# Patient Record
Sex: MALE | Race: WHITE | NOT HISPANIC OR LATINO | Employment: OTHER | URBAN - METROPOLITAN AREA
[De-identification: names, ages, dates, MRNs, and addresses within clinical notes are randomized per-mention and may not be internally consistent; named-entity substitution may affect disease eponyms.]

---

## 2019-06-20 ENCOUNTER — TRANSFERRED RECORDS (OUTPATIENT)
Dept: HEALTH INFORMATION MANAGEMENT | Facility: CLINIC | Age: 64
End: 2019-06-20

## 2020-02-24 ENCOUNTER — OFFICE VISIT (OUTPATIENT)
Dept: NEPHROLOGY | Facility: CLINIC | Age: 65
End: 2020-02-24
Payer: MEDICARE

## 2020-02-24 VITALS
HEART RATE: 91 BPM | BODY MASS INDEX: 31.33 KG/M2 | WEIGHT: 231 LBS | SYSTOLIC BLOOD PRESSURE: 134 MMHG | OXYGEN SATURATION: 98 % | DIASTOLIC BLOOD PRESSURE: 83 MMHG

## 2020-02-24 DIAGNOSIS — D64.9 ANEMIA, UNSPECIFIED TYPE: Primary | ICD-10-CM

## 2020-02-24 DIAGNOSIS — N18.30 CKD (CHRONIC KIDNEY DISEASE) STAGE 3, GFR 30-59 ML/MIN (H): ICD-10-CM

## 2020-02-24 DIAGNOSIS — Z86.79 HX OF AORTIC ANEURYSM: ICD-10-CM

## 2020-02-24 LAB
ALBUMIN SERPL-MCNC: 3.3 G/DL (ref 3.4–5)
ALBUMIN UR-MCNC: 100 MG/DL
ANION GAP SERPL CALCULATED.3IONS-SCNC: 5 MMOL/L (ref 3–14)
APPEARANCE UR: CLEAR
BILIRUB UR QL STRIP: NEGATIVE
BUN SERPL-MCNC: 31 MG/DL (ref 7–30)
CALCIUM SERPL-MCNC: 8.8 MG/DL (ref 8.5–10.1)
CHLORIDE SERPL-SCNC: 110 MMOL/L (ref 94–109)
CO2 SERPL-SCNC: 29 MMOL/L (ref 20–32)
COLOR UR AUTO: YELLOW
CREAT SERPL-MCNC: 1.45 MG/DL (ref 0.66–1.25)
CREAT UR-MCNC: 195 MG/DL
FERRITIN SERPL-MCNC: 124 NG/ML (ref 26–388)
GFR SERPL CREATININE-BSD FRML MDRD: 50 ML/MIN/{1.73_M2}
GLUCOSE SERPL-MCNC: 101 MG/DL (ref 70–99)
GLUCOSE UR STRIP-MCNC: NEGATIVE MG/DL
HGB BLD-MCNC: 11.1 G/DL (ref 13.3–17.7)
HGB UR QL STRIP: NEGATIVE
IRON SATN MFR SERPL: 13 % (ref 15–46)
IRON SERPL-MCNC: 36 UG/DL (ref 35–180)
KETONES UR STRIP-MCNC: NEGATIVE MG/DL
LEUKOCYTE ESTERASE UR QL STRIP: NEGATIVE
MUCOUS THREADS #/AREA URNS LPF: PRESENT /LPF
NITRATE UR QL: NEGATIVE
PH UR STRIP: 5.5 PH (ref 5–7)
PHOSPHATE SERPL-MCNC: 3.2 MG/DL (ref 2.5–4.5)
POTASSIUM SERPL-SCNC: 4.4 MMOL/L (ref 3.4–5.3)
PROT UR-MCNC: 1.34 G/L
PROT/CREAT 24H UR: 0.69 G/G CR (ref 0–0.2)
PTH-INTACT SERPL-MCNC: 100 PG/ML (ref 18–80)
RBC #/AREA URNS AUTO: ABNORMAL /HPF
SODIUM SERPL-SCNC: 144 MMOL/L (ref 133–144)
SOURCE: ABNORMAL
SP GR UR STRIP: 1.02 (ref 1–1.03)
TIBC SERPL-MCNC: 276 UG/DL (ref 240–430)
UROBILINOGEN UR STRIP-MCNC: 2 MG/DL (ref 0–2)
WBC #/AREA URNS AUTO: ABNORMAL /HPF

## 2020-02-24 PROCEDURE — 81001 URINALYSIS AUTO W/SCOPE: CPT | Performed by: INTERNAL MEDICINE

## 2020-02-24 PROCEDURE — 99204 OFFICE O/P NEW MOD 45 MIN: CPT | Performed by: INTERNAL MEDICINE

## 2020-02-24 PROCEDURE — 83550 IRON BINDING TEST: CPT | Performed by: INTERNAL MEDICINE

## 2020-02-24 PROCEDURE — 82728 ASSAY OF FERRITIN: CPT | Performed by: INTERNAL MEDICINE

## 2020-02-24 PROCEDURE — 83970 ASSAY OF PARATHORMONE: CPT | Performed by: INTERNAL MEDICINE

## 2020-02-24 PROCEDURE — 85018 HEMOGLOBIN: CPT | Performed by: INTERNAL MEDICINE

## 2020-02-24 PROCEDURE — 80069 RENAL FUNCTION PANEL: CPT | Performed by: INTERNAL MEDICINE

## 2020-02-24 PROCEDURE — 36415 COLL VENOUS BLD VENIPUNCTURE: CPT | Performed by: INTERNAL MEDICINE

## 2020-02-24 PROCEDURE — 83540 ASSAY OF IRON: CPT | Performed by: INTERNAL MEDICINE

## 2020-02-24 PROCEDURE — 84156 ASSAY OF PROTEIN URINE: CPT | Performed by: INTERNAL MEDICINE

## 2020-02-24 RX ORDER — PANTOPRAZOLE SODIUM 40 MG/1
1 FOR SUSPENSION ORAL DAILY
COMMUNITY
End: 2020-10-12

## 2020-02-24 RX ORDER — CLOPIDOGREL BISULFATE 75 MG/1
75 TABLET ORAL DAILY
COMMUNITY
End: 2020-10-12

## 2020-02-24 ASSESSMENT — PAIN SCALES - GENERAL: PAINLEVEL: NO PAIN (0)

## 2020-02-24 NOTE — PROGRESS NOTES
"2/24/20  CC: CKD    HPI: Min Andino is a 64 year old male who presents for evaluation of CKD. I last saw Mr. Andino in 2014. To review from my previous note: Mr. Andino's past medical hx was unremarkable leading up to Dec 2013 when he noted chest tightness. He was seen at [facility] at that time and noted to have aortic dissection. He was initially monitored but later underwent aortic repair/aortic valve replacement/CABG in early April 2014. His post op course was complicated by the need for ECMO as well as pressor support. His wife reports today that he was told that he has \"normal\" creatinine levels when undergoing physicals in the past. His creatinine was 2.2 post surgery in April 2014 but improved to 1.3 at the time of discharge in April. 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 At that time his creatinine was 2.2 post op but improved to 1.3. Since that time, creatinine readings have included 1.48 on 7/17/14, 1.39 no 7/27/14, and 1.35 on 8/13/14. 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      02/24/20: today he presents to reestablish care in our clinic. His creatinine was stable at ~ 1.1 on last check in 2014 but is now at a new baseline of ~ 1.6 since august. Mr. Andino underwent thoracoabdominal aortic repair august 2019. The placement of this stent compromised blood flow to the left kidney moiety which was noted on imaging later. His creatinine post this procedure was ~ 1.6. There have been a few episodes when the creatinine has increased, most recently a few weeks ago (to 1.9). With his most recent creatinine rise, lasix was held. He has not noted much change in his swelling or breathing with holding the lasix for the past few weeks. Today I noted SOB during our conversation. His oxygen saturation was fine but his breathing seemed labored. He and his wife report that this is his breathing " "baseline over the past 5 years and is not worse than typical. They are following weights at home and he has been dropping weight - no increase since stopping the lasix. He does have difficulty with empyting his bladder - last imaging in the fall showed no hydronephrosis. Flomax did not help him in the past. He has a HHN coming once a week to the house. He is not lightheaded. He has plans to see cardiology at [place] in March, is looking to establish care in urology, is also looking to establish care with internal medicine potentially in our clinic for continuity in one location.      No Known Allergies    atorvastatin (LIPITOR) 40 MG tablet, Take 40 mg by mouth daily  clopidogrel (PLAVIX) 75 MG tablet, Take 75 mg by mouth daily  Ferrous Gluconate (IRON 27 PO), Take by mouth 2 times daily  metoprolol (LOPRESSOR) 25 MG tablet, Take 12.5 mg by mouth 2 times daily   multivitamin, therapeutic with minerals (MULTI-VITAMIN) TABS, Take 1 tablet by mouth daily  pantoprazole sodium (PROTONIX) 40 MG packet, Take 1 packet by mouth daily  warfarin (COUMADIN) 1 MG tablet, Take by mouth daily    No current facility-administered medications on file prior to visit.       Past Medical History:   Diagnosis Date     Aortic dissection (H)      Aortic root aneurysm (H)      C. difficile colitis     April 2014 following surgery     Connective tissue disease (H)     \"probable\" per [facility] ER Notes     DVT (deep venous thrombosis) (H)     April 2014 following surgery     Horseshoe kidney      Hypertension      Valvular cardiomyopathy (H)        Past Surgical History:   Procedure Laterality Date     AORTIC VALVE REPLACEMENT  4/7/14     ARTHROSCOPY KNEE RT/LT  2005    left, repair meniscus     C CABG, ARTERIAL, SINGLE  4/7/14     C REPAIR CRUCIATE LIGAMENT,KNEE  1999    left     REPAIR AORTIC ROOT  4/7/14    surgery followed by ECMO, vasopressor therapy       Social History     Tobacco Use     Smoking status: Never Smoker     Smokeless tobacco: " Never Used   Substance Use Topics     Alcohol use: Yes     Comment: reports very little etoh use.      Drug use: No       Family History   Problem Relation Age of Onset     Family History Negative Father      C.A.D. No family hx of      Diabetes No family hx of      Hypertension No family hx of      Aneurysm Unknown         family hx       ROS: A 4 system review of systems was negative other than noted here or above.     Exam:  /83 (BP Location: Right arm, Patient Position: Sitting, Cuff Size: Adult Large)   Pulse 91   Wt 104.8 kg (231 lb)   SpO2 98%   BMI 31.33 kg/m      GENERAL APPEARANCE: alert and no distress  HEENT: nc/at  RESP: lungs clear to auscultation   CV: regular rhythm, normal rate, no rub  Extremities: no clubbing, cyanosis, +1 edema in the LLE, +_2 edema in the LLE  SKIN: no rash  NEURO: mentation intact and speech normal  PSYCH: affect normal/bright    Results  Office Visit on 02/24/2020   Component Date Value Ref Range Status     Sodium 02/24/2020 144  133 - 144 mmol/L Final     Potassium 02/24/2020 4.4  3.4 - 5.3 mmol/L Final     Chloride 02/24/2020 110* 94 - 109 mmol/L Final     Carbon Dioxide 02/24/2020 29  20 - 32 mmol/L Final     Anion Gap 02/24/2020 5  3 - 14 mmol/L Final     Glucose 02/24/2020 101* 70 - 99 mg/dL Final    Non Fasting     Urea Nitrogen 02/24/2020 31* 7 - 30 mg/dL Final     Creatinine 02/24/2020 1.45* 0.66 - 1.25 mg/dL Final     GFR Estimate 02/24/2020 50* >60 mL/min/[1.73_m2] Final    Comment: Non  GFR Calc  Starting 12/18/2018, serum creatinine based estimated GFR (eGFR) will be   calculated using the Chronic Kidney Disease Epidemiology Collaboration   (CKD-EPI) equation.       GFR Estimate If Black 02/24/2020 58* >60 mL/min/[1.73_m2] Final    Comment:  GFR Calc  Starting 12/18/2018, serum creatinine based estimated GFR (eGFR) will be   calculated using the Chronic Kidney Disease Epidemiology Collaboration   (CKD-EPI) equation.        Calcium 02/24/2020 8.8  8.5 - 10.1 mg/dL Final     Phosphorus 02/24/2020 3.2  2.5 - 4.5 mg/dL Final     Albumin 02/24/2020 3.3* 3.4 - 5.0 g/dL Final     Hemoglobin 02/24/2020 11.1* 13.3 - 17.7 g/dL Final     Color Urine 02/24/2020 Yellow   Final     Appearance Urine 02/24/2020 Clear   Final     Glucose Urine 02/24/2020 Negative  NEG^Negative mg/dL Final     Bilirubin Urine 02/24/2020 Negative  NEG^Negative Final     Ketones Urine 02/24/2020 Negative  NEG^Negative mg/dL Final     Specific Gravity Urine 02/24/2020 1.021  1.003 - 1.035 Final     Blood Urine 02/24/2020 Negative  NEG^Negative Final     pH Urine 02/24/2020 5.5  5.0 - 7.0 pH Final     Protein Albumin Urine 02/24/2020 100* NEG^Negative mg/dL Final     Urobilinogen mg/dL 02/24/2020 2.0  0.0 - 2.0 mg/dL Final     Nitrite Urine 02/24/2020 Negative  NEG^Negative Final     Leukocyte Esterase Urine 02/24/2020 Negative  NEG^Negative Final     Source 02/24/2020 Midstream Urine   Final    :  Assessment/Plan:  1. CKD Stage 3: has CKD in the setting of previous ULICES as well as vascular compromise to the left kidney moiety from procedure in August 2019. Addt ULICES recently which was likely hemodynamic in the setting of poor oral intake and being on lasix. Will get urine studies as well today.     2. Hypertension: at goal of <130/80    3. Anemia:hemoglobin 11.1. Will get iron studies.     4. Aneurysms/AVR: following with [facility] - defer workup for underlying etiology of his vascular complications to [facility].     5. Urinary retention: recommend seeing urology for addt evaluation.     Patient Instructions   Dr. Espinosa in urology if you are interested in seeing urology here at our facility  Internal medicine is available here: Dr. Blair    Lab and urine tests today.         Madelin Vallecillo, DO

## 2020-02-24 NOTE — PATIENT INSTRUCTIONS
Dr. Espinosa in urology if you are interested in seeing urology here at our facility  Internal medicine is available here: Dr. Blair    Lab and urine tests today.

## 2020-02-24 NOTE — NURSING NOTE
Min Andino's goals for this visit include:   Chief Complaint   Patient presents with     Consult     LOV 2014  has been seen at [facility]       He requests these members of his care team be copied on today's visit information: no    PCP: Don Mc    Referring Provider:  No referring provider defined for this encounter.    /83 (BP Location: Right arm, Patient Position: Sitting, Cuff Size: Adult Large)   Pulse 91   Wt 104.8 kg (231 lb)   SpO2 98%   BMI 31.33 kg/m      Do you need any medication refills at today's visit? No    Lizet Cary LPN

## 2020-02-25 DIAGNOSIS — Z86.79 HX OF AORTIC ANEURYSM: Primary | ICD-10-CM

## 2020-03-10 PROBLEM — Z95.828 HISTORY OF ENDOVASCULAR STENT GRAFT FOR ABDOMINAL AORTIC ANEURYSM (AAA): Status: ACTIVE | Noted: 2019-08-12

## 2020-03-10 PROBLEM — R06.02 SOB (SHORTNESS OF BREATH): Status: ACTIVE | Noted: 2020-02-10

## 2020-03-10 PROBLEM — R60.0 FLUID RETENTION IN LEGS: Status: ACTIVE | Noted: 2020-02-10

## 2020-03-10 PROBLEM — K21.9 GASTROESOPHAGEAL REFLUX DISEASE: Status: ACTIVE | Noted: 2019-08-01

## 2020-03-10 PROBLEM — I72.4 PSEUDOANEURYSM OF FEMORAL ARTERY (H): Status: ACTIVE | Noted: 2019-12-22

## 2020-03-10 PROBLEM — I11.9 HYPERTENSIVE HEART DISEASE WITHOUT HEART FAILURE: Status: ACTIVE | Noted: 2019-08-01

## 2020-03-10 PROBLEM — I50.9 CONGESTIVE HEART FAILURE (H): Status: ACTIVE | Noted: 2020-01-27

## 2020-03-10 PROBLEM — Z95.1 PRESENCE OF AORTOCORONARY BYPASS GRAFT: Status: ACTIVE | Noted: 2019-12-22

## 2020-03-10 PROBLEM — R79.89 HIGH SERUM CREATININE: Status: ACTIVE | Noted: 2019-08-16

## 2020-03-10 PROBLEM — E78.5 HYPERLIPIDEMIA: Status: ACTIVE | Noted: 2019-08-01

## 2020-03-10 NOTE — PROGRESS NOTES
Subjective     Min Andino is a 64 year old male who presents to clinic today for the following health issues:    HPI   New Patient/Transfer of Care; Establish Care      Pt states that he is here to Establish care on today and to talk to the provider about previously health concerns which some of them are being already care for. Pt states that does not have further concerns on today visit as well.     64-year-old gentleman comes in accompanied by his wife to establish care.  He has had a lot of vascular problem for which he goes to [facility].  More recently he has been seen by Dr. Silva and nephrology at our clinic for chronic kidney disease.    The patient's history is significant for having had aortic root and aortic valve replacement by mechanical valve as well as CABG x1 graft on 4/7/2014.  In June 2014 he had type B dissecting thoracoabdominal aneurysm repair by graft.  Postsurgically he had acute kidney failure.  Patient has known horseshoe kidney.  Also has history of heart failure related to valvular disease, anxiety disorder and felt to have connective tissue disorder.    More recently he underwent bilateral iliac artery aneurysm repair and bilateral iliac branch devices as well as superior mesenteric artery stenosis on 12/18/2019.  On 12/22/2019 he was hospitalized for right femoral pseudoaneurysm repair.  It sounds like it was discovered that the stent to the left renal artery is blocked.    He complains of weakness and shortness of breath has had the symptoms since surgery though gradually getting better.      Patient Active Problem List   Diagnosis     CARDIOVASCULAR SCREENING; LDL GOAL LESS THAN 160     Hypertension, goal below 140/90     Anemia     CKD (chronic kidney disease) stage 3, GFR 30-59 ml/min (H)     Hx of aortic aneurysm     Aneurysm of iliac artery (H)     Anticoagulation monitoring, INR range 2-3     Aortic dissection (H)     Aortic regurgitation     Callus     Congestive heart  failure (H)     Coronary atherosclerosis     Delirium, acute     Fluid retention in legs     Gastroesophageal reflux disease     High serum creatinine     Hyperlipidemia     Hypertensive heart disease without heart failure     Mechanical heart valve present     Presence of aortocoronary bypass graft     Presence of prosthetic heart valve     Pseudoaneurysm of femoral artery (H)     History of endovascular stent graft for abdominal aortic aneurysm (AAA)     S/P AVR (aortic valve replacement)     SOB (shortness of breath)     Tailors bunion     Thoracoabdominal aortic aneurysm (TAAA) (H)     Vitamin D deficiency     Past Surgical History:   Procedure Laterality Date     AORTIC VALVE REPLACEMENT  4/7/14     ARTHROSCOPY KNEE RT/LT  2005    left, repair meniscus     C CABG, ARTERIAL, SINGLE  4/7/14     C REPAIR CRUCIATE LIGAMENT,KNEE  1999    left     REPAIR AORTIC ROOT  4/7/14    surgery followed by ECMO, vasopressor therapy       Social History     Tobacco Use     Smoking status: Never Smoker     Smokeless tobacco: Never Used   Substance Use Topics     Alcohol use: Yes     Comment: reports very little etoh use.      Family History   Problem Relation Age of Onset     Family History Negative Father      C.A.D. No family hx of      Diabetes No family hx of      Hypertension No family hx of      Aneurysm Unknown         family hx         Current Outpatient Medications   Medication Sig Dispense Refill     atorvastatin (LIPITOR) 40 MG tablet Take 40 mg by mouth daily       clopidogrel (PLAVIX) 75 MG tablet Take 75 mg by mouth daily       Ferrous Gluconate (IRON 27 PO) Take by mouth 2 times daily       furosemide (LASIX) 20 MG tablet Take 20 mg by mouth daily       metoprolol (LOPRESSOR) 25 MG tablet Take 12.5 mg by mouth 2 times daily        multivitamin, therapeutic with minerals (MULTI-VITAMIN) TABS Take 1 tablet by mouth daily       pantoprazole sodium (PROTONIX) 40 MG packet Take 1 packet by mouth daily       warfarin  (COUMADIN) 1 MG tablet Take by mouth daily       No Known Allergies  BP Readings from Last 3 Encounters:   03/11/20 124/79   02/24/20 134/83   10/13/14 101/55    Wt Readings from Last 3 Encounters:   03/11/20 105.1 kg (231 lb 12.8 oz)   02/24/20 104.8 kg (231 lb)   10/13/14 100.2 kg (221 lb)                    Reviewed and updated as needed this visit by Provider         Review of Systems   ROS COMP: Constitutional, HEENT, cardiovascular, pulmonary, GI, , musculoskeletal, neuro, skin, endocrine and psych systems are negative, except as otherwise noted.      Objective    There were no vitals taken for this visit.  There is no height or weight on file to calculate BMI.  Physical Exam   GENERAL: healthy, alert and no distress  NECK: no adenopathy, no asymmetry, masses, or scars and thyroid normal to palpation  RESP: lungs clear to auscultation - no rales, rhonchi or wheezes  CV: regular rates and rhythm, normal S1 S2, no S3 or S4, heart valve click and 2+ bilateral lower extremity pitting edema to knee    ABDOMEN: soft, nontender, no hepatosplenomegaly, no masses and bowel sounds normal  MS: no gross musculoskeletal defects noted, no edema    Diagnostic Test Results:  Labs reviewed in Epic        Assessment & Plan     1.  Chronic kidney disease stage III probably related to postsurgical acute renal failure as well as left renal artery stent obstruction.  His creatinine recently was 1.45 but since then he has been started Lasix 20 mg a day.  Creatinine today is 1.65 and GFR 43.  Seems stable.  2.  Anemia postoperatively gradually getting better.  3.  Essential hypertension with blood pressure under control.  4.  Status post mechanical aortic valve replacement 2014.  Has a bileaflet valve.  Anticoagulated Coumadin.  5.  Pure hypercholesterolemia been treated with atorvastatin 40 mg a day.  Cholesterol 146, triglyceride 96, HDL 34 and LDL 93 measured on 10/10/2019.  6.  Biventricular heart failure with stress echo  "showing EF around 45 to 50% with mild Robles reduced right ventricular systolic function.  7.  Atherosclerotic coronary artery disease with status post single vessel bypass x1 and repair of the aortic root aneurysm along with replacement of the aortic valve June 2014.  8.  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  9.  Status post bilateral iliac artery aneurysm repair as well as superior mesenteric artery stent and stenting of the hypogastric artery in December 2019.  12.  Bilateral leg edema.  13.  GERD with significant symptoms.  14.  Known horseshoe kidney.  15.  Suspected connective tissue disorder.  16.  Obesity.    I will get back to the patient with results of BMP and INR today.      Total time spent 60 minutes with greater than 50% of time spent in care coordination, face-to-face consultation and review of records.    BMI:   Estimated body mass index is 32.09 kg/m  as calculated from the following:    Height as of this encounter: 1.81 m (5' 11.26\").    Weight as of this encounter: 105.1 kg (231 lb 12.8 oz).       No follow-ups on file.    John Blair MD  [facility]    "

## 2020-03-11 ENCOUNTER — OFFICE VISIT (OUTPATIENT)
Dept: PEDIATRICS | Facility: CLINIC | Age: 65
End: 2020-03-11
Payer: MEDICARE

## 2020-03-11 VITALS
TEMPERATURE: 96.4 F | DIASTOLIC BLOOD PRESSURE: 79 MMHG | SYSTOLIC BLOOD PRESSURE: 124 MMHG | HEIGHT: 71 IN | HEART RATE: 90 BPM | OXYGEN SATURATION: 96 % | RESPIRATION RATE: 20 BRPM | WEIGHT: 231.8 LBS | BODY MASS INDEX: 32.45 KG/M2

## 2020-03-11 DIAGNOSIS — I10 HYPERTENSION, GOAL BELOW 140/90: ICD-10-CM

## 2020-03-11 DIAGNOSIS — Z79.01 ANTICOAGULATION MONITORING, INR RANGE 2-3: ICD-10-CM

## 2020-03-11 DIAGNOSIS — I50.82 BIVENTRICULAR CONGESTIVE HEART FAILURE (H): ICD-10-CM

## 2020-03-11 DIAGNOSIS — I25.10 ATHEROSCLEROSIS OF CORONARY ARTERY OF NATIVE HEART WITHOUT ANGINA PECTORIS, UNSPECIFIED VESSEL OR LESION TYPE: ICD-10-CM

## 2020-03-11 DIAGNOSIS — Z95.1 PRESENCE OF AORTOCORONARY BYPASS GRAFT: ICD-10-CM

## 2020-03-11 DIAGNOSIS — E78.00 PURE HYPERCHOLESTEROLEMIA: ICD-10-CM

## 2020-03-11 DIAGNOSIS — Z95.2 S/P AVR (AORTIC VALVE REPLACEMENT): ICD-10-CM

## 2020-03-11 DIAGNOSIS — K21.9 GASTROESOPHAGEAL REFLUX DISEASE WITHOUT ESOPHAGITIS: ICD-10-CM

## 2020-03-11 DIAGNOSIS — D64.9 ANEMIA, UNSPECIFIED TYPE: ICD-10-CM

## 2020-03-11 DIAGNOSIS — N18.30 CKD (CHRONIC KIDNEY DISEASE) STAGE 3, GFR 30-59 ML/MIN (H): Primary | ICD-10-CM

## 2020-03-11 LAB
ANION GAP SERPL CALCULATED.3IONS-SCNC: 3 MMOL/L (ref 3–14)
BUN SERPL-MCNC: 33 MG/DL (ref 7–30)
CALCIUM SERPL-MCNC: 8.8 MG/DL (ref 8.5–10.1)
CHLORIDE SERPL-SCNC: 107 MMOL/L (ref 94–109)
CO2 SERPL-SCNC: 32 MMOL/L (ref 20–32)
CREAT SERPL-MCNC: 1.65 MG/DL (ref 0.66–1.25)
GFR SERPL CREATININE-BSD FRML MDRD: 43 ML/MIN/{1.73_M2}
GLUCOSE SERPL-MCNC: 97 MG/DL (ref 70–99)
INR PPP: 2.41 (ref 0.86–1.14)
POTASSIUM SERPL-SCNC: 4.4 MMOL/L (ref 3.4–5.3)
SODIUM SERPL-SCNC: 142 MMOL/L (ref 133–144)

## 2020-03-11 PROCEDURE — 85610 PROTHROMBIN TIME: CPT | Performed by: INTERNAL MEDICINE

## 2020-03-11 PROCEDURE — 80048 BASIC METABOLIC PNL TOTAL CA: CPT | Performed by: INTERNAL MEDICINE

## 2020-03-11 PROCEDURE — 36415 COLL VENOUS BLD VENIPUNCTURE: CPT | Performed by: INTERNAL MEDICINE

## 2020-03-11 PROCEDURE — 99205 OFFICE O/P NEW HI 60 MIN: CPT | Performed by: INTERNAL MEDICINE

## 2020-03-11 RX ORDER — FUROSEMIDE 20 MG
20 TABLET ORAL DAILY
COMMUNITY
End: 2020-03-16

## 2020-03-11 ASSESSMENT — MIFFLIN-ST. JEOR: SCORE: 1862.69

## 2020-03-11 ASSESSMENT — PAIN SCALES - GENERAL: PAINLEVEL: NO PAIN (0)

## 2020-03-15 ENCOUNTER — MYC MEDICAL ADVICE (OUTPATIENT)
Dept: PEDIATRICS | Facility: CLINIC | Age: 65
End: 2020-03-15

## 2020-03-15 DIAGNOSIS — R60.0 BILATERAL LEG EDEMA: Primary | ICD-10-CM

## 2020-03-16 DIAGNOSIS — N18.30 CKD (CHRONIC KIDNEY DISEASE) STAGE 3, GFR 30-59 ML/MIN (H): Primary | ICD-10-CM

## 2020-03-16 RX ORDER — FUROSEMIDE 20 MG
20 TABLET ORAL DAILY
Qty: 90 TABLET | Refills: 1 | Status: SHIPPED | OUTPATIENT
Start: 2020-03-16 | End: 2020-05-31

## 2020-03-26 DIAGNOSIS — Z79.01 LONG TERM (CURRENT) USE OF ANTICOAGULANTS: Primary | ICD-10-CM

## 2020-04-02 DIAGNOSIS — Z86.79 HX OF AORTIC ANEURYSM: ICD-10-CM

## 2020-04-02 DIAGNOSIS — N18.30 CKD (CHRONIC KIDNEY DISEASE) STAGE 3, GFR 30-59 ML/MIN (H): ICD-10-CM

## 2020-04-02 LAB
ANION GAP SERPL CALCULATED.3IONS-SCNC: 6 MMOL/L (ref 3–14)
BUN SERPL-MCNC: 39 MG/DL (ref 7–30)
CALCIUM SERPL-MCNC: 8.7 MG/DL (ref 8.5–10.1)
CHLORIDE SERPL-SCNC: 110 MMOL/L (ref 94–109)
CO2 SERPL-SCNC: 27 MMOL/L (ref 20–32)
CREAT SERPL-MCNC: 1.73 MG/DL (ref 0.66–1.25)
GFR SERPL CREATININE-BSD FRML MDRD: 40 ML/MIN/{1.73_M2}
GLUCOSE SERPL-MCNC: 107 MG/DL (ref 70–99)
INR BLD: 2.1 (ref 0.86–1.14)
POTASSIUM SERPL-SCNC: 4.1 MMOL/L (ref 3.4–5.3)
SODIUM SERPL-SCNC: 143 MMOL/L (ref 133–144)

## 2020-04-02 PROCEDURE — 85610 PROTHROMBIN TIME: CPT | Mod: QW | Performed by: INTERNAL MEDICINE

## 2020-04-02 PROCEDURE — 80048 BASIC METABOLIC PNL TOTAL CA: CPT | Performed by: INTERNAL MEDICINE

## 2020-04-02 PROCEDURE — 85610 PROTHROMBIN TIME: CPT | Performed by: INTERNAL MEDICINE

## 2020-04-02 PROCEDURE — 36415 COLL VENOUS BLD VENIPUNCTURE: CPT | Performed by: INTERNAL MEDICINE

## 2020-04-08 ENCOUNTER — MYC MEDICAL ADVICE (OUTPATIENT)
Dept: NEPHROLOGY | Facility: CLINIC | Age: 65
End: 2020-04-08

## 2020-04-08 DIAGNOSIS — N18.30 CKD (CHRONIC KIDNEY DISEASE) STAGE 3, GFR 30-59 ML/MIN (H): Primary | ICD-10-CM

## 2020-04-10 NOTE — TELEPHONE ENCOUNTER
Orders faxed.     Sukumar Ernst RN   [facility] Care Coordinator  [facility]

## 2020-05-05 ENCOUNTER — VIRTUAL VISIT (OUTPATIENT)
Dept: PEDIATRICS | Facility: CLINIC | Age: 65
End: 2020-05-05
Payer: MEDICARE

## 2020-05-05 DIAGNOSIS — I25.10 ATHEROSCLEROSIS OF NATIVE CORONARY ARTERY OF NATIVE HEART WITHOUT ANGINA PECTORIS: ICD-10-CM

## 2020-05-05 DIAGNOSIS — N18.30 CKD (CHRONIC KIDNEY DISEASE) STAGE 3, GFR 30-59 ML/MIN (H): ICD-10-CM

## 2020-05-05 DIAGNOSIS — R60.0 BILATERAL LEG EDEMA: Primary | ICD-10-CM

## 2020-05-05 DIAGNOSIS — I50.22 CHRONIC SYSTOLIC CONGESTIVE HEART FAILURE (H): ICD-10-CM

## 2020-05-05 DIAGNOSIS — I10 HYPERTENSION, GOAL BELOW 140/90: ICD-10-CM

## 2020-05-05 DIAGNOSIS — Z95.2 MECHANICAL HEART VALVE PRESENT: ICD-10-CM

## 2020-05-05 PROCEDURE — 99214 OFFICE O/P EST MOD 30 MIN: CPT | Mod: 95 | Performed by: INTERNAL MEDICINE

## 2020-05-05 NOTE — PROGRESS NOTES
"Min Andino is a 64 year old male who is being evaluated via a billable video visit.      The patient has been notified of following:     \"This video visit will be conducted via a call between you and your physician/provider. We have found that certain health care needs can be provided without the need for an in-person physical exam.  This service lets us provide the care you need with a video conversation.  If a prescription is necessary we can send it directly to your pharmacy.  If lab work is needed we can place an order for that and you can then stop by our lab to have the test done at a later time.    Video visits are billed at different rates depending on your insurance coverage.  Please reach out to your insurance provider with any questions.    If during the course of the call the physician/provider feels a video visit is not appropriate, you will not be charged for this service.\"    Patient has given verbal consent for Video visit? Yes    How would you like to obtain your AVS? MyChart    Patient would like the video invitation sent by: Send to e-mail at: [email or web address]    Will anyone else be joining your video visit? Yes: wife. How would they like to receive their invitation? Send to e-mail at: [email or web address]        Subjective     Min Andino is a 64 year old male who presents to clinic today for the following health issues:    HPI  Concern - swelling in bilateral feet  Onset: worst in the last month    Description:   Patient notes has swelling in bilateral feet since his surgery in December 2019. Notes has been worst in the last month with some redness, tightness and tenderness. Had surgery on the right leg. Patient has been monitoring his weight since the surgery and cutting out salt in his diet. Noted has gained about 10 lbs in the last month. Has some appointments scheduled with [facility] but would like to discuss his leg swelling. Has been using his compression stockings, " sometimes it will help and other times it will not. He used to have a home care nurse come out to his home to check in on him and she told him to monitor his swelling. Patient wanted to mnow if his lasix should be increased. Denies any fevers, chills or other symptoms.    Intensity: moderate    Progression of Symptoms:  worsening    Accompanying Signs & Symptoms:  Redness, tightness    Previous history of similar problem:   yes    Precipitating factors:   Worsened by:feels it when he takes off his shoes    Alleviating factors:  Improved by: none    Therapies Tried and outcome: compression     Patient has chronic SOB which is unchanged. His is eating better. He has noticed increase leg edema and 10 Lbs weight gain over the past month or more. He was started on Furosemide 20 mg last month, but that has not helped. He has complex medical issues as outline in problem list. Takes medication as prescribed. Has appointment next week at [facility].       Video Start Time: 11:02 am        Patient Active Problem List   Diagnosis     CARDIOVASCULAR SCREENING; LDL GOAL LESS THAN 160     Hypertension, goal below 140/90     Anemia     CKD (chronic kidney disease) stage 3, GFR 30-59 ml/min (H)     Hx of aortic aneurysm     Aneurysm of iliac artery (H)     Anticoagulation monitoring, INR range 2-3     Aortic dissection (H)     Aortic regurgitation     Callus     Congestive heart failure (H)     Coronary atherosclerosis     Delirium, acute     Bilateral leg edema     Gastroesophageal reflux disease     High serum creatinine     Hyperlipidemia     Hypertensive heart disease without heart failure     Mechanical heart valve present     Presence of aortocoronary bypass graft     Presence of prosthetic heart valve     Pseudoaneurysm of femoral artery (H)     History of endovascular stent graft for abdominal aortic aneurysm (AAA)     S/P AVR (aortic valve replacement)     SOB (shortness of breath)     Randys barrington      Thoracoabdominal aortic aneurysm (TAAA) (H)     Vitamin D deficiency     Past Surgical History:   Procedure Laterality Date     AORTIC VALVE REPLACEMENT  4/7/14     ARTHROSCOPY KNEE RT/LT  2005    left, repair meniscus     C CABG, ARTERIAL, SINGLE  4/7/14     C REPAIR CRUCIATE LIGAMENT,KNEE  1999    left     REPAIR AORTIC ROOT  4/7/14    surgery followed by ECMO, vasopressor therapy       Social History     Tobacco Use     Smoking status: Never Smoker     Smokeless tobacco: Never Used   Substance Use Topics     Alcohol use: Yes     Comment: reports very little etoh use.      Family History   Problem Relation Age of Onset     Family History Negative Father      Aneurysm Other         family hx     C.A.D. No family hx of      Diabetes No family hx of      Hypertension No family hx of          Current Outpatient Medications   Medication Sig Dispense Refill     atorvastatin (LIPITOR) 40 MG tablet Take 40 mg by mouth daily       clopidogrel (PLAVIX) 75 MG tablet Take 75 mg by mouth daily       Ferrous Gluconate (IRON 27 PO) Take by mouth 2 times daily       furosemide (LASIX) 20 MG tablet Take 1 tablet (20 mg) by mouth daily 90 tablet 1     metoprolol (LOPRESSOR) 25 MG tablet Take 12.5 mg by mouth 2 times daily        multivitamin, therapeutic with minerals (MULTI-VITAMIN) TABS Take 1 tablet by mouth daily       pantoprazole sodium (PROTONIX) 40 MG packet Take 1 packet by mouth daily       warfarin (COUMADIN) 1 MG tablet Take by mouth daily       No Known Allergies  BP Readings from Last 3 Encounters:   03/11/20 124/79   02/24/20 134/83   10/13/14 101/55    Wt Readings from Last 3 Encounters:   03/11/20 105.1 kg (231 lb 12.8 oz)   02/24/20 104.8 kg (231 lb)   10/13/14 100.2 kg (221 lb)                    Reviewed and updated as needed this visit by Provider         Review of Systems   ROS COMP: Constitutional, HEENT, cardiovascular, pulmonary, GI, , musculoskeletal, neuro, skin, endocrine and psych systems are  "negative, except as otherwise noted.      Objective    There were no vitals taken for this visit.  Estimated body mass index is 32.09 kg/m  as calculated from the following:    Height as of 3/11/20: 1.81 m (5' 11.26\").    Weight as of 3/11/20: 105.1 kg (231 lb 12.8 oz).  Physical Exam     GENERAL: healthy, alert and no distress  EYES: Eyes grossly normal to inspection, conjunctivae and sclerae normal  RESP: no audible wheeze, cough, or visible cyanosis.  No visible retractions or increased work of breathing.  Able to speak fully in complete sentences.  MS: 4+ edema to thigh bilateral.   NEURO: Cranial nerves grossly intact, mentation intact and speech normal  PSYCH: mentation appears normal, affect normal/bright, judgement and insight intact, normal speech and appearance well-groomed      Diagnostic Test Results:  Labs reviewed in Epic  Cr: 1.73 and GRF 40 on 04/02/2020. Electrolytes normal. Previous Cr. 1.65 and GRF 43 on 03/11/2020. Prior to that on 02/24/2020 Cr. 1.45 and GRF 50        Assessment & Plan     1. Bilateral leg edema with weight gain of 10 lbs. Patient has increase volume overload most likely related to CHF and worsening of CKD.  Advice to increase Furosemide to 40 mg once a day from 20 mg daily he currently takes. He has follow-up at [facility] next week. Renal function can be rechecked there.  2. CKD stage 3. Renal function has worsen in patient with acute or chronic kidney failure following vascular surgery and left renal artery stent obstruction.  3. Essential hypertension - controled  4. S/P Mechanical aortic valve replacement with bileaflet valve with repair of ascending aorta in June 2014. Anticoagulated with recent INR 2.1  5. Coronary Artery disease with S/P CABG X 1 graft June 2014.  6. Multiple vascular interventions as mentioned before.     BMI:   Estimated body mass index is 32.09 kg/m  as calculated from the following:    Height as of 3/11/20: 1.81 m (5' 11.26\").    Weight as of 3/11/20: " 105.1 kg (231 lb 12.8 oz).               No follow-ups on file.    John Blair MD  [facility]              Video-Visit Details    Type of service:  Video Visit    Video End Time:11:20 AM    Originating Location (pt. Location): Home    Distant Location (provider location):  [facility]     Platform used for Video Visit: [employer]    No follow-ups on file.       John Blair MD

## 2020-05-22 ENCOUNTER — MYC MEDICAL ADVICE (OUTPATIENT)
Dept: PEDIATRICS | Facility: CLINIC | Age: 65
End: 2020-05-22

## 2020-05-22 DIAGNOSIS — N18.30 CKD (CHRONIC KIDNEY DISEASE) STAGE 3, GFR 30-59 ML/MIN (H): Primary | ICD-10-CM

## 2020-05-22 DIAGNOSIS — I72.3 ANEURYSM OF ILIAC ARTERY (H): Primary | ICD-10-CM

## 2020-05-22 DIAGNOSIS — I71.50: ICD-10-CM

## 2020-05-22 DIAGNOSIS — D64.9 ANEMIA, UNSPECIFIED TYPE: ICD-10-CM

## 2020-05-22 DIAGNOSIS — Z86.79 HX OF AORTIC ANEURYSM: ICD-10-CM

## 2020-05-22 DIAGNOSIS — Z95.828 HISTORY OF ENDOVASCULAR STENT GRAFT FOR ABDOMINAL AORTIC ANEURYSM (AAA): ICD-10-CM

## 2020-05-22 DIAGNOSIS — N25.81 SECONDARY RENAL HYPERPARATHYROIDISM (H): ICD-10-CM

## 2020-05-26 DIAGNOSIS — D64.9 ANEMIA, UNSPECIFIED TYPE: ICD-10-CM

## 2020-05-26 DIAGNOSIS — N25.81 SECONDARY RENAL HYPERPARATHYROIDISM (H): ICD-10-CM

## 2020-05-26 DIAGNOSIS — N18.30 CKD (CHRONIC KIDNEY DISEASE) STAGE 3, GFR 30-59 ML/MIN (H): ICD-10-CM

## 2020-05-26 DIAGNOSIS — R60.0 BILATERAL LEG EDEMA: ICD-10-CM

## 2020-05-26 LAB
ALBUMIN SERPL-MCNC: 3.3 G/DL (ref 3.4–5)
ANION GAP SERPL CALCULATED.3IONS-SCNC: 5 MMOL/L (ref 3–14)
BUN SERPL-MCNC: 31 MG/DL (ref 7–30)
CALCIUM SERPL-MCNC: 8.1 MG/DL (ref 8.5–10.1)
CHLORIDE SERPL-SCNC: 110 MMOL/L (ref 94–109)
CO2 SERPL-SCNC: 28 MMOL/L (ref 20–32)
CREAT SERPL-MCNC: 1.75 MG/DL (ref 0.66–1.25)
ERYTHROCYTE [DISTWIDTH] IN BLOOD BY AUTOMATED COUNT: 17.9 % (ref 10–15)
FERRITIN SERPL-MCNC: 91 NG/ML (ref 26–388)
GFR SERPL CREATININE-BSD FRML MDRD: 40 ML/MIN/{1.73_M2}
GLUCOSE SERPL-MCNC: 99 MG/DL (ref 70–99)
HCT VFR BLD AUTO: 35.1 % (ref 40–53)
HGB BLD-MCNC: 11 G/DL (ref 13.3–17.7)
IRON SATN MFR SERPL: 14 % (ref 15–46)
IRON SERPL-MCNC: 44 UG/DL (ref 35–180)
MCH RBC QN AUTO: 29.5 PG (ref 26.5–33)
MCHC RBC AUTO-ENTMCNC: 31.3 G/DL (ref 31.5–36.5)
MCV RBC AUTO: 94 FL (ref 78–100)
PHOSPHATE SERPL-MCNC: 3.6 MG/DL (ref 2.5–4.5)
PLATELET # BLD AUTO: 155 10E9/L (ref 150–450)
POTASSIUM SERPL-SCNC: 4.4 MMOL/L (ref 3.4–5.3)
PTH-INTACT SERPL-MCNC: 118 PG/ML (ref 18–80)
RBC # BLD AUTO: 3.73 10E12/L (ref 4.4–5.9)
SODIUM SERPL-SCNC: 143 MMOL/L (ref 133–144)
TIBC SERPL-MCNC: 305 UG/DL (ref 240–430)
WBC # BLD AUTO: 6.2 10E9/L (ref 4–11)

## 2020-05-26 PROCEDURE — 83540 ASSAY OF IRON: CPT | Performed by: INTERNAL MEDICINE

## 2020-05-26 PROCEDURE — 36415 COLL VENOUS BLD VENIPUNCTURE: CPT | Performed by: INTERNAL MEDICINE

## 2020-05-26 PROCEDURE — 83550 IRON BINDING TEST: CPT | Performed by: INTERNAL MEDICINE

## 2020-05-26 PROCEDURE — 85027 COMPLETE CBC AUTOMATED: CPT | Performed by: INTERNAL MEDICINE

## 2020-05-26 PROCEDURE — 82306 VITAMIN D 25 HYDROXY: CPT | Performed by: INTERNAL MEDICINE

## 2020-05-26 PROCEDURE — 83970 ASSAY OF PARATHORMONE: CPT | Performed by: INTERNAL MEDICINE

## 2020-05-26 PROCEDURE — 82728 ASSAY OF FERRITIN: CPT | Performed by: INTERNAL MEDICINE

## 2020-05-26 PROCEDURE — 80069 RENAL FUNCTION PANEL: CPT | Performed by: INTERNAL MEDICINE

## 2020-05-27 ENCOUNTER — MYC MEDICAL ADVICE (OUTPATIENT)
Dept: PEDIATRICS | Facility: CLINIC | Age: 65
End: 2020-05-27

## 2020-05-27 ENCOUNTER — TELEPHONE (OUTPATIENT)
Dept: VASCULAR SURGERY | Facility: CLINIC | Age: 65
End: 2020-05-27
Payer: COMMERCIAL

## 2020-05-27 DIAGNOSIS — R60.0 BILATERAL LEG EDEMA: ICD-10-CM

## 2020-05-27 NOTE — TELEPHONE ENCOUNTER
[facility] Call Center    Phone Message    May a detailed message be left on voicemail: yes     Reason for Call: Other: Pt is internally referred by Dr John Blair in  system to Dr Gricelda Contreras for Aneurysm of iliac artery, and hx of endovascular stent graft of AAA. Pt has records at [facility] in [place] as well. Please call pt at home number to discuss, thanks!     Action Taken: Message routed to:  [facility] ([facility] Vascular Surgery    Travel Screening: Not Applicable

## 2020-05-27 NOTE — TELEPHONE ENCOUNTER
Wife called and is wondering when this referral will placed as nothing is in the chart.    Also wants to let know that it should be for ENDO Vascular.    Please contact wife when placed [phone] and can leave detailed message.

## 2020-05-28 LAB
DEPRECATED CALCIDIOL+CALCIFEROL SERPL-MC: <38 UG/L (ref 20–75)
VITAMIN D2 SERPL-MCNC: <5 UG/L
VITAMIN D3 SERPL-MCNC: 33 UG/L

## 2020-05-31 RX ORDER — FUROSEMIDE 20 MG
40 TABLET ORAL DAILY
Qty: 180 TABLET | Refills: 1 | Status: SHIPPED | OUTPATIENT
Start: 2020-05-31 | End: 2020-10-12

## 2020-06-01 ENCOUNTER — MYC MEDICAL ADVICE (OUTPATIENT)
Dept: NEPHROLOGY | Facility: CLINIC | Age: 65
End: 2020-06-01

## 2020-06-01 NOTE — TELEPHONE ENCOUNTER
Dr Vallecillo,  Please review the pt's lab results drawn on 5/26 and advise.     Mica Velasquez, RNCC  Neurology

## 2020-06-02 ENCOUNTER — DOCUMENTATION ONLY (OUTPATIENT)
Dept: NEPHROLOGY | Facility: CLINIC | Age: 65
End: 2020-06-02

## 2020-06-02 DIAGNOSIS — N18.30 CKD (CHRONIC KIDNEY DISEASE) STAGE 3, GFR 30-59 ML/MIN (H): Primary | ICD-10-CM

## 2020-06-02 NOTE — PROGRESS NOTES
Action 6.2.2020 MJ   Action Taken Received message from Nahomy. Requested all images performed on 5.13. and 5.14 from [place]. Sent request

## 2020-06-04 ENCOUNTER — DOCUMENTATION ONLY (OUTPATIENT)
Dept: CARE COORDINATION | Facility: CLINIC | Age: 65
End: 2020-06-04

## 2020-06-05 NOTE — TELEPHONE ENCOUNTER
New lab results drawn 6/4 pulled in from [facility] for Dr Vallecillo to review and advise.  Mica Velasquez, RNCC  Neurology

## 2020-06-08 ENCOUNTER — VIRTUAL VISIT (OUTPATIENT)
Dept: NEPHROLOGY | Facility: CLINIC | Age: 65
End: 2020-06-08
Payer: MEDICARE

## 2020-06-08 DIAGNOSIS — N18.30 CKD (CHRONIC KIDNEY DISEASE) STAGE 3, GFR 30-59 ML/MIN (H): Primary | ICD-10-CM

## 2020-06-08 DIAGNOSIS — D64.9 ANEMIA, UNSPECIFIED TYPE: ICD-10-CM

## 2020-06-08 DIAGNOSIS — N25.81 SECONDARY RENAL HYPERPARATHYROIDISM (H): ICD-10-CM

## 2020-06-08 DIAGNOSIS — I10 ESSENTIAL HYPERTENSION: ICD-10-CM

## 2020-06-08 PROCEDURE — 99214 OFFICE O/P EST MOD 30 MIN: CPT | Mod: GT | Performed by: INTERNAL MEDICINE

## 2020-06-08 NOTE — TELEPHONE ENCOUNTER
Patient has an office visit 6/8. Questions will be addressed at time of visit with Dr. Vallecillo.    Gricelda Salazar RN

## 2020-06-08 NOTE — PROGRESS NOTES
"06/08/20   CC: CKD    HPI: Min Andino is a 64 year old male who presents for evaluation of CKD. I last saw Mr. Andino in 2014. To review from my previous note: Mr. Andino's past medical hx was unremarkable leading up to Dec 2013 when he noted chest tightness. He was seen at [facility] at that time and noted to have aortic dissection. He was initially monitored but later underwent aortic repair/aortic valve replacement/CABG in early April 2014. His post op course was complicated by the need for ECMO as well as pressor support. His wife reports today that he was told that he has \"normal\" creatinine levels when undergoing physicals in the past. His creatinine was 2.2 post surgery in April 2014 but improved to 1.3 at the time of discharge in April. 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 At that time his creatinine was 2.2 post op but improved to 1.3. Since that time, creatinine readings have included 1.48 on 7/17/14, 1.39 no 7/27/14, and 1.35 on 8/13/14. 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      02/24/20: today he presents to reestablish care in our clinic. His creatinine was stable at ~ 1.1 on last check in 2014 but is now at a new baseline of ~ 1.6 since august. Mr. Andino underwent thoracoabdominal aortic repair august 2019. The placement of this stent compromised blood flow to the left kidney moiety which was noted on imaging later. His creatinine post this procedure was ~ 1.6. There have been a few episodes when the creatinine has increased, most recently a few weeks ago (to 1.9). With his most recent creatinine rise, lasix was held. He has not noted much change in his swelling or breathing with holding the lasix for the past few weeks. Today I noted SOB during our conversation. His oxygen saturation was fine but his breathing seemed labored. He and his wife report that this is his breathing " baseline over the past 5 years and is not worse than typical. They are following weights at home and he has been dropping weight - no increase since stopping the lasix. He does have difficulty with empyting his bladder - last imaging in the fall showed no hydronephrosis. Flomax did not help him in the past. He has a HHN coming once a week to the house. He is not lightheaded. He has plans to see cardiology at [place] in March, is looking to establish care in urology, is also looking to establish care with internal medicine potentially in our clinic for continuity in one location.       06/08/20:  Virtual visit. Lasix was increased last month. He reports that the swelling is there but better. He denies any change in eating. No diarrhea. Weight was 240 lbs down to 215 lbs. He had been losing 1 lb a day. He was in ED last week and  Dx with hernia. He held lasix since Friday - was on lasix 40 mg daily held over the weekend - weight was 215 lbs on Thursday and 217 lbs this AM. He denies lightheadedness/dizziness. No orthostatic sxs. He quit wearing compression stockings a week ago. He feels he has slightly more endurance; can't lift a lot, needing to take breaks. He feels his urinary retention is not as problematic - has not seen urology. He has been taking iron BID.        No Known Allergies    atorvastatin (LIPITOR) 40 MG tablet, Take 40 mg by mouth daily  Ferrous Gluconate (IRON 27 PO), Take by mouth 2 times daily  furosemide (LASIX) 20 MG tablet, Take 2 tablets (40 mg) by mouth daily  metoprolol (LOPRESSOR) 25 MG tablet, Take 12.5 mg by mouth 2 times daily   multivitamin, therapeutic with minerals (MULTI-VITAMIN) TABS, Take 1 tablet by mouth daily  warfarin (COUMADIN) 1 MG tablet, Take by mouth daily  clopidogrel (PLAVIX) 75 MG tablet, Take 75 mg by mouth daily  pantoprazole sodium (PROTONIX) 40 MG packet, Take 1 packet by mouth daily    No current facility-administered medications on file prior to visit.       Past  "Medical History:   Diagnosis Date     Aortic dissection (H)      Aortic root aneurysm (H)      C. difficile colitis     April 2014 following surgery     Connective tissue disease (H)     \"probable\" per [facility] Notes     DVT (deep venous thrombosis) (H)     April 2014 following surgery     Horseshoe kidney      Hypertension      Valvular cardiomyopathy (H)        Past Surgical History:   Procedure Laterality Date     AORTIC VALVE REPLACEMENT  4/7/14     ARTHROSCOPY KNEE RT/LT  2005    left, repair meniscus     C CABG, ARTERIAL, SINGLE  4/7/14     C REPAIR CRUCIATE LIGAMENT,KNEE  1999    left     REPAIR AORTIC ROOT  4/7/14    surgery followed by ECMO, vasopressor therapy       Social History     Tobacco Use     Smoking status: Never Smoker     Smokeless tobacco: Never Used   Substance Use Topics     Alcohol use: Yes     Comment: reports very little etoh use.      Drug use: No       Family History   Problem Relation Age of Onset     Family History Negative Father      Aneurysm Other         family hx     C.A.D. No family hx of      Diabetes No family hx of      Hypertension No family hx of        ROS: A 4 system review of systems was negative other than noted here or above.     Exam:  There were no vitals taken for this visit.    GENERAL: Healthy, alert and no distress  EYES: Eyes grossly normal to inspection.  No discharge or erythema, or obvious scleral/conjunctival abnormalities.  RESP: No audible wheeze, cough, or visible cyanosis.  No visible retractions or increased work of breathing.    SKIN: Visible skin clear. No significant rash, abnormal pigmentation or lesions.  NEURO: Cranial nerves grossly intact.  Mentation and speech appropriate for age.  PSYCH: Mentation appears normal, affect normal/bright, judgement and insight intact, normal speech and appearance well-groomed.   Results  No visits with results within 1 Day(s) from this visit.   Latest known visit with results is:   Orders Only on 05/26/2020 "   Component Date Value Ref Range Status     Ferritin 05/26/2020 91  26 - 388 ng/mL Final     Iron 05/26/2020 44  35 - 180 ug/dL Final     Iron Binding Cap 05/26/2020 305  240 - 430 ug/dL Final     Iron Saturation Index 05/26/2020 14* 15 - 46 % Final     25 OH Vit D2 05/26/2020 <5  ug/L Final     25 OH Vit D3 05/26/2020 33  ug/L Final     25 OH Vit D total 05/26/2020 <38  20 - 75 ug/L Final    Comment: Season, race, dietary intake, and treatment affect the concentration of   25-hydroxy-Vitamin D. Values may decrease during winter months and increase   during summer months. Values 20-29 ug/L may indicate Vitamin D insufficiency   and values <20 ug/L may indicate Vitamin D deficiency.  This test was developed and its performance characteristics determined by the   [facility].   It has not been cleared or approved by the FDA. The laboratory is regulated   under CLIA as qualified to perform high-complexity testing. This test is used   for clinical purposes. It should not be regarded as investigational or for   research.       Parathyroid Hormone Intact 05/26/2020 118* 18 - 80 pg/mL Final     Sodium 05/26/2020 143  133 - 144 mmol/L Final     Potassium 05/26/2020 4.4  3.4 - 5.3 mmol/L Final     Chloride 05/26/2020 110* 94 - 109 mmol/L Final     Carbon Dioxide 05/26/2020 28  20 - 32 mmol/L Final     Anion Gap 05/26/2020 5  3 - 14 mmol/L Final     Glucose 05/26/2020 99  70 - 99 mg/dL Final     Urea Nitrogen 05/26/2020 31* 7 - 30 mg/dL Final     Creatinine 05/26/2020 1.75* 0.66 - 1.25 mg/dL Final     GFR Estimate 05/26/2020 40* >60 mL/min/[1.73_m2] Final    Comment: Non  GFR Calc  Starting 12/18/2018, serum creatinine based estimated GFR (eGFR) will be   calculated using the Chronic Kidney Disease Epidemiology Collaboration   (CKD-EPI) equation.       GFR Estimate If Black 05/26/2020 46* >60 mL/min/[1.73_m2] Final    Comment:   GFR Calc  Starting 12/18/2018, serum creatinine based estimated GFR (eGFR) will be   calculated using the Chronic Kidney Disease Epidemiology Collaboration   (CKD-EPI) equation.       Calcium 05/26/2020 8.1* 8.5 - 10.1 mg/dL Final     Phosphorus 05/26/2020 3.6  2.5 - 4.5 mg/dL Final     Albumin 05/26/2020 3.3* 3.4 - 5.0 g/dL Final     WBC 05/26/2020 6.2  4.0 - 11.0 10e9/L Final     RBC Count 05/26/2020 3.73* 4.4 - 5.9 10e12/L Final     Hemoglobin 05/26/2020 11.0* 13.3 - 17.7 g/dL Final     Hematocrit 05/26/2020 35.1* 40.0 - 53.0 % Final     MCV 05/26/2020 94  78 - 100 fl Final     MCH 05/26/2020 29.5  26.5 - 33.0 pg Final     MCHC 05/26/2020 31.3* 31.5 - 36.5 g/dL Final    Results confirmed by repeat test     RDW 05/26/2020 17.9* 10.0 - 15.0 % Final     Platelet Count 05/26/2020 155  150 - 450 10e9/L Final     :  Assessment/Plan:  1. CKD Stage 3: has CKD in the setting of previous ULICES as well as vascular compromise to the left kidney moiety from procedure in August 2019. Addt ULICES recently which was likely hemodynamic in the setting of poor oral intake and being on lasix.    Decreasing lasix to 20 mg daily    2. Hypertension: asked to monitor home readings.     3. Anemia:hemoglobin 11.4 in May.   Iron level is low at 14% in May. Will increase iron to TID.     4. Aneurysms/AVR: following with [facility]     5. Urinary retention: recommend seeing urology for addt evaluation.     Patient Instructions   1. Restart lasix but just at 20 mg daily  2. Repeat labs on Wednesday - (scheduled 6/10 @ [facility])  3. Attempt three times a day iron supplement - decrease if constipation.   4. Goal blood pressure less than 130/80 - let me know if you are above this goal at home.   5. One month follow-up with A[facility] - virtual visit. 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      The patient has been notified of following:  "    \"This video visit will be conducted via a call between you and your physician/provider. We have found that certain health care needs can be provided without the need for an in-person physical exam.  This service lets us provide the care you need with a video conversation.  If a prescription is necessary we can send it directly to your pharmacy.  If lab work is needed we can place an order for that and you can then stop by our lab to have the test done at a later time.    Video visits are billed at different rates depending on your insurance coverage.  Please reach out to your insurance provider with any questions.    If during the course of the call the physician/provider feels a video visit is not appropriate, you will not be charged for this service.\"    Patient has given verbal consent for Video visit? YES    How would you like to obtain your AVS? [email or web address]     Patient would like the video invitation sent by: [employer] [email or web address]    Will anyone else be joining your video visit? Yes Wife Jalyn        Video-Visit Details    Type of service:  Video Visit    Video Start Time: 805 AM  Video End Time: 841 AM    Originating Location (pt. Location): Home    Distant Location (provider location):  [place]     Platform used for Video Visit: Иван Vallecillo MD        " oral

## 2020-06-08 NOTE — PATIENT INSTRUCTIONS
1. Restart lasix but just at 20 mg daily  2. Repeat labs on Wednesday - (scheduled 6/10 @ [facility])  3. Attempt three times a day iron supplement - decrease if constipation.   4. Goal blood pressure less than 130/80 - let me know if you are above this goal at home.   5. One month follow-up with [facility] - virtual visit. (scheduled 7/13 @ 2:30)

## 2020-06-09 NOTE — TELEPHONE ENCOUNTER
DIAGNOSIS: Aneurysm of iliac artery, and hx of endovascular stent graft of AAA per Nah[facility]  email invite for video visit   DATE RECEIVED: 6.10.20   NOTES STATUS DETAILS   OFFICE NOTE from referring provider Internal 5.22.20, 5.5.20, 3.11.20  Dr. Kristin Blair  [facility]   OFFICE NOTE from other specialist CE 3.31.20  Dr. Petar Gonzales  [facility]    2.10.20, 1.13.20  Katelynn Hassan, APRN CNP  [facility]    2.4.20  Doreen Thurston, JANIA [facility]    1.9.20, 1.6.20  Dr. Don Nichols    1.7.20  Viki Low, JANAI [facility]    12.22.19  Dr. Nae Lee  [facility]    OPERATIVE REPORT CE 12.22.19  Dr. Ranjan Morfin  [facility]    12.18.20  Dr. Jone Barrientos  [facility]   MEDICATION LIST Internal/CE    PERTINENT LABS CE/Internal    CTA (CT ANGIOGRAPHY) N/A    CT Pacs 6.4.20, 5.12.20  CT Chest/Abd/Pelvis   MRI N/A    ULTRASOUND Pacs 5.13.20  US Kidneys with Renal Artery

## 2020-06-10 ENCOUNTER — PRE VISIT (OUTPATIENT)
Dept: VASCULAR SURGERY | Facility: CLINIC | Age: 65
End: 2020-06-10

## 2020-06-10 ENCOUNTER — TELEPHONE (OUTPATIENT)
Dept: VASCULAR SURGERY | Facility: CLINIC | Age: 65
End: 2020-06-10

## 2020-06-10 DIAGNOSIS — N18.30 CKD (CHRONIC KIDNEY DISEASE) STAGE 3, GFR 30-59 ML/MIN (H): ICD-10-CM

## 2020-06-10 DIAGNOSIS — Z79.01 LONG TERM (CURRENT) USE OF ANTICOAGULANTS: ICD-10-CM

## 2020-06-10 LAB
ALBUMIN SERPL-MCNC: 3.6 G/DL (ref 3.4–5)
ANION GAP SERPL CALCULATED.3IONS-SCNC: 7 MMOL/L (ref 3–14)
BUN SERPL-MCNC: 41 MG/DL (ref 7–30)
CALCIUM SERPL-MCNC: 8.8 MG/DL (ref 8.5–10.1)
CHLORIDE SERPL-SCNC: 108 MMOL/L (ref 94–109)
CO2 SERPL-SCNC: 28 MMOL/L (ref 20–32)
CREAT SERPL-MCNC: 1.97 MG/DL (ref 0.66–1.25)
GFR SERPL CREATININE-BSD FRML MDRD: 35 ML/MIN/{1.73_M2}
GLUCOSE SERPL-MCNC: 95 MG/DL (ref 70–99)
INR PPP: 1.88 (ref 0.86–1.14)
PHOSPHATE SERPL-MCNC: 3.9 MG/DL (ref 2.5–4.5)
POTASSIUM SERPL-SCNC: 4.6 MMOL/L (ref 3.4–5.3)
SODIUM SERPL-SCNC: 143 MMOL/L (ref 133–144)

## 2020-06-10 PROCEDURE — 36415 COLL VENOUS BLD VENIPUNCTURE: CPT | Performed by: INTERNAL MEDICINE

## 2020-06-10 PROCEDURE — 85610 PROTHROMBIN TIME: CPT | Performed by: INTERNAL MEDICINE

## 2020-06-10 PROCEDURE — 80069 RENAL FUNCTION PANEL: CPT | Performed by: INTERNAL MEDICINE

## 2020-06-10 NOTE — TELEPHONE ENCOUNTER
Spoke with patient and his wife letting them know we are going to have to cancel today's appointment as [facility] has not pushed over his imaging yet, despite being requested to do son on 6/1. Pt/wife were very understanding and will be rescheduled as soon as we have the imaging in PACs.

## 2020-06-17 ENCOUNTER — VIRTUAL VISIT (OUTPATIENT)
Dept: GASTROENTEROLOGY | Facility: CLINIC | Age: 65
End: 2020-06-17
Payer: MEDICARE

## 2020-06-17 ENCOUNTER — MYC MEDICAL ADVICE (OUTPATIENT)
Dept: GASTROENTEROLOGY | Facility: CLINIC | Age: 65
End: 2020-06-17

## 2020-06-17 DIAGNOSIS — N18.30 CKD (CHRONIC KIDNEY DISEASE) STAGE 3, GFR 30-59 ML/MIN (H): Primary | ICD-10-CM

## 2020-06-17 DIAGNOSIS — K21.9 GASTROESOPHAGEAL REFLUX DISEASE, ESOPHAGITIS PRESENCE NOT SPECIFIED: Primary | ICD-10-CM

## 2020-06-17 PROCEDURE — 99204 OFFICE O/P NEW MOD 45 MIN: CPT | Mod: GT | Performed by: INTERNAL MEDICINE

## 2020-06-17 ASSESSMENT — PAIN SCALES - GENERAL: PAINLEVEL: NO PAIN (0)

## 2020-06-17 NOTE — PATIENT INSTRUCTIONS
Start taking nexium 40mg twice a day - take on an empty stomach and eat 30 to 60 minutes later.    Avoid eating within 3 hours of going to sleep.  Sleep with the head of your bed elevated if possible.      Patient Education     Tips to Control Acid Reflux    To control acid reflux, you ll need to make some basic diet and lifestyle changes. The simple steps outlined below may be all you ll need to ease discomfort.  Watch what you eat    Avoid fatty foods and spicy foods.    Eat fewer acidic foods, such as citrus and tomato-based foods. These can increase symptoms.    Limit drinking alcohol, caffeine, and fizzy beverages. All increase acid reflux.    Try limiting chocolate, peppermint, and spearmint. These can worsen acid reflux in some people.  Watch when you eat    Avoid lying down for 3 hours after eating.    Do not snack before going to bed.  Raise your head  Raising your head and upper body by 4 to 6 inches helps limit reflux when you re lying down. Put blocks under the head of your bed frame to raise it.  Other changes    Lose weight, if you need to    Don t exercise near bedtime    Avoid tight-fitting clothes    Limit ibuprofen    Stop smoking   Date Last Reviewed: 7/1/2016 2000-2019 [employer]. [address] [place] [address], [place] [address]. All rights reserved. This information is not intended as a substitute for professional medical care. Always follow your healthcare professional's instructions.

## 2020-06-17 NOTE — PROGRESS NOTES
"Min Anidno is a 64 year old male who is being evaluated via a billable video visit.      The patient has been notified of following:     \"This video visit will be conducted via a call between you and your physician/provider. We have found that certain health care needs can be provided without the need for an in-person physical exam.  This service lets us provide the care you need with a video conversation.  If a prescription is necessary we can send it directly to your pharmacy.  If lab work is needed we can place an order for that and you can then stop by our lab to have the test done at a later time.    Video visits are billed at different rates depending on your insurance coverage.  Please reach out to your insurance provider with any questions.    If during the course of the call the physician/provider feels a video visit is not appropriate, you will not be charged for this service.\"    Patient has given verbal consent for Video visit? Yes    Will anyone else be joining your video visit? Yes: Wife . How would they like to receive their invitation? Send to e-mail at: [email or web address]          Accompanying Signs & Symptoms:  Does it feel like food gets stuck: no   Nausea: no   Vomiting (bloody?): no   Abdominal Pain: no   Chronic cough for 5 years now.       Appointment today to discuss chronic cough thought to be due to LPR.  Previously had good response to nexium - this was switched to pantoprazole which did not work.  Recently switched back to nexium and has noticed some improvement but not complete resolution.  Coughs mainly throughout the day which is very disruptive to him - has been going on for years.  Has previously followed with ENT and was told his symptoms were likely due to reflux. No dysphagia or weight loss.  No changes in bowel habits.  Occasionally will cough so much he feels nauseated but no vomiting. Patient follows closely with vascular surgery at [facility] - had thoracoabdominal " "aortic aneurysm repair last year with complicated post-op course.  Remains on plavix and warfarin.  Patient also had recent ER visit a few weeks ago for inguinal hernia - planning for follow-up with surgery regarding what to do for this.     Due for colonoscopy he thinks - last was ~ 6 years ago and normal.  Had polyp on his initial c-scope at age 49 but has had 2 or 3 colonoscopies since which he reports were normal.    Past Medical History:   Diagnosis Date     Aortic dissection (H)      Aortic root aneurysm (H)      C. difficile colitis     April 2014 following surgery     Connective tissue disease (H)     \"probable\" per [facility] Notes     DVT (deep venous thrombosis) (H)     April 2014 following surgery     Horseshoe kidney      Hypertension      Valvular cardiomyopathy (H)        Past Surgical History:   Procedure Laterality Date     AORTIC VALVE REPLACEMENT  4/7/14     ARTHROSCOPY KNEE RT/LT  2005    left, repair meniscus     C CABG, ARTERIAL, SINGLE  4/7/14     C REPAIR CRUCIATE LIGAMENT,KNEE  1999    left     REPAIR AORTIC ROOT  4/7/14    surgery followed by ECMO, vasopressor therapy       Family History   Problem Relation Age of Onset     Family History Negative Father      Aneurysm Other         family hx     C.A.D. No family hx of      Diabetes No family hx of      Hypertension No family hx of        Social History     Tobacco Use     Smoking status: Never Smoker     Smokeless tobacco: Never Used   Substance Use Topics     Alcohol use: Yes     Comment: reports very little etoh use.        O:  Gen: well nourished male in NAD  HEENT: NCAT  Neck: normal ROM  Resp: nonlabored breathing, + cough  Neuro: no focal deficits  Psych: appropriate mood and affect        Assessment and Plan:    # possible LPR - will increase nexium to 40mg BID and monitor - discussed that it may take time for his symptoms to improve on his regimen.  Did discuss endoscopy with patient and his wife, thinks he may have had an EGD for " this around 2013 but is unsure, will try to attain records.  Will hold off on endoscopic evaluation at this time given patient's comorbidities, may consider EGD +/- bravo placement pending clinical response and review of previous records.    # reported history of colon polyps - will obtain previous reports    # hernia - following with surgery    RTC 2 months    Video-Visit Details    Type of service:  Video Visit    Video Start Time: 1:04PM  Video End Time: 1:28 PM    Originating Location (pt. Location): Home    Distant Location (provider location):  [facility]     Platform used for Video Visit: Иван Chavira DO

## 2020-06-18 ENCOUNTER — TELEPHONE (OUTPATIENT)
Dept: GASTROENTEROLOGY | Facility: CLINIC | Age: 65
End: 2020-06-18

## 2020-06-18 ENCOUNTER — TELEPHONE (OUTPATIENT)
Dept: VASCULAR SURGERY | Facility: CLINIC | Age: 65
End: 2020-06-18

## 2020-06-18 DIAGNOSIS — K21.9 GASTROESOPHAGEAL REFLUX DISEASE, ESOPHAGITIS PRESENCE NOT SPECIFIED: ICD-10-CM

## 2020-06-18 RX ORDER — ESOMEPRAZOLE MAGNESIUM 40 MG/1
40 CAPSULE, DELAYED RELEASE ORAL 2 TIMES DAILY
Qty: 180 CAPSULE | Refills: 1 | Status: SHIPPED | OUTPATIENT
Start: 2020-06-18 | End: 2020-09-14

## 2020-06-18 ASSESSMENT — PAIN SCALES - GENERAL: PAINLEVEL: NO PAIN (0)

## 2020-06-18 NOTE — TELEPHONE ENCOUNTER
[facility] Call Center    Phone Message    May a detailed message be left on voicemail: yes     Reason for Call: Other: pt wife calling about esomeprazole (NEXIUM) 20 MG DR capsule [[id number]] (Order [id number]) , went to pick it up at pharmacy and it was wrong dosage, please advise with pt and wife     Action Taken: Message routed to:  Adult Clinics: Gastroenterology (GI) p [id number]    Travel Screening: Not Applicable

## 2020-06-18 NOTE — TELEPHONE ENCOUNTER
Orders in place are correct. Nexium 20mg take 2 tablets by mouth (which equals 40mg) twice daily.     Spoke to pt. Pt vocalizes understanding and does not have any questions at this time.    Nidia Mcelroy RN  Gastroenterology Care Coordinator  [place], [place]

## 2020-06-18 NOTE — NURSING NOTE
Vascular Rooming Note     Min Andino's goals for this visit include:   Chief Complaint   Patient presents with     Consult     Min, is  participating in a video visit today for a consult regarding Aneurysm of iliac artery, feeling pretty well,  and to establish care, as reported by patient.     Atiya Boykin LPN

## 2020-06-18 NOTE — PROGRESS NOTES
"Min Andino is a 64 year old male who is being evaluated via a billable video visit.      The patient has been notified of following:     \"This video visit will be conducted via a call between you and your physician/provider. We have found that certain health care needs can be provided without the need for an in-person physical exam.  This service lets us provide the care you need with a video conversation.  If a prescription is necessary we can send it directly to your pharmacy.  If lab work is needed we can place an order for that and you can then stop by our lab to have the test done at a later time.    Video visits are billed at different rates depending on your insurance coverage.  Please reach out to your insurance provider with any questions.    If during the course of the call the physician/provider feels a video visit is not appropriate, you will not be charged for this service.\"    Patient has given verbal consent for Video visit? Yes    Will anyone else be joining your video visit? No    email : [email or web address]    Video-Visit Details    Type of service:  Video Visit    Video Start Time: 3:30 PM  Video End Time: 3:53 PM    Originating Location (pt. Location): Home    Distant Location (provider location):  [facility]     Platform used for Video Visit: [facility]       Vascular Surgery Consultation Note     Patient:  Min Andino   Date of birth 1955, Medical record number [id number]  Date of Visit:  06/25/2020  Consult Requester:No att. providers found            Assessment and Recommendations:   ASSESSMENT / RECOMMENDATION: 65 YO calvin with highly complex series of repairs following Type I aortic dissection in 2013.   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 He has a residual type 3 endoleak due to separation of the IBD graft in the left hypogastric " artery. The patient is well known to the [facility] vascular service and is considering repair of the type 3 endoleak there. Considering their knowledge of his multiple repairs and the high degree of expertise, I have urged i to have the procedure performed at [facility]. I will be glad to follow him after the procedure with routine serial imaging studies.      Many thanks for involving me in the care of this very pleasant patient. Should any questions or concerns arise, please don't hesitate to contact me.    Warm Regards,    SAMMY Gregg MD  Professor, Vascular and Endovascular Surgery  [facility]  [email or web address]           History of Present Illness:   This is the first vascular surgery clinic visit for this 65 YO male with HTN, CHF, horseshoe kidney, CKD3, and prosthetic aortic valve who presents for a second opinion regarding additional surgery. The patient's vascular issue began in 2014 with an extensive aortic dissection. He underwent open repair of the ascending aorta and arch via a median sternotomy in April 2014, followed by open replacement of the descending thoracic and visceral aortic segments via thoracoabdominal incision. Due to a large intercostal patch aneurysm, Dr. Jone Barrientos of [facility] performed an extensive FEVAR in August 2019, followed by EVAR with bilateral IBD in December 2019. The patient required emergency repair of a right femora artery pseudoaneurysm bur was eventually discharged in good condition. On follow up imaging, the left IBD branch device in the hypogastric artery as become dislodged from the proximal endograft. Dr. Barrientos has recommended an extension graft to cover the separation via a left femoral approach As Dr. Barrientos is leaving [facility] for another position, the patient and his wife are here to determine whether they wish to move their vascular care to the [facility].     On further questioning the patient feels that he has not yet recovered from the previous  "operations to undergo another procedure. He has decreased stamina, bilateral ankle edema, and finds hmiself short of breath after walking about 500 feet to his mailbox. He is currently scheduled for an echocardiogram [place]  to evaluate for CHF and also scheduled for lymphedema clinic.           Review of Systems:   Review Of Systems  Skin: negative  Eyes: negative  Ears/Nose/Throat: negative  Respiratory: Dyspnea on exertion- see HPI  Cardiovascular: no angina or previous MI. Notes improved orthopnea but persistent ankle edema partly controlled with Lasix  Gastrointestinal: recent incarcerated umbilical hernia, now asymptomatic. Evaluated and followed by Dr. Rivero  Genitourinary: Horseshoe kidney, left renal artery not revascularized with FEVAR, now with CKD3  Musculoskeletal: bilateral knee pain  Neurologic: negative  Psychiatric: negative  Hematologic/Lymphatic/Immunologic: negative  Endocrine: negative             Past Medical History:     Past Medical History:   Diagnosis Date     Aortic dissection (H)      Aortic root aneurysm (H)      C. difficile colitis     April 2014 following surgery     Connective tissue disease (H)     \"probable\" per [facility] Notes     DVT (deep venous thrombosis) (H)     April 2014 following surgery     Horseshoe kidney      Hypertension      Valvular cardiomyopathy (H)             Past Surgical History:     Past Surgical History:   Procedure Laterality Date     AORTIC VALVE REPLACEMENT  4/7/14     ARTHROSCOPY KNEE RT/LT  2005    left, repair meniscus     C CABG, ARTERIAL, SINGLE  4/7/14     C REPAIR CRUCIATE LIGAMENT,KNEE  1999    left     REPAIR AORTIC ROOT  4/7/14    surgery followed by ECMO, vasopressor therapy            Family History:     Family History   Problem Relation Age of Onset     Family History Negative Father      Aneurysm Other         family hx     C.A.D. No family hx of      Diabetes No family hx of      Hypertension No family hx of             Social History: "     Social History     Tobacco Use     Smoking status: Never Smoker     Smokeless tobacco: Never Used   Substance Use Topics     Alcohol use: Yes     Comment: reports very little etoh use.      History   Sexual Activity     Sexual activity: Never            Current Medications (antimicrobials listed in bold):     Current Outpatient Medications   Medication     aspirin (ASA) 81 MG chewable tablet     atorvastatin (LIPITOR) 40 MG tablet     esomeprazole (NEXIUM) 40 MG DR capsule     Ferrous Gluconate (IRON 27 PO)     furosemide (LASIX) 20 MG tablet     metoprolol (LOPRESSOR) 25 MG tablet     multivitamin, therapeutic with minerals (MULTI-VITAMIN) TABS     warfarin (COUMADIN) 1 MG tablet     clopidogrel (PLAVIX) 75 MG tablet     esomeprazole (NEXIUM) 20 MG DR capsule     pantoprazole sodium (PROTONIX) 40 MG packet     No current facility-administered medications for this visit.           Allergies:   No Known Allergies         Physical Inspection:     GENERAL:  well-developed, thin WM, in no acute distress. Appears fatigued.  HEENT:  Head is normocephalic, atraumatic   EYES:  Eyes are clear with anicteric sclerae.  ENT:  Oropharynx is moist without exudates or ulcers. Tongue is midline  NECK:  Midline trachea without masses appreciated on visual inspection.  LUNGS:  Unlabored breathing, not breathless.  SKIN:  No acute rashes noted..    NEUROLOGIC:  Grossly nonfocal. Smile is symmetric. Active upper extremities.         Laboratory Data:     Hematology Studies    Recent Labs   Lab Test 05/26/20  1459 02/24/20  1515 04/13/15  1029 10/13/14  1008 09/09/14  1215   WBC 6.2  --   --   --   --    HGB 11.0* 11.1* 13.0* 11.6* 11.5*   MCV 94  --   --   --   --      --   --   --   --        Metabolic Studies     Recent Labs   Lab Test 06/25/20  1101 06/10/20  1337 05/26/20  1459 04/02/20  1114 03/11/20  1227    143 143 143 142   POTASSIUM 4.3 4.6 4.4 4.1 4.4   CHLORIDE 111* 108 110* 110* 107   CO2 28 28 28 27 32    BUN 38* 41* 31* 39* 33*   CR 1.64* 1.97* 1.75* 1.73* 1.65*   GFRESTIMATED 43* 35* 40* 40* 43*       Imaging Studies  I have reviewed the CTA studies transmitted from [facility]. Please see formal report for final determination    Assess:  1. Extensive vascular reconstruction with near complete aortic replacement using a combination of open and endovascular repairs.  2. Type 3 endoleak at left hypogastric artery die to IBD graft separation  3. 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 CKD 3  5. General deconditioning, moderate frailty    Recommendation:  1. Follow up at [facility] for repair of the type 3 endoleak  2. RTC prn desire to be followed in our system    Total time spent 24 minutes face to face with patient with more than 50% time spent in counseling and coordination of care.    Shea Gregg MD

## 2020-06-18 NOTE — TELEPHONE ENCOUNTER
Patients mychart message from 6/17 addressed. See encounter from 6/18. Closing encounter.     Sayra Lockhart LPN

## 2020-06-18 NOTE — TELEPHONE ENCOUNTER
Spoke with patient wife regarding the appointment today and due to  issues  did not have access to patient chart, therefore needing to change appointment to 6/25 at 3:40 video visit.

## 2020-06-22 ENCOUNTER — VIRTUAL VISIT (OUTPATIENT)
Dept: SURGERY | Facility: CLINIC | Age: 65
End: 2020-06-22
Payer: MEDICARE

## 2020-06-22 DIAGNOSIS — K42.9 UMBILICAL HERNIA WITHOUT OBSTRUCTION AND WITHOUT GANGRENE: Primary | ICD-10-CM

## 2020-06-22 PROCEDURE — 99202 OFFICE O/P NEW SF 15 MIN: CPT | Mod: 95 | Performed by: SURGERY

## 2020-06-22 RX ORDER — ASPIRIN 81 MG/1
81 TABLET, CHEWABLE ORAL DAILY
COMMUNITY

## 2020-06-22 NOTE — LETTER
"    6/22/2020         RE: Min Andino  [address] [facility] [id number]        Dear Colleague,    Thank you for referring your patient, Min Andino, to the [facility]. Please see a copy of my visit note below.    Min Andino is a 64 year old male who is being evaluated via a billable video visit.      The patient has been notified of following:     \"This video visit will be conducted via a call between you and your physician/provider. We have found that certain health care needs can be provided without the need for an in-person physical exam.  This service lets us provide the care you need with a video conversation.  If a prescription is necessary we can send it directly to your pharmacy.  If lab work is needed we can place an order for that and you can then stop by our lab to have the test done at a later time.    Video visits are billed at different rates depending on your insurance coverage.  Please reach out to your insurance provider with any questions.    If during the course of the call the physician/provider feels a video visit is not appropriate, you will not be charged for this service.\"    Patient has given verbal consent for Video visit? Yes    Patient via [employer] in My Chart  Video visit? Yes: [email or web address]. How would they like to receive their invitation? Send to e-mail at: [email or web address]             Monique Kaur LPN    Video-Visit Details    Type of service:  Video Visit    Video Start Time:4 pm  Video End Time: 445    Originating Location (pt. Location): Home    Distant Location (provider location):  [facility]     Platform used for Video Visit: Иван  Chief Complaint: Umbilical hernia    History of Present Illness:   64 year old man with a complex vascular history which includes several open and endovascular repairs for arch and thoracoabdominal aortic aneurysm. He has ongoing issues with a left iliac " "endoleak and lymphedema.   He noted a new abdominal mass with sudden onset of pain 3 weeks ago when performing some yard work. He was seen in the ED, and noted to have a tender umbilical hernia which was reduced. He was sent for a CT scan and no other significant pathology was noted.  Since that episode, he denies any episodes of pain, or of any specific mass in the area.   Patient was seen with his wife, who is very knowledgeable about his complex history.         Past Medical History:   Diagnosis Date     Aortic dissection (H)      Aortic root aneurysm (H)      C. difficile colitis     April 2014 following surgery     Connective tissue disease (H)     \"probable\" per [facility] Notes     DVT (deep venous thrombosis) (H)     April 2014 following surgery     Horseshoe kidney      Hypertension      Valvular cardiomyopathy (H)      Past Surgical History:   Procedure Laterality Date     AORTIC VALVE REPLACEMENT  4/7/14     ARTHROSCOPY KNEE RT/LT  2005    left, repair meniscus     C CABG, ARTERIAL, SINGLE  4/7/14     C REPAIR CRUCIATE LIGAMENT,KNEE  1999    left     REPAIR AORTIC ROOT  4/7/14    surgery followed by ECMO, vasopressor therapy     Current Outpatient Medications   Medication     aspirin (ASA) 81 MG chewable tablet     atorvastatin (LIPITOR) 40 MG tablet     esomeprazole (NEXIUM) 40 MG DR capsule     Ferrous Gluconate (IRON 27 PO)     furosemide (LASIX) 20 MG tablet     metoprolol (LOPRESSOR) 25 MG tablet     multivitamin, therapeutic with minerals (MULTI-VITAMIN) TABS     warfarin (COUMADIN) 1 MG tablet     clopidogrel (PLAVIX) 75 MG tablet     esomeprazole (NEXIUM) 20 MG DR capsule     pantoprazole sodium (PROTONIX) 40 MG packet     No current facility-administered medications for this visit.       No Known Allergies  Social History     Socioeconomic History     Marital status:      Spouse name: Not on file     Number of children: 5     Years of education: Not on file     Highest education level: " Not on file   Occupational History     Employer: [employer]     Comment: 35 years     Employer: OTHER     Comment: landscaping business   Social Needs     Financial resource strain: Not on file     Food insecurity     Worry: Not on file     Inability: Not on file     Transportation needs     Medical: Not on file     Non-medical: Not on file   Tobacco Use     Smoking status: Never Smoker     Smokeless tobacco: Never Used   Substance and Sexual Activity     Alcohol use: Yes     Comment: reports very little etoh use.      Drug use: No     Sexual activity: Never   Lifestyle     Physical activity     Days per week: Not on file     Minutes per session: Not on file     Stress: Not on file   Relationships     Social connections     Talks on phone: Not on file     Gets together: Not on file     Attends [employer]ism service: Not on file     Active member of club or organization: Not on file     Attends meetings of clubs or organizations: Not on file     Relationship status: Not on file     Intimate partner violence     Fear of current or ex partner: Not on file     Emotionally abused: Not on file     Physically abused: Not on file     Forced sexual activity: Not on file   Other Topics Concern     Not on file   Social History Narrative     Not on file     Family History   Problem Relation Age of Onset     Family History Negative Father      Aneurysm Other         family hx     C.A.D. No family hx of      Diabetes No family hx of      Hypertension No family hx of            Review of Systems:  No chest pain, dyspnea, weight loss, fevers or night sweats.   All other systems questioned and negative.     Exam:  Vital signs for exam: There were no vitals taken for this visit.  Gen - sitting comfortably, NAD, able to ambulate without difficulty  Heent- grossly normocephalic, no jaundice or icterus noted, extraocular  movements intact  Resp- breathing comfortably, verbalized clearly w/o sob or cough  CV - extremities  with no visible edema, fingertips well perfused  Abd- patient's wife able to palpate abd in all 4 quadrants, appears soft, non  distended, no signs of tenderness  Left thoracoabdominal incision noted- video definition was a bit poor, and difficult to discern if the area of the hernia (at the umbilicus) is at the lower end of his incision or not  Skin - no visible rashes  Neuro grossly intact  Psych appropriate without obvious decreased mood or psychosis    Laboratory Studies:    Latest CBC:  Lab Results   Component Value Date    WBC 6.2 05/26/2020     Lab Results   Component Value Date    RBC 3.73 05/26/2020     Lab Results   Component Value Date    HGB 11.0 05/26/2020     Lab Results   Component Value Date    HCT 35.1 05/26/2020     Lab Results   Component Value Date    MCV 94 05/26/2020     Lab Results   Component Value Date    MCH 29.5 05/26/2020     Lab Results   Component Value Date    MCHC 31.3 05/26/2020     Lab Results   Component Value Date    RDW 17.9 05/26/2020     Lab Results   Component Value Date     05/26/2020       Latest Basic Metabolic Panel:  Lab Results   Component Value Date     06/10/2020      Lab Results   Component Value Date    POTASSIUM 4.6 06/10/2020     Lab Results   Component Value Date    CHLORIDE 108 06/10/2020     Lab Results   Component Value Date    FAYE 8.8 06/10/2020     Lab Results   Component Value Date    CO2 28 06/10/2020     Lab Results   Component Value Date    BUN 41 06/10/2020     Lab Results   Component Value Date    CR 1.97 06/10/2020     Lab Results   Component Value Date    GLC 95 06/10/2020       Radiology:   CT images from 6/4/20 reviewed- small fat containing umbilical hernia       IMPRESSION AND PLAN:  Fat containing umbilical hernia in a man with a complex vascular history. They have not decides whether they will be continuing their care at [place] or transferring to the [place] system. I do not think that there is an urgency for hernia repair at this  time. The small risks of incarceration explained to him and his wife, and we discussed binder use if he were to develop non acute symptoms. He will follow-up with me in person over the next few months.     Anabel Rivero MD        Again, thank you for allowing me to participate in the care of your patient.        Sincerely,        Anabel Rivero MD

## 2020-06-22 NOTE — PROGRESS NOTES
"Min Andino is a 64 year old male who is being evaluated via a billable video visit.      The patient has been notified of following:     \"This video visit will be conducted via a call between you and your physician/provider. We have found that certain health care needs can be provided without the need for an in-person physical exam.  This service lets us provide the care you need with a video conversation.  If a prescription is necessary we can send it directly to your pharmacy.  If lab work is needed we can place an order for that and you can then stop by our lab to have the test done at a later time.    Video visits are billed at different rates depending on your insurance coverage.  Please reach out to your insurance provider with any questions.    If during the course of the call the physician/provider feels a video visit is not appropriate, you will not be charged for this service.\"    Patient has given verbal consent for Video visit? Yes    Patient via Иван in My Chart  Video visit? Yes: [email or web address]. How would they like to receive their invitation? Send to e-mail at: [email or web address]             Monique Kaur LPN    Video-Visit Details    Type of service:  Video Visit    Video Start Time:4 pm  Video End Time: 445    Originating Location (pt. Location): Home    Distant Location (provider location):  [facility]     Platform used for Video Visit: Иван  Chief Complaint: Umbilical hernia    History of Present Illness:   64 year old man with a complex vascular history which includes several open and endovascular repairs for arch and thoracoabdominal aortic aneurysm. He has ongoing issues with a left iliac endoleak and lymphedema.   He noted a new abdominal mass with sudden onset of pain 3 weeks ago when performing some yard work. He was seen in the ED, and noted to have a tender umbilical hernia which was reduced. He was sent for a CT scan and no other " "significant pathology was noted.  Since that episode, he denies any episodes of pain, or of any specific mass in the area.   Patient was seen with his wife, who is very knowledgeable about his complex history.         Past Medical History:   Diagnosis Date     Aortic dissection (H)      Aortic root aneurysm (H)      C. difficile colitis     April 2014 following surgery     Connective tissue disease (H)     \"probable\" per [facility] Notes     DVT (deep venous thrombosis) (H)     April 2014 following surgery     Horseshoe kidney      Hypertension      Valvular cardiomyopathy (H)      Past Surgical History:   Procedure Laterality Date     AORTIC VALVE REPLACEMENT  4/7/14     ARTHROSCOPY KNEE RT/LT  2005    left, repair meniscus     C CABG, ARTERIAL, SINGLE  4/7/14     C REPAIR CRUCIATE LIGAMENT,KNEE  1999    left     REPAIR AORTIC ROOT  4/7/14    surgery followed by ECMO, vasopressor therapy     Current Outpatient Medications   Medication     aspirin (ASA) 81 MG chewable tablet     atorvastatin (LIPITOR) 40 MG tablet     esomeprazole (NEXIUM) 40 MG DR capsule     Ferrous Gluconate (IRON 27 PO)     furosemide (LASIX) 20 MG tablet     metoprolol (LOPRESSOR) 25 MG tablet     multivitamin, therapeutic with minerals (MULTI-VITAMIN) TABS     warfarin (COUMADIN) 1 MG tablet     clopidogrel (PLAVIX) 75 MG tablet     esomeprazole (NEXIUM) 20 MG DR capsule     pantoprazole sodium (PROTONIX) 40 MG packet     No current facility-administered medications for this visit.       No Known Allergies  Social History     Socioeconomic History     Marital status:      Spouse name: Not on file     Number of children: 5     Years of education: Not on file     Highest education level: Not on file   Occupational History     Employer: [employer]     Comment: 35 years     Employer: OTHER     Comment: landscaping business   Social Needs     Financial resource strain: Not on file     Food insecurity     Worry: Not on file "     Inability: Not on file     Transportation needs     Medical: Not on file     Non-medical: Not on file   Tobacco Use     Smoking status: Never Smoker     Smokeless tobacco: Never Used   Substance and Sexual Activity     Alcohol use: Yes     Comment: reports very little etoh use.      Drug use: No     Sexual activity: Never   Lifestyle     Physical activity     Days per week: Not on file     Minutes per session: Not on file     Stress: Not on file   Relationships     Social connections     Talks on phone: Not on file     Gets together: Not on file     Attends Quaker service: Not on file     Active member of club or organization: Not on file     Attends meetings of clubs or organizations: Not on file     Relationship status: Not on file     Intimate partner violence     Fear of current or ex partner: Not on file     Emotionally abused: Not on file     Physically abused: Not on file     Forced sexual activity: Not on file   Other Topics Concern     Not on file   Social History Narrative     Not on file     Family History   Problem Relation Age of Onset     Family History Negative Father      Aneurysm Other         family hx     C.A.D. No family hx of      Diabetes No family hx of      Hypertension No family hx of            Review of Systems:  No chest pain, dyspnea, weight loss, fevers or night sweats.   All other systems questioned and negative.     Exam:  Vital signs for exam: There were no vitals taken for this visit.  Gen - sitting comfortably, NAD, able to ambulate without difficulty  Heent- grossly normocephalic, no jaundice or icterus noted, extraocular  movements intact  Resp- breathing comfortably, verbalized clearly w/o sob or cough  CV - extremities with no visible edema, fingertips well perfused  Abd- patient's wife able to palpate abd in all 4 quadrants, appears soft, non  distended, no signs of tenderness  Left thoracoabdominal incision noted- video definition was a bit poor, and difficult to  discern if the area of the hernia (at the umbilicus) is at the lower end of his incision or not  Skin - no visible rashes  Neuro grossly intact  Psych appropriate without obvious decreased mood or psychosis    Laboratory Studies:    Latest CBC:  Lab Results   Component Value Date    WBC 6.2 05/26/2020     Lab Results   Component Value Date    RBC 3.73 05/26/2020     Lab Results   Component Value Date    HGB 11.0 05/26/2020     Lab Results   Component Value Date    HCT 35.1 05/26/2020     Lab Results   Component Value Date    MCV 94 05/26/2020     Lab Results   Component Value Date    MCH 29.5 05/26/2020     Lab Results   Component Value Date    MCHC 31.3 05/26/2020     Lab Results   Component Value Date    RDW 17.9 05/26/2020     Lab Results   Component Value Date     05/26/2020       Latest Basic Metabolic Panel:  Lab Results   Component Value Date     06/10/2020      Lab Results   Component Value Date    POTASSIUM 4.6 06/10/2020     Lab Results   Component Value Date    CHLORIDE 108 06/10/2020     Lab Results   Component Value Date    FAYE 8.8 06/10/2020     Lab Results   Component Value Date    CO2 28 06/10/2020     Lab Results   Component Value Date    BUN 41 06/10/2020     Lab Results   Component Value Date    CR 1.97 06/10/2020     Lab Results   Component Value Date    GLC 95 06/10/2020       Radiology:   CT images from 6/4/20 reviewed- small fat containing umbilical hernia       IMPRESSION AND PLAN:  Fat containing umbilical hernia in a man with a complex vascular history. They have not decides whether they will be continuing their care at [place] or transferring to the [place] system. I do not think that there is an urgency for hernia repair at this time. The small risks of incarceration explained to him and his wife, and we discussed binder use if he were to develop non acute symptoms. He will follow-up with me in person over the next few months.     Anabel Rivero MD

## 2020-06-25 ENCOUNTER — VIRTUAL VISIT (OUTPATIENT)
Dept: VASCULAR SURGERY | Facility: CLINIC | Age: 65
End: 2020-06-25
Payer: MEDICARE

## 2020-06-25 DIAGNOSIS — I50.22 CHRONIC SYSTOLIC CONGESTIVE HEART FAILURE (H): ICD-10-CM

## 2020-06-25 DIAGNOSIS — R06.02 SOB (SHORTNESS OF BREATH): ICD-10-CM

## 2020-06-25 DIAGNOSIS — N18.30 CKD (CHRONIC KIDNEY DISEASE) STAGE 3, GFR 30-59 ML/MIN (H): ICD-10-CM

## 2020-06-25 DIAGNOSIS — R60.0 BILATERAL LEG EDEMA: ICD-10-CM

## 2020-06-25 DIAGNOSIS — I71.03 DISSECTION OF THORACOABDOMINAL AORTA (H): ICD-10-CM

## 2020-06-25 DIAGNOSIS — I72.3 ANEURYSM OF ILIAC ARTERY (H): ICD-10-CM

## 2020-06-25 DIAGNOSIS — I10 HYPERTENSION, GOAL BELOW 140/90: ICD-10-CM

## 2020-06-25 DIAGNOSIS — Z95.2 S/P AVR (AORTIC VALVE REPLACEMENT): ICD-10-CM

## 2020-06-25 DIAGNOSIS — Z95.1 PRESENCE OF AORTOCORONARY BYPASS GRAFT: ICD-10-CM

## 2020-06-25 LAB
ALBUMIN SERPL-MCNC: 3.4 G/DL (ref 3.4–5)
ANION GAP SERPL CALCULATED.3IONS-SCNC: 4 MMOL/L (ref 3–14)
BUN SERPL-MCNC: 38 MG/DL (ref 7–30)
CALCIUM SERPL-MCNC: 8.5 MG/DL (ref 8.5–10.1)
CHLORIDE SERPL-SCNC: 111 MMOL/L (ref 94–109)
CO2 SERPL-SCNC: 28 MMOL/L (ref 20–32)
CREAT SERPL-MCNC: 1.64 MG/DL (ref 0.66–1.25)
GFR SERPL CREATININE-BSD FRML MDRD: 43 ML/MIN/{1.73_M2}
GLUCOSE SERPL-MCNC: 110 MG/DL (ref 70–99)
PHOSPHATE SERPL-MCNC: 3.6 MG/DL (ref 2.5–4.5)
POTASSIUM SERPL-SCNC: 4.3 MMOL/L (ref 3.4–5.3)
SODIUM SERPL-SCNC: 143 MMOL/L (ref 133–144)

## 2020-06-25 PROCEDURE — 36415 COLL VENOUS BLD VENIPUNCTURE: CPT | Performed by: INTERNAL MEDICINE

## 2020-06-25 PROCEDURE — 80069 RENAL FUNCTION PANEL: CPT | Performed by: INTERNAL MEDICINE

## 2020-06-25 NOTE — LETTER
6/25/2020       RE: Min Andino  [address] [facility] [id number]     Dear Colleague,    Thank you for referring your patient, Min Andino, to the [facility]IC at [facility]. Please see a copy of my visit note below.    Min Andino is a 64 year old male who is being evaluated via a billable video visit.        email : [email or web address]    Video-Visit Details    Type of service:  Video Visit    Video Start Time: 3:30 PM  Video End Time: 3:53 PM    Originating Location (pt. Location): Home    Distant Location (provider location):  [facility] VASCULAR [facility]     Platform used for Video Visit: Netadmin       Vascular Surgery Consultation Note     Patient:  Min Andino   Date of birth 1955, Medical record number [id number]  Date of Visit:  06/25/2020  Consult Requester:No att. providers found            Assessment and Recommendations:   ASSESSMENT / RECOMMENDATION: 65 YO calvin with highly complex series of repairs following Type I aortic dissection in 2013.   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 He has a residual type 3 endoleak due to separation of the IBD graft in the left hypogastric artery. The patient is well known to the [facility] vascular service and is considering repair of the type 3 endoleak there. Considering their knowledge of his multiple repairs and the high degree of expertise, I have urged i to have the procedure performed at [place]. I will be glad to follow him after the procedure with routine serial imaging studies.      Many thanks for involving me in the care of this very pleasant patient. Should any questions or concerns arise, please don't hesitate to contact me.    Warm Regards,    SAMMY Gregg MD  Professor, Vascular and Endovascular Surgery  [employer]   [place]  [email or web address]           History of Present Illness:   This is the first vascular surgery clinic visit for this 63 YO male with HTN, CHF, horseshoe kidney, CKD3, and prosthetic aortic valve who presents for a second opinion regarding additional surgery. The patient's vascular issue began in 2014 with an extensive aortic dissection. He underwent open repair of the ascending aorta and arch via a median sternotomy in April 2014, followed by open replacement of the descending thoracic and visceral aortic segments via thoracoabdominal incision. Due to a large intercostal patch aneurysm, Dr. Jone Barrientos of [place] performed an extensive FEVAR in August 2019, followed by EVAR with bilateral IBD in December 2019. The patient required emergency repair of a right femora artery pseudoaneurysm bur was eventually discharged in good condition. On follow up imaging, the left IBD branch device in the hypogastric artery as become dislodged from the proximal endograft. Dr. Barrientos has recommended an extension graft to cover the separation via a left femoral approach As Dr. Barrientos is leaving [place] for another position, the patient and his wife are here to determine whether they wish to move their vascular care to the [facility].     On further questioning the patient feels that he has not yet recovered from the previous operations to undergo another procedure. He has decreased stamina, bilateral ankle edema, and finds hmiself short of breath after walking about 500 feet to his mailbox. He is currently scheduled for an echocardiogram [place]  to evaluate for CHF and also scheduled for lymphedema clinic.           Review of Systems:   Review Of Systems  Skin: negative  Eyes: negative  Ears/Nose/Throat: negative  Respiratory: Dyspnea on exertion- see HPI  Cardiovascular: no angina or previous MI. Notes improved orthopnea but persistent ankle edema partly controlled with Lasix  Gastrointestinal: recent incarcerated umbilical hernia,  "now asymptomatic. Evaluated and followed by Dr. Rivero  Genitourinary: Horseshoe kidney, left renal artery not revascularized with FEVAR, now with CKD3  Musculoskeletal: bilateral knee pain  Neurologic: negative  Psychiatric: negative  Hematologic/Lymphatic/Immunologic: negative  Endocrine: negative             Past Medical History:     Past Medical History:   Diagnosis Date     Aortic dissection (H)      Aortic root aneurysm (H)      C. difficile colitis     April 2014 following surgery     Connective tissue disease (H)     \"probable\" per [facility] Notes     DVT (deep venous thrombosis) (H)     April 2014 following surgery     Horseshoe kidney      Hypertension      Valvular cardiomyopathy (H)             Past Surgical History:     Past Surgical History:   Procedure Laterality Date     AORTIC VALVE REPLACEMENT  4/7/14     ARTHROSCOPY KNEE RT/LT  2005    left, repair meniscus     C CABG, ARTERIAL, SINGLE  4/7/14     C REPAIR CRUCIATE LIGAMENT,KNEE  1999    left     REPAIR AORTIC ROOT  4/7/14    surgery followed by ECMO, vasopressor therapy            Family History:     Family History   Problem Relation Age of Onset     Family History Negative Father      Aneurysm Other         family hx     C.A.D. No family hx of      Diabetes No family hx of      Hypertension No family hx of             Social History:     Social History     Tobacco Use     Smoking status: Never Smoker     Smokeless tobacco: Never Used   Substance Use Topics     Alcohol use: Yes     Comment: reports very little etoh use.      History   Sexual Activity     Sexual activity: Never            Current Medications (antimicrobials listed in bold):     Current Outpatient Medications   Medication     aspirin (ASA) 81 MG chewable tablet     atorvastatin (LIPITOR) 40 MG tablet     esomeprazole (NEXIUM) 40 MG DR capsule     Ferrous Gluconate (IRON 27 PO)     furosemide (LASIX) 20 MG tablet     metoprolol (LOPRESSOR) 25 MG tablet     multivitamin, " therapeutic with minerals (MULTI-VITAMIN) TABS     warfarin (COUMADIN) 1 MG tablet     clopidogrel (PLAVIX) 75 MG tablet     esomeprazole (NEXIUM) 20 MG DR capsule     pantoprazole sodium (PROTONIX) 40 MG packet     No current facility-administered medications for this visit.           Allergies:   No Known Allergies         Physical Inspection:     GENERAL:  well-developed, thin WM, in no acute distress. Appears fatigued.  HEENT:  Head is normocephalic, atraumatic   EYES:  Eyes are clear with anicteric sclerae.  ENT:  Oropharynx is moist without exudates or ulcers. Tongue is midline  NECK:  Midline trachea without masses appreciated on visual inspection.  LUNGS:  Unlabored breathing, not breathless.  SKIN:  No acute rashes noted..    NEUROLOGIC:  Grossly nonfocal. Smile is symmetric. Active upper extremities.         Laboratory Data:     Hematology Studies    Recent Labs   Lab Test 05/26/20  1459 02/24/20  1515 04/13/15  1029 10/13/14  1008 09/09/14  1215   WBC 6.2  --   --   --   --    HGB 11.0* 11.1* 13.0* 11.6* 11.5*   MCV 94  --   --   --   --      --   --   --   --        Metabolic Studies     Recent Labs   Lab Test 06/25/20  1101 06/10/20  1337 05/26/20  1459 04/02/20  1114 03/11/20  1227    143 143 143 142   POTASSIUM 4.3 4.6 4.4 4.1 4.4   CHLORIDE 111* 108 110* 110* 107   CO2 28 28 28 27 32   BUN 38* 41* 31* 39* 33*   CR 1.64* 1.97* 1.75* 1.73* 1.65*   GFRESTIMATED 43* 35* 40* 40* 43*       Imaging Studies  I have reviewed the CTA studies transmitted from [place]. Please see formal report for final determination    Assess:  1. Extensive vascular reconstruction with near complete aortic replacement using a combination of open and endovascular repairs.  2. Type 3 endoleak at left hypogastric artery die to IBD graft separation  3. 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 CKD 3  5. General deconditioning, moderate frailty    Recommendation:  1. Follow up at [place] for repair of the type 3  endoleak  2. RTC prn desire to be followed in our system    Total time spent 24 minutes face to face with patient with more than 50% time spent in counseling and coordination of care.    Shea Gregg MD

## 2020-07-13 ENCOUNTER — VIRTUAL VISIT (OUTPATIENT)
Dept: NEPHROLOGY | Facility: CLINIC | Age: 65
End: 2020-07-13
Payer: MEDICARE

## 2020-07-13 VITALS — BODY MASS INDEX: 28.38 KG/M2 | WEIGHT: 205 LBS

## 2020-07-13 DIAGNOSIS — N25.81 SECONDARY RENAL HYPERPARATHYROIDISM (H): ICD-10-CM

## 2020-07-13 DIAGNOSIS — N18.30 CKD (CHRONIC KIDNEY DISEASE) STAGE 3, GFR 30-59 ML/MIN (H): Primary | ICD-10-CM

## 2020-07-13 DIAGNOSIS — D64.9 ANEMIA, UNSPECIFIED TYPE: ICD-10-CM

## 2020-07-13 DIAGNOSIS — I10 ESSENTIAL HYPERTENSION: ICD-10-CM

## 2020-07-13 PROCEDURE — 99214 OFFICE O/P EST MOD 30 MIN: CPT | Mod: 95 | Performed by: INTERNAL MEDICINE

## 2020-07-13 ASSESSMENT — PAIN SCALES - GENERAL: PAINLEVEL: NO PAIN (0)

## 2020-07-13 NOTE — PROGRESS NOTES
"7/13/20  CC: CKD    HPI: Min Andino is a 64 year old male who presents for evaluation of CKD. I last saw Mr. Andino in 2014. To review from my previous note: Mr. Andino's past medical hx was unremarkable leading up to Dec 2013 when he noted chest tightness. He was seen at [facility] at that time and noted to have aortic dissection. He was initially monitored but later underwent aortic repair/aortic valve replacement/CABG in early April 2014. His post op course was complicated by the need for ECMO as well as pressor support. His wife reports today that he was told that he has \"normal\" creatinine levels when undergoing physicals in the past. His creatinine was 2.2 post surgery in April 2014 but improved to 1.3 at the time of discharge in April. 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 At that time his creatinine was 2.2 post op but improved to 1.3. Since that time, creatinine readings have included 1.48 on 7/17/14, 1.39 no 7/27/14, and 1.35 on 8/13/14. 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      02/24/20: today he presents to reestablish care in our clinic. His creatinine was stable at ~ 1.1 on last check in 2014 but is now at a new baseline of ~ 1.6 since august. Mr. Andino underwent thoracoabdominal aortic repair august 2019. The placement of this stent compromised blood flow to the left kidney moiety which was noted on imaging later. His creatinine post this procedure was ~ 1.6. There have been a few episodes when the creatinine has increased, most recently a few weeks ago (to 1.9). With his most recent creatinine rise, lasix was held. He has not noted much change in his swelling or breathing with holding the lasix for the past few weeks. Today I noted SOB during our conversation. His oxygen saturation was fine but his breathing seemed labored. He and his wife report that this is his breathing " baseline over the past 5 years and is not worse than typical. They are following weights at home and he has been dropping weight - no increase since stopping the lasix. He does have difficulty with empyting his bladder - last imaging in the fall showed no hydronephrosis. Flomax did not help him in the past. He has a HHN coming once a week to the house. He is not lightheaded. He has plans to see cardiology at [place] in March, is looking to establish care in urology, is also looking to establish care with internal medicine potentially in our clinic for continuity in one location.       06/08/20:  Virtual visit. Lasix was increased last month. He reports that the swelling is there but better. He denies any change in eating. No diarrhea. Weight was 240 lbs down to 215 lbs. He had been losing 1 lb a day. He was in ED last week and  Dx with hernia. He held lasix since Friday - was on lasix 40 mg daily held over the weekend - weight was 215 lbs on Thursday and 217 lbs this AM. He denies lightheadedness/dizziness. No orthostatic sxs. He quit wearing compression stockings a week ago. He feels he has slightly more endurance; can't lift a lot, needing to take breaks. He feels his urinary retention is not as problematic - has not seen urology. He has been taking iron BID.     7/13/20: video visit. He has continued to see weight loss over time - was 215 lbs in June but now 205 lbs. He has seen a change in his clothing size by 2 over the past few months. He is hungry often - eating well. No diarrhea. He has some constipation at times. He feels that maybe his breathing is improved. He is wearing compression stockings. He is using lasix 20 mg daily.        No Known Allergies    aspirin (ASA) 81 MG chewable tablet, Take 81 mg by mouth daily  esomeprazole (NEXIUM) 40 MG DR capsule, Take 1 capsule (40 mg) by mouth 2 times daily Take 30-60 minutes before eating.  Ferrous Sulfate (IRON PO), Take 45 mg by mouth 2 times daily Slow  "release  furosemide (LASIX) 20 MG tablet, Take 2 tablets (40 mg) by mouth daily  metoprolol (LOPRESSOR) 25 MG tablet, Take 12.5 mg by mouth 2 times daily   multivitamin, therapeutic with minerals (MULTI-VITAMIN) TABS, Take 1 tablet by mouth daily  warfarin (COUMADIN) 1 MG tablet, Take by mouth daily  clopidogrel (PLAVIX) 75 MG tablet, Take 75 mg by mouth daily  esomeprazole (NEXIUM) 20 MG DR capsule, Take 20 mg by mouth  Ferrous Gluconate (IRON 27 PO), Take by mouth 2 times daily  pantoprazole sodium (PROTONIX) 40 MG packet, Take 1 packet by mouth daily    No current facility-administered medications on file prior to visit.       Past Medical History:   Diagnosis Date     Aortic dissection (H)      Aortic root aneurysm (H)      C. difficile colitis     April 2014 following surgery     Connective tissue disease (H)     \"probable\" per [facility] Notes     DVT (deep venous thrombosis) (H)     April 2014 following surgery     Horseshoe kidney      Hypertension      Valvular cardiomyopathy (H)        Past Surgical History:   Procedure Laterality Date     AORTIC VALVE REPLACEMENT  4/7/14     ARTHROSCOPY KNEE RT/LT  2005    left, repair meniscus     C CABG, ARTERIAL, SINGLE  4/7/14     C REPAIR CRUCIATE LIGAMENT,KNEE  1999    left     REPAIR AORTIC ROOT  4/7/14    surgery followed by ECMO, vasopressor therapy       Social History     Tobacco Use     Smoking status: Never Smoker     Smokeless tobacco: Never Used   Substance Use Topics     Alcohol use: Yes     Comment: reports very little etoh use.      Drug use: No       Family History   Problem Relation Age of Onset     Family History Negative Father      Aneurysm Other         family hx     C.A.D. No family hx of      Diabetes No family hx of      Hypertension No family hx of        ROS: A 4 system review of systems was negative other than noted here or above.     Exam:  Wt 93 kg (205 lb)   BMI 28.38 kg/m      GENERAL: Healthy, alert and no distress  EYES: Eyes grossly " normal to inspection.  No discharge or erythema, or obvious scleral/conjunctival abnormalities.  RESP: No audible wheeze, cough, or visible cyanosis.  No visible retractions or increased work of breathing.    SKIN: Visible skin clear. No significant rash, abnormal pigmentation or lesions.  NEURO: Cranial nerves grossly intact.  Mentation and speech appropriate for age.  PSYCH: Mentation appears normal, affect normal/bright, judgement and insight intact, normal speech and appearance well-groomed.   Result  No visits with results within 1 Day(s) from this visit.   Latest known visit with results is:   Orders Only on 06/25/2020   Component Date Value Ref Range Status     Sodium 06/25/2020 143  133 - 144 mmol/L Final     Potassium 06/25/2020 4.3  3.4 - 5.3 mmol/L Final     Chloride 06/25/2020 111* 94 - 109 mmol/L Final     Carbon Dioxide 06/25/2020 28  20 - 32 mmol/L Final     Anion Gap 06/25/2020 4  3 - 14 mmol/L Final     Glucose 06/25/2020 110* 70 - 99 mg/dL Final     Urea Nitrogen 06/25/2020 38* 7 - 30 mg/dL Final     Creatinine 06/25/2020 1.64* 0.66 - 1.25 mg/dL Final     GFR Estimate 06/25/2020 43* >60 mL/min/[1.73_m2] Final    Comment: Non  GFR Calc  Starting 12/18/2018, serum creatinine based estimated GFR (eGFR) will be   calculated using the Chronic Kidney Disease Epidemiology Collaboration   (CKD-EPI) equation.       GFR Estimate If Black 06/25/2020 50* >60 mL/min/[1.73_m2] Final    Comment:  GFR Calc  Starting 12/18/2018, serum creatinine based estimated GFR (eGFR) will be   calculated using the Chronic Kidney Disease Epidemiology Collaboration   (CKD-EPI) equation.       Calcium 06/25/2020 8.5  8.5 - 10.1 mg/dL Final     Phosphorus 06/25/2020 3.6  2.5 - 4.5 mg/dL Final     Albumin 06/25/2020 3.4  3.4 - 5.0 g/dL Final        Assessment/Plan:  1. CKD Stage 3: has CKD in the setting of previous ULICES as well as vascular compromise to the left kidney moiety from procedure in August  "2019. Addt ULICES recently which was likely hemodynamic in the setting of poor oral intake and being on lasix. Creatinine is realtively stable. Will continue to monitor closely.     2. Hypertension: 130/67 - no changes today.     3. Anemia: hemoglobin 11 in May - iron saturation low in May - on iron BID.     4. Aneurysms/AVR: following with [facility]       Patient Instructions   1. Labs in the next week or two  2. Labs in August  3. Labs in Sept  4. Labs and follow-up appt in October.        DO Min Carter Hill Andino is a 64 year old male who is being evaluated via a billable video visit.      The patient has been notified of following:     \"This video visit will be conducted via a call between you and your physician/provider. We have found that certain health care needs can be provided without the need for an in-person physical exam.  This service lets us provide the care you need with a video conversation.  If a prescription is necessary we can send it directly to your pharmacy.  If lab work is needed we can place an order for that and you can then stop by our lab to have the test done at a later time.    Video visits are billed at different rates depending on your insurance coverage.  Please reach out to your insurance provider with any questions.    If during the course of the call the physician/provider feels a video visit is not appropriate, you will not be charged for this service.\"    Patient has given verbal consent for Video visit? Yes  How would you like to obtain your AVS? Trampoline  Patient would like the video invitation sent by: via [employer]  Will anyone else be joining your video visit? Wife \"Jalyn\"    Lizet Cary LPN    Video-Visit Details    Type of service:  Video Visit    Video time was not documented. Will bill on EM codes.     Madelin Vallecillo, DO       "

## 2020-07-13 NOTE — PATIENT INSTRUCTIONS
1. Labs in the next week or two  2. Labs in August  3. Labs in Sept  4. Labs and follow-up appt in October.

## 2020-07-16 ENCOUNTER — TELEPHONE (OUTPATIENT)
Dept: GASTROENTEROLOGY | Facility: CLINIC | Age: 65
End: 2020-07-16

## 2020-07-16 NOTE — TELEPHONE ENCOUNTER
7/16 Provided phone number [phone] to schedule  in about 2 months (around 8/17/2020.    Evangelina Sanchez   Procedure    Ortho/Sports Med/Ent/Eye   [facility]   [phone]

## 2020-07-22 DIAGNOSIS — N18.30 CKD (CHRONIC KIDNEY DISEASE) STAGE 3, GFR 30-59 ML/MIN (H): ICD-10-CM

## 2020-07-22 LAB
ALBUMIN SERPL-MCNC: 3.7 G/DL (ref 3.4–5)
ANION GAP SERPL CALCULATED.3IONS-SCNC: 3 MMOL/L (ref 3–14)
BUN SERPL-MCNC: 31 MG/DL (ref 7–30)
CALCIUM SERPL-MCNC: 9.1 MG/DL (ref 8.5–10.1)
CHLORIDE SERPL-SCNC: 106 MMOL/L (ref 94–109)
CO2 SERPL-SCNC: 31 MMOL/L (ref 20–32)
CREAT SERPL-MCNC: 1.61 MG/DL (ref 0.66–1.25)
GFR SERPL CREATININE-BSD FRML MDRD: 44 ML/MIN/{1.73_M2}
GLUCOSE SERPL-MCNC: 101 MG/DL (ref 70–99)
PHOSPHATE SERPL-MCNC: 3.8 MG/DL (ref 2.5–4.5)
POTASSIUM SERPL-SCNC: 5.1 MMOL/L (ref 3.4–5.3)
SODIUM SERPL-SCNC: 140 MMOL/L (ref 133–144)

## 2020-07-22 PROCEDURE — 36415 COLL VENOUS BLD VENIPUNCTURE: CPT | Performed by: INTERNAL MEDICINE

## 2020-07-22 PROCEDURE — 80069 RENAL FUNCTION PANEL: CPT | Performed by: INTERNAL MEDICINE

## 2020-07-30 NOTE — TELEPHONE ENCOUNTER
7/30 2nd attempt.  Provided phone number [phone] to schedule  in about 2 months (around 8/17/2020.    Evangelina Sanchez   Procedure    Ortho/Sports Med/Ent/Eye   [facility]   [phone]

## 2020-08-05 DIAGNOSIS — N25.81 SECONDARY RENAL HYPERPARATHYROIDISM (H): ICD-10-CM

## 2020-08-05 DIAGNOSIS — N18.30 CKD (CHRONIC KIDNEY DISEASE) STAGE 3, GFR 30-59 ML/MIN (H): Primary | ICD-10-CM

## 2020-08-05 DIAGNOSIS — D64.9 ANEMIA, UNSPECIFIED TYPE: ICD-10-CM

## 2020-08-18 DIAGNOSIS — N18.30 CKD (CHRONIC KIDNEY DISEASE) STAGE 3, GFR 30-59 ML/MIN (H): ICD-10-CM

## 2020-08-18 DIAGNOSIS — D64.9 ANEMIA, UNSPECIFIED TYPE: ICD-10-CM

## 2020-08-18 DIAGNOSIS — N25.81 SECONDARY RENAL HYPERPARATHYROIDISM (H): ICD-10-CM

## 2020-08-18 LAB
ALBUMIN SERPL-MCNC: 3.4 G/DL (ref 3.4–5)
ANION GAP SERPL CALCULATED.3IONS-SCNC: 5 MMOL/L (ref 3–14)
BUN SERPL-MCNC: 28 MG/DL (ref 7–30)
CALCIUM SERPL-MCNC: 9.1 MG/DL (ref 8.5–10.1)
CHLORIDE SERPL-SCNC: 111 MMOL/L (ref 94–109)
CO2 SERPL-SCNC: 25 MMOL/L (ref 20–32)
CREAT SERPL-MCNC: 1.39 MG/DL (ref 0.66–1.25)
FERRITIN SERPL-MCNC: 196 NG/ML (ref 26–388)
GFR SERPL CREATININE-BSD FRML MDRD: 53 ML/MIN/{1.73_M2}
GLUCOSE SERPL-MCNC: 83 MG/DL (ref 70–99)
HGB BLD-MCNC: 12 G/DL (ref 13.3–17.7)
IRON SATN MFR SERPL: 32 % (ref 15–46)
IRON SERPL-MCNC: 73 UG/DL (ref 35–180)
PHOSPHATE SERPL-MCNC: 4.3 MG/DL (ref 2.5–4.5)
POTASSIUM SERPL-SCNC: 4.6 MMOL/L (ref 3.4–5.3)
PTH-INTACT SERPL-MCNC: 43 PG/ML (ref 18–80)
SODIUM SERPL-SCNC: 141 MMOL/L (ref 133–144)
TIBC SERPL-MCNC: 227 UG/DL (ref 240–430)

## 2020-08-18 PROCEDURE — 83970 ASSAY OF PARATHORMONE: CPT | Performed by: INTERNAL MEDICINE

## 2020-08-18 PROCEDURE — 85018 HEMOGLOBIN: CPT | Performed by: INTERNAL MEDICINE

## 2020-08-18 PROCEDURE — 83540 ASSAY OF IRON: CPT | Performed by: INTERNAL MEDICINE

## 2020-08-18 PROCEDURE — 82306 VITAMIN D 25 HYDROXY: CPT | Performed by: INTERNAL MEDICINE

## 2020-08-18 PROCEDURE — 36415 COLL VENOUS BLD VENIPUNCTURE: CPT | Performed by: INTERNAL MEDICINE

## 2020-08-18 PROCEDURE — 82728 ASSAY OF FERRITIN: CPT | Performed by: INTERNAL MEDICINE

## 2020-08-18 PROCEDURE — 83550 IRON BINDING TEST: CPT | Performed by: INTERNAL MEDICINE

## 2020-08-18 PROCEDURE — 80069 RENAL FUNCTION PANEL: CPT | Performed by: INTERNAL MEDICINE

## 2020-08-20 LAB
DEPRECATED CALCIDIOL+CALCIFEROL SERPL-MC: <43 UG/L (ref 20–75)
VITAMIN D2 SERPL-MCNC: <5 UG/L
VITAMIN D3 SERPL-MCNC: 38 UG/L

## 2020-08-26 ENCOUNTER — VIRTUAL VISIT (OUTPATIENT)
Dept: GASTROENTEROLOGY | Facility: CLINIC | Age: 65
End: 2020-08-26
Payer: MEDICARE

## 2020-08-26 DIAGNOSIS — K21.9 GASTROESOPHAGEAL REFLUX DISEASE WITHOUT ESOPHAGITIS: Primary | ICD-10-CM

## 2020-08-26 PROCEDURE — 99214 OFFICE O/P EST MOD 30 MIN: CPT | Mod: 95 | Performed by: INTERNAL MEDICINE

## 2020-08-26 NOTE — PROGRESS NOTES
"       HPI:    Min presents today for a video visit to discuss chronic cough thought to be secondary to LPR.  Has noticed significant improvement over the last few weeks in cough.  Remains on nexium BID which seems to be helping.  No dysphagia.  No abdominal pain.  Appetite is improving and seems to be eating more lately than usual. No changes in bowel habits or blood in the stool    Has lost about 40lbs over the last few months which he thinks is mostly fluid (previously had a lot of LE edema and abdominal distention which has resolved).  Planning to undergo surgery at [place] in November for repair of endo leak from L internal iliac stent.  Patient is worried as his cough always seems to worsen when he undergoes surgical procedures.       Past Medical History:   Diagnosis Date     Aortic dissection (H)      Aortic root aneurysm (H)      C. difficile colitis     April 2014 following surgery     Connective tissue disease (H)     \"probable\" per [facility] Notes     DVT (deep venous thrombosis) (H)     April 2014 following surgery     Horseshoe kidney      Hypertension      Valvular cardiomyopathy (H)        Past Surgical History:   Procedure Laterality Date     AORTIC VALVE REPLACEMENT  4/7/14     ARTHROSCOPY KNEE RT/LT  2005    left, repair meniscus     C CABG, ARTERIAL, SINGLE  4/7/14     C REPAIR CRUCIATE LIGAMENT,KNEE  1999    left     REPAIR AORTIC ROOT  4/7/14    surgery followed by ECMO, vasopressor therapy       Family History   Problem Relation Age of Onset     Family History Negative Father      Aneurysm Other         family hx     C.A.D. No family hx of      Diabetes No family hx of      Hypertension No family hx of        Social History     Tobacco Use     Smoking status: Never Smoker     Smokeless tobacco: Never Used   Substance Use Topics     Alcohol use: Yes     Comment: reports very little etoh use.         O:    Gen: no acute distress  HEENT: NCAT  Neck: normal ROM  Resp: nonlabored breathing  Neuro: " no gross deficits  Psych: appropriate mood and affect    Assessment and Plan:    # chronic cough thought to be secondary to LPR - now improved on nexium - will continue for now at current dose - after patient undergoes his surgery, may try to wean to lowest dose that controls his symptoms.    # CHF    # weight loss - no localizing GI symptoms.  Appetite is good and reports he is eating more than he has in awhile.  Likely related to less fluid retention. CT chest/abdomen and pelvis in June reviewed - demonstrated soft tissue edema, pleural effusions and minimal perihepatic/perisplenic ascites an known aneurysms but otherwise unrevealing.      # history of colon polyps - last c-scope in 2014 without polyps - will hold off on repeat colonoscopy at this time given comorbidities.  May reconsider if persistent weight loss, changes in bowel habits, etc    RTC 5-6 months    Padmaja Chavira, DO     Video-Visit Details     Type of service:  Video Visit      Originating Location (pt. Location): Home     Distant Location (provider location):  [facility]      Mode of Communication:  Video Conference via [employer]

## 2020-08-26 NOTE — PROGRESS NOTES
"Min Andino is a 64 year old male who is being evaluated via a billable video visit.      The patient has been notified of following:     \"This video visit will be conducted via a call between you and your physician/provider. We have found that certain health care needs can be provided without the need for an in-person physical exam.  This service lets us provide the care you need with a video conversation.  If a prescription is necessary we can send it directly to your pharmacy.  If lab work is needed we can place an order for that and you can then stop by our lab to have the test done at a later time.    If during the course of the call the physician/provider feels a video visit is not appropriate, you will not be charged for this service.\"     Patient has given verbal consent for Video visit? Yes    Patient would like the video invitation sent by: Send to e-mail at: [email or web address]    Video Start Time:     Min Andino complains of    Chief Complaint   Patient presents with     New Patient     - Reflux       I have reviewed and updated the patient's Past Medical History, Social History, Family History and Medication List.    ALLERGIES  Patient has no known allergies.    Additional provider notes:      Assessment/Plan:        Video-Visit Details    Type of service:  Video Visit    Video End Time (time video stopped):     Originating Location (pt. Location):     Distant Location (provider location):  [facility]     Mode of Communication:  Video Conference via Nancy Cabello MA    "

## 2020-08-26 NOTE — PATIENT INSTRUCTIONS
Continue your nexium at your current dose for now, we may try to decrease it at your next appointment depending on your symptoms.    Good luck with your surgery.    Please let us know if your cough returns or is worsening.

## 2020-09-14 ENCOUNTER — MYC REFILL (OUTPATIENT)
Dept: GASTROENTEROLOGY | Facility: CLINIC | Age: 65
End: 2020-09-14

## 2020-09-14 DIAGNOSIS — K21.9 GASTROESOPHAGEAL REFLUX DISEASE, ESOPHAGITIS PRESENCE NOT SPECIFIED: ICD-10-CM

## 2020-09-14 RX ORDER — ESOMEPRAZOLE MAGNESIUM 40 MG/1
40 CAPSULE, DELAYED RELEASE ORAL 2 TIMES DAILY
Qty: 180 CAPSULE | Refills: 1 | Status: SHIPPED | OUTPATIENT
Start: 2020-09-14 | End: 2021-05-27

## 2020-09-15 ENCOUNTER — ALLIED HEALTH/NURSE VISIT (OUTPATIENT)
Dept: FAMILY MEDICINE | Facility: OTHER | Age: 65
End: 2020-09-15
Payer: MEDICARE

## 2020-09-15 ENCOUNTER — TELEPHONE (OUTPATIENT)
Dept: GASTROENTEROLOGY | Facility: CLINIC | Age: 65
End: 2020-09-15

## 2020-09-15 ENCOUNTER — NURSE TRIAGE (OUTPATIENT)
Dept: NURSING | Facility: CLINIC | Age: 65
End: 2020-09-15

## 2020-09-15 DIAGNOSIS — Z23 NEED FOR PROPHYLACTIC VACCINATION AND INOCULATION AGAINST INFLUENZA: Primary | ICD-10-CM

## 2020-09-15 DIAGNOSIS — N18.30 CKD (CHRONIC KIDNEY DISEASE) STAGE 3, GFR 30-59 ML/MIN (H): ICD-10-CM

## 2020-09-15 LAB
ALBUMIN SERPL-MCNC: 3.5 G/DL (ref 3.4–5)
ANION GAP SERPL CALCULATED.3IONS-SCNC: 5 MMOL/L (ref 3–14)
BUN SERPL-MCNC: 30 MG/DL (ref 7–30)
CALCIUM SERPL-MCNC: 9.1 MG/DL (ref 8.5–10.1)
CHLORIDE SERPL-SCNC: 109 MMOL/L (ref 94–109)
CO2 SERPL-SCNC: 26 MMOL/L (ref 20–32)
CREAT SERPL-MCNC: 1.47 MG/DL (ref 0.66–1.25)
GFR SERPL CREATININE-BSD FRML MDRD: 49 ML/MIN/{1.73_M2}
GLUCOSE SERPL-MCNC: 98 MG/DL (ref 70–99)
HGB BLD-MCNC: 11.8 G/DL (ref 13.3–17.7)
PHOSPHATE SERPL-MCNC: 3.7 MG/DL (ref 2.5–4.5)
POTASSIUM SERPL-SCNC: 4.7 MMOL/L (ref 3.4–5.3)
SODIUM SERPL-SCNC: 140 MMOL/L (ref 133–144)

## 2020-09-15 PROCEDURE — 99207 ZZC NO CHARGE NURSE ONLY: CPT

## 2020-09-15 PROCEDURE — 90662 IIV NO PRSV INCREASED AG IM: CPT

## 2020-09-15 PROCEDURE — 85018 HEMOGLOBIN: CPT | Performed by: INTERNAL MEDICINE

## 2020-09-15 PROCEDURE — 80069 RENAL FUNCTION PANEL: CPT | Performed by: INTERNAL MEDICINE

## 2020-09-15 PROCEDURE — G0008 ADMIN INFLUENZA VIRUS VAC: HCPCS

## 2020-09-15 PROCEDURE — 36415 COLL VENOUS BLD VENIPUNCTURE: CPT | Performed by: INTERNAL MEDICINE

## 2020-09-15 NOTE — TELEPHONE ENCOUNTER
PA Initiation    Medication: esomeprazole (NEXIUM) 40 MG DR capsule   Insurance Company: [employer] - Phone [phone] Fax [phone]  Pharmacy Filling the Rx: [id number] - [place], [place] - [address] [facility] [address]  Filling Pharmacy Phone: [phone]  Filling Pharmacy Fax: [phone]  Start Date: 9/15/2020

## 2020-09-15 NOTE — TELEPHONE ENCOUNTER
Wife calling re 's prescription. States was told by pharmacy that the prescription needs a Provider over ride.  [facility] sent a MyChart request yesterday.    Per [facility] Refill request is currently open 9/14/20.  Advised to call clinic to follow up as indicated by pharmacy.  Number given to caller for [facility].    Protocol-  Information Only  Care advice reviewed.   Disposition-  Per Visit Selection Guide  Advised as above. Will route this request to Provider.  Caller states understanding of the recommended disposition.   Advised to call back if further questions or concerns.     CHRISTEN Ron RN  [facility]         Reason for Disposition    [1] Caller requesting NON-URGENT health information AND [2] PCP's office is the best resource    Protocols used: INFORMATION ONLY CALL-A-

## 2020-09-15 NOTE — TELEPHONE ENCOUNTER
Prior Authorization Retail Medication Request    Medication/Dose: Nexium  ICD code (if different than what is on RX):    Previously Tried and Failed:    Rationale:      Insurance Name:    Insurance ID:        Pharmacy Information (if different than what is on RX)  Name:    Phone:

## 2020-09-15 NOTE — TELEPHONE ENCOUNTER
Please see other telephone encounter.    Nidia Mcelroy RN  Gastroenterology Care Coordinator  [facility] [place]

## 2020-09-15 NOTE — TELEPHONE ENCOUNTER
Pt is calling regarding medication has not been refilled. Pt stating pharmacy needs provider to send over an override for medication. Pt will be out of medication in 2 days. Please advise

## 2020-09-15 NOTE — TELEPHONE ENCOUNTER
Pt's states that a PA is needed. A stat PA request has completed.    Nidia Mcelroy RN  Gastroenterology Care Coordinator  [place], [place]

## 2020-09-15 NOTE — TELEPHONE ENCOUNTER
Prior Authorization Approval    Authorization Effective Date: 8/16/2020  Authorization Expiration Date: 9/15/2021  Medication: esomeprazole (NEXIUM) 40 MG DR capsule--APPROVED  Approved Dose/Quantity:   Reference #:     Insurance Company: [employer] - Phone [phone] Fax [phone]  Expected CoPay:       CoPay Card Available:      Foundation Assistance Needed:    Which Pharmacy is filling the prescription (Not needed for infusion/clinic administered): be[id number] [place], [place] - [address] AT [facility] [address]  Pharmacy Notified: Yes  Patient Notified: Yes **Instructed pharmacy to notify patient when script is ready to /ship.**

## 2020-09-16 NOTE — TELEPHONE ENCOUNTER
[facility] for pt informing him that this has been approved.    Nidia Mcelroy RN  Gastroenterology Care Coordinator  [place], [place]

## 2020-10-07 ENCOUNTER — MEDICAL CORRESPONDENCE (OUTPATIENT)
Dept: HEALTH INFORMATION MANAGEMENT | Facility: CLINIC | Age: 65
End: 2020-10-07

## 2020-10-12 ENCOUNTER — OFFICE VISIT (OUTPATIENT)
Dept: PEDIATRICS | Facility: CLINIC | Age: 65
End: 2020-10-12
Payer: MEDICARE

## 2020-10-12 VITALS
SYSTOLIC BLOOD PRESSURE: 115 MMHG | BODY MASS INDEX: 28.49 KG/M2 | DIASTOLIC BLOOD PRESSURE: 61 MMHG | TEMPERATURE: 98 F | WEIGHT: 203.5 LBS | OXYGEN SATURATION: 97 % | HEIGHT: 71 IN | HEART RATE: 66 BPM

## 2020-10-12 DIAGNOSIS — Z00.00 ENCOUNTER FOR MEDICARE ANNUAL WELLNESS EXAM: Primary | ICD-10-CM

## 2020-10-12 DIAGNOSIS — Z23 NEED FOR 23-POLYVALENT PNEUMOCOCCAL POLYSACCHARIDE VACCINE: ICD-10-CM

## 2020-10-12 DIAGNOSIS — I25.10 ATHEROSCLEROSIS OF NATIVE CORONARY ARTERY OF NATIVE HEART WITHOUT ANGINA PECTORIS: ICD-10-CM

## 2020-10-12 DIAGNOSIS — N18.30 ANEMIA DUE TO STAGE 3 CHRONIC KIDNEY DISEASE, UNSPECIFIED WHETHER STAGE 3A OR 3B CKD (H): ICD-10-CM

## 2020-10-12 DIAGNOSIS — I10 HYPERTENSION, GOAL BELOW 140/90: ICD-10-CM

## 2020-10-12 DIAGNOSIS — R39.15 URINARY URGENCY: ICD-10-CM

## 2020-10-12 DIAGNOSIS — Z95.2 MECHANICAL HEART VALVE PRESENT: ICD-10-CM

## 2020-10-12 DIAGNOSIS — D63.1 ANEMIA DUE TO STAGE 3 CHRONIC KIDNEY DISEASE, UNSPECIFIED WHETHER STAGE 3A OR 3B CKD (H): ICD-10-CM

## 2020-10-12 DIAGNOSIS — E78.00 PURE HYPERCHOLESTEROLEMIA: ICD-10-CM

## 2020-10-12 DIAGNOSIS — N18.30 STAGE 3 CHRONIC KIDNEY DISEASE, UNSPECIFIED WHETHER STAGE 3A OR 3B CKD (H): ICD-10-CM

## 2020-10-12 DIAGNOSIS — Z79.01 ANTICOAGULATION MONITORING, INR RANGE 2-3: ICD-10-CM

## 2020-10-12 DIAGNOSIS — K21.9 GASTROESOPHAGEAL REFLUX DISEASE WITHOUT ESOPHAGITIS: ICD-10-CM

## 2020-10-12 DIAGNOSIS — I50.22 CHRONIC SYSTOLIC CONGESTIVE HEART FAILURE (H): ICD-10-CM

## 2020-10-12 PROBLEM — R06.02 SOB (SHORTNESS OF BREATH): Status: RESOLVED | Noted: 2020-02-10 | Resolved: 2020-10-12

## 2020-10-12 PROCEDURE — 99214 OFFICE O/P EST MOD 30 MIN: CPT | Mod: 25 | Performed by: INTERNAL MEDICINE

## 2020-10-12 PROCEDURE — G0009 ADMIN PNEUMOCOCCAL VACCINE: HCPCS | Performed by: INTERNAL MEDICINE

## 2020-10-12 PROCEDURE — G0402 INITIAL PREVENTIVE EXAM: HCPCS | Performed by: INTERNAL MEDICINE

## 2020-10-12 PROCEDURE — 90732 PPSV23 VACC 2 YRS+ SUBQ/IM: CPT | Performed by: INTERNAL MEDICINE

## 2020-10-12 RX ORDER — METOPROLOL SUCCINATE 25 MG/1
25 TABLET, EXTENDED RELEASE ORAL DAILY
COMMUNITY
Start: 2020-10-12 | End: 2021-05-27

## 2020-10-12 RX ORDER — LOSARTAN POTASSIUM 25 MG/1
TABLET ORAL
COMMUNITY
Start: 2020-08-03 | End: 2021-05-27

## 2020-10-12 ASSESSMENT — MIFFLIN-ST. JEOR: SCORE: 1734.16

## 2020-10-12 NOTE — PATIENT INSTRUCTIONS
Patient Education   Personalized Prevention Plan  You are due for the preventive services outlined below.  Your care team is available to assist you in scheduling these services.  If you have already completed any of these items, please share that information with your care team to update in your medical record.  Health Maintenance Due   Topic Date Due     Heart Failure Action Plan  1955     Cholesterol Lab  1955     Thyroid Function Lab  1955     Hepatitis C Screening  1955     Discuss Advance Care Planning  1955     HIV Screening  12/23/1969     Liver Monitoring Lab  09/16/2016     Annual Wellness Visit  12/23/2019     Pneumococcal Vaccine (2 of 2 - PPSV23) 10/10/2020

## 2020-10-12 NOTE — PROGRESS NOTES
SUBJECTIVE:   Min Andino is a 64 year old male who presents for Preventive Visit.    64-year-old gentleman comes for a Medicare  annual wellness exam.  He has had both Prevnar 13 and Pneumovax 23 Valent before he turned 64.  His last Pneumovax 23 Valent vaccine was on 10/23/2014 so he is wondering if he should have a booster.  He otherwise has been doing well.  He does not have issues with fluid retention.  In November he is going back to [place] for some more vascular intervention.    He has multiple health issues including hypertension, chronic kidney disease stage III, anemia of chronic kidney disease, hyperlipidemia, mechanical aortic valve, coronary artery disease with previous single-vessel bypass surgery, surgical repair of the ascending aortic aneurysm, repair of fat dissected thoracoabdominal aneurysm as well as repair of bilateral iliac artery aneurysm, superior mesenteric artery stent and stenting the hypogastric artery and the sounds like renal artery at one time as well.  He has had multiple interventions done at [facility].    He is denying chest pain, shortness of breath, dizziness or lightheadedness.  No change in bowel habits.  Takes his medication as prescribed.  Does complain of urinary urgency and frequency at night.  Offered to refer him to urologist but he decided to wait.    Patient has been advised of split billing requirements and indicates understanding: Yes  Are you in the first 12 months of your Medicare Part B coverage?  Yes,  Visual Acuity:  Right Eye: 20/20   Left Eye: 20/25  Both Eyes: 20/20 with glasses    Physical Health:    In general, how would you rate your overall physical health? good    Outside of work, how many days during the week do you exercise? 2-3 days/week    Outside of work, approximately how many minutes a day do you exercise?15-30 minutes    If you drink alcohol do you typically have >3 drinks per day or >7 drinks per week? No    Do you usually eat at least 4  "servings of fruit and vegetables a day, include whole grains & fiber and avoid regularly eating high fat or \"junk\" foods? Yes    Do you have any problems taking medications regularly?  No    Do you have any side effects from medications? none    Needs assistance for the following daily activities: no assistance needed    Which of the following safety concerns are present in your home?  none identified     Hearing impairment: Yes, Pt wears hearaids    In the past 6 months, have you been bothered by leaking of urine? no    Mental Health:    In general, how would you rate your overall mental or emotional health? good  PHQ-2 Score: 0    Do you feel safe in your environment? Yes    Have you ever done Advance Care Planning? (For example, a Health Directive, POLST, or a discussion with a medical provider or your loved ones about your wishes): Yes, advance care planning is on file.    Additional concerns to address?  YES    Fall risk:  Fallen 2 or more times in the past year?: No  Any fall with injury in the past year?: No    Cognitive Screenin) Repeat 3 items (Leader, Season, Table)    2) Clock draw: NORMAL  3) 3 item recall: Recalls 2 objects   Results: NORMAL clock, 1-2 items recalled: COGNITIVE IMPAIRMENT LESS LIKELY    Mini-CogTM Copyright S Baldomero. Licensed by the author for use in [facility]; reprinted with permission (sohay@.[facility]). All rights reserved.      Do you have sleep apnea, excessive snoring or daytime drowsiness?: no            Reviewed and updated as needed this visit by clinical staff  Tobacco  Allergies  Meds              Reviewed and updated as needed this visit by Provider                Social History     Tobacco Use     Smoking status: Never Smoker     Smokeless tobacco: Never Used   Substance Use Topics     Alcohol use: Yes     Comment: reports very little etoh use.                            Current providers sharing in care for this patient include:   Patient Care " Team:  John Blair MD as PCP - General (Internal Medicine)  John Blair MD as Assigned PCP    The following health maintenance items are reviewed in Epic and correct as of today:  Health Maintenance   Topic Date Due     HF ACTION PLAN  1955     LIPID  1955     TSH W/FREE T4 REFLEX  1955     HEPATITIS C SCREENING  1955     ADVANCE CARE PLANNING  1955     HIV SCREENING  12/23/1969     ALT  09/16/2016     MEDICARE ANNUAL WELLNESS VISIT  12/23/2019     Pneumococcal Vaccine: 65+ Years (2 of 2 - PPSV23) 10/10/2020     BMP  03/15/2021     FALL RISK ASSESSMENT  05/05/2021     CBC  05/26/2021     COLORECTAL CANCER SCREENING  12/11/2024     DTAP/TDAP/TD IMMUNIZATION (3 - Td) 03/04/2029     PHQ-2  Completed     INFLUENZA VACCINE  Completed     ZOSTER IMMUNIZATION  Completed     AORTIC ANEURYSM SCREENING (SYSTEM ASSIGNED)  Completed     Pneumococcal Vaccine: Pediatrics (0 to 5 Years) and At-Risk Patients (6 to 64 Years)  Aged Out     IPV IMMUNIZATION  Aged Out     MENINGITIS IMMUNIZATION  Aged Out     HEPATITIS B IMMUNIZATION  Aged Out     Labs reviewed in EPIC  BP Readings from Last 3 Encounters:   10/12/20 115/61   03/11/20 124/79   02/24/20 134/83    Wt Readings from Last 3 Encounters:   10/12/20 92.3 kg (203 lb 8 oz)   07/13/20 93 kg (205 lb)   03/11/20 105.1 kg (231 lb 12.8 oz)                  Patient Active Problem List   Diagnosis     CARDIOVASCULAR SCREENING; LDL GOAL LESS THAN 160     Hypertension, goal below 140/90     Anemia     CKD (chronic kidney disease) stage 3, GFR 30-59 ml/min     Hx of aortic aneurysm     Aneurysm of iliac artery (H)     Anticoagulation monitoring, INR range 2-3     Aortic dissection (H)     Aortic regurgitation     Callus     Congestive heart failure (H)     Coronary atherosclerosis     Delirium, acute     Bilateral leg edema     Gastroesophageal reflux disease without esophagitis     High serum creatinine     Hyperlipidemia     Hypertensive heart  disease without heart failure     Mechanical heart valve present     Presence of aortocoronary bypass graft     Presence of prosthetic heart valve     Pseudoaneurysm of femoral artery (H)     History of endovascular stent graft for abdominal aortic aneurysm (AAA)     S/P AVR (aortic valve replacement)     Tailors bunion     Thoracoabdominal aortic aneurysm (TAAA) (H)     Vitamin D deficiency     Past Surgical History:   Procedure Laterality Date     AORTIC VALVE REPLACEMENT  4/7/14     ARTHROSCOPY KNEE RT/LT  2005    left, repair meniscus     C CABG, ARTERIAL, SINGLE  4/7/14     C REPAIR CRUCIATE LIGAMENT,KNEE  1999    left     REPAIR AORTIC ROOT  4/7/14    surgery followed by ECMO, vasopressor therapy       Social History     Tobacco Use     Smoking status: Never Smoker     Smokeless tobacco: Never Used   Substance Use Topics     Alcohol use: Yes     Comment: reports very little etoh use.      Family History   Problem Relation Age of Onset     Family History Negative Father      Aneurysm Other         family hx     C.A.D. No family hx of      Diabetes No family hx of      Hypertension No family hx of          Current Outpatient Medications   Medication Sig Dispense Refill     aspirin (ASA) 81 MG chewable tablet Take 81 mg by mouth daily       atorvastatin (LIPITOR) 40 MG tablet Take 1 tablet (40 mg) by mouth daily 90 tablet 1     esomeprazole (NEXIUM) 40 MG DR capsule Take 1 capsule (40 mg) by mouth 2 times daily Take 30-60 minutes before eating. 180 capsule 1     Ferrous Sulfate (IRON PO) Take 45 mg by mouth 2 times daily Slow release       losartan (COZAAR) 25 MG tablet TAKE 1 TABLET(25 MG) BY MOUTH DAILY       metoprolol succinate ER (TOPROL-XL) 25 MG 24 hr tablet Take 1 tablet (25 mg) by mouth daily       multivitamin, therapeutic with minerals (MULTI-VITAMIN) TABS Take 1 tablet by mouth daily       warfarin (COUMADIN) 1 MG tablet Take 1 mg by mouth daily Pt takes 4mg-five days a week and 5mg-two days a week.    "    No Known Allergies  Pneumonia Vaccine:Adults age 65+ who received Pneumovax (PPSV23) at 65 years or older: Should be given PCV13 > 1 year after their most recent PPSV23    ROS:  Constitutional, HEENT, cardiovascular, pulmonary, GI, , musculoskeletal, neuro, skin, endocrine and psych systems are negative, except as otherwise noted.    OBJECTIVE:   /61 (BP Location: Right arm, Patient Position: Sitting, Cuff Size: Adult Large)   Pulse 66   Temp 98  F (36.7  C) (Temporal)   Ht 1.81 m (5' 11.25\")   Wt 92.3 kg (203 lb 8 oz)   SpO2 97%   BMI 28.18 kg/m   Estimated body mass index is 28.18 kg/m  as calculated from the following:    Height as of this encounter: 1.81 m (5' 11.25\").    Weight as of this encounter: 92.3 kg (203 lb 8 oz).  EXAM:   GENERAL: healthy, alert and no distress  EYES: Eyes grossly normal to inspection, PERRL and conjunctivae and sclerae normal  HENT: ear canals and TM's normal, nose and mouth without ulcers or lesions  NECK: no adenopathy, no asymmetry, masses, or scars and thyroid normal to palpation  RESP: lungs clear to auscultation - no rales, rhonchi or wheezes  CV: regular rate and rhythm, normal S1 S2, no S3 or S4, no murmur, click or rub, no peripheral edema and peripheral pulses strong  ABDOMEN: soft, nontender, no hepatosplenomegaly, no masses and bowel sounds normal   (male): normal male genitalia without lesions or urethral discharge, no hernia  RECTAL: normal sphincter tone, no rectal masses, prostate normal size, smooth, nontender without nodules or masses  MS: no gross musculoskeletal defects noted, no edema  SKIN: no suspicious lesions or rashes  NEURO: Normal strength and tone, mentation intact and speech normal  PSYCH: mentation appears normal, affect normal/bright  LYMPH: no cervical, supraclavicular, axillary, or inguinal adenopathy    Diagnostic Test Results:  Labs reviewed in Epic    ASSESSMENT / PLAN:     1.  Encounter for Medicare annual wellness exam.  Exam " completed.  2.  Booster Pneumovax 23 Valent vaccine provided today.  Was more than 5 years since his last one before age 64.  3.  Stage III chronic kidney disease.  Been followed by Dr. Vallecillo.  4.  Mild anemia of chronic kidney disease stable.  5.  Essential hypertension with blood pressure under control.  6.  Pure hypercholesterolemia been treated with atorvastatin 40 mg a day.  Patient to have fasting lipids and I will get back to the results.  7.  Urinary urgency and frequency particularly at night.  He wanted to wait regarding referral to urology.  8.  Coronary artery disease.  Currently stable.  Status post single-vessel bypass x1 graft SVG to RCA June 2014.  9.  Status post aortic valve and root conduit replacement for ascending aortic aneurysm with 27 mm carbo medics Top-Hat plus to 32 mm Dacron graft June 2014.  10.  Biventricular heart failure with biventricular dilatation and left ventricular EF around 35% to 40%.  Currently well compensated.  11.  Moderate to severe tricuspid valve regurgitation and mild to moderate mitral valve regurgitation.  12.  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    13.  Multiple other vascular repairs including bilateral iliac artery aneurysm repair, superior mesenteric artery stent, hypogastric artery stent, renal artery stent.  14.  Suspected connective tissue disorder.  15 GERD    Patient gets regular lab due to frequent follow-ups with vascular department at [facility] also with nephrology department at [facility].  The only thing she really needs is a lipid profile.      Patient has been advised of split billing requirements and indicates understanding: Yes    COUNSELING:  Reviewed preventive health counseling, as reflected in patient instructions       Regular exercise       Healthy diet/nutrition       Vision screening    Estimated body mass index is 28.18 kg/m  as calculated from the following:    Height as of this  "encounter: 1.81 m (5' 11.25\").    Weight as of this encounter: 92.3 kg (203 lb 8 oz).        He reports that he has never smoked. He has never used smokeless tobacco.    Appropriate preventive services were discussed with this patient, including applicable screening as appropriate for cardiovascular disease, diabetes, osteopenia/osteoporosis, and glaucoma.  As appropriate for age/gender, discussed screening for colorectal cancer, prostate cancer, breast cancer, and cervical cancer. Checklist reviewing preventive services available has been given to the patient.    Reviewed patients plan of care and provided an AVS. The Basic Care Plan (routine screening as documented in Health Maintenance) for Min meets the Care Plan requirement. This Care Plan has been established and reviewed with the Patient.    Counseling Resources:  ATP IV Guidelines  Pooled Cohorts Equation Calculator  Breast Cancer Risk Calculator  BRCA-Related Cancer Risk Assessment: FHS-7 Tool  FRAX Risk Assessment  ICSI Preventive Guidelines  Dietary Guidelines for Americans, 2010  USDA's MyPlate  ASA Prophylaxis  Lung CA Screening    John Blair MD  [facility]  "

## 2020-10-16 DIAGNOSIS — E78.00 PURE HYPERCHOLESTEROLEMIA: ICD-10-CM

## 2020-10-16 DIAGNOSIS — N18.30 CKD (CHRONIC KIDNEY DISEASE) STAGE 3, GFR 30-59 ML/MIN (H): ICD-10-CM

## 2020-10-16 LAB
ALBUMIN SERPL-MCNC: 3.4 G/DL (ref 3.4–5)
ANION GAP SERPL CALCULATED.3IONS-SCNC: 3 MMOL/L (ref 3–14)
BUN SERPL-MCNC: 36 MG/DL (ref 7–30)
CALCIUM SERPL-MCNC: 9.3 MG/DL (ref 8.5–10.1)
CHLORIDE SERPL-SCNC: 110 MMOL/L (ref 94–109)
CHOLEST SERPL-MCNC: 125 MG/DL
CO2 SERPL-SCNC: 29 MMOL/L (ref 20–32)
CREAT SERPL-MCNC: 1.46 MG/DL (ref 0.66–1.25)
GFR SERPL CREATININE-BSD FRML MDRD: 49 ML/MIN/{1.73_M2}
GLUCOSE SERPL-MCNC: 99 MG/DL (ref 70–99)
HDLC SERPL-MCNC: 46 MG/DL
HGB BLD-MCNC: 12.5 G/DL (ref 13.3–17.7)
LDLC SERPL CALC-MCNC: 59 MG/DL
NONHDLC SERPL-MCNC: 79 MG/DL
PHOSPHATE SERPL-MCNC: 3.8 MG/DL (ref 2.5–4.5)
POTASSIUM SERPL-SCNC: 5.3 MMOL/L (ref 3.4–5.3)
SODIUM SERPL-SCNC: 142 MMOL/L (ref 133–144)
TRIGL SERPL-MCNC: 98 MG/DL

## 2020-10-16 PROCEDURE — 80061 LIPID PANEL: CPT | Performed by: INTERNAL MEDICINE

## 2020-10-16 PROCEDURE — 36415 COLL VENOUS BLD VENIPUNCTURE: CPT | Performed by: INTERNAL MEDICINE

## 2020-10-16 PROCEDURE — 80069 RENAL FUNCTION PANEL: CPT | Performed by: INTERNAL MEDICINE

## 2020-10-16 PROCEDURE — 85018 HEMOGLOBIN: CPT | Performed by: INTERNAL MEDICINE

## 2020-10-20 ENCOUNTER — VIRTUAL VISIT (OUTPATIENT)
Dept: NEPHROLOGY | Facility: CLINIC | Age: 65
End: 2020-10-20
Payer: MEDICARE

## 2020-10-20 DIAGNOSIS — I10 ESSENTIAL HYPERTENSION: ICD-10-CM

## 2020-10-20 DIAGNOSIS — D64.9 ANEMIA, UNSPECIFIED TYPE: Primary | ICD-10-CM

## 2020-10-20 DIAGNOSIS — N18.31 STAGE 3A CHRONIC KIDNEY DISEASE (H): ICD-10-CM

## 2020-10-20 PROCEDURE — 99213 OFFICE O/P EST LOW 20 MIN: CPT | Mod: 95 | Performed by: INTERNAL MEDICINE

## 2020-10-20 RX ORDER — NITROGLYCERIN 0.4 MG/1
0.4 TABLET SUBLINGUAL
COMMUNITY
Start: 2020-10-06 | End: 2022-06-15

## 2020-10-20 NOTE — PROGRESS NOTES
"10/20/20   CC: CKD    HPI: Min Andino is a 64 year old male who presents for evaluation of CKD. I last saw Mr. Andino in 2014. To review from my previous note: Mr. Andino's past medical hx was unremarkable leading up to Dec 2013 when he noted chest tightness. He was seen at [facility] at that time and noted to have aortic dissection. He was initially monitored but later underwent aortic repair/aortic valve replacement/CABG in early April 2014. His post op course was complicated by the need for ECMO as well as pressor support. His wife reports today that he was told that he has \"normal\" creatinine levels when undergoing physicals in the past. His creatinine was 2.2 post surgery in April 2014 but improved to 1.3 at the time of discharge in April. 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 At that time his creatinine was 2.2 post op but improved to 1.3. Since that time, creatinine readings have included 1.48 on 7/17/14, 1.39 no 7/27/14, and 1.35 on 8/13/14. 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      02/24/20: today he presents to reestablish care in our clinic. His creatinine was stable at ~ 1.1 on last check in 2014 but is now at a new baseline of ~ 1.6 since august. Mr. Andino underwent thoracoabdominal aortic repair august 2019. The placement of this stent compromised blood flow to the left kidney moiety which was noted on imaging later. His creatinine post this procedure was ~ 1.6. There have been a few episodes when the creatinine has increased, most recently a few weeks ago (to 1.9). With his most recent creatinine rise, lasix was held. He has not noted much change in his swelling or breathing with holding the lasix for the past few weeks. Today I noted SOB during our conversation. His oxygen saturation was fine but his breathing seemed labored. He and his wife report that this is his breathing " baseline over the past 5 years and is not worse than typical. They are following weights at home and he has been dropping weight - no increase since stopping the lasix. He does have difficulty with empyting his bladder - last imaging in the fall showed no hydronephrosis. Flomax did not help him in the past. He has a HHN coming once a week to the house. He is not lightheaded. He has plans to see cardiology at [place] in March, is looking to establish care in urology, is also looking to establish care with internal medicine potentially in our clinic for continuity in one location.       06/08/20:  Virtual visit. Lasix was increased last month. He reports that the swelling is there but better. He denies any change in eating. No diarrhea. Weight was 240 lbs down to 215 lbs. He had been losing 1 lb a day. He was in ED last week and  Dx with hernia. He held lasix since Friday - was on lasix 40 mg daily held over the weekend - weight was 215 lbs on Thursday and 217 lbs this AM. He denies lightheadedness/dizziness. No orthostatic sxs. He quit wearing compression stockings a week ago. He feels he has slightly more endurance; can't lift a lot, needing to take breaks. He feels his urinary retention is not as problematic - has not seen urology. He has been taking iron BID.     7/13/20: video visit. He has continued to see weight loss over time - was 215 lbs in June but now 205 lbs. He has seen a change in his clothing size by 2 over the past few months. He is hungry often - eating well. No diarrhea. He has some constipation at times. He feels that maybe his breathing is improved. He is wearing compression stockings. He is using lasix 20 mg daily.     10/20/20: video visit - started on [facility] and did exam that way - then converted to telephone given echo noted. Feeling the best he has felt since before last winter. NO new issues. He has endovascular stent repair planned in the coming week. Activity improving. No swelling. Weight  "most recently 203 lbs. Over the past month - 201-203 lbs. BP has been well controlled; 115/61 at a recent physician appt. He tends to have higher readings at home; 140s at home, sometimes 130s.        No Known Allergies         aspirin (ASA) 81 MG chewable tablet, Take 81 mg by mouth daily       atorvastatin (LIPITOR) 40 MG tablet, Take 1 tablet (40 mg) by mouth daily       esomeprazole (NEXIUM) 40 MG DR capsule, Take 1 capsule (40 mg) by mouth 2 times daily Take 30-60 minutes before eating.       Ferrous Sulfate (IRON PO), Take 45 mg by mouth 2 times daily Slow release       losartan (COZAAR) 25 MG tablet, TAKE 1 TABLET(25 MG) BY MOUTH DAILY       metoprolol succinate ER (TOPROL-XL) 25 MG 24 hr tablet, Take 1 tablet (25 mg) by mouth daily       multivitamin, therapeutic with minerals (MULTI-VITAMIN) TABS, Take 1 tablet by mouth daily       nitroGLYcerin (NITROSTAT) 0.4 MG sublingual tablet, Place 0.4 mg under the tongue       warfarin (COUMADIN) 1 MG tablet, Take 1 mg by mouth daily Pt takes 4mg-five days a week and 5mg-two days a week.    No current facility-administered medications on file prior to visit.       Past Medical History:   Diagnosis Date     Aortic dissection (H)      Aortic root aneurysm (H)      C. difficile colitis     April 2014 following surgery     Connective tissue disease (H)     \"probable\" per [facility] Notes     DVT (deep venous thrombosis) (H)     April 2014 following surgery     Horseshoe kidney      Hypertension      Urinary urgency 10/12/2020     Valvular cardiomyopathy (H)        Past Surgical History:   Procedure Laterality Date     AORTIC VALVE REPLACEMENT  4/7/14     ARTHROSCOPY KNEE RT/LT  2005    left, repair meniscus     C CABG, ARTERIAL, SINGLE  4/7/14     C REPAIR CRUCIATE LIGAMENT,KNEE  1999    left     REPAIR AORTIC ROOT  4/7/14    surgery followed by ECMO, vasopressor therapy       Social History     Tobacco Use     Smoking status: Never Smoker     Smokeless tobacco: Never " Used   Substance Use Topics     Alcohol use: Yes     Comment: reports very little etoh use.      Drug use: No       Family History   Problem Relation Age of Onset     Family History Negative Father      Aneurysm Other         family hx     C.A.D. No family hx of      Diabetes No family hx of      Hypertension No family hx of        ROS: A 4 system review of systems was negative other than noted here or above.     Exam:  There were no vitals taken for this visit.    GENERAL: Healthy, alert and no distress  EYES: Eyes grossly normal to inspection.  No discharge or erythema, or obvious scleral/conjunctival abnormalities.  RESP: No audible wheeze, cough, or visible cyanosis.  No visible retractions or increased work of breathing.    SKIN: Visible skin clear. No significant rash, abnormal pigmentation or lesions.  NEURO: Cranial nerves grossly intact.  Mentation and speech appropriate for age.  PSYCH: Mentation appears normal, affect normal/bright, judgement and insight intact, normal speech and appearance well-groomed.     Resul  No visits with results within 1 Day(s) from this visit.   Latest known visit with results is:   Orders Only on 10/16/2020   Component Date Value Ref Range Status     Hemoglobin 10/16/2020 12.5* 13.3 - 17.7 g/dL Final     Sodium 10/16/2020 142  133 - 144 mmol/L Final     Potassium 10/16/2020 5.3  3.4 - 5.3 mmol/L Final     Chloride 10/16/2020 110* 94 - 109 mmol/L Final     Carbon Dioxide 10/16/2020 29  20 - 32 mmol/L Final     Anion Gap 10/16/2020 3  3 - 14 mmol/L Final     Glucose 10/16/2020 99  70 - 99 mg/dL Final     Urea Nitrogen 10/16/2020 36* 7 - 30 mg/dL Final     Creatinine 10/16/2020 1.46* 0.66 - 1.25 mg/dL Final     GFR Estimate 10/16/2020 49* >60 mL/min/[1.73_m2] Final    Comment: Non  GFR Calc  Starting 12/18/2018, serum creatinine based estimated GFR (eGFR) will be   calculated using the Chronic Kidney Disease Epidemiology Collaboration   (CKD-EPI) equation.        "GFR Estimate If Black 10/16/2020 57* >60 mL/min/[1.73_m2] Final    Comment:  GFR Calc  Starting 12/18/2018, serum creatinine based estimated GFR (eGFR) will be   calculated using the Chronic Kidney Disease Epidemiology Collaboration   (CKD-EPI) equation.       Calcium 10/16/2020 9.3  8.5 - 10.1 mg/dL Final     Phosphorus 10/16/2020 3.8  2.5 - 4.5 mg/dL Final     Albumin 10/16/2020 3.4  3.4 - 5.0 g/dL Final     Cholesterol 10/16/2020 125  <200 mg/dL Final     Triglycerides 10/16/2020 98  <150 mg/dL Final     HDL Cholesterol 10/16/2020 46  >39 mg/dL Final     LDL Cholesterol Calculated 10/16/2020 59  <100 mg/dL Final    Desirable:       <100 mg/dl     Non HDL Cholesterol 10/16/2020 79  <130 mg/dL Final     Assessment/Plan:  1. CKD Stage 3: has CKD in the setting of previous ULICES as well as vascular compromise to the left kidney moiety from procedure in August 2019. Addt ULICES recently which was likely hemodynamic in the setting of poor oral intake and being on lasix. Creatinine is relatively stable. Will continue to monitor closely.     2. Hypertension: at goal at recent appt - no changes today.     3. Anemia: hemoglobin 12.5 - iron saturation 30% in aguust - will decrease oral iron to just once a day.     4. Aneurysms/AVR: following with [facility] - has vascular procedure planned for the coming weeks.       Patient Instructions   1. Decrease iron to once a day  2. Plan labs ~ 1-2 weeks after you are discharged from the hospital.  3. Tentatively plan 3 month follow-up        DO Min Carter Thu is a 64 year old male who is being evaluated via a billable video visit.      The patient has been notified of following:     \"This video visit will be conducted via a call between you and your physician/provider. We have found that certain health care needs can be provided without the need for an in-person physical exam.  This service lets us provide the care you need with a " "video conversation.  If a prescription is necessary we can send it directly to your pharmacy.  If lab work is needed we can place an order for that and you can then stop by our lab to have the test done at a later time.    Video visits are billed at different rates depending on your insurance coverage.  Please reach out to your insurance provider with any questions.    If during the course of the call the physician/provider feels a video visit is not appropriate, you will not be charged for this service.\"    Patient has given verbal consent for Video visit? yes  How would you like to obtain your AVS? mychart  If you are dropped from the video visit, the video invite should be resent to: text invite  Will anyone else be joining your video visit?no      Video-Visit Details    Type of service:  Video Visit    Video Start Time: 1104 AM  Video End Time: 1109 AM  Telephone call for 11 min after video due to echo on video    Originating Location (pt. Location): Home    Distant Location (provider location):  [facility]     Platform used for Video Visit: Иван Vallecillo MD      "

## 2020-10-20 NOTE — PATIENT INSTRUCTIONS
1. Decrease iron to once a day  2. Plan labs ~ 1-2 weeks after you are discharged from the hospital.  3. Tentatively plan 3 month follow-up

## 2020-11-20 ENCOUNTER — TELEPHONE (OUTPATIENT)
Dept: PEDIATRICS | Facility: CLINIC | Age: 65
End: 2020-11-20

## 2020-11-20 DIAGNOSIS — Z95.2 MECHANICAL HEART VALVE PRESENT: Primary | ICD-10-CM

## 2020-11-20 NOTE — TELEPHONE ENCOUNTER
Min calls and states that he has transferred his care from [facility] to [facility] with you. He is currently on Lovenox post procedure and they are managing his INR at this time but they asked him to get a referral for our Anticoagulation clinic so we can keep it in the same system.     Pt also has a home monitor through [employer] that he would like to keep. Please review and send us a initial Anticoagulation referral so we can begin to manage him. Thanks! Tony Leon, RN, BSN

## 2020-11-20 NOTE — TELEPHONE ENCOUNTER
Reason for Call: Request for an order or referral:    Order or referral being requested: coumadin clinic     Date needed: as soon as possible    Has the patient been seen by the PCP for this problem? YES    Additional comments: is wondering if you have received a referral from Dr Blair from [facility]    Phone number Patient can be reached at:  [phone]    Best Time:  any    Can we leave a detailed message on this number?  YES    Call taken on 11/20/2020 at 1:01 PM by Heather Olmstead

## 2020-11-23 ENCOUNTER — MYC MEDICAL ADVICE (OUTPATIENT)
Dept: PEDIATRICS | Facility: CLINIC | Age: 65
End: 2020-11-23

## 2020-11-23 ENCOUNTER — DOCUMENTATION ONLY (OUTPATIENT)
Dept: PEDIATRICS | Facility: CLINIC | Age: 65
End: 2020-11-23

## 2020-11-23 NOTE — TELEPHONE ENCOUNTER
Routing to provider to please advise on orders in mychart request    I am trying to begin INR management through [facility] in stead of [facility].  The  INR nurse stated that I needed a referral from you to begin that.  Also, I have a home INR monitor and she said they may need approval for that as well.  Can you place that referral with them, please.    Kerri Hinds RN  [facility]

## 2020-11-23 NOTE — TELEPHONE ENCOUNTER
[facility] [place].    Lois Khan RN

## 2020-11-23 NOTE — TELEPHONE ENCOUNTER
Sent home meter application form to [employer] manager who handles all home meter requests. INR referral has been placed.    Shantal Hoover RN

## 2020-11-23 NOTE — TELEPHONE ENCOUNTER
I believe I have already put in a referral for a low INR clinic last week.  Please check.  Please find out what kind of approval process is needed for home INR from me.

## 2020-11-23 NOTE — PROGRESS NOTES
Anticoagulation Management    Discussed INR home monitoring program with Min Andino reviewing:      [employer] requirements: >= 3 months of anticoagulation therapy, indication for chronic anticoagulation and order from provider    Required testing frequency (q1-2 weeks)    Home meters, testing supplies, meter training, and reporting of INR results done through an outside company. Patient would be contacted by home monitoring company to review insurance coverage with home monitoring company prior to enrolling.    [facility] would continue to receive and manage INR results.    Home monitoring application may take several weeks and must continue to follow up with recommended INR monitoring in clinic until receives monitor and training completed.     Home monitoring terms reviewed with patient      Patient agrees to frequency of testing as directed by referring provider ( weekly or biweekly) Yes    Testing to be performed during business hours of [facility] Yes    Patient agrees they have the skill ( or a designated caregiver) necessary to perform the self test Yes    Patient agrees to report all INR results to INR home monitoring company Yes    Patient agrees to have additional INR test in clinic if a home result is critical Yes    Patient agrees to schedule an INR test at a [facility] clinic yearly for technique observation and quality check of INR results with their home meter Yes    Patient agrees to use a [facility] approved service provider and device for home monitoring Yes    [facility]    Referring provider: Johnnie    Referring providers Clinic Fax number   Min Andino is interested home INR monitoring and requests order be submitted.        Patient has a home meter but would like to transfer from [facility] to [place], patient already has INR clinic referral placed.

## 2020-11-24 ENCOUNTER — TELEPHONE (OUTPATIENT)
Dept: NEPHROLOGY | Facility: CLINIC | Age: 65
End: 2020-11-24

## 2020-11-24 LAB — INR PPP: 1.6 (ref 0.9–1.1)

## 2020-11-24 NOTE — TELEPHONE ENCOUNTER
1st attempt.    Pt stated his wife makes most of his appts and she would be calling back to schedule the 3 month video visit with Dr. Vallecillo for around 1/20/21 with lab appt (done in [place]) prior.    Jacinta Raymundo  Medical Specialty Procedure   [place]  [phone]

## 2020-11-25 ENCOUNTER — TELEPHONE (OUTPATIENT)
Dept: PEDIATRICS | Facility: CLINIC | Age: 65
End: 2020-11-25
Payer: MEDICARE

## 2020-11-25 DIAGNOSIS — Z95.2 MECHANICAL HEART VALVE PRESENT: ICD-10-CM

## 2020-11-25 LAB
INR PPP: 1.2 (ref 0.9–1.1)
INR PPP: 1.2 (ref 0.9–1.1)

## 2020-11-25 PROCEDURE — 99207 PR NO CHARGE NURSE ONLY: CPT | Performed by: INTERNAL MEDICINE

## 2020-11-25 NOTE — TELEPHONE ENCOUNTER
ANTICOAGULATION FOLLOW-UP CLINIC VISIT    Patient Name:  Min Andino  Date:  11/25/2020  Contact Type:  Telephone- Jalyn, pt's wife    SUBJECTIVE:  Patient Findings     Positives:  Hospital admission, Other complaints    Comments:  Patient uses a home monitor ordered by [facility]. Request has already been made to reorder through [employer] Wife given the [facility] [place] line to call in results until we start receiving them from [facility].     Patient had surgery- left internal iliac artery stenting for endoleak on 11/11/2020.  Warfarin was restarted in the hospital on 11/15/20. The hospital did forget to give him warfarin on 11/16/20.  Restarted warfarin over 1 week ago.  Bridging with lovenox.   Patient was instructed to take a loaded dose of 8 mg but declined. He is more concerned about bleeding and would prefer to bridge with lovenox for a longer period time than take a loaded dose. He has agreed to take 5 mg for a few days.  Maintenance dose prior to surgery was 4 mg daily.    Recommended patient take 5 mg today/tomorrow (most he is willing to take) and recheck INR on Friday. Call result to [place]. Keep bridging with lovenox. Wife states he has enough to last until then. Currently using 90 mg bid.        Clinical Outcomes     Negatives:  Major bleeding event, Thromboembolic event, Anticoagulation-related hospital admission, Anticoagulation-related ED visit, Anticoagulation-related fatality    Comments:  Patient uses a home monitor ordered by [facility]. Request has already been made to reorder through [employer] Wife given the [facility] [place] line to call in results until we start receiving them from [facility].     Patient had surgery- left internal iliac artery stenting for endoleak on 11/11/2020.  Warfarin was restarted in the hospital on 11/15/20. The hospital did forget to give him warfarin on 11/16/20.  Restarted warfarin over 1 week ago.  Bridging with lovenox.   Patient was instructed to take a loaded dose of 8  mg but declined. He is more concerned about bleeding and would prefer to bridge with lovenox for a longer period time than take a loaded dose. He has agreed to take 5 mg for a few days.  Maintenance dose prior to surgery was 4 mg daily.    Recommended patient take 5 mg today/tomorrow (most he is willing to take) and recheck INR on Friday. Call result to [facility]. Keep bridging with lovenox. Wife states he has enough to last until then. Currently using 90 mg bid.           OBJECTIVE    Recent labs: (last 7 days)     20   INR 1.6*       ASSESSMENT / PLAN      Anticoagulation Summary  As of 2020    INR goal:  2.5-3.5   TTR:  --   INR used for dosin.2 (11/15/2020)   Warfarin maintenance plan:  4 mg (4 mg x 1) every day   Full warfarin instructions:  : 5 mg; : 5 mg; Otherwise 4 mg every day   Weekly warfarin total:  28 mg   Plan last modified:  Temi Taylor RN (2020)   Next INR check:  2020   Target end date:  2050    Indications    Mechanical heart valve present [Z95.2]             Anticoagulation Episode Summary     INR check location:      Preferred lab:      Send INR reminders to:  [facility]    Comments:        Anticoagulation Care Providers     Provider Role Specialty Phone number    John Blair MD Referring Internal Medicine [phone]            See the Encounter Report to view Anticoagulation Flowsheet and Dosing Calendar (Go to Encounters tab in chart review, and find the Anticoagulation Therapy Visit)        Temi Taylor RN

## 2020-11-25 NOTE — TELEPHONE ENCOUNTER
Reason for Call:  INR    Who is calling?  Patient's spouse    Phone number:  [phone]      INR Value:  1.6    Are there any other concerns:  Yes: , patient's wife is concerned because no one has called them with dosing adjustments and it has been 2 days    Can we leave a detailed message on this number? YES    Phone number patient can be reached at: Cell number on file:    Telephone Information:   Mobile [phone]         Call taken on 11/25/2020 at 11:12 AM by Jeana Escobedo

## 2020-11-27 ENCOUNTER — ANTICOAGULATION THERAPY VISIT (OUTPATIENT)
Dept: PEDIATRICS | Facility: CLINIC | Age: 65
End: 2020-11-27

## 2020-11-27 ENCOUNTER — TELEPHONE (OUTPATIENT)
Dept: PEDIATRICS | Facility: CLINIC | Age: 65
End: 2020-11-27

## 2020-11-27 DIAGNOSIS — Z95.2 MECHANICAL HEART VALVE PRESENT: ICD-10-CM

## 2020-11-27 DIAGNOSIS — Z95.2 H/O MECHANICAL AORTIC VALVE REPLACEMENT: Primary | ICD-10-CM

## 2020-11-27 DIAGNOSIS — Z79.01 LONG TERM CURRENT USE OF ANTICOAGULANT THERAPY: ICD-10-CM

## 2020-11-27 LAB — INR PPP: 1.6 (ref 0.9–1.1)

## 2020-11-27 NOTE — PROGRESS NOTES
ANTICOAGULATION MANAGEMENT     Patient Name:  Min Andino  Date:  11/27/2020    ASSESSMENT /SUBJECTIVE:    Today's INR result of 1.6 is subtherapeutic. Goal INR of 2.5-3.5. See separate telephone encounter. Message sent to Dr. Blair to verify goal.      Warfarin dose taken: Warfarin taken as instructed    Diet: No new diet changes affecting INR    Medication changes/ interactions: No new medications/supplements affecting INR    Previous INR: Subtherapeutic     S/S of bleeding or thromboembolism: No    New injury or illness: No    Upcoming surgery, procedure or cardioversion: No    Additional findings: Will continue to bridge with lovenox. New rx sent.      PLAN:    Telephone call with  Zainab regarding INR result and instructed:     Warfarin Dosing Instructions: 8 mg today then change your warfarin dose to 5 mg MWF and 4 mg ROW    Instructed patient to follow up no later than: in 3 days  Patient to recheck with home meter    Education provided: None required, Importance of taking warfarin as instructed and discussed by a loaded dose was needed. Patient previously declined a loaded dose because he was concerned about risk of bleeding. Huddled with Marion ThomasD. There is a bigger risk for bleeding while using 2 different blocking agents (lovenox and warfarin). Because he is seeing Dr. Land for his kidney and we know kidney's that do not function well can take a long time to come back into range. This would further increase his risk for bleeding. It would also be recommended he get his Anti-Xa checked if lovenox continued for a long period. It would be beneficial to take a loaded dose today to get his INR back into range and then transition back towards PTA. Zainab verbalized understanding and will given medication as recommended and continue to bridge.      Zainab verbalizes understanding and agrees to warfarin dosing plan.    Instructed to call the Anticoagulation Clinic for any changes,  questions or concerns. (#[phone])        Temi Taylor RN      OBJECTIVE:  Recent labs: (last 7 days)     20   INR 1.6* 1.6*         No question data found.  Anticoagulation Summary  As of 2020    INR goal:  2.5-3.5   TTR:  --   INR used for dosin.6 (2020)   Warfarin maintenance plan:  5 mg (1 mg x 1 and 4 mg x 1) every Mon, Wed, Fri; 4 mg (4 mg x 1) all other days   Full warfarin instructions:  : 8 mg; Otherwise 5 mg every Mon, Wed, Fri; 4 mg all other days   Weekly warfarin total:  31 mg   Plan last modified:  Temi Taylor RN (2020)   Next INR check:  2020   Priority:  High   Target end date:  2050    Indications    Mechanical heart valve present [Z95.2]             Anticoagulation Episode Summary     INR check location:      Preferred lab:      Send INR reminders to:  [facility]    Comments:        Anticoagulation Care Providers     Provider Role Specialty Phone number    John Blair MD Referring Internal Medicine [phone]

## 2020-11-27 NOTE — PROGRESS NOTES
Patient's wife, Jalyn, left a message stating Min's INR was 1.6 today.     Temi Taylor RN on 11/27/2020 at 12:08 PM    Per micromedex:   Estimated creatinine clearance based on serum creatinine:   56.9 mL/min

## 2020-11-27 NOTE — TELEPHONE ENCOUNTER
New referral order has the patient's INR goal range listed as 2.5-3.5.    Patient on warfarin for a mechanical AVR (Carbomedic tophat bileaflet 32mm) placed in 2014. Patient experienced a soleal dvt at the time of surgery.    Patient's previous goal was 2-3 while being managed at [facility].    Discussed goal with chidi Vang, PharmD. Based on current history she is recommending a goal of 2-3.    Routed to Dr. Blair to decide if he would like the goal to remain 2.5-3.5 as originally ordered on 11/20/20 or if he would like to change it to 2-3.  Order pended if he would like to change goal range.    Temi Taylor RN on 11/27/2020 at 1:07 PM

## 2020-11-30 ENCOUNTER — ANTICOAGULATION THERAPY VISIT (OUTPATIENT)
Dept: PEDIATRICS | Facility: CLINIC | Age: 65
End: 2020-11-30

## 2020-11-30 DIAGNOSIS — Z95.2 MECHANICAL HEART VALVE PRESENT: ICD-10-CM

## 2020-11-30 DIAGNOSIS — Z79.01 LONG TERM CURRENT USE OF ANTICOAGULANT THERAPY: ICD-10-CM

## 2020-11-30 DIAGNOSIS — Z95.2 H/O MECHANICAL AORTIC VALVE REPLACEMENT: ICD-10-CM

## 2020-11-30 LAB — INR PPP: 2 (ref 0.9–1.1)

## 2020-11-30 NOTE — PROGRESS NOTES
ANTICOAGULATION MANAGEMENT     Patient Name:  Min Andino  Date:  2020    ASSESSMENT /SUBJECTIVE:    Today's INR result of 2.0 is therapeutic. Goal INR of 2.0-3.0      Warfarin dose taken: Warfarin taken as instructed    Diet: No new diet changes affecting INR    Medication changes/ interactions: No new medications/supplements affecting INR    Previous INR: Subtherapeutic     S/S of bleeding or thromboembolism: No    New injury or illness: No    Upcoming surgery, procedure or cardioversion: No    Additional findings: first therapeutic since released from the hospital on 20      PLAN:    Telephone call with  pts wife Caity regarding INR result and instructed:     Warfarin Dosing Instructions: Continue your current warfarin dose 5 mg MWF; 4 mg ROW    Instructed patient to follow up no later than: 4 days  Patient to recheck with home meter    Education provided: None required      Jalyn verbalizes understanding and agrees to warfarin dosing plan.    Instructed to call the Anticoagulation Clinic for any changes, questions or concerns. (#[phone])        Eva Stanton RN      OBJECTIVE:  Recent labs: (last 7 days)     20   INR 1.6* 1.6* 2.0*         No question data found.  Anticoagulation Summary  As of 2020    INR goal:  2.0-3.0   TTR:  --   Prior goal:  2.5-3.5   INR used for dosin.0 (2020)   Warfarin maintenance plan:  5 mg (1 mg x 1 and 4 mg x 1) every Mon, Wed, Fri; 4 mg (4 mg x 1) all other days   Full warfarin instructions:  5 mg every Mon, Wed, Fri; 4 mg all other days   Weekly warfarin total:  31 mg   Plan last modified:  Temi Taylor, RN (2020)   Next INR check:  12/3/2020   Priority:  High   Target end date:  2050    Indications    Mechanical heart valve present [Z95.2]  H/O mechanical aortic valve replacement [Z95.2]  Long term current use of anticoagulant therapy [Z79.01]             Anticoagulation Episode Summary      INR check location:      Preferred lab:      Send INR reminders to:  RIVER MCLAUGHLIN    Comments:        Anticoagulation Care Providers     Provider Role Specialty Phone number    John Blair MD Referring Internal Medicine [phone]

## 2020-12-03 LAB — INR PPP: 2.1 (ref 0.9–1.1)

## 2020-12-04 ENCOUNTER — ANTICOAGULATION THERAPY VISIT (OUTPATIENT)
Dept: PEDIATRICS | Facility: CLINIC | Age: 65
End: 2020-12-04
Payer: MEDICARE

## 2020-12-04 ENCOUNTER — OFFICE VISIT (OUTPATIENT)
Dept: PEDIATRICS | Facility: CLINIC | Age: 65
End: 2020-12-04
Payer: MEDICARE

## 2020-12-04 VITALS
WEIGHT: 199.3 LBS | DIASTOLIC BLOOD PRESSURE: 58 MMHG | SYSTOLIC BLOOD PRESSURE: 105 MMHG | BODY MASS INDEX: 27.6 KG/M2 | OXYGEN SATURATION: 99 % | TEMPERATURE: 99.2 F | HEART RATE: 84 BPM

## 2020-12-04 DIAGNOSIS — Z95.2 H/O MECHANICAL AORTIC VALVE REPLACEMENT: ICD-10-CM

## 2020-12-04 DIAGNOSIS — Z79.01 LONG TERM CURRENT USE OF ANTICOAGULANT THERAPY: ICD-10-CM

## 2020-12-04 DIAGNOSIS — Z95.828 HISTORY OF ENDOVASCULAR STENT GRAFT FOR ABDOMINAL AORTIC ANEURYSM (AAA): ICD-10-CM

## 2020-12-04 DIAGNOSIS — Z95.2 MECHANICAL HEART VALVE PRESENT: ICD-10-CM

## 2020-12-04 DIAGNOSIS — Z95.828 S/P INSERTION OF ILIAC ARTERY STENT: ICD-10-CM

## 2020-12-04 DIAGNOSIS — Z95.2 S/P AVR (AORTIC VALVE REPLACEMENT): ICD-10-CM

## 2020-12-04 DIAGNOSIS — I10 HYPERTENSION, GOAL BELOW 140/90: ICD-10-CM

## 2020-12-04 DIAGNOSIS — I50.20 SYSTOLIC CONGESTIVE HEART FAILURE, UNSPECIFIED HF CHRONICITY (H): ICD-10-CM

## 2020-12-04 DIAGNOSIS — Z48.02 VISIT FOR SUTURE REMOVAL: Primary | ICD-10-CM

## 2020-12-04 PROCEDURE — 99214 OFFICE O/P EST MOD 30 MIN: CPT | Performed by: INTERNAL MEDICINE

## 2020-12-04 PROCEDURE — 99207 PR NO CHARGE NURSE ONLY: CPT | Performed by: INTERNAL MEDICINE

## 2020-12-04 NOTE — PROGRESS NOTES
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 No   Problems taking medications regularly:  None  Medication changes since discharge: lovenox- pt is no longer on it  Problems adhering to non-medication therapy:  None    Summary of hospitalization:    Diagnostic Tests/Treatments reviewed.  Follow up needed: none  Other Healthcare Providers Involved in Patient s Care:         None  Update since discharge: improved. Post Discharge Medication Reconciliation: discharge medications reconciled, continue medications without change.  Plan of care communicated with patient          64-year-old with normal generalized vascular disease as well as heart disease had recently undergone surgery at [facility].  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  He has had bilateral iliac artery aneurysm repair as well as stenting of other multiple vessels as previously described in past medical and surgical history.    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  The patient comes in today to have me look at the incision and remove the sutures are present in the left lower quadrant and groin area.  Is been doing fine.  Denies claudication.  No chest pain or shortness of breath.  Full week.      Review of Systems   Constitutional, HEENT, cardiovascular, pulmonary, GI, , musculoskeletal, neuro, skin, endocrine and psych systems are negative, except as otherwise noted.      Objective    There were no vitals taken for this  visit.  There is no height or weight on file to calculate BMI.  Physical Exam   GENERAL: healthy, alert and no distress  NECK: no adenopathy, no asymmetry, masses, or scars and thyroid normal to palpation  RESP: lungs clear to auscultation - no rales, rhonchi or wheezes  CV: regular rates and rhythm, normal S1 S2, no S3 or S4, peripheral pulses strong, no peripheral edema. Prostatic valve click  ABDOMEN: soft, nontender, no hepatosplenomegaly, no masses and bowel sounds normal  MS: no gross musculoskeletal defects noted, no edema  SKIN: incision left groin area, well healed.             Assessment & Plan     1.  Visit for suture removal.  Incision looks good and so I removed all the sutures.  Wound looks fine after removal of sutures.  2.  Left internal iliac artery stenting for endoleak on 11/11/2020.  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  3.  Essential hypertension.  Blood pressure according to patient is higher in the morning.  Advised to start taking losartan in the morning.  Blood pressure in clinic today was good.  4.  Systolic heart failure with recent EF of 36% with evidence of biventricular heart failure during recent evaluation at [facility] in November.  Clinically well compensated today.  5.  Multiple other vascular problems including mechanical aortic valve, multiple vascular surgery in the past and   Coronary disease which appears to be stable.    John Blair MD  [facility]

## 2020-12-04 NOTE — PROGRESS NOTES
ANTICOAGULATION FOLLOW-UP CLINIC VISIT    Patient Name:  Min Andino  Date:  2020  Contact Type:  Telephone    SUBJECTIVE:  Patient Findings     Positives:  Extra doses    Comments:  No changes in medications, activity, or diet noted. No concerns with clotting, bleeding, or increased bruising noted.  Pt did self-dose last night and took 5 mg. Will have pt continue maintenance dose and recheck INR on Monday as wife requested.   Pt is using a home meter now.  Patient verbalizes understanding and agrees to plan. No further questions or concerns.        Clinical Outcomes     Negatives:  Major bleeding event, Thromboembolic event, Anticoagulation-related hospital admission, Anticoagulation-related ED visit, Anticoagulation-related fatality    Comments:  No changes in medications, activity, or diet noted. No concerns with clotting, bleeding, or increased bruising noted.  Pt did self-dose last night and took 5 mg. Will have pt continue maintenance dose and recheck INR on Monday as wife requested.   Pt is using a home meter now.  Patient verbalizes understanding and agrees to plan. No further questions or concerns.           OBJECTIVE    Recent labs: (last 7 days)     20   INR 2.1*       ASSESSMENT / PLAN  INR assessment THER    Recheck INR In: 3 DAYS    INR Location Home INR      Anticoagulation Summary  As of 2020    INR goal:  2.0-3.0   TTR:  100.0 % (3 d)   INR used for dosin.1 (12/3/2020)   Warfarin maintenance plan:  5 mg (1 mg x 1 and 4 mg x 1) every Mon, Wed, Fri; 4 mg (4 mg x 1) all other days   Full warfarin instructions:  5 mg every Mon, Wed, Fri; 4 mg all other days   Weekly warfarin total:  31 mg   Plan last modified:  Temi Taylor RN (2020)   Next INR check:  2020   Priority:  High   Target end date:  2050    Indications    Mechanical heart valve present [Z95.2]  H/O mechanical aortic valve replacement [Z95.2]  Long term current use of anticoagulant therapy  [Z79.01]             Anticoagulation Episode Summary     INR check location:      Preferred lab:      Send INR reminders to:  RIVER MCLAUGHLIN    Comments:        Anticoagulation Care Providers     Provider Role Specialty Phone number    John Blair MD Referring Internal Medicine [phone]            See the Encounter Report to view Anticoagulation Flowsheet and Dosing Calendar (Go to Encounters tab in chart review, and find the Anticoagulation Therapy Visit)        Ambika Licea RN

## 2020-12-07 ENCOUNTER — DOCUMENTATION ONLY (OUTPATIENT)
Dept: PEDIATRICS | Facility: CLINIC | Age: 65
End: 2020-12-07

## 2020-12-07 ENCOUNTER — ANTICOAGULATION THERAPY VISIT (OUTPATIENT)
Dept: PEDIATRICS | Facility: CLINIC | Age: 65
End: 2020-12-07

## 2020-12-07 DIAGNOSIS — Z95.2 H/O MECHANICAL AORTIC VALVE REPLACEMENT: ICD-10-CM

## 2020-12-07 DIAGNOSIS — Z79.01 LONG TERM CURRENT USE OF ANTICOAGULANT THERAPY: ICD-10-CM

## 2020-12-07 DIAGNOSIS — Z95.2 MECHANICAL HEART VALVE PRESENT: ICD-10-CM

## 2020-12-07 LAB — INR PPP: 2.1 (ref 0.9–1.1)

## 2020-12-07 NOTE — PROGRESS NOTES
Anticoagulation Management    Discussed INR home monitoring program with Min Andino reviewing:       [employer] requirements: >= 3 months of anticoagulation therapy, indication for chronic anticoagulation and order from provider    Required testing frequency (q1-2 weeks)    Home meters, testing supplies, meter training, and reporting of INR results done through an outside company. Patient would be contacted by home monitoring company to review insurance coverage with home monitoring company prior to enrolling.    [facility] would continue to receive and manage INR results.    Home monitoring application may take several weeks and must continue to follow up with recommended INR monitoring in clinic until receives monitor and training completed.     Home monitoring terms reviewed with patient      Patient agrees to frequency of testing as directed by referring provider ( weekly or biweekly) Yes    Testing to be performed during business hours of [facility] Yes    Patient agrees they have the skill ( or a designated caregiver) necessary to perform the self test Yes    Patient agrees to report all INR results to INR home monitoring company Yes    Patient agrees to have additional INR test in clinic if a home result is critical Yes    Patient agrees to schedule an INR test at a [facility] clinic yearly for technique observation and quality check of INR results with their home meter Yes    Patient agrees to use a [facility] approved service provider and device for home monitoring Yes    Merged with [facility]    Referring provider: Dr. John Blair    Referring providers Clinic Fax number     Min Andino is interested home INR monitoring and requests order be submitted.

## 2020-12-07 NOTE — PROGRESS NOTES
ANTICOAGULATION MANAGEMENT     Patient Name:  Min Andino  Date:  2020    ASSESSMENT /SUBJECTIVE:    Today's INR result of 2.1 is therapeutic. Goal INR of 2.0-3.0      Warfarin dose taken: Warfarin taken as instructed    Diet: No new diet changes affecting INR    Medication changes/ interactions: No new medications/supplements affecting INR    Previous INR: Therapeutic     S/S of bleeding or thromboembolism: No    New injury or illness: No    Upcoming surgery, procedure or cardioversion: No    Additional findings: None      PLAN:    Telephone call with Jalyn regarding INR result and instructed:     Warfarin Dosing Instructions: Continue your current warfarin dose 4 mg Sun, Tue, Thu; 5 mg all other days (this is what pt took last week and is doing well in range)    Instructed patient to follow up no later than: 1 week  Patient to recheck with home meter    Education provided: Target INR goal and significance of current INR result      Jalyn verbalizes understanding and agrees to warfarin dosing plan.    Instructed to call the Anticoagulation Clinic for any changes, questions or concerns. (#[phone])        Nigel Rubio RN      OBJECTIVE:  Recent labs: (last 7 days)     20   INR 2.1* 2.1*         No question data found.  Anticoagulation Summary  As of 2020    INR goal:  2.0-3.0   TTR:  100.0 % (1 wk)   INR used for dosin.1 (2020)   Warfarin maintenance plan:  4 mg (4 mg x 1) every Sun, Tue, Thu; 5 mg (1 mg x 1 and 4 mg x 1) all other days   Full warfarin instructions:  4 mg every Sun, Tue, Thu; 5 mg all other days   Weekly warfarin total:  32 mg   Plan last modified:  Nigel Rubio, RN (2020)   Next INR check:  2020   Priority:  High   Target end date:  2050    Indications    Mechanical heart valve present [Z95.2]  H/O mechanical aortic valve replacement [Z95.2]  Long term current use of anticoagulant therapy [Z79.01]             Anticoagulation  Episode Summary     INR check location:      Preferred lab:      Send INR reminders to:  RIVER MCLAUGHLIN    Comments:        Anticoagulation Care Providers     Provider Role Specialty Phone number    John Blair MD Referring Internal Medicine [phone]

## 2020-12-14 ENCOUNTER — ANTICOAGULATION THERAPY VISIT (OUTPATIENT)
Dept: PEDIATRICS | Facility: CLINIC | Age: 65
End: 2020-12-14

## 2020-12-14 DIAGNOSIS — Z95.2 H/O MECHANICAL AORTIC VALVE REPLACEMENT: ICD-10-CM

## 2020-12-14 DIAGNOSIS — Z79.01 LONG TERM CURRENT USE OF ANTICOAGULANT THERAPY: ICD-10-CM

## 2020-12-14 DIAGNOSIS — Z95.2 MECHANICAL HEART VALVE PRESENT: ICD-10-CM

## 2020-12-14 LAB — INR PPP: 2.7 (ref 0.9–1.1)

## 2020-12-14 NOTE — PROGRESS NOTES
ANTICOAGULATION MANAGEMENT     Patient Name:  Min Andino  Date:  2020    ASSESSMENT /SUBJECTIVE:    Today's INR result of 2.7 is therapeutic. Goal INR of 2.0-3.0      Warfarin dose taken: Warfarin taken as instructed    Diet: No new diet changes affecting INR    Medication changes/ interactions: No new medications/supplements affecting INR    Previous INR: Therapeutic     S/S of bleeding or thromboembolism: No    New injury or illness: No    Upcoming surgery, procedure or cardioversion: No    Additional findings: Patient feels more comfortable when his INR is Lower in the range.  He would like to dial down weekly dosing of coumadin just a bit. Writer agrees with wife and patient.       PLAN:    Telephone call with  Tony regarding INR result and instructed:     Warfarin Dosing Instructions: Change your warfarin dose to 5 mg tues, 4 mg all other days    Instructed patient to follow up no later than: 1 week  Patient to recheck with home meter    Education provided: Monitoring for bleeding signs and symptoms and Monitoring for clotting signs and symptoms      Min Gunderson verbalizes understanding and agrees to warfarin dosing plan.    Instructed to call the Anticoagulation Clinic for any changes, questions or concerns. (#[phone])        Vonda Izaguirre, JASPER      OBJECTIVE:  Recent labs: (last 7 days)     20   INR 2.7*         No question data found.  Anticoagulation Summary  As of 2020    INR goal:  2.0-3.0   TTR:  100.0 % (2 wk)   INR used for dosin.7 (2020)   Warfarin maintenance plan:  4 mg (4 mg x 1) every Sun, Tue, Thu; 5 mg (1 mg x 1 and 4 mg x 1) all other days   Full warfarin instructions:  4 mg every Sun, Tue, Thu; 5 mg all other days   Weekly warfarin total:  32 mg   Plan last modified:  Nigel Rubio, RN (2020)   Next INR check:  2020   Priority:  High   Target end date:  2050    Indications    Mechanical heart valve present [Z95.2]  H/O  mechanical aortic valve replacement [Z95.2]  Long term current use of anticoagulant therapy [Z79.01]             Anticoagulation Episode Summary     INR check location:      Preferred lab:      Send INR reminders to:  RIVER MCLAUGHLIN    Comments:        Anticoagulation Care Providers     Provider Role Specialty Phone number    John Blair MD Referring Internal Medicine [phone]

## 2020-12-21 ENCOUNTER — ANTICOAGULATION THERAPY VISIT (OUTPATIENT)
Dept: PEDIATRICS | Facility: CLINIC | Age: 65
End: 2020-12-21

## 2020-12-21 DIAGNOSIS — Z95.2 MECHANICAL HEART VALVE PRESENT: ICD-10-CM

## 2020-12-21 DIAGNOSIS — Z95.2 H/O MECHANICAL AORTIC VALVE REPLACEMENT: ICD-10-CM

## 2020-12-21 DIAGNOSIS — Z79.01 LONG TERM CURRENT USE OF ANTICOAGULANT THERAPY: ICD-10-CM

## 2020-12-21 LAB — INR PPP: 2.4 (ref 0.9–1.1)

## 2020-12-21 NOTE — PROGRESS NOTES
ANTICOAGULATION MANAGEMENT     Patient Name:  Min Andino  Date:  2020    ASSESSMENT /SUBJECTIVE:    Today's INR result of 2.4 is therapeutic. Goal INR of 2.0-3.0      Warfarin dose taken: Warfarin taken as instructed    Diet: No new diet changes affecting INR    Medication changes/ interactions: No new medications/supplements affecting INR    Previous INR: Therapeutic     S/S of bleeding or thromboembolism: No    New injury or illness: No    Upcoming surgery, procedure or cardioversion: No    Additional findings: None      PLAN:    Telephone call with Min regarding INR result and instructed:     Warfarin Dosing Instructions: Continue your current warfarin dose 5mg Tue, 4mg AOD    Instructed patient to follow up no later than: 1 week  Patient to recheck with home meter    Education provided: Contact the anticoagulation clinic with any changes, questions or concerns at #[phone]       min verbalizes understanding and agrees to warfarin dosing plan.    Instructed to call the Anticoagulation Clinic for any changes, questions or concerns. (#[phone])        Samantha Ty RN      OBJECTIVE:  Recent labs: (last 7 days)     20   INR 2.4*         No question data found.  Anticoagulation Summary  As of 2020    INR goal:  2.0-3.0   TTR:  100.0 % (3 wk)   INR used for dosin.4 (2020)   Warfarin maintenance plan:  5 mg (1 mg x 1 and 4 mg x 1) every Tue; 4 mg (4 mg x 1) all other days   Full warfarin instructions:  5 mg every Tue; 4 mg all other days   Weekly warfarin total:  29 mg   No change documented:  Samantha Ty RN   Plan last modified:  Vonda Izaguirre RN (2020)   Next INR check:  2020   Priority:  High   Target end date:  2050    Indications    Mechanical heart valve present [Z95.2]  H/O mechanical aortic valve replacement [Z95.2]  Long term current use of anticoagulant therapy [Z79.01]             Anticoagulation Episode Summary     INR check location:   Already printed new order for [facility] yesterday, given to Amrik Baxtero 146.     Preferred lab:      Send INR reminders to:  RIVER MCLAUGHLIN    Comments:        Anticoagulation Care Providers     Provider Role Specialty Phone number    John Blair MD Referring Internal Medicine [phone]         Samantha Estrada RN, BSN, PHN  1

## 2020-12-24 DIAGNOSIS — Z95.2 S/P AVR: Primary | ICD-10-CM

## 2020-12-24 RX ORDER — WARFARIN SODIUM 4 MG/1
TABLET ORAL
Qty: 93 TABLET | Refills: 0 | Status: SHIPPED | OUTPATIENT
Start: 2020-12-24 | End: 2021-05-26

## 2020-12-28 ENCOUNTER — ANTICOAGULATION THERAPY VISIT (OUTPATIENT)
Dept: PEDIATRICS | Facility: CLINIC | Age: 65
End: 2020-12-28

## 2020-12-28 ENCOUNTER — TRANSFERRED RECORDS (OUTPATIENT)
Dept: HEALTH INFORMATION MANAGEMENT | Facility: CLINIC | Age: 65
End: 2020-12-28

## 2020-12-28 DIAGNOSIS — Z95.2 H/O MECHANICAL AORTIC VALVE REPLACEMENT: ICD-10-CM

## 2020-12-28 DIAGNOSIS — Z79.01 LONG TERM CURRENT USE OF ANTICOAGULANT THERAPY: ICD-10-CM

## 2020-12-28 DIAGNOSIS — Z95.2 MECHANICAL HEART VALVE PRESENT: ICD-10-CM

## 2020-12-28 LAB — INR PPP: 2.3 (ref 0.9–1.1)

## 2020-12-28 NOTE — PROGRESS NOTES
ANTICOAGULATION MANAGEMENT     Patient Name:  Min Andino  Date:  2020    ASSESSMENT /SUBJECTIVE:    Today's INR result of 2.3 is therapeutic. Goal INR of 2.0-3.0      Warfarin dose taken: Warfarin taken as instructed    Diet: No new diet changes affecting INR    Medication changes/ interactions: No new medications/supplements affecting INR    Previous INR: Therapeutic     S/S of bleeding or thromboembolism: No    New injury or illness: No    Upcoming surgery, procedure or cardioversion: No    Additional findings: Patient gives permission to manage by exception      PLAN:    Spoke to Min regarding INR result and instructed:     Warfarin Dosing Instructions: Continue your current warfarin dose 5 mg Tue; 4 mg all other days    Instructed patient to follow up no later than: 1 week  Patient to recheck with home meter    Education provided: Monitoring for bleeding signs and symptoms, Monitoring for clotting signs and symptoms and Contact the anticoagulation clinic with any changes, questions or concerns at #[phone]       Min verbalizes understanding and agrees to warfarin dosing plan.    Instructed to call the Anticoagulation Clinic for any changes, questions or concerns. (#[phone])        Vonda Izaguirre RN      OBJECTIVE:  Recent labs: (last 7 days)     20   INR 2.3*         No question data found.  Anticoagulation Summary  As of 2020    INR goal:  2.0-3.0   TTR:  100.0 % (4 wk)   INR used for dosin.3 (2020)   Warfarin maintenance plan:  5 mg (1 mg x 1 and 4 mg x 1) every Tue; 4 mg (4 mg x 1) all other days   Full warfarin instructions:  5 mg every Tue; 4 mg all other days   Weekly warfarin total:  29 mg   No change documented:  Vonda Izaguirre RN   Plan last modified:  Vonda Izaguirre RN (2020)   Next INR check:  2021   Priority:  High   Target end date:  2050    Indications    Mechanical heart valve present [Z95.2]  H/O mechanical aortic valve  replacement [Z95.2]  Long term current use of anticoagulant therapy [Z79.01]             Anticoagulation Episode Summary     INR check location:      Preferred lab:      Send INR reminders to:  R[facility]GHLIN    Comments:        Anticoagulation Care Providers     Provider Role Specialty Phone number    John Blair MD Referring Internal Medicine [phone]

## 2021-01-04 ENCOUNTER — ANTICOAGULATION THERAPY VISIT (OUTPATIENT)
Dept: PEDIATRICS | Facility: CLINIC | Age: 66
End: 2021-01-04

## 2021-01-04 ENCOUNTER — TRANSFERRED RECORDS (OUTPATIENT)
Dept: HEALTH INFORMATION MANAGEMENT | Facility: CLINIC | Age: 66
End: 2021-01-04

## 2021-01-04 DIAGNOSIS — Z95.2 MECHANICAL HEART VALVE PRESENT: ICD-10-CM

## 2021-01-04 DIAGNOSIS — Z95.2 H/O MECHANICAL AORTIC VALVE REPLACEMENT: ICD-10-CM

## 2021-01-04 DIAGNOSIS — Z79.01 LONG TERM CURRENT USE OF ANTICOAGULANT THERAPY: ICD-10-CM

## 2021-01-04 LAB — INR PPP: 1.9 (ref 0.9–1.1)

## 2021-01-04 NOTE — PROGRESS NOTES
ANTICOAGULATION MANAGEMENT     Patient Name:  Min Andino  Date:  2021    ASSESSMENT /SUBJECTIVE:    Today's INR result of 1.9 is subtherapeutic. Goal INR of 2.0-3.0      Warfarin dose taken: Warfarin taken as instructed    Diet: No new diet changes affecting INR    Medication changes/ interactions: No new medications/supplements affecting INR    Previous INR: Therapeutic     S/S of bleeding or thromboembolism: No    New injury or illness: No    Upcoming surgery, procedure or cardioversion: No    Additional findings: None      PLAN:    Telephone call with Min regarding INR result and instructed:     Warfarin Dosing Instructions: Wife expressed concern about patient INR decreasing gradually over the last several weeks. Would like an increase in warfarin, plan to increase patient maintenance plan to 5mg every Mon & Thu; 4mg all other days of the week. 3.4% increase.    Instructed patient to follow up no later than: 1 week  Patient to recheck with home meter    Education provided: Target INR goal and significance of current INR result, Importance of therapeutic range and Importance of taking warfarin as instructed      Min Sevilla Jalyn verbalizes understanding and agrees to warfarin dosing plan.    Instructed to call the Anticoagulation Clinic for any changes, questions or concerns. (#[phone])        Dave Thomas RN      OBJECTIVE:  Recent labs: (last 7 days)     21   INR 1.9*         No question data found.  Anticoagulation Summary  As of 2021    INR goal:  2.0-3.0   TTR:  95.0 % (1.2 mo)   INR used for dosin.9 (2021)   Warfarin maintenance plan:  5 mg (1 mg x 1 and 4 mg x 1) every Tue; 4 mg (4 mg x 1) all other days   Full warfarin instructions:  5 mg every Tue; 4 mg all other days   Weekly warfarin total:  29 mg   Plan last modified:  Vonda Izaguirre RN (2020)   Next INR check:  2021   Priority:  High   Target end date:  2050    Indications    Mechanical heart valve  present [Z95.2]  H/O mechanical aortic valve replacement [Z95.2]  Long term current use of anticoagulant therapy [Z79.01]             Anticoagulation Episode Summary     INR check location:      Preferred lab:  [facility]TER[facility]    Send INR reminders to:  R[facility]GHL[facility]    Comments:  Okay to Manage By Exception      Anticoagulation Care Providers     Provider Role Specialty Phone number    John Blair MD Referring Internal Medicine [phone]

## 2021-01-11 ENCOUNTER — ANTICOAGULATION THERAPY VISIT (OUTPATIENT)
Dept: PEDIATRICS | Facility: CLINIC | Age: 66
End: 2021-01-11

## 2021-01-11 ENCOUNTER — TRANSFERRED RECORDS (OUTPATIENT)
Dept: HEALTH INFORMATION MANAGEMENT | Facility: CLINIC | Age: 66
End: 2021-01-11

## 2021-01-11 DIAGNOSIS — Z95.2 H/O MECHANICAL AORTIC VALVE REPLACEMENT: ICD-10-CM

## 2021-01-11 DIAGNOSIS — Z95.2 MECHANICAL HEART VALVE PRESENT: ICD-10-CM

## 2021-01-11 DIAGNOSIS — Z79.01 LONG TERM CURRENT USE OF ANTICOAGULANT THERAPY: ICD-10-CM

## 2021-01-11 LAB — INR PPP: 2.3 (ref 0.9–1.1)

## 2021-01-11 NOTE — PROGRESS NOTES
Incoming fax from [employer] home monitoring company    Date of INR 1/11    INR result 2.3

## 2021-01-11 NOTE — PROGRESS NOTES
ANTICOAGULATION MANAGEMENT     Patient Name:  Min Andino  Date:  2021    ASSESSMENT /SUBJECTIVE:    Today's INR result of 2.3 is therapeutic. Goal INR of 2.0-3.0      Warfarin dose taken: Warfarin taken as instructed    Diet: No new diet changes affecting INR    Medication changes/ interactions: No new medications/supplements affecting INR    Previous INR: Subtherapeutic     S/S of bleeding or thromboembolism: No    New injury or illness: No    Upcoming surgery, procedure or cardioversion: No    Additional findings: None      PLAN:    Telephone call with Min regarding INR result and instructed:     Warfarin Dosing Instructions: Continue your current warfarin dose 5mg Mon/Thu, 4mg AOD    Instructed patient to follow up no later than: 1 week  Patient to recheck with home meter    Education provided: Contact [facility] Anticoagulation: [phone]  with any changes, questions or concerns at       Min verbalizes understanding and agrees to warfarin dosing plan.    Instructed to call the Anticoagulation Clinic for any changes, questions or concerns. (#[phone])        Samantha Ty RN      OBJECTIVE:  Recent labs: (last 7 days)     21   INR 2.3*         No question data found.  Anticoagulation Summary  As of 2021    INR goal:  2.0-3.0   TTR:  91.7 % (1.4 mo)   INR used for dosin.3 (2021)   Warfarin maintenance plan:  5 mg (1 mg x 1 and 4 mg x 1) every Mon, Thu; 4 mg (4 mg x 1) all other days   Full warfarin instructions:  5 mg every Mon, Thu; 4 mg all other days   Weekly warfarin total:  30 mg   No change documented:  Samantha Ty RN   Plan last modified:  Dave Thomas RN (2021)   Next INR check:  2021   Priority:  High   Target end date:  2050    Indications    Mechanical heart valve present [Z95.2]  H/O mechanical aortic valve replacement [Z95.2]  Long term current use of anticoagulant therapy [Z79.01]             Anticoagulation Episode Summary      INR check location:      Preferred lab:  [facility]    Send INR reminders to:  [facility]GHL[facility]    Comments:  Okay to Manage By Exception      Anticoagulation Care Providers     Provider Role Specialty Phone number    John Blair MD Referring Internal Medicine [phone]         Samantha Estrada, RN, BSN, PHN

## 2021-01-18 ENCOUNTER — TRANSFERRED RECORDS (OUTPATIENT)
Dept: HEALTH INFORMATION MANAGEMENT | Facility: CLINIC | Age: 66
End: 2021-01-18

## 2021-01-18 ENCOUNTER — ANTICOAGULATION THERAPY VISIT (OUTPATIENT)
Dept: PEDIATRICS | Facility: CLINIC | Age: 66
End: 2021-01-18

## 2021-01-18 DIAGNOSIS — Z95.2 H/O MECHANICAL AORTIC VALVE REPLACEMENT: ICD-10-CM

## 2021-01-18 DIAGNOSIS — Z95.2 MECHANICAL HEART VALVE PRESENT: ICD-10-CM

## 2021-01-18 DIAGNOSIS — Z79.01 LONG TERM CURRENT USE OF ANTICOAGULANT THERAPY: ICD-10-CM

## 2021-01-18 LAB — INR PPP: 1.9 (ref 0.9–1.1)

## 2021-01-18 NOTE — PROGRESS NOTES
ANTICOAGULATION MANAGEMENT     Patient Name:  Min Andino  Date:  2021    ASSESSMENT /SUBJECTIVE:    Today's INR result of 1.9 is subtherapeutic. Goal INR of 2.0-3.0      Warfarin dose taken: Warfarin taken as instructed    Diet: No new diet changes affecting INR    Medication changes/ interactions: No new medications/supplements affecting INR    Previous INR: Therapeutic     S/S of bleeding or thromboembolism: No    New injury or illness: No    Upcoming surgery, procedure or cardioversion: No    Additional findings: None      PLAN:    Telephone call with Min regarding INR result and instructed:     Warfarin Dosing Instructions: Change your warfarin dose to 5 mg MWF and 4 mg ROW  . (3.3 % change)    Instructed patient to follow up no later than: 1 week  Patient to recheck with home meter    Education provided: None required      Min verbalizes understanding and agrees to warfarin dosing plan.    Instructed to call the Anticoagulation Clinic for any changes, questions or concerns. (#[phone])        Temi Taylor RN      OBJECTIVE:  Recent labs: (last 7 days)     21   INR 1.9*         No question data found.  Anticoagulation Summary  As of 2021    INR goal:  2.0-3.0   TTR:  89.3 % (1.6 mo)   INR used for dosin.9 (2021)   Warfarin maintenance plan:  5 mg (1 mg x 1 and 4 mg x 1) every Mon, Wed, Fri; 4 mg (4 mg x 1) all other days   Full warfarin instructions:  5 mg every Mon, Wed, Fri; 4 mg all other days   Weekly warfarin total:  31 mg   Plan last modified:  Temi Taylor RN (2021)   Next INR check:  2021   Priority:  High   Target end date:  2050    Indications    Mechanical heart valve present [Z95.2]  H/O mechanical aortic valve replacement [Z95.2]  Long term current use of anticoagulant therapy [Z79.01]             Anticoagulation Episode Summary     INR check location:      Preferred lab:  EXTERNAL LAB    Send INR reminders to:  RIVER MCLAUGHLIN     Comments:  Okay to Manage By Exception      Anticoagulation Care Providers     Provider Role Specialty Phone number    John Blair MD Referring Internal Medicine [phone]

## 2021-01-21 DIAGNOSIS — N18.31 STAGE 3A CHRONIC KIDNEY DISEASE (H): ICD-10-CM

## 2021-01-21 DIAGNOSIS — D64.9 ANEMIA, UNSPECIFIED TYPE: ICD-10-CM

## 2021-01-21 LAB
ALBUMIN SERPL-MCNC: 3.6 G/DL (ref 3.4–5)
ANION GAP SERPL CALCULATED.3IONS-SCNC: 1 MMOL/L (ref 3–14)
BUN SERPL-MCNC: 32 MG/DL (ref 7–30)
CALCIUM SERPL-MCNC: 9 MG/DL (ref 8.5–10.1)
CHLORIDE SERPL-SCNC: 108 MMOL/L (ref 94–109)
CO2 SERPL-SCNC: 32 MMOL/L (ref 20–32)
CREAT SERPL-MCNC: 1.3 MG/DL (ref 0.66–1.25)
CREAT UR-MCNC: 92 MG/DL
FERRITIN SERPL-MCNC: 121 NG/ML (ref 26–388)
GFR SERPL CREATININE-BSD FRML MDRD: 57 ML/MIN/{1.73_M2}
GLUCOSE SERPL-MCNC: 95 MG/DL (ref 70–99)
HGB BLD-MCNC: 12.2 G/DL (ref 13.3–17.7)
IRON SATN MFR SERPL: 19 % (ref 15–46)
IRON SERPL-MCNC: 50 UG/DL (ref 35–180)
PHOSPHATE SERPL-MCNC: 3.3 MG/DL (ref 2.5–4.5)
POTASSIUM SERPL-SCNC: 4.7 MMOL/L (ref 3.4–5.3)
PROT UR-MCNC: 0.16 G/L
PROT/CREAT 24H UR: 0.17 G/G CR (ref 0–0.2)
PTH-INTACT SERPL-MCNC: 43 PG/ML (ref 18–80)
SODIUM SERPL-SCNC: 141 MMOL/L (ref 133–144)
TIBC SERPL-MCNC: 262 UG/DL (ref 240–430)

## 2021-01-21 PROCEDURE — 83540 ASSAY OF IRON: CPT | Performed by: INTERNAL MEDICINE

## 2021-01-21 PROCEDURE — 80069 RENAL FUNCTION PANEL: CPT | Performed by: INTERNAL MEDICINE

## 2021-01-21 PROCEDURE — 85018 HEMOGLOBIN: CPT | Performed by: INTERNAL MEDICINE

## 2021-01-21 PROCEDURE — 36415 COLL VENOUS BLD VENIPUNCTURE: CPT | Performed by: INTERNAL MEDICINE

## 2021-01-21 PROCEDURE — 83970 ASSAY OF PARATHORMONE: CPT | Performed by: INTERNAL MEDICINE

## 2021-01-21 PROCEDURE — 83550 IRON BINDING TEST: CPT | Performed by: INTERNAL MEDICINE

## 2021-01-21 PROCEDURE — 84156 ASSAY OF PROTEIN URINE: CPT | Performed by: INTERNAL MEDICINE

## 2021-01-21 PROCEDURE — 82728 ASSAY OF FERRITIN: CPT | Performed by: INTERNAL MEDICINE

## 2021-01-25 ENCOUNTER — VIRTUAL VISIT (OUTPATIENT)
Dept: NEPHROLOGY | Facility: CLINIC | Age: 66
End: 2021-01-25
Payer: MEDICARE

## 2021-01-25 ENCOUNTER — TRANSFERRED RECORDS (OUTPATIENT)
Dept: HEALTH INFORMATION MANAGEMENT | Facility: CLINIC | Age: 66
End: 2021-01-25

## 2021-01-25 ENCOUNTER — VIRTUAL VISIT (OUTPATIENT)
Dept: GASTROENTEROLOGY | Facility: CLINIC | Age: 66
End: 2021-01-25
Payer: MEDICARE

## 2021-01-25 ENCOUNTER — ANTICOAGULATION THERAPY VISIT (OUTPATIENT)
Dept: FAMILY MEDICINE | Facility: CLINIC | Age: 66
End: 2021-01-25

## 2021-01-25 DIAGNOSIS — K21.9 GASTROESOPHAGEAL REFLUX DISEASE WITHOUT ESOPHAGITIS: Primary | ICD-10-CM

## 2021-01-25 DIAGNOSIS — Z79.01 LONG TERM CURRENT USE OF ANTICOAGULANT THERAPY: ICD-10-CM

## 2021-01-25 DIAGNOSIS — D64.9 ANEMIA, UNSPECIFIED TYPE: ICD-10-CM

## 2021-01-25 DIAGNOSIS — Z95.2 MECHANICAL HEART VALVE PRESENT: ICD-10-CM

## 2021-01-25 DIAGNOSIS — Z86.79 HX OF AORTIC ANEURYSM: ICD-10-CM

## 2021-01-25 DIAGNOSIS — N18.31 STAGE 3A CHRONIC KIDNEY DISEASE (H): Primary | ICD-10-CM

## 2021-01-25 DIAGNOSIS — I10 ESSENTIAL HYPERTENSION: ICD-10-CM

## 2021-01-25 DIAGNOSIS — Z95.2 H/O MECHANICAL AORTIC VALVE REPLACEMENT: ICD-10-CM

## 2021-01-25 LAB — INR PPP: 2 (ref 0.9–1.1)

## 2021-01-25 PROCEDURE — 99214 OFFICE O/P EST MOD 30 MIN: CPT | Mod: 95 | Performed by: INTERNAL MEDICINE

## 2021-01-25 PROCEDURE — 99442 PR PHYSICIAN TELEPHONE EVALUATION 11-20 MIN: CPT | Mod: 95 | Performed by: INTERNAL MEDICINE

## 2021-01-25 NOTE — PROGRESS NOTES
ANTICOAGULATION MANAGEMENT     Patient Name:  Min Andino  Date:  2021    ASSESSMENT /SUBJECTIVE:    Today's INR result of 2.0 is therapeutic. Goal INR of 2.0-3.0      Warfarin dose taken: Warfarin taken as instructed    Diet: No new diet changes affecting INR    Medication changes/ interactions: No new medications/supplements affecting INR    Previous INR: Subtherapeutic     S/S of bleeding or thromboembolism: No    New injury or illness: No    Upcoming surgery, procedure or cardioversion: No    Additional findings: None      PLAN:    Telephone call with Min regarding INR result and instructed:     Warfarin Dosing Instructions: Change your warfarin dose to 4 mg on sun/tues/thu and 5 mg all other days  . (3.2 % change) Patient requesting to increase dosing as been running on low end    Instructed patient to follow up no later than: 2 weeks  Patient to recheck with home meter    Education provided: None required      Min verbalizes understanding and agrees to warfarin dosing plan.    Instructed to call the Anticoagulation Clinic for any changes, questions or concerns. (#[phone])        Shantal Hoover RN      OBJECTIVE:  Recent labs: (last 7 days)     21   INR 2.0*         No question data found.  Anticoagulation Summary  As of 2021    INR goal:  2.0-3.0   TTR:  78.1 % (1.9 mo)   INR used for dosin.0 (2021)   Warfarin maintenance plan:  5 mg (1 mg x 1 and 4 mg x 1) every Mon, Wed, Fri; 4 mg (4 mg x 1) all other days   Full warfarin instructions:  5 mg every Mon, Wed, Fri; 4 mg all other days   Weekly warfarin total:  31 mg   Plan last modified:  Temi Taylor, RN (2021)   Next INR check:  2021   Priority:  Maintenance   Target end date:  2050    Indications    Mechanical heart valve present [Z95.2]  H/O mechanical aortic valve replacement [Z95.2]  Long term current use of anticoagulant therapy [Z79.01]             Anticoagulation Episode Summary     INR  check location:      Preferred lab:  EXTERNAL LAB    Send INR reminders to:  IR[facility] MCLAUGHLIN    Comments:  Okay to Manage By Exception      Anticoagulation Care Providers     Provider Role Specialty Phone number    John Blair MD Referring Internal Medicine [phone]       Incoming fax from [employer] monitoring company   Date of INR 1/25  INR result 2.0

## 2021-01-25 NOTE — PROGRESS NOTES
"01/25/21    CC: CKD    HPI: Min Andino is a 64 year old male who presents for evaluation of CKD. I last saw Mr. Andino in 2014. To review from my previous note: Mr. Andino's past medical hx was unremarkable leading up to Dec 2013 when he noted chest tightness. He was seen at [facility] at that time and noted to have aortic dissection. He was initially monitored but later underwent aortic repair/aortic valve replacement/CABG in early April 2014. His post op course was complicated by the need for ECMO as well as pressor support. His wife reports today that he was told that he has \"normal\" creatinine levels when undergoing physicals in the past. His creatinine was 2.2 post surgery in April 2014 but improved to 1.3 at the time of discharge in April. 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 At that time his creatinine was 2.2 post op but improved to 1.3. Since that time, creatinine readings have included 1.48 on 7/17/14, 1.39 no 7/27/14, and 1.35 on 8/13/14. 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                 02/24/20: today he presents to reestablish care in our clinic. His creatinine was stable at ~ 1.1 on last check in 2014 but is now at a new baseline of ~ 1.6 since august. Mr. Andino underwent thoracoabdominal aortic repair august 2019. The placement of this stent compromised blood flow to the left kidney moiety which was noted on imaging later. His creatinine post this procedure was ~ 1.6. There have been a few episodes when the creatinine has increased, most recently a few weeks ago (to 1.9). With his most recent creatinine rise, lasix was held. He has not noted much change in his swelling or breathing with holding the lasix for the past few weeks. Today I noted SOB during our conversation. His oxygen saturation was fine but his breathing seemed labored. He and his wife report that this is his " breathing baseline over the past 5 years and is not worse than typical. They are following weights at home and he has been dropping weight - no increase since stopping the lasix. He does have difficulty with empyting his bladder - last imaging in the fall showed no hydronephrosis. Flomax did not help him in the past. He has a HHN coming once a week to the house. He is not lightheaded. He has plans to see cardiology at [place] in March, is looking to establish care in urology, is also looking to establish care with internal medicine potentially in our clinic for continuity in one location.         06/08/20:  Virtual visit. Lasix was increased last month. He reports that the swelling is there but better. He denies any change in eating. No diarrhea. Weight was 240 lbs down to 215 lbs. He had been losing 1 lb a day. He was in ED last week and  Dx with hernia. He held lasix since Friday - was on lasix 40 mg daily held over the weekend - weight was 215 lbs on Thursday and 217 lbs this AM. He denies lightheadedness/dizziness. No orthostatic sxs. He quit wearing compression stockings a week ago. He feels he has slightly more endurance; can't lift a lot, needing to take breaks. He feels his urinary retention is not as problematic - has not seen urology. He has been taking iron BID.      7/13/20: video visit. He has continued to see weight loss over time - was 215 lbs in June but now 205 lbs. He has seen a change in his clothing size by 2 over the past few months. He is hungry often - eating well. No diarrhea. He has some constipation at times. He feels that maybe his breathing is improved. He is wearing compression stockings. He is using lasix 20 mg daily.      10/20/20: video visit - started on [facility] and did exam that way - then converted to telephone given echo noted. Feeling the best he has felt since before last winter. NO new issues. He has endovascular stent repair planned in the coming week. Activity improving. No  swelling. Weight most recently 203 lbs. Over the past month - 201-203 lbs. BP has been well controlled; 115/61 at a recent physician appt. He tends to have higher readings at home; 140s at home, sometimes 130s.     01/25/21:  In the setting of COVID-19 pandemic, this visit was changed to a telephone visit. Patient reports feeling good overall. No difficulty with fluid overload/swelling/shortness of breath. His weight is up slightly but he also has a bigger appetite lately and snacking more with being at home. BP was 105/58 at a recent clinic visit but they report pressures of 130-176 at home most recently - mostly above goal. They have been working with cardiology clinic at [place] in regards to medication adjustments  And plan to reach out to them with these updates. He was hospitalized 11/11/20-11/17/20 following left internal iliac artery stenting for endoleak. Returned to operating room 11/13 for [id number] of left common femoral aneurysm and previous graft left external iliac to superficial femoral and deep femoral artery interposition graft. Creatinine was stable at 1.5-1.7 while inpatient. He has been taking iron BID but with some constipation.        aspirin (ASA) 81 MG chewable tablet, Take 81 mg by mouth daily       atorvastatin (LIPITOR) 40 MG tablet, Take 1 tablet (40 mg) by mouth daily       esomeprazole (NEXIUM) 40 MG DR capsule, Take 1 capsule (40 mg) by mouth 2 times daily Take 30-60 minutes before eating.       Ferrous Sulfate (IRON PO), Take 45 mg by mouth 2 times daily Slow release       losartan (COZAAR) 25 MG tablet, TAKE 1 TABLET(25 MG) BY MOUTH DAILY       metoprolol succinate ER (TOPROL-XL) 25 MG 24 hr tablet, Take 1 tablet (25 mg) by mouth daily       multivitamin, therapeutic with minerals (MULTI-VITAMIN) TABS, Take 1 tablet by mouth daily       warfarin (COUMADIN) 1 MG tablet, Take 5 mg every Mon, Wed, Fri; 4 mg all other days or As directed by Anticoagulation clinic       warfarin  ANTICOAGULANT (COUMADIN) 4 MG tablet, 5 mg (1 mg x 1 and 4 mg x 1) every Tue; 4 mg (4 mg x 1) all other days or as directed by the INR clinic.       nitroGLYcerin (NITROSTAT) 0.4 MG sublingual tablet, Place 0.4 mg under the tongue    No current facility-administered medications on file prior to visit.       Exam:  There were no vitals taken for this visit.  Telephone visit.     Results:    No visits with results within 1 Day(s) from this visit.   Latest known visit with results is:   Orders Only on 01/21/2021   Component Date Value Ref Range Status     Ferritin 01/21/2021 121  26 - 388 ng/mL Final     Iron 01/21/2021 50  35 - 180 ug/dL Final     Iron Binding Cap 01/21/2021 262  240 - 430 ug/dL Final     Iron Saturation Index 01/21/2021 19  15 - 46 % Final     Sodium 01/21/2021 141  133 - 144 mmol/L Final     Potassium 01/21/2021 4.7  3.4 - 5.3 mmol/L Final     Chloride 01/21/2021 108  94 - 109 mmol/L Final     Carbon Dioxide 01/21/2021 32  20 - 32 mmol/L Final     Anion Gap 01/21/2021 1* 3 - 14 mmol/L Final     Glucose 01/21/2021 95  70 - 99 mg/dL Final     Urea Nitrogen 01/21/2021 32* 7 - 30 mg/dL Final     Creatinine 01/21/2021 1.30* 0.66 - 1.25 mg/dL Final     GFR Estimate 01/21/2021 57* >60 mL/min/[1.73_m2] Final    Comment: Non  GFR Calc  Starting 12/18/2018, serum creatinine based estimated GFR (eGFR) will be   calculated using the Chronic Kidney Disease Epidemiology Collaboration   (CKD-EPI) equation.       GFR Estimate If Black 01/21/2021 66  >60 mL/min/[1.73_m2] Final    Comment:  GFR Calc  Starting 12/18/2018, serum creatinine based estimated GFR (eGFR) will be   calculated using the Chronic Kidney Disease Epidemiology Collaboration   (CKD-EPI) equation.       Calcium 01/21/2021 9.0  8.5 - 10.1 mg/dL Final     Phosphorus 01/21/2021 3.3  2.5 - 4.5 mg/dL Final     Albumin 01/21/2021 3.6  3.4 - 5.0 g/dL Final     Protein Random Urine 01/21/2021 0.16  g/L Final     Protein Total  Urine g/gr Creatinine 01/21/2021 0.17  0 - 0.2 g/g Cr Final     Parathyroid Hormone Intact 01/21/2021 43  18 - 80 pg/mL Final     Hemoglobin 01/21/2021 12.2* 13.3 - 17.7 g/dL Final     Creatinine Urine 01/21/2021 92  mg/dL Final      Lab results were reviewed and interpreted.       Assessment/Plan:   1. CKD Stage 3: has CKD in the setting of previous ULICES as well as vascular compromise to the left kidney moiety from his vascular procedure in August 2019. Addt ULICES recently which was likely hemodynamic in the setting of poor oral intake and being on lasix. Creatinine is relatively stable. Will monitor routinely.      2. Hypertension: blood pressure goal from kidney perspective is <130/80. He is above this goal at this time. They report that this has been followed by [facility] most recently and would like to follow-up with them on next change for now. I defer mgmt to [facility] for now.      3. Anemia: hemoglobin 12.2. Iron saturation below goal at 19%, however, given his constipation he has with BID dosing of iron, I am ok with them decreasing the iron to just once a day.      4. Aneurysms/AVR: following with [facility]    Patient Instructions   1. Labs in 3 months  2. Follow-up in 6 months.   3. Ok to decrease iron to just once a day dosing.        25 minutes spent on the date of the encounter doing review of outside records, review of test results, interpretation of tests, patient visit and documentation   Telephone visit: 12 minutes  Madelin Vallecillo DO

## 2021-01-25 NOTE — PATIENT INSTRUCTIONS
1. Labs in 3 months  2. Follow-up in 6 months.   3. Ok to decrease iron to just once a day dosing.

## 2021-01-25 NOTE — PROGRESS NOTES
Min is a 65 year old who is being evaluated via a billable video visit.      How would you like to obtain your AVS? [facility]  Telephone visit: [phone]    Will anyone else be joining your phonr visit? Piper Kaur LPN

## 2021-01-25 NOTE — PATIENT INSTRUCTIONS
Continue your twice daily nexium. You can try adding pepcid (famotidine) up to twice a day as needed for cough/throat clearing.

## 2021-01-25 NOTE — PROGRESS NOTES
"     HPI:    Min presents today for a telephone visit to discuss chronic cough/LPR.  Symptoms have improved although has been clearing his throat more frequently the last month.    Gained about 10lbs since his surgery in November although still down about 30lbs since August.  Is eating well - is concerned that he maybe eating too much.  Has also been less active now that it is winter.     Some constipation which he attributes to iron pills - is using fiber and a stool softener.  Recently was able to go down on his iron supplement.     Past Medical History:   Diagnosis Date     Aortic dissection (H)      Aortic root aneurysm (H)      C. difficile colitis     April 2014 following surgery     Connective tissue disease (H)     \"probable\" per [facility] Notes     DVT (deep venous thrombosis) (H)     April 2014 following surgery     Horseshoe kidney      Hypertension      S/P insertion of iliac artery stent 11/11/2020    Left internal iliac artery stenting for endoleak     Urinary urgency 10/12/2020     Valvular cardiomyopathy (H)        Past Surgical History:   Procedure Laterality Date     AORTIC VALVE REPLACEMENT  4/7/14     ARTHROSCOPY KNEE RT/LT  2005    left, repair meniscus     C CABG, ARTERIAL, SINGLE  4/7/14     C REPAIR CRUCIATE LIGAMENT,KNEE  1999    left     REPAIR AORTIC ROOT  4/7/14    surgery followed by ECMO, vasopressor therapy       Family History   Problem Relation Age of Onset     Family History Negative Father      Aneurysm Other         family hx     C.A.D. No family hx of      Diabetes No family hx of      Hypertension No family hx of        Social History     Tobacco Use     Smoking status: Never Smoker     Smokeless tobacco: Never Used   Substance Use Topics     Alcohol use: Yes     Comment: reports very little etoh use.         Assessment and Plan:    # chronic cough - improved - thought to be secondary to LPR - continue BID nexium.  Has had some recurrence of throat clearing, can try adding " pepcid as needed.  Will continue BID PPI for now - may consider trying to wean in a few months pending symptoms    # weight loss - now starting to gain some back - multifactorial.  Recent CTs reassuring.  Will continue to monitor    # history of colon polyps - last c-scope 2014 was normal - will hold off on repeat given comorbidities but may consider at next appointment.    # LINDSEY - no overt GI bleeding - improving with iron and now having dose weaned down.  If persists, consider endoscopic evaluation.    # CHF, history of thoracic aortic aneurysm      RTC 6 months    Padmaja Chavira DO     Phone call duration: 10 minutes

## 2021-01-25 NOTE — PROGRESS NOTES
Min is a 65 year old who is being evaluated via a billable telephone visit.      What phone number would you like to be contacted at? [phone]   How would you like to obtain your AVS? MyChart  Phone call duration:  Minutes  Wyatt Cabello CMA

## 2021-01-27 NOTE — NURSING NOTE
Called and spoke with pt.  Offered to assist with scheduling Future appts recommended by Dr. Vallecillo:    Instructions:  1. Labs in 3 months  2. Follow-up in 6 months.   3. Ok to decrease iron to just once a day dosing.        Pt stated he will call at a later dated to schedule the appts himself.  Pt stated he is able to see Dr. Vallecillo's After Visit instructions in his MyChart.    Lizet Cary LPN

## 2021-02-01 ENCOUNTER — TRANSFERRED RECORDS (OUTPATIENT)
Dept: HEALTH INFORMATION MANAGEMENT | Facility: CLINIC | Age: 66
End: 2021-02-01

## 2021-02-01 ENCOUNTER — ANTICOAGULATION THERAPY VISIT (OUTPATIENT)
Dept: FAMILY MEDICINE | Facility: CLINIC | Age: 66
End: 2021-02-01

## 2021-02-01 DIAGNOSIS — Z95.2 H/O MECHANICAL AORTIC VALVE REPLACEMENT: ICD-10-CM

## 2021-02-01 DIAGNOSIS — Z95.2 MECHANICAL HEART VALVE PRESENT: ICD-10-CM

## 2021-02-01 DIAGNOSIS — Z79.01 LONG TERM CURRENT USE OF ANTICOAGULANT THERAPY: ICD-10-CM

## 2021-02-01 LAB — INR PPP: 2.3 (ref 0.9–1.1)

## 2021-02-01 NOTE — PROGRESS NOTES
ANTICOAGULATION  MANAGEMENT-Patient Home Monitoring Result    Assessment     Therapeutic INR result of 2.3 . Goal range 2.0-3.0. Received via fax from [employer] home INR monitoring company.        Previous INR was therapeutic    Min was last contacted by phone:     Plan     Per home monitoring agreement with patient, patient was NOT contacted regarding therapeutic result today.  Patient is to continue current dose and continue to check INR with home monitor per protocol.  ?       OBJECTIVE    INR   Date Value Ref Range Status   2021 2.3 (A) 0.90 - 1.10 Final       ASSESSMENT / PLAN  No question data found.  Anticoagulation Summary  As of 2021    INR goal:  2.0-3.0   TTR:  80.6 % (2.1 mo)   INR used for dosin.3 (2021)   Warfarin maintenance plan:  4 mg (4 mg x 1) every Sun, Tue, Thu; 5 mg (1 mg x 1 and 4 mg x 1) all other days   Full warfarin instructions:  4 mg every Sun, Tue, Thu; 5 mg all other days   Weekly warfarin total:  32 mg   Plan last modified:  Shantal Hoover RN (2021)   Next INR check:  2021   Priority:  Maintenance   Target end date:  2050    Indications    Mechanical heart valve present [Z95.2]  H/O mechanical aortic valve replacement [Z95.2]  Long term current use of anticoagulant therapy [Z79.01]             Anticoagulation Episode Summary     INR check location:      Preferred lab:  EXTERNAL LAB    Send INR reminders to:  [facility] MCLAUGHLIN    Comments:  Okay to Manage By Exception      Anticoagulation Care Providers     Provider Role Specialty Phone number    John Blair MD Referring Internal Medicine [phone]

## 2021-02-08 ENCOUNTER — ANTICOAGULATION THERAPY VISIT (OUTPATIENT)
Dept: FAMILY MEDICINE | Facility: CLINIC | Age: 66
End: 2021-02-08

## 2021-02-08 ENCOUNTER — TRANSFERRED RECORDS (OUTPATIENT)
Dept: HEALTH INFORMATION MANAGEMENT | Facility: CLINIC | Age: 66
End: 2021-02-08

## 2021-02-08 DIAGNOSIS — Z95.2 H/O MECHANICAL AORTIC VALVE REPLACEMENT: ICD-10-CM

## 2021-02-08 DIAGNOSIS — Z95.2 MECHANICAL HEART VALVE PRESENT: ICD-10-CM

## 2021-02-08 DIAGNOSIS — Z79.01 LONG TERM CURRENT USE OF ANTICOAGULANT THERAPY: ICD-10-CM

## 2021-02-08 LAB — INR PPP: 2.5 (ref 0.9–1.1)

## 2021-02-08 NOTE — PROGRESS NOTES
ANTICOAGULATION MANAGEMENT     Patient Name:  Min Andino  Date:  2021      ANTICOAGULATION  MANAGEMENT-Patient Home Monitoring Result    Assessment     Therapeutic INR result of 2.5 . Goal range 2.0-3.0. Received via fax from [employer] home INR monitoring company.        Previous INR was therapeutic    Min was last contacted by phone: 21    Plan     Per home monitoring agreement with patient, patient was NOT contacted regarding therapeutic result today.  Patient is to continue current dose and continue to check INR with home monitor per protocol.  ?       OBJECTIVE    INR   Date Value Ref Range Status   2021 2.5 (A) 0.90 - 1.10 Final       ASSESSMENT / PLAN  No question data found.  Anticoagulation Summary  As of 2021    INR goal:  2.0-3.0   TTR:  82.5 % (2.3 mo)   INR used for dosin.5 (2021)   Warfarin maintenance plan:  4 mg (4 mg x 1) every Sun, Tue, Thu; 5 mg (1 mg x 1 and 4 mg x 1) all other days   Full warfarin instructions:  4 mg every Sun, Tue, Thu; 5 mg all other days   Weekly warfarin total:  32 mg   No change documented:  Vonda Izaguirre RN   Plan last modified:  Shantal Hoover RN (2021)   Next INR check:  2/15/2021   Priority:  Maintenance   Target end date:  2050    Indications    Mechanical heart valve present [Z95.2]  H/O mechanical aortic valve replacement [Z95.2]  Long term current use of anticoagulant therapy [Z79.01]             Anticoagulation Episode Summary     INR check location:      Preferred lab:  EXTERNAL LAB    Send INR reminders to:  RIVER MCLAUGHLIN    Comments:  Okay to Manage By Exception      Anticoagulation Care Providers     Provider Role Specialty Phone number    John Blair MD Referring Internal Medicine [phone]

## 2021-02-15 ENCOUNTER — ANTICOAGULATION THERAPY VISIT (OUTPATIENT)
Dept: FAMILY MEDICINE | Facility: CLINIC | Age: 66
End: 2021-02-15

## 2021-02-15 ENCOUNTER — TRANSFERRED RECORDS (OUTPATIENT)
Dept: HEALTH INFORMATION MANAGEMENT | Facility: CLINIC | Age: 66
End: 2021-02-15

## 2021-02-15 DIAGNOSIS — Z95.2 MECHANICAL HEART VALVE PRESENT: ICD-10-CM

## 2021-02-15 DIAGNOSIS — Z95.2 H/O MECHANICAL AORTIC VALVE REPLACEMENT: ICD-10-CM

## 2021-02-15 DIAGNOSIS — Z79.01 LONG TERM CURRENT USE OF ANTICOAGULANT THERAPY: ICD-10-CM

## 2021-02-15 LAB — INR PPP: 2.1 (ref 0.9–1.1)

## 2021-02-15 NOTE — PROGRESS NOTES
ANTICOAGULATION  MANAGEMENT-Patient Home Monitoring Result    Assessment     Therapeutic INR result of 2.1 . Goal range 2.0-3.0. Received via fax from [employer] home INR monitoring company.        Previous INR was therapeutic    Min was last contacted by phone: 21    Plan     Per home monitoring agreement with patient, patient was NOT contacted regarding therapeutic result today.  Patient is to continue current dose and continue to check INR with home monitor per protocol.  ?       OBJECTIVE    INR   Date Value Ref Range Status   02/15/2021 2.1 (A) 0.90 - 1.10 Final       ASSESSMENT / PLAN  No question data found.  Anticoagulation Summary  As of 2/15/2021    INR goal:  2.0-3.0   TTR:  84.1 % (2.6 mo)   INR used for dosin.1 (2/15/2021)   Warfarin maintenance plan:  4 mg (4 mg x 1) every Sun, Tue, Thu; 5 mg (1 mg x 1 and 4 mg x 1) all other days   Full warfarin instructions:  4 mg every Sun, Tue, Thu; 5 mg all other days   Weekly warfarin total:  32 mg   No change documented:  Vonda Izaguirre RN   Plan last modified:  Shantal Hoover RN (2021)   Next INR check:  2021   Priority:  Maintenance   Target end date:  2050    Indications    Mechanical heart valve present [Z95.2]  H/O mechanical aortic valve replacement [Z95.2]  Long term current use of anticoagulant therapy [Z79.01]             Anticoagulation Episode Summary     INR check location:      Preferred lab:  EXTERNAL LAB    Send INR reminders to:  R[facility] MCLAUGHLIN    Comments:  Okay to Manage By Exception      Anticoagulation Care Providers     Provider Role Specialty Phone number    John Blair MD Referring Internal Medicine [phone]

## 2021-02-22 ENCOUNTER — ANTICOAGULATION THERAPY VISIT (OUTPATIENT)
Dept: FAMILY MEDICINE | Facility: CLINIC | Age: 66
End: 2021-02-22

## 2021-02-22 ENCOUNTER — TRANSFERRED RECORDS (OUTPATIENT)
Dept: HEALTH INFORMATION MANAGEMENT | Facility: CLINIC | Age: 66
End: 2021-02-22

## 2021-02-22 DIAGNOSIS — Z95.2 H/O MECHANICAL AORTIC VALVE REPLACEMENT: ICD-10-CM

## 2021-02-22 DIAGNOSIS — Z95.2 MECHANICAL HEART VALVE PRESENT: ICD-10-CM

## 2021-02-22 DIAGNOSIS — Z79.01 LONG TERM CURRENT USE OF ANTICOAGULANT THERAPY: ICD-10-CM

## 2021-02-22 LAB — INR PPP: 2.1 (ref 0.9–1.1)

## 2021-02-22 NOTE — PROGRESS NOTES
ANTICOAGULATION  MANAGEMENT-Patient Home Monitoring Result    Assessment     Therapeutic INR result of 2.1 . Goal range 2.0-3.0. Received via fax from [employer] home INR monitoring company.        Previous INR was therapeutic    Min was last contacted by phone: 21    Plan     Per home monitoring agreement with patient, patient was NOT contacted regarding therapeutic result today.  Patient is to continue current dose and continue to check INR with home monitor per protocol.  ?       OBJECTIVE    INR   Date Value Ref Range Status   2021 2.1 (A) 0.90 - 1.10 Final       ASSESSMENT / PLAN  No question data found.  Anticoagulation Summary  As of 2021    INR goal:  2.0-3.0   TTR:  85.4 % (2.8 mo)   INR used for dosin.1 (2021)   Warfarin maintenance plan:  4 mg (4 mg x 1) every Sun, Tue, Thu; 5 mg (1 mg x 1 and 4 mg x 1) all other days   Full warfarin instructions:  4 mg every Sun, Tue, Thu; 5 mg all other days   Weekly warfarin total:  32 mg   No change documented:  Nigel Rubio RN   Plan last modified:  Shantal Hoover RN (2021)   Next INR check:  3/1/2021   Priority:  Maintenance   Target end date:  2050    Indications    Mechanical heart valve present [Z95.2]  H/O mechanical aortic valve replacement [Z95.2]  Long term current use of anticoagulant therapy [Z79.01]             Anticoagulation Episode Summary     INR check location:      Preferred lab:  EXTERNAL LAB    Send INR reminders to:  RIVER MCLAUGHLIN    Comments:  Okay to Manage By Exception      Anticoagulation Care Providers     Provider Role Specialty Phone number    John Blair MD Referring Internal Medicine [phone]

## 2021-03-01 ENCOUNTER — TRANSFERRED RECORDS (OUTPATIENT)
Dept: HEALTH INFORMATION MANAGEMENT | Facility: CLINIC | Age: 66
End: 2021-03-01

## 2021-03-01 ENCOUNTER — ANTICOAGULATION THERAPY VISIT (OUTPATIENT)
Dept: FAMILY MEDICINE | Facility: CLINIC | Age: 66
End: 2021-03-01

## 2021-03-01 DIAGNOSIS — Z95.2 H/O MECHANICAL AORTIC VALVE REPLACEMENT: ICD-10-CM

## 2021-03-01 DIAGNOSIS — Z95.2 MECHANICAL HEART VALVE PRESENT: ICD-10-CM

## 2021-03-01 DIAGNOSIS — Z79.01 LONG TERM CURRENT USE OF ANTICOAGULANT THERAPY: ICD-10-CM

## 2021-03-01 LAB — INR PPP: 2.8 (ref 0.9–1.1)

## 2021-03-01 NOTE — PROGRESS NOTES
ANTICOAGULATION  MANAGEMENT-Patient Home Monitoring Result    Assessment     Therapeutic INR result of 2.8 . Goal range 2.0-3.0. Received via fax from [employer] home INR monitoring company.        Previous INR was therapeutic    Min was last contacted by phone: 21    Plan     Per home monitoring agreement with patient, patient was NOT contacted regarding therapeutic result today.  Patient is to continue current dose and continue to check INR with home monitor per protocol.  ?       OBJECTIVE    INR   Date Value Ref Range Status   2021 2.8 (A) 0.90 - 1.10 Final       ASSESSMENT / PLAN  No question data found.  Anticoagulation Summary  As of 3/1/2021    INR goal:  2.0-3.0   TTR:  86.5 % (3 mo)   INR used for dosin.8 (3/1/2021)   Warfarin maintenance plan:  4 mg (4 mg x 1) every Sun, Tue, Thu; 5 mg (1 mg x 1 and 4 mg x 1) all other days   Full warfarin instructions:  4 mg every Sun, Tue, Thu; 5 mg all other days   Weekly warfarin total:  32 mg   Plan last modified:  Shantal Hoover RN (2021)   Next INR check:     Priority:  Maintenance   Target end date:  2050    Indications    Mechanical heart valve present [Z95.2]  H/O mechanical aortic valve replacement [Z95.2]  Long term current use of anticoagulant therapy [Z79.01]             Anticoagulation Episode Summary     INR check location:      Preferred lab:  EXTERNAL LAB    Send INR reminders to:  RIVER MCLAUGHLIN    Comments:  Okay to Manage By Exception      Anticoagulation Care Providers     Provider Role Specialty Phone number    John Blair MD Referring Internal Medicine [phone]            Evelyn Yin RN   [facility]  [facility], [place]

## 2021-03-01 NOTE — PROGRESS NOTES
Incoming fax from [employer] home monitoring company    Date of INR 3/1    INR result 2.8

## 2021-03-08 ENCOUNTER — TRANSFERRED RECORDS (OUTPATIENT)
Dept: HEALTH INFORMATION MANAGEMENT | Facility: CLINIC | Age: 66
End: 2021-03-08

## 2021-03-08 ENCOUNTER — DOCUMENTATION ONLY (OUTPATIENT)
Dept: FAMILY MEDICINE | Facility: CLINIC | Age: 66
End: 2021-03-08

## 2021-03-08 DIAGNOSIS — Z95.2 MECHANICAL HEART VALVE PRESENT: ICD-10-CM

## 2021-03-08 DIAGNOSIS — Z95.2 H/O MECHANICAL AORTIC VALVE REPLACEMENT: ICD-10-CM

## 2021-03-08 DIAGNOSIS — Z79.01 LONG TERM CURRENT USE OF ANTICOAGULANT THERAPY: ICD-10-CM

## 2021-03-08 LAB — INR PPP: 2.9 (ref 0.9–1.1)

## 2021-03-08 NOTE — PROGRESS NOTES
ANTICOAGULATION  MANAGEMENT-Patient Home Monitoring Result    Assessment     Therapeutic INR result of 2.9 . Goal range 2.0-3.0. Received via fax from [employer] home INR monitoring company.        Previous INR was therapeutic    Min was last contacted by phone: 2021    Plan     Per home monitoring agreement with patient, patient was NOT contacted regarding therapeutic result today.  Patient is to continue current dose and continue to check INR with home monitor per protocol.  ?       OBJECTIVE    INR   Date Value Ref Range Status   2021 2.9 (A) 0.90 - 1.10 Final       ASSESSMENT / PLAN  No question data found.  Anticoagulation Summary  As of 3/8/2021    INR goal:  2.0-3.0   TTR:  87.5 % (3.3 mo)   INR used for dosin.9 (3/8/2021)   Warfarin maintenance plan:  4 mg (4 mg x 1) every Sun, Tue, Thu; 5 mg (1 mg x 1 and 4 mg x 1) all other days   Full warfarin instructions:  4 mg every Sun, Tue, Thu; 5 mg all other days   Weekly warfarin total:  32 mg   No change documented:  Nigel Rubio RN   Plan last modified:  Shantal Hoover RN (2021)   Next INR check:  3/15/2021   Priority:  Maintenance   Target end date:  2050    Indications    Mechanical heart valve present [Z95.2]  H/O mechanical aortic valve replacement [Z95.2]  Long term current use of anticoagulant therapy [Z79.01]             Anticoagulation Episode Summary     INR check location:      Preferred lab:  EXTERNAL LAB    Send INR reminders to:  RIVER MCLAUGHLIN    Comments:  Okay to Manage By Exception      Anticoagulation Care Providers     Provider Role Specialty Phone number    John Blair MD Referring Internal Medicine [phone]

## 2021-03-18 ENCOUNTER — TRANSFERRED RECORDS (OUTPATIENT)
Dept: HEALTH INFORMATION MANAGEMENT | Facility: CLINIC | Age: 66
End: 2021-03-18

## 2021-03-18 ENCOUNTER — ANTICOAGULATION THERAPY VISIT (OUTPATIENT)
Dept: FAMILY MEDICINE | Facility: CLINIC | Age: 66
End: 2021-03-18

## 2021-03-18 DIAGNOSIS — Z79.01 LONG TERM CURRENT USE OF ANTICOAGULANT THERAPY: ICD-10-CM

## 2021-03-18 DIAGNOSIS — Z95.2 H/O MECHANICAL AORTIC VALVE REPLACEMENT: ICD-10-CM

## 2021-03-18 DIAGNOSIS — Z95.2 MECHANICAL HEART VALVE PRESENT: ICD-10-CM

## 2021-03-18 LAB — INR PPP: 2.8 (ref 0.9–1.1)

## 2021-03-18 NOTE — PROGRESS NOTES
ANTICOAGULATION  MANAGEMENT-Patient Home Monitoring Result    Assessment     Therapeutic INR result of 2.8 . Goal range 2.0-3.0. Received via fax from [employer] home INR monitoring company.        Previous INR was therapeutic    Min was last contacted by phone: 21    Plan     Per home monitoring agreement with patient, patient was NOT contacted regarding therapeutic result today.  Patient is to continue current dose and continue to check INR with home monitor per protocol.  ?       OBJECTIVE    INR   Date Value Ref Range Status   2021 2.8 (A) 0.90 - 1.10 Final       ASSESSMENT / PLAN  No question data found.  Anticoagulation Summary  As of 3/18/2021    INR goal:  2.0-3.0   TTR:  88.7 % (3.6 mo)   INR used for dosin.8 (3/18/2021)   Warfarin maintenance plan:  4 mg (4 mg x 1) every Sun, Tue, Thu; 5 mg (1 mg x 1 and 4 mg x 1) all other days   Full warfarin instructions:  4 mg every Sun, Tue, Thu; 5 mg all other days   Weekly warfarin total:  32 mg   No change documented:  Vonda Izaguirre RN   Plan last modified:  Shantal Hoover RN (2021)   Next INR check:  3/25/2021   Priority:  Maintenance   Target end date:  2050    Indications    Mechanical heart valve present [Z95.2]  H/O mechanical aortic valve replacement [Z95.2]  Long term current use of anticoagulant therapy [Z79.01]             Anticoagulation Episode Summary     INR check location:      Preferred lab:  EXTERNAL LAB    Send INR reminders to:  RIVER MCLAUGHLIN    Comments:  Okay to Manage By Exception      Anticoagulation Care Providers     Provider Role Specialty Phone number    John Blair MD Referring Internal Medicine [phone]

## 2021-03-22 ENCOUNTER — TRANSFERRED RECORDS (OUTPATIENT)
Dept: HEALTH INFORMATION MANAGEMENT | Facility: CLINIC | Age: 66
End: 2021-03-22

## 2021-03-22 ENCOUNTER — DOCUMENTATION ONLY (OUTPATIENT)
Dept: FAMILY MEDICINE | Facility: CLINIC | Age: 66
End: 2021-03-22

## 2021-03-22 DIAGNOSIS — Z95.2 MECHANICAL HEART VALVE PRESENT: ICD-10-CM

## 2021-03-22 DIAGNOSIS — Z79.01 LONG TERM CURRENT USE OF ANTICOAGULANT THERAPY: ICD-10-CM

## 2021-03-22 DIAGNOSIS — Z95.2 H/O MECHANICAL AORTIC VALVE REPLACEMENT: ICD-10-CM

## 2021-03-22 LAB — INR PPP: 2.7 (ref 0.9–1.1)

## 2021-03-22 NOTE — PROGRESS NOTES
ANTICOAGULATION  MANAGEMENT-Patient Home Monitoring Result    Assessment     Therapeutic INR result of 2.7 . Goal range 2.0-3.0. Received via fax from [employer] home INR monitoring company.        Previous INR was therapeutic    Min was last contacted by phone: 2021    Plan     Per home monitoring agreement with patient, patient was NOT contacted regarding therapeutic result today.  Patient is to continue current dose and continue to check INR with home monitor per protocol.  ?       OBJECTIVE    INR   Date Value Ref Range Status   2021 2.7 (A) 0.90 - 1.10 Final       ASSESSMENT / PLAN  No question data found.  Anticoagulation Summary  As of 3/22/2021    INR goal:  2.0-3.0   TTR:  89.1 % (3.7 mo)   INR used for dosin.7 (3/22/2021)   Warfarin maintenance plan:  4 mg (4 mg x 1) every Sun, Tue, Thu; 5 mg (1 mg x 1 and 4 mg x 1) all other days   Full warfarin instructions:  4 mg every Sun, Tue, Thu; 5 mg all other days   Weekly warfarin total:  32 mg   No change documented:  Nigel Rubio RN   Plan last modified:  Shantal Hoover RN (2021)   Next INR check:  3/29/2021   Priority:  Maintenance   Target end date:  2050    Indications    Mechanical heart valve present [Z95.2]  H/O mechanical aortic valve replacement [Z95.2]  Long term current use of anticoagulant therapy [Z79.01]             Anticoagulation Episode Summary     INR check location:      Preferred lab:  EXTERNAL LAB    Send INR reminders to:  RIVER MCLAUGHLIN    Comments:  Okay to Manage By Exception      Anticoagulation Care Providers     Provider Role Specialty Phone number    John Blair MD Referring Internal Medicine [phone]

## 2021-03-25 ENCOUNTER — MYC MEDICAL ADVICE (OUTPATIENT)
Dept: FAMILY MEDICINE | Facility: CLINIC | Age: 66
End: 2021-03-25

## 2021-03-25 DIAGNOSIS — Z95.2 S/P AVR (AORTIC VALVE REPLACEMENT): Primary | ICD-10-CM

## 2021-03-25 DIAGNOSIS — Z95.2 S/P AVR (AORTIC VALVE REPLACEMENT): ICD-10-CM

## 2021-03-25 RX ORDER — WARFARIN SODIUM 1 MG/1
TABLET ORAL
Qty: 400 TABLET | Refills: 0 | Status: SHIPPED | OUTPATIENT
Start: 2021-03-25 | End: 2021-03-26

## 2021-03-25 RX ORDER — WARFARIN SODIUM 1 MG/1
TABLET ORAL
Qty: 384 TABLET | Refills: 1 | Status: SHIPPED | OUTPATIENT
Start: 2021-03-25 | End: 2021-03-25

## 2021-03-26 RX ORDER — WARFARIN SODIUM 1 MG/1
TABLET ORAL
Qty: 420 TABLET | Refills: 0 | Status: SHIPPED | OUTPATIENT
Start: 2021-03-26 | End: 2022-04-06

## 2021-03-26 NOTE — TELEPHONE ENCOUNTER
Resent to the pharmacy for a 90 day supply.    Rx approved per [facility] protocol.    Mica Browning RN    [facility]

## 2021-03-31 ENCOUNTER — DOCUMENTATION ONLY (OUTPATIENT)
Dept: FAMILY MEDICINE | Facility: CLINIC | Age: 66
End: 2021-03-31

## 2021-03-31 ENCOUNTER — TRANSFERRED RECORDS (OUTPATIENT)
Dept: HEALTH INFORMATION MANAGEMENT | Facility: CLINIC | Age: 66
End: 2021-03-31

## 2021-03-31 DIAGNOSIS — Z95.2 MECHANICAL HEART VALVE PRESENT: ICD-10-CM

## 2021-03-31 DIAGNOSIS — Z95.2 H/O MECHANICAL AORTIC VALVE REPLACEMENT: ICD-10-CM

## 2021-03-31 DIAGNOSIS — Z79.01 LONG TERM CURRENT USE OF ANTICOAGULANT THERAPY: ICD-10-CM

## 2021-03-31 LAB — INR PPP: 2.6 (ref 0.9–1.1)

## 2021-03-31 NOTE — PROGRESS NOTES
ANTICOAGULATION  MANAGEMENT-Patient Home Monitoring Result    Assessment     Therapeutic INR result of 2.6 . Goal range 2.0-3.0. Received via fax from [employer] home INR monitoring company.        Previous INR was therapeutic    Min was last contacted by phone: 1/25/21    Plan     Per home monitoring agreement with patient, patient was NOT contacted regarding therapeutic result today.  Patient is to continue current dose and continue to check INR with home monitor per protocol.  ?       OBJECTIVE    INR   Date Value Ref Range Status   03/22/2021 2.7 (A) 0.90 - 1.10 Final       ASSESSMENT / PLAN  No question data found.

## 2021-04-08 ENCOUNTER — ANTICOAGULATION THERAPY VISIT (OUTPATIENT)
Dept: FAMILY MEDICINE | Facility: CLINIC | Age: 66
End: 2021-04-08

## 2021-04-08 ENCOUNTER — TRANSFERRED RECORDS (OUTPATIENT)
Dept: HEALTH INFORMATION MANAGEMENT | Facility: CLINIC | Age: 66
End: 2021-04-08

## 2021-04-08 DIAGNOSIS — Z95.2 H/O MECHANICAL AORTIC VALVE REPLACEMENT: ICD-10-CM

## 2021-04-08 DIAGNOSIS — Z79.01 LONG TERM CURRENT USE OF ANTICOAGULANT THERAPY: ICD-10-CM

## 2021-04-08 DIAGNOSIS — Z95.2 MECHANICAL HEART VALVE PRESENT: ICD-10-CM

## 2021-04-08 LAB — INR PPP: 2.8 (ref 0.9–1.1)

## 2021-04-08 NOTE — PROGRESS NOTES
ANTICOAGULATION  MANAGEMENT-Patient Home Monitoring Result    Assessment     Therapeutic INR result of 2.8 . Goal range 2.0-3.0. Received via fax from [employer] home INR monitoring company.        Previous INR was therapeutic    Min was last contacted by phone: 21    Plan     Per home monitoring agreement with patient, patient was NOT contacted regarding therapeutic result today.  Patient is to continue current dose and continue to check INR with home monitor per protocol.  ?       OBJECTIVE    INR   Date Value Ref Range Status   2021 2.8 (A) 0.90 - 1.10 Final       ASSESSMENT / PLAN  No question data found.  Anticoagulation Summary  As of 2021    INR goal:  2.0-3.0   TTR:  90.5 % (4.3 mo)   INR used for dosin.8 (2021)   Warfarin maintenance plan:  4 mg (4 mg x 1) every Sun, Tue, Thu; 5 mg (1 mg x 1 and 4 mg x 1) all other days   Full warfarin instructions:  4 mg every Sun, Tue, Thu; 5 mg all other days   Weekly warfarin total:  32 mg   No change documented:  Shantal Hoover RN   Plan last modified:  Shantal Hoover RN (2021)   Next INR check:  2021   Priority:  Maintenance   Target end date:  2050    Indications    Mechanical heart valve present [Z95.2]  H/O mechanical aortic valve replacement [Z95.2]  Long term current use of anticoagulant therapy [Z79.01]             Anticoagulation Episode Summary     INR check location:      Preferred lab:  EXTERNAL LAB    Send INR reminders to:  RIVER MCLAUGHLIN    Comments:  Okay to Manage By Exception      Anticoagulation Care Providers     Provider Role Specialty Phone number    John Blair MD Referring Internal Medicine [phone]           Yes...

## 2021-04-12 NOTE — PROGRESS NOTES
"    Assessment & Plan       ICD-10-CM    1. Impacted cerumen of right ear  H61.21 REMOVE IMPACTED CERUMEN   2. Stage 3a chronic kidney disease  N18.31 Renal panel     Protein  random urine with Creat Ratio     Parathyroid Hormone Intact     Hemoglobin   3. Iron deficiency anemia, unspecified iron deficiency anemia type  D50.9       Cerumen impaction was removed with curette today easily without complications.  Discussed manners and mechanisms to keep cerumen managed.  Sees Dr. Blair for his chronic health issues.    5 minutes spent on the date of the encounter doing chart review, history and exam, documentation and further activities per the note       BMI:   Estimated body mass index is 30.19 kg/m  as calculated from the following:    Height as of this encounter: 1.816 m (5' 11.5\").    Weight as of this encounter: 99.6 kg (219 lb 8 oz).   Weight management plan: Discussed healthy diet and exercise guidelines        Return in about 2 weeks (around 4/28/2021) for Routine Visit.    Gricelda Enrique MD, MD  [facility]    Dylon Copeland is a 65 year old who presents for the following health issues     HPI     Right ear feels plugged for a few months     Has many question about cerumen what causes it.  Has tried some over-the-counter drops but did not immediately relieve it.  He works in [employer] environments and seems to find this frequent problem.    Review of Systems   Constitutional, HEENT, cardiovascular, pulmonary, GI, , musculoskeletal, neuro, skin, endocrine and psych systems are negative, except as otherwise noted.      Objective    /62   Pulse 74   Temp 97.7  F (36.5  C) (Temporal)   Resp 18   Ht 1.816 m (5' 11.5\")   Wt 99.6 kg (219 lb 8 oz)   SpO2 99%   BMI 30.19 kg/m    Body mass index is 30.19 kg/m .  Physical Exam   GENERAL: healthy, alert and no distress  EYES: Eyes grossly normal to inspection, PERRL and conjunctivae and sclerae normal  HENT: R ear canal blocked with " cerumen.  Easily removed with curette.  After ear canals and TM's normal, nose and mouth without ulcers or lesions  NECK: no adenopathy, no asymmetry, masses, or scars and thyroid normal to palpation  RESP: lungs clear to auscultation - no rales, rhonchi or wheezes  CV: regular rate and rhythm, normal S1 S2, no S3 or S4, no murmur, click or rub, no peripheral edema and peripheral pulses strong  MS: no gross musculoskeletal defects noted, no edema  SKIN: no suspicious lesions or rashes  NEURO: Normal strength and tone, mentation intact and speech normal  PSYCH: mentation appears normal, affect normal/bright

## 2021-04-14 ENCOUNTER — OFFICE VISIT (OUTPATIENT)
Dept: FAMILY MEDICINE | Facility: OTHER | Age: 66
End: 2021-04-14
Payer: MEDICARE

## 2021-04-14 VITALS
WEIGHT: 219.5 LBS | BODY MASS INDEX: 29.73 KG/M2 | HEART RATE: 74 BPM | RESPIRATION RATE: 18 BRPM | TEMPERATURE: 97.7 F | HEIGHT: 72 IN | OXYGEN SATURATION: 99 % | SYSTOLIC BLOOD PRESSURE: 120 MMHG | DIASTOLIC BLOOD PRESSURE: 62 MMHG

## 2021-04-14 DIAGNOSIS — D50.9 IRON DEFICIENCY ANEMIA, UNSPECIFIED IRON DEFICIENCY ANEMIA TYPE: ICD-10-CM

## 2021-04-14 DIAGNOSIS — H61.21 IMPACTED CERUMEN OF RIGHT EAR: Primary | ICD-10-CM

## 2021-04-14 DIAGNOSIS — N18.31 STAGE 3A CHRONIC KIDNEY DISEASE (H): ICD-10-CM

## 2021-04-14 LAB
ALBUMIN SERPL-MCNC: 3.5 G/DL (ref 3.4–5)
ANION GAP SERPL CALCULATED.3IONS-SCNC: 3 MMOL/L (ref 3–14)
BUN SERPL-MCNC: 27 MG/DL (ref 7–30)
CALCIUM SERPL-MCNC: 8.6 MG/DL (ref 8.5–10.1)
CHLORIDE SERPL-SCNC: 111 MMOL/L (ref 94–109)
CO2 SERPL-SCNC: 27 MMOL/L (ref 20–32)
CREAT SERPL-MCNC: 1.52 MG/DL (ref 0.66–1.25)
CREAT UR-MCNC: 97 MG/DL
FERRITIN SERPL-MCNC: 125 NG/ML (ref 26–388)
GFR SERPL CREATININE-BSD FRML MDRD: 47 ML/MIN/{1.73_M2}
GLUCOSE SERPL-MCNC: 104 MG/DL (ref 70–99)
HGB BLD-MCNC: 12.6 G/DL (ref 13.3–17.7)
IRON SATN MFR SERPL: 25 % (ref 15–46)
IRON SERPL-MCNC: 59 UG/DL (ref 35–180)
PHOSPHATE SERPL-MCNC: 3 MG/DL (ref 2.5–4.5)
POTASSIUM SERPL-SCNC: 5 MMOL/L (ref 3.4–5.3)
PROT UR-MCNC: 0.16 G/L
PROT/CREAT 24H UR: 0.16 G/G CR (ref 0–0.2)
PTH-INTACT SERPL-MCNC: 61 PG/ML (ref 18–80)
SODIUM SERPL-SCNC: 141 MMOL/L (ref 133–144)
TIBC SERPL-MCNC: 233 UG/DL (ref 240–430)

## 2021-04-14 PROCEDURE — 82728 ASSAY OF FERRITIN: CPT | Performed by: INTERNAL MEDICINE

## 2021-04-14 PROCEDURE — 83550 IRON BINDING TEST: CPT | Performed by: INTERNAL MEDICINE

## 2021-04-14 PROCEDURE — 83540 ASSAY OF IRON: CPT | Performed by: INTERNAL MEDICINE

## 2021-04-14 PROCEDURE — 83970 ASSAY OF PARATHORMONE: CPT | Performed by: INTERNAL MEDICINE

## 2021-04-14 PROCEDURE — 36415 COLL VENOUS BLD VENIPUNCTURE: CPT | Performed by: INTERNAL MEDICINE

## 2021-04-14 PROCEDURE — 80069 RENAL FUNCTION PANEL: CPT | Performed by: INTERNAL MEDICINE

## 2021-04-14 PROCEDURE — 84156 ASSAY OF PROTEIN URINE: CPT | Performed by: INTERNAL MEDICINE

## 2021-04-14 PROCEDURE — 69210 REMOVE IMPACTED EAR WAX UNI: CPT | Mod: RT | Performed by: FAMILY MEDICINE

## 2021-04-14 PROCEDURE — 85018 HEMOGLOBIN: CPT | Performed by: INTERNAL MEDICINE

## 2021-04-14 ASSESSMENT — PAIN SCALES - GENERAL: PAINLEVEL: NO PAIN (0)

## 2021-04-14 ASSESSMENT — MIFFLIN-ST. JEOR: SCORE: 1805.71

## 2021-04-21 ENCOUNTER — TRANSFERRED RECORDS (OUTPATIENT)
Dept: HEALTH INFORMATION MANAGEMENT | Facility: CLINIC | Age: 66
End: 2021-04-21

## 2021-04-21 ENCOUNTER — ANTICOAGULATION THERAPY VISIT (OUTPATIENT)
Dept: FAMILY MEDICINE | Facility: CLINIC | Age: 66
End: 2021-04-21

## 2021-04-21 DIAGNOSIS — Z95.2 MECHANICAL HEART VALVE PRESENT: ICD-10-CM

## 2021-04-21 DIAGNOSIS — Z95.2 H/O MECHANICAL AORTIC VALVE REPLACEMENT: ICD-10-CM

## 2021-04-21 DIAGNOSIS — Z79.01 LONG TERM CURRENT USE OF ANTICOAGULANT THERAPY: ICD-10-CM

## 2021-04-21 LAB — INR PPP: 2 (ref 0.9–1.1)

## 2021-04-28 ENCOUNTER — TRANSFERRED RECORDS (OUTPATIENT)
Dept: HEALTH INFORMATION MANAGEMENT | Facility: CLINIC | Age: 66
End: 2021-04-28

## 2021-04-28 ENCOUNTER — ANTICOAGULATION THERAPY VISIT (OUTPATIENT)
Dept: FAMILY MEDICINE | Facility: CLINIC | Age: 66
End: 2021-04-28

## 2021-04-28 DIAGNOSIS — Z79.01 LONG TERM CURRENT USE OF ANTICOAGULANT THERAPY: ICD-10-CM

## 2021-04-28 DIAGNOSIS — Z95.2 MECHANICAL HEART VALVE PRESENT: ICD-10-CM

## 2021-04-28 DIAGNOSIS — Z95.2 H/O MECHANICAL AORTIC VALVE REPLACEMENT: ICD-10-CM

## 2021-04-28 LAB — INR PPP: 2.1 (ref 0.9–1.1)

## 2021-04-28 NOTE — PROGRESS NOTES
ANTICOAGULATION  MANAGEMENT-Patient Home Monitoring Result    Assessment     Therapeutic INR result of 2.1 . Goal range 2.0-3.0. Received via fax from [employer] home INR monitoring company.        Previous INR was therapeutic    Min was last contacted by phone:     Plan     Per home monitoring agreement with patient, patient was NOT contacted regarding therapeutic result today.  Patient is to continue current dose and continue to check INR with home monitor per protocol.  ?       OBJECTIVE    INR   Date Value Ref Range Status   2021 2.1 (A) 0.90 - 1.10 Final       ASSESSMENT / PLAN  No question data found.  Anticoagulation Summary  As of 2021    INR goal:  2.0-3.0   TTR:  91.8 % (5 mo)   INR used for dosin.1 (2021)   Warfarin maintenance plan:  4 mg (4 mg x 1) every Sun, Tue, Thu; 5 mg (1 mg x 1 and 4 mg x 1) all other days   Full warfarin instructions:  4 mg every Sun, Tue, Thu; 5 mg all other days   Weekly warfarin total:  32 mg   No change documented:  Vonda Izaguirre RN   Plan last modified:  Shantal Hoover RN (2021)   Next INR check:  2021   Priority:  Maintenance   Target end date:  2050    Indications    Mechanical heart valve present [Z95.2]  H/O mechanical aortic valve replacement [Z95.2]  Long term current use of anticoagulant therapy [Z79.01]             Anticoagulation Episode Summary     INR check location:      Preferred lab:  EXTERNAL LAB    Send INR reminders to:  RIVER MCLAUGHLIN    Comments:  Okay to Manage By Exception      Anticoagulation Care Providers     Provider Role Specialty Phone number    John Blair MD Referring Internal Medicine [phone]

## 2021-05-07 ENCOUNTER — ANTICOAGULATION THERAPY VISIT (OUTPATIENT)
Dept: FAMILY MEDICINE | Facility: CLINIC | Age: 66
End: 2021-05-07

## 2021-05-07 ENCOUNTER — TRANSFERRED RECORDS (OUTPATIENT)
Dept: HEALTH INFORMATION MANAGEMENT | Facility: CLINIC | Age: 66
End: 2021-05-07

## 2021-05-07 DIAGNOSIS — Z95.2 MECHANICAL HEART VALVE PRESENT: ICD-10-CM

## 2021-05-07 DIAGNOSIS — Z79.01 LONG TERM CURRENT USE OF ANTICOAGULANT THERAPY: ICD-10-CM

## 2021-05-07 DIAGNOSIS — Z95.2 H/O MECHANICAL AORTIC VALVE REPLACEMENT: ICD-10-CM

## 2021-05-07 LAB — INR PPP: 2.6 (ref 0.9–1.1)

## 2021-05-07 NOTE — PROGRESS NOTES
ANTICOAGULATION  MANAGEMENT-Patient Home Monitoring Result    Assessment     Therapeutic INR result of 2.6 . Goal range 2.0-3.0. Received via fax from [employer] home INR monitoring company.        Previous INR was therapeutic    Min was last contacted by phone: 21    Plan     Per home monitoring agreement with patient, patient was NOT contacted regarding therapeutic result today.  Patient is to continue current dose and continue to check INR with home monitor per protocol.  ?       OBJECTIVE    INR   Date Value Ref Range Status   2021 2.6 (A) 0.90 - 1.10 Final       ASSESSMENT / PLAN  No question data found.  Anticoagulation Summary  As of 2021    INR goal:  2.0-3.0   TTR:  92.2 % (5.3 mo)   INR used for dosin.6 (2021)   Warfarin maintenance plan:  4 mg (4 mg x 1) every Sun, Tue, Thu; 5 mg (1 mg x 1 and 4 mg x 1) all other days   Full warfarin instructions:  4 mg every Sun, Tue, Thu; 5 mg all other days   Weekly warfarin total:  32 mg   Plan last modified:  Shantal Hoover RN (2021)   Next INR check:  2021   Priority:  Maintenance   Target end date:  2050    Indications    Mechanical heart valve present [Z95.2]  H/O mechanical aortic valve replacement [Z95.2]  Long term current use of anticoagulant therapy [Z79.01]             Anticoagulation Episode Summary     INR check location:      Preferred lab:  EXTERNAL LAB    Send INR reminders to:  RIVER MCLAUGHLIN    Comments:  Okay to Manage By Exception      Anticoagulation Care Providers     Provider Role Specialty Phone number    John Blair MD Referring Internal Medicine [phone]

## 2021-05-20 ENCOUNTER — TRANSFERRED RECORDS (OUTPATIENT)
Dept: HEALTH INFORMATION MANAGEMENT | Facility: CLINIC | Age: 66
End: 2021-05-20

## 2021-05-20 LAB — INR PPP: 2.1 (ref 0.9–1.1)

## 2021-05-24 ENCOUNTER — ANTICOAGULATION THERAPY VISIT (OUTPATIENT)
Dept: FAMILY MEDICINE | Facility: CLINIC | Age: 66
End: 2021-05-24

## 2021-05-24 DIAGNOSIS — Z95.2 H/O MECHANICAL AORTIC VALVE REPLACEMENT: ICD-10-CM

## 2021-05-24 DIAGNOSIS — Z95.2 MECHANICAL HEART VALVE PRESENT: ICD-10-CM

## 2021-05-24 DIAGNOSIS — Z79.01 LONG TERM CURRENT USE OF ANTICOAGULANT THERAPY: ICD-10-CM

## 2021-05-24 NOTE — PROGRESS NOTES
ANTICOAGULATION MANAGEMENT     Patient Name:  Min Andino  Date:  2021    ASSESSMENT /SUBJECTIVE:    Today's INR result of 2.1 is therapeutic. Goal INR of 2.0-3.0    PLAN:    Detailed message left for Min regarding INR result and instructed:     Warfarin Dosing Instructions: Continue your current warfarin dose 4mg every Sun, Tue, & Thu; 5mg all other days of the week.     Instructed patient to follow up no later than: 2 weeks  Patient to recheck with home meter    Education provided: Target INR goal and significance of current INR result, Importance of therapeutic range and Contact [facility]: [phone]  with any changes, questions or concerns.       Dave Thomas RN      OBJECTIVE:  Recent labs: (last 7 days)     21   INR 2.1*         No question data found.  Anticoagulation Summary  As of 2021    INR goal:  2.0-3.0   TTR:  92.8 % (5.7 mo)   INR used for dosin.1 (2021)   Warfarin maintenance plan:  4 mg (4 mg x 1) every Sun, Tue, Thu; 5 mg (1 mg x 1 and 4 mg x 1) all other days   Full warfarin instructions:  4 mg every Sun, Tue, Thu; 5 mg all other days   Weekly warfarin total:  32 mg   Plan last modified:  Shantal Hoover RN (2021)   Next INR check:     Priority:  Maintenance   Target end date:  2050    Indications    Mechanical heart valve present [Z95.2]  H/O mechanical aortic valve replacement [Z95.2]  Long term current use of anticoagulant therapy [Z79.01]             Anticoagulation Episode Summary     INR check location:      Preferred lab:  EXTERNAL LAB    Send INR reminders to:  RIVER MCLAUGHLIN    Comments:  Okay to Manage By Exception      Anticoagulation Care Providers     Provider Role Specialty Phone number    John Blair MD Referring Internal Medicine [phone]

## 2021-05-26 DIAGNOSIS — Z95.2 S/P AVR: ICD-10-CM

## 2021-05-26 RX ORDER — WARFARIN SODIUM 4 MG/1
TABLET ORAL
Qty: 45 TABLET | Refills: 1 | Status: SHIPPED | OUTPATIENT
Start: 2021-05-26 | End: 2021-07-20

## 2021-05-26 NOTE — TELEPHONE ENCOUNTER
Refill Request  Warfarin 4 mg  Current Dosing Instructions: 4 mg Sun/Tues/Thurs and 5 mg ROW  Tabs: 45  Refills: 1  Last INR: 5/24/21  Next INR due: 6/3/21  Last OV with responsible provider: 12/4/20

## 2021-05-26 NOTE — PROGRESS NOTES
Assessment & Plan     1.  Essential hypertension with blood pressure not at goal.  He probably has resistant hypertension.  Had a lengthy discussion on how blood pressure medication work and after that added amlodipine 5 mg a day.  Follow-up in 3 to 4 weeks with me.  Continue losartan 100 mg a day and metoprolol  mg a day.  2.  Chronic systolic heart failure clinically well compensated.  Resting EF of 36% based on stress echocardiogram of 11/10/2020 at [place].  3.  Impacted cerumen right ear.  Irrigated and even removed some with forcep.  Currently removed completely.  Advised use wax softener at home.  4.  Stage IIIa chronic kidney disease.  Recently evaluated by Dr. Vallecillo in nephrology.  5.  Generalized vascular disease as previously noted.    No follow-ups on file.    John Blair MD  [facility]    Dylon Copeland is a 65 year old who presents for the following health issues     HPI     Hyperlipidemia Follow-Up      Are you regularly taking any medication or supplement to lower your cholesterol?   Yes- Liptor    Are you having muscle aches or other side effects that you think could be caused by your cholesterol lowering medication?  No    Hypertension Follow-up      Do you check your blood pressure regularly outside of the clinic? Yes     Are you following a low salt diet? No    Are your blood pressures ever more than 140 on the top number (systolic) OR more   than 90 on the bottom number (diastolic), for example 140/90? Yes      How many servings of fruits and vegetables do you eat daily?  2-3    On average, how many sweetened beverages do you drink each day (Examples: soda, juice, sweet tea, etc.  Do NOT count diet or artificially sweetened beverages)?   2    How many days per week do you exercise enough to make your heart beat faster? 5    How many minutes a day do you exercise enough to make your heart beat faster? 20 - 29    How many days per week do you miss taking  your medication? 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  At home his blood pressure is 133-179/50 6-97.  He was started on losartan initially and the dose was then increased.  Later metoprolol was added and dose increased.  Blood pressure still remains elevated.  Patient now comes in accompanied by his wife wanting to discuss treatment options.  He has known chronic kidney disease and been followed by nephrology.      Review of Systems   Constitutional, HEENT, cardiovascular, pulmonary, GI, , musculoskeletal, neuro, skin, endocrine and psych systems are negative, except as otherwise noted.      Objective    BP (!) 152/58 (BP Location: Right arm, Patient Position: Sitting, Cuff Size: Adult Regular)   Pulse 52   Temp 98.1  F (36.7  C) (Oral)   Wt 100.4 kg (221 lb 6.4 oz)   BMI 30.45 kg/m    Body mass index is 30.45 kg/m .  Physical Exam   GENERAL: healthy, alert and no distress  HENT: normal cephalic/atraumatic, right ear: crumen, nose and mouth without ulcers or lesions, oropharynx clear and oral mucous membranes moist  NECK: no adenopathy, no asymmetry, masses, or scars and thyroid normal to palpation  RESP: lungs clear to auscultation - no rales, rhonchi or wheezes  CV: regular rate and rhythm, normal S1 S2, no S3 or S4, no murmur, click or rub, no peripheral edema and peripheral pulses strong  ABDOMEN: soft, nontender, no hepatosplenomegaly, no masses and bowel sounds normal  MS: no gross musculoskeletal defects noted, no edema    Lab performed on 4/14/2021 showed creatinine of 1.52 with electrolytes normal.  Potassium was 5.0.  GFR 47.  Stress echocardiogram on 11/10/2020 at [facility] showed EF of 36% which increased to 45% with exercise.  There was no evidence of ischemic changes.

## 2021-05-27 ENCOUNTER — OFFICE VISIT (OUTPATIENT)
Dept: FAMILY MEDICINE | Facility: CLINIC | Age: 66
End: 2021-05-27
Payer: MEDICARE

## 2021-05-27 VITALS
WEIGHT: 221.4 LBS | BODY MASS INDEX: 30.45 KG/M2 | TEMPERATURE: 98.1 F | DIASTOLIC BLOOD PRESSURE: 58 MMHG | HEART RATE: 52 BPM | SYSTOLIC BLOOD PRESSURE: 152 MMHG

## 2021-05-27 DIAGNOSIS — H61.21 IMPACTED CERUMEN OF RIGHT EAR: ICD-10-CM

## 2021-05-27 DIAGNOSIS — N18.31 STAGE 3A CHRONIC KIDNEY DISEASE (H): ICD-10-CM

## 2021-05-27 DIAGNOSIS — I50.22 CHRONIC SYSTOLIC CONGESTIVE HEART FAILURE (H): ICD-10-CM

## 2021-05-27 DIAGNOSIS — I10 HYPERTENSION, GOAL BELOW 140/90: Primary | ICD-10-CM

## 2021-05-27 PROCEDURE — 99214 OFFICE O/P EST MOD 30 MIN: CPT | Mod: 25 | Performed by: INTERNAL MEDICINE

## 2021-05-27 PROCEDURE — 69210 REMOVE IMPACTED EAR WAX UNI: CPT | Mod: RT | Performed by: INTERNAL MEDICINE

## 2021-05-27 RX ORDER — AMLODIPINE BESYLATE 2.5 MG/1
5 TABLET ORAL DAILY
Qty: 30 TABLET | Refills: 0 | Status: SHIPPED | OUTPATIENT
Start: 2021-05-27 | End: 2021-05-28

## 2021-05-27 RX ORDER — LOSARTAN POTASSIUM 100 MG/1
TABLET ORAL
COMMUNITY
Start: 2021-01-26 | End: 2021-12-21

## 2021-05-27 RX ORDER — METOPROLOL SUCCINATE 100 MG/1
TABLET, EXTENDED RELEASE ORAL
COMMUNITY
Start: 2021-05-07 | End: 2021-09-28

## 2021-05-27 ASSESSMENT — PAIN SCALES - GENERAL: PAINLEVEL: NO PAIN (0)

## 2021-05-28 ENCOUNTER — MYC MEDICAL ADVICE (OUTPATIENT)
Dept: FAMILY MEDICINE | Facility: CLINIC | Age: 66
End: 2021-05-28

## 2021-05-28 DIAGNOSIS — I10 HYPERTENSION, GOAL BELOW 140/90: ICD-10-CM

## 2021-05-28 RX ORDER — AMLODIPINE BESYLATE 5 MG/1
TABLET ORAL
Qty: 90 TABLET | OUTPATIENT
Start: 2021-05-28

## 2021-05-28 RX ORDER — AMLODIPINE BESYLATE 5 MG/1
5 TABLET ORAL DAILY
Qty: 30 TABLET | Refills: 0 | Status: SHIPPED | OUTPATIENT
Start: 2021-05-28 | End: 2021-06-22

## 2021-06-01 ENCOUNTER — ANTICOAGULATION THERAPY VISIT (OUTPATIENT)
Dept: FAMILY MEDICINE | Facility: CLINIC | Age: 66
End: 2021-06-01

## 2021-06-01 ENCOUNTER — TRANSFERRED RECORDS (OUTPATIENT)
Dept: HEALTH INFORMATION MANAGEMENT | Facility: CLINIC | Age: 66
End: 2021-06-01

## 2021-06-01 DIAGNOSIS — Z79.01 LONG TERM CURRENT USE OF ANTICOAGULANT THERAPY: ICD-10-CM

## 2021-06-01 DIAGNOSIS — Z95.2 H/O MECHANICAL AORTIC VALVE REPLACEMENT: ICD-10-CM

## 2021-06-01 LAB — INR PPP: 2.8 (ref 0.9–1.1)

## 2021-06-01 NOTE — PROGRESS NOTES
ANTICOAGULATION  MANAGEMENT-Patient Home Monitoring Result    Assessment     Therapeutic INR result of 2.8 . Goal range 2.0-3.0. Received via fax from [employer] home INR monitoring company.        Previous INR was therapeutic    Min was last contacted by phone: 21    Plan     Per home monitoring agreement with patient, patient was NOT contacted regarding therapeutic result today.  Patient is to continue current dose and continue to check INR with home monitor per protocol.  ?       OBJECTIVE    INR   Date Value Ref Range Status   2021 2.8 (A) 0.90 - 1.10 Final       ASSESSMENT / PLAN  No question data found.  Anticoagulation Summary  As of 2021    INR goal:  2.0-3.0   TTR:  93.3 % (6.1 mo)   INR used for dosin.8 (2021)   Warfarin maintenance plan:  4 mg (4 mg x 1) every Sun, Tue, Thu; 5 mg (1 mg x 1 and 4 mg x 1) all other days   Full warfarin instructions:  4 mg every Sun, Tue, Thu; 5 mg all other days   Weekly warfarin total:  32 mg   Plan last modified:  Shantal Hoover RN (2021)   Next INR check:     Priority:  Maintenance   Target end date:  2050    Indications    Mechanical heart valve present (Resolved) [Z95.2]  H/O mechanical aortic valve replacement [Z95.2]  Long term current use of anticoagulant therapy [Z79.01]             Anticoagulation Episode Summary     INR check location:      Preferred lab:  EXTERNAL LAB    Send INR reminders to:  R[facility] MCLAUGHLIN    Comments:  Okay to Manage By Exception      Anticoagulation Care Providers     Provider Role Specialty Phone number    John Blair MD Referring Internal Medicine [phone]

## 2021-06-02 ENCOUNTER — TELEPHONE (OUTPATIENT)
Dept: FAMILY MEDICINE | Facility: CLINIC | Age: 66
End: 2021-06-02

## 2021-06-02 DIAGNOSIS — E78.00 PURE HYPERCHOLESTEROLEMIA: ICD-10-CM

## 2021-06-02 DIAGNOSIS — I25.10 ATHEROSCLEROSIS OF CORONARY ARTERY OF NATIVE HEART WITHOUT ANGINA PECTORIS, UNSPECIFIED VESSEL OR LESION TYPE: ICD-10-CM

## 2021-06-02 NOTE — TELEPHONE ENCOUNTER
1:34 PM - Pt left a VM stating that he had some medication changes.    Please return call to pt.  Thanks.  Ambika Licea RN on 6/2/2021 at 1:58 PM

## 2021-06-02 NOTE — TELEPHONE ENCOUNTER
VM left for patient to call back.     Samantha Estrada, RN, BSN, PHN  Anticoagulation Nurse  [phone]

## 2021-06-03 RX ORDER — ATORVASTATIN CALCIUM 40 MG/1
TABLET, FILM COATED ORAL
Qty: 90 TABLET | Refills: 1 | Status: SHIPPED | OUTPATIENT
Start: 2021-06-03 | End: 2022-06-10

## 2021-06-03 NOTE — TELEPHONE ENCOUNTER
Patient returned call to [facility] to report he started on amlodipine and wanted to make sure there were no interactions with warfarin therapy.  Educated amlodipine should not interact with warfarin.  Patient verbalized understanding and will continue maintenance dosing and recheck INR with home meter on 6/15.    Shantal Hoover RN

## 2021-06-11 ENCOUNTER — ANTICOAGULATION THERAPY VISIT (OUTPATIENT)
Dept: FAMILY MEDICINE | Facility: CLINIC | Age: 66
End: 2021-06-11

## 2021-06-11 ENCOUNTER — TRANSFERRED RECORDS (OUTPATIENT)
Dept: HEALTH INFORMATION MANAGEMENT | Facility: CLINIC | Age: 66
End: 2021-06-11

## 2021-06-11 DIAGNOSIS — Z79.01 LONG TERM CURRENT USE OF ANTICOAGULANT THERAPY: ICD-10-CM

## 2021-06-11 DIAGNOSIS — Z95.2 H/O MECHANICAL AORTIC VALVE REPLACEMENT: ICD-10-CM

## 2021-06-11 LAB — INR PPP: 3 (ref 0.9–1.1)

## 2021-06-11 NOTE — PROGRESS NOTES
Incoming fax from [employer] home monitoring company    Date of INR 6/11    INR result 3.0      ANTICOAGULATION  MANAGEMENT-Patient Home Monitoring Result    Assessment     Therapeutic INR result of 3.0 . Goal range 2.0-3.0. Received via fax from [employer] home INR monitoring company.        Previous INR was therapeutic    Min was last contacted by phone: 5/24/21    Plan     Per home monitoring agreement with patient, patient was NOT contacted regarding therapeutic result today.  Patient is to continue current dose and continue to check INR with home monitor per protocol.  ?       OBJECTIVE    INR   Date Value Ref Range Status   06/11/2021 3.0 (A) 0.90 - 1.10 Final       ASSESSMENT / PLAN  No question data found.  Anticoagulation Summary  As of 6/11/2021    INR goal:  2.0-3.0   TTR:  93.7 % (6.4 mo)   INR used for dosing:  3.0 (6/11/2021)   Warfarin maintenance plan:  4 mg (4 mg x 1) every Sun, Tue, Thu; 5 mg (1 mg x 1 and 4 mg x 1) all other days   Full warfarin instructions:  4 mg every Sun, Tue, Thu; 5 mg all other days   Weekly warfarin total:  32 mg   No change documented:  Ambika Licea RN   Plan last modified:  Shantal Hoover RN (1/25/2021)   Next INR check:  6/18/2021   Priority:  Maintenance   Target end date:  11/20/2050    Indications    Mechanical heart valve present (Resolved) [Z95.2]  H/O mechanical aortic valve replacement [Z95.2]  Long term current use of anticoagulant therapy [Z79.01]             Anticoagulation Episode Summary     INR check location:      Preferred lab:  EXTERNAL LAB    Send INR reminders to:  R[facility] MCLAUGHLIN    Comments:  Okay to Manage By Exception      Anticoagulation Care Providers     Provider Role Specialty Phone number    John Blair MD Referring Internal Medicine [phone]

## 2021-06-17 ENCOUNTER — ANTICOAGULATION THERAPY VISIT (OUTPATIENT)
Dept: FAMILY MEDICINE | Facility: CLINIC | Age: 66
End: 2021-06-17

## 2021-06-17 ENCOUNTER — TRANSFERRED RECORDS (OUTPATIENT)
Dept: HEALTH INFORMATION MANAGEMENT | Facility: CLINIC | Age: 66
End: 2021-06-17

## 2021-06-17 DIAGNOSIS — Z95.2 H/O MECHANICAL AORTIC VALVE REPLACEMENT: ICD-10-CM

## 2021-06-17 DIAGNOSIS — Z79.01 LONG TERM CURRENT USE OF ANTICOAGULANT THERAPY: ICD-10-CM

## 2021-06-17 LAB — INR PPP: 3 (ref 0.9–1.1)

## 2021-06-17 NOTE — PROGRESS NOTES
ANTICOAGULATION  MANAGEMENT-Patient Home Monitoring Result    Assessment     Therapeutic INR result of 3.0 . Goal range 2.0-3.0. Received via fax from [employer] home INR monitoring company.        Previous INR was therapeutic    Min was last contacted by phone: 5/24/21    Plan     Per home monitoring agreement with patient, patient was NOT contacted regarding therapeutic result today.  Patient is to continue current dose and continue to check INR with home monitor per protocol.  ?       OBJECTIVE    INR   Date Value Ref Range Status   06/17/2021 3.0 (A) 0.90 - 1.10 Final       ASSESSMENT / PLAN  No question data found.  Anticoagulation Summary  As of 6/17/2021    INR goal:  2.0-3.0   TTR:  93.8 % (6.6 mo)   INR used for dosing:  3.0 (6/17/2021)   Warfarin maintenance plan:  4 mg (4 mg x 1) every Sun, Tue, Thu; 5 mg (1 mg x 1 and 4 mg x 1) all other days   Full warfarin instructions:  4 mg every Sun, Tue, Thu; 5 mg all other days   Weekly warfarin total:  32 mg   No change documented:  Vonda Izaguirre RN   Plan last modified:  Shantal Hoover RN (1/25/2021)   Next INR check:  6/24/2021   Priority:  Maintenance   Target end date:  11/20/2050    Indications    Mechanical heart valve present (Resolved) [Z95.2]  H/O mechanical aortic valve replacement [Z95.2]  Long term current use of anticoagulant therapy [Z79.01]             Anticoagulation Episode Summary     INR check location:      Preferred lab:  EXTERNAL LAB    Send INR reminders to:  R[facility] MCLAUGHLIN    Comments:  Okay to Manage By Exception      Anticoagulation Care Providers     Provider Role Specialty Phone number    John Blair MD Referring Internal Medicine [phone]

## 2021-06-22 ENCOUNTER — OFFICE VISIT (OUTPATIENT)
Dept: FAMILY MEDICINE | Facility: CLINIC | Age: 66
End: 2021-06-22
Payer: MEDICARE

## 2021-06-22 VITALS
SYSTOLIC BLOOD PRESSURE: 134 MMHG | HEART RATE: 52 BPM | BODY MASS INDEX: 30.53 KG/M2 | RESPIRATION RATE: 16 BRPM | DIASTOLIC BLOOD PRESSURE: 74 MMHG | OXYGEN SATURATION: 97 % | TEMPERATURE: 97.4 F | WEIGHT: 222 LBS

## 2021-06-22 DIAGNOSIS — N18.31 STAGE 3A CHRONIC KIDNEY DISEASE (H): ICD-10-CM

## 2021-06-22 DIAGNOSIS — I10 HYPERTENSION, GOAL BELOW 140/90: Primary | ICD-10-CM

## 2021-06-22 DIAGNOSIS — I50.22 CHRONIC SYSTOLIC CONGESTIVE HEART FAILURE (H): ICD-10-CM

## 2021-06-22 PROCEDURE — 99213 OFFICE O/P EST LOW 20 MIN: CPT | Performed by: INTERNAL MEDICINE

## 2021-06-22 RX ORDER — AMLODIPINE BESYLATE 5 MG/1
5 TABLET ORAL DAILY
Qty: 90 TABLET | Refills: 3 | Status: SHIPPED | OUTPATIENT
Start: 2021-06-22 | End: 2021-08-27

## 2021-06-22 NOTE — PROGRESS NOTES
Assessment & Plan     #1. Essential hypertension. Blood pressure readings in the morning at home are higher otherwise good. Today blood pressure right arm 120/60 and left arm 116/60 when I checked. Advised to return for follow-up in 2 months in the meantime continue with metoprolol  mg a day, losartan 100 mg a day and amlodipine 5 mg a day. And notices pulses in the 50s so in the future could consider cutting back on metoprolol and even adding a mild diuretic. Due to congestive heart failure, spironolactone might be a good choice though we will have to watch for hyperkalemia particular in view of chronic kidney disease.  #2. Chronic kidney disease stage III remaining unchanged.  #3. Chronic systolic heart failure clinically well compensated. EF at rest had been around 26% but with exercise he went up to 45% at [facility]. In the future could discuss SGLT2 inhibitor for both chronic kidney disease and heart failure.        John Blair MD  [facility] [place]    Dylon Copeland is a 65 year old who presents for the following health issues     History of Present Illness       Hypertension: He presents for follow up of hypertension.  He does check blood pressure  regularly outside of the clinic. Outside blood pressures have been over 140/90. He follows a low salt diet.     He eats 2-3 servings of fruits and vegetables daily.He consumes 2 sweetened beverage(s) daily.He exercises with enough effort to increase his heart rate 10 to 19 minutes per day.  He exercises with enough effort to increase his heart rate 4 days per week.   He is taking medications regularly.       Hypertension Follow-up      Do you check your blood pressure regularly outside of the clinic? Yes     Are you following a low salt diet? Yes    Are your blood pressures ever more than 140 on the top number (systolic) OR more   than 90 on the bottom number (diastolic), for example 140/90? Yes pt has chart of BP  readings      How many servings of fruits and vegetables do you eat daily?  2-3    On average, how many sweetened beverages do you drink each day (Examples: soda, juice, sweet tea, etc.  Do NOT count diet or artificially sweetened beverages)?   0    How many days per week do you exercise enough to make your heart beat faster? 6    How many minutes a day do you exercise enough to make your heart beat faster? 30 - 60    How many days per week do you miss taking your medication? 0     Patient comes in today for hypertension follow-up. He has been monitoring his blood pressure four times a day and he brought his readings with. They are pretty good except for the morning when he first gets up it tends to be high between 140 to even occasionally 160. Systolic. Diastolic is always good. Denies chest pain or shortness of breath. No dizziness or lightheadedness    Review of Systems   Constitutional, HEENT, cardiovascular, pulmonary, GI, , musculoskeletal, neuro, skin, endocrine and psych systems are negative, except as otherwise noted.      Objective    There were no vitals taken for this visit.  There is no height or weight on file to calculate BMI.  Physical Exam   GENERAL: healthy, alert and no distress  NECK: no adenopathy, no asymmetry, masses, or scars and thyroid normal to palpation  RESP: lungs clear to auscultation - no rales, rhonchi or wheezes  CV: regular rate and rhythm, normal S1 S2, no S3 or S4, no murmur, click or rub, no peripheral edema and peripheral pulses strong  ABDOMEN: soft, nontender, no hepatosplenomegaly, no masses and bowel sounds normal  MS: no gross musculoskeletal defects noted, no edema

## 2021-07-01 ENCOUNTER — DOCUMENTATION ONLY (OUTPATIENT)
Dept: FAMILY MEDICINE | Facility: CLINIC | Age: 66
End: 2021-07-01

## 2021-07-08 ENCOUNTER — TRANSFERRED RECORDS (OUTPATIENT)
Dept: HEALTH INFORMATION MANAGEMENT | Facility: CLINIC | Age: 66
End: 2021-07-08

## 2021-07-08 ENCOUNTER — ANTICOAGULATION THERAPY VISIT (OUTPATIENT)
Dept: FAMILY MEDICINE | Facility: CLINIC | Age: 66
End: 2021-07-08

## 2021-07-08 ENCOUNTER — DOCUMENTATION ONLY (OUTPATIENT)
Dept: FAMILY MEDICINE | Facility: CLINIC | Age: 66
End: 2021-07-08

## 2021-07-08 DIAGNOSIS — Z79.01 LONG TERM CURRENT USE OF ANTICOAGULANT THERAPY: ICD-10-CM

## 2021-07-08 DIAGNOSIS — Z95.2 H/O MECHANICAL AORTIC VALVE REPLACEMENT: ICD-10-CM

## 2021-07-08 LAB — INR PPP: 2.3 (ref 0.9–1.1)

## 2021-07-08 NOTE — PROGRESS NOTES
ANTICOAGULATION     Min Andino is overdue for INR check.      Left message  reminding patient to check INR with their home meter and call results to the home monitoring company as soon as possible.     Shantal Hoover RN

## 2021-07-08 NOTE — PROGRESS NOTES
ANTICOAGULATION  MANAGEMENT-Patient Home Monitoring Result    Assessment     Therapeutic INR result of 2.3 . Goal range 2.0-3.0. Received via fax from [employer] home INR monitoring company.        Previous INR was therapeutic    Min was last contacted by phone:     Plan     Per home monitoring agreement with patient, patient was NOT contacted regarding therapeutic result today.  Patient is to continue current dose and continue to check INR with home monitor per protocol.  ?       OBJECTIVE    INR   Date Value Ref Range Status   2021 2.3 (A) 0.90 - 1.10 Final       ASSESSMENT / PLAN  No question data found.  Anticoagulation Summary  As of 2021    INR goal:  2.0-3.0   TTR:  94.4 % (7.3 mo)   INR used for dosin.3 (2021)   Warfarin maintenance plan:  4 mg (4 mg x 1) every Sun, Tue, Thu; 5 mg (1 mg x 1 and 4 mg x 1) all other days   Full warfarin instructions:  4 mg every Sun, Tue, Thu; 5 mg all other days   Weekly warfarin total:  32 mg   No change documented:  Shantal Hoover RN   Plan last modified:  Shantal Hoover RN (2021)   Next INR check:  2021   Priority:  Maintenance   Target end date:  2050    Indications    Mechanical heart valve present (Resolved) [Z95.2]  H/O mechanical aortic valve replacement [Z95.2]  Long term current use of anticoagulant therapy [Z79.01]             Anticoagulation Episode Summary     INR check location:      Preferred lab:  EXTERNAL LAB    Send INR reminders to:  RIVER MCLAUGHLIN    Comments:  Okay to Manage By Exception      Anticoagulation Care Providers     Provider Role Specialty Phone number    John Blair MD Referring Internal Medicine [phone]

## 2021-07-20 ENCOUNTER — TRANSFERRED RECORDS (OUTPATIENT)
Dept: HEALTH INFORMATION MANAGEMENT | Facility: CLINIC | Age: 66
End: 2021-07-20

## 2021-07-20 ENCOUNTER — ANTICOAGULATION THERAPY VISIT (OUTPATIENT)
Dept: FAMILY MEDICINE | Facility: CLINIC | Age: 66
End: 2021-07-20

## 2021-07-20 ENCOUNTER — MYC MEDICAL ADVICE (OUTPATIENT)
Dept: FAMILY MEDICINE | Facility: CLINIC | Age: 66
End: 2021-07-20

## 2021-07-20 DIAGNOSIS — Z95.2 S/P AVR: ICD-10-CM

## 2021-07-20 DIAGNOSIS — Z79.01 LONG TERM CURRENT USE OF ANTICOAGULANT THERAPY: ICD-10-CM

## 2021-07-20 DIAGNOSIS — Z95.2 H/O MECHANICAL AORTIC VALVE REPLACEMENT: Primary | ICD-10-CM

## 2021-07-20 LAB — INR PPP: 2

## 2021-07-20 RX ORDER — WARFARIN SODIUM 4 MG/1
TABLET ORAL
Qty: 90 TABLET | Refills: 1 | Status: SHIPPED | OUTPATIENT
Start: 2021-07-20 | End: 2021-07-21

## 2021-07-20 NOTE — PROGRESS NOTES
ANTICOAGULATION  MANAGEMENT-Patient Home Monitoring Result    Assessment     Therapeutic INR result of 2.0 . Goal range 2.0-3.0. Received via fax from [employer] home INR monitoring company.        Previous INR was therapeutic    Min was last contacted by phone: 2021    Plan     Per home monitoring agreement with patient, patient was NOT contacted regarding therapeutic result today.  Patient is to continue current dose and continue to check INR with home monitor per protocol.  ?       OBJECTIVE    INR   Date Value Ref Range Status   2021 2.0  Final       ASSESSMENT / PLAN  No question data found.  Anticoagulation Summary  As of 2021    INR goal:  2.0-3.0   TTR:  94.7 % (7.7 mo)   INR used for dosin.0 (2021)   Warfarin maintenance plan:  4 mg (4 mg x 1) every Sun, Tue, Thu; 5 mg (1 mg x 1 and 4 mg x 1) all other days   Full warfarin instructions:  4 mg every Sun, Tue, Thu; 5 mg all other days   Weekly warfarin total:  32 mg   No change documented:  Nigel Rubio RN   Plan last modified:  Shantal Hoover RN (2021)   Next INR check:  8/3/2021   Priority:  Maintenance   Target end date:  2050    Indications    Mechanical heart valve present (Resolved) [Z95.2]  H/O mechanical aortic valve replacement [Z95.2]  Long term current use of anticoagulant therapy [Z79.01]             Anticoagulation Episode Summary     INR check location:      Preferred lab:  EXTERNAL LAB    Send INR reminders to:  RIVER MCLAUGHLIN    Comments:  Okay to Manage By Exception      Anticoagulation Care Providers     Provider Role Specialty Phone number    John Blair MD Referring Internal Medicine [phone]

## 2021-07-20 NOTE — TELEPHONE ENCOUNTER
Rx approved per [facility] protocol.    Mica Browning RN    [facility] Anticoagulation [facility]

## 2021-07-21 ENCOUNTER — TELEPHONE (OUTPATIENT)
Dept: FAMILY MEDICINE | Facility: CLINIC | Age: 66
End: 2021-07-21

## 2021-07-21 DIAGNOSIS — Z95.2 S/P AVR: ICD-10-CM

## 2021-07-21 RX ORDER — WARFARIN SODIUM 4 MG/1
TABLET ORAL
Qty: 90 TABLET | Refills: 3 | Status: SHIPPED | OUTPATIENT
Start: 2021-07-21 | End: 2022-03-02

## 2021-07-21 NOTE — TELEPHONE ENCOUNTER
Message from Jayme Castillo [facility] [phone]    warfarin ANTICOAGULANT (COUMADIN) 4 MG tablet    Patient is claiming that he is now taking 4mg daily instead of only a few times a week.  If that is correct please send a new RX reflecting this change.    Doreen Pineda

## 2021-07-30 ENCOUNTER — TRANSFERRED RECORDS (OUTPATIENT)
Dept: HEALTH INFORMATION MANAGEMENT | Facility: CLINIC | Age: 66
End: 2021-07-30

## 2021-07-30 ENCOUNTER — LAB (OUTPATIENT)
Dept: LAB | Facility: OTHER | Age: 66
End: 2021-07-30
Payer: MEDICARE

## 2021-07-30 DIAGNOSIS — N18.30 CKD (CHRONIC KIDNEY DISEASE) STAGE 3, GFR 30-59 ML/MIN (H): ICD-10-CM

## 2021-07-30 LAB
ALBUMIN SERPL-MCNC: 3.6 G/DL (ref 3.4–5)
ANION GAP SERPL CALCULATED.3IONS-SCNC: 2 MMOL/L (ref 3–14)
BUN SERPL-MCNC: 31 MG/DL (ref 7–30)
CALCIUM SERPL-MCNC: 8.6 MG/DL (ref 8.5–10.1)
CHLORIDE BLD-SCNC: 109 MMOL/L (ref 94–109)
CO2 SERPL-SCNC: 29 MMOL/L (ref 20–32)
CREAT SERPL-MCNC: 1.54 MG/DL (ref 0.66–1.25)
GFR SERPL CREATININE-BSD FRML MDRD: 46 ML/MIN/1.73M2
GLUCOSE BLD-MCNC: 98 MG/DL (ref 70–99)
INR (EXTERNAL): 2.1 (ref 0.9–1.1)
PHOSPHATE SERPL-MCNC: 3.3 MG/DL (ref 2.5–4.5)
POTASSIUM BLD-SCNC: 5.1 MMOL/L (ref 3.4–5.3)
SODIUM SERPL-SCNC: 140 MMOL/L (ref 133–144)

## 2021-07-30 PROCEDURE — 36415 COLL VENOUS BLD VENIPUNCTURE: CPT

## 2021-07-30 PROCEDURE — 80069 RENAL FUNCTION PANEL: CPT

## 2021-08-02 ENCOUNTER — DOCUMENTATION ONLY (OUTPATIENT)
Dept: FAMILY MEDICINE | Facility: CLINIC | Age: 66
End: 2021-08-02

## 2021-08-02 DIAGNOSIS — Z79.01 LONG TERM CURRENT USE OF ANTICOAGULANT THERAPY: ICD-10-CM

## 2021-08-02 DIAGNOSIS — Z95.2 H/O MECHANICAL AORTIC VALVE REPLACEMENT: Primary | ICD-10-CM

## 2021-08-02 NOTE — PROGRESS NOTES
ANTICOAGULATION  MANAGEMENT-Patient Home Monitoring Result    Assessment     Therapeutic INR result of 2.1 . Goal range 2.0-3.0. Received via fax from [employer] home INR monitoring company.        Previous INR was therapeutic    Min was last contacted by phone: 2021    Plan     Per home monitoring agreement with patient, patient was NOT contacted regarding therapeutic result today.  Patient is to continue current dose and continue to check INR with home monitor per protocol.  ?       OBJECTIVE    INR (External)   Date Value Ref Range Status   2021 2.1 (A) 0.9 - 1.1 Final       ASSESSMENT / PLAN  No question data found.  Anticoagulation Summary  As of 2021    INR goal:  2.0-3.0   TTR:  95.0 % (8.1 mo)   INR used for dosin.1 (2021)   Warfarin maintenance plan:  4 mg (4 mg x 1) every Sun, Tue, Thu; 5 mg (1 mg x 1 and 4 mg x 1) all other days   Full warfarin instructions:  4 mg every Sun, Tue, Thu; 5 mg all other days   Weekly warfarin total:  32 mg   Plan last modified:  Shantal Hoover RN (2021)   Next INR check:  2021   Priority:  Maintenance   Target end date:  2050    Indications    Mechanical heart valve present (Resolved) [Z95.2]  H/O mechanical aortic valve replacement [Z95.2]  Long term current use of anticoagulant therapy [Z79.01]             Anticoagulation Episode Summary     INR check location:      Preferred lab:  EXTERNAL LAB    Send INR reminders to:  R[facility] MCLAUGHLIN    Comments:  Okay to Manage By Exception      Anticoagulation Care Providers     Provider Role Specialty Phone number    John Blair MD Referring Internal Medicine [phone]

## 2021-08-16 ENCOUNTER — TRANSFERRED RECORDS (OUTPATIENT)
Dept: HEALTH INFORMATION MANAGEMENT | Facility: CLINIC | Age: 66
End: 2021-08-16

## 2021-08-16 ENCOUNTER — ANTICOAGULATION THERAPY VISIT (OUTPATIENT)
Dept: FAMILY MEDICINE | Facility: CLINIC | Age: 66
End: 2021-08-16

## 2021-08-16 DIAGNOSIS — Z79.01 LONG TERM CURRENT USE OF ANTICOAGULANT THERAPY: ICD-10-CM

## 2021-08-16 DIAGNOSIS — Z95.2 H/O MECHANICAL AORTIC VALVE REPLACEMENT: Primary | ICD-10-CM

## 2021-08-16 LAB — INR (EXTERNAL): 2.7 (ref 0.9–1.1)

## 2021-08-16 NOTE — CONFIDENTIAL NOTE
ANTICOAGULATION MANAGEMENT     Min Andino 65 year old male is on warfarin with therapeutic INR result. (Goal INR 2.0-3.0)    Recent labs: (last 7 days)     08/16/21  1211   INR 2.7*       ASSESSMENT     Source(s): Patient/Caregiver Call       Warfarin doses taken: Pt states he has been taking 3mg Sun; 4mg all other days for at least a month    Diet: No new diet changes identified    New illness, injury, or hospitalization: No    Medication/supplement changes: None noted    Signs or symptoms of bleeding or clotting: No    Previous INR: Therapeutic last 2(+) visits    Additional findings: None     PLAN     Recommended plan for no diet, medication or health factor changes affecting INR     Dosing Instructions: Continue your current warfarin dose with next INR in 2 weeks       Summary  As of 8/16/2021    Full warfarin instructions:  3 mg every Sun; 4 mg all other days   Next INR check:  8/30/2021             Telephone call with Min who verbalizes understanding and agrees to plan    Patient to recheck with home meter    Education provided: Target INR goal and significance of current INR result    Plan made per [facility] anticoagulation protocol    Nigel Rubio RN  Anticoagulation Clinic  8/16/2021    _______________________________________________________________________     Anticoagulation Episode Summary     Current INR goal:  2.0-3.0   TTR:  95.3 % (8.6 mo)   Target end date:  11/20/2050   Send INR reminders to:  R[facility] MCLAUGHLIN    Indications    Mechanical heart valve present (Resolved) [Z95.2]  H/O mechanical aortic valve replacement [Z95.2]  Long term current use of anticoagulant therapy [Z79.01]           Comments:  Okay to Manage By Exception         Anticoagulation Care Providers     Provider Role Specialty Phone number    John Blair MD Referring Internal Medicine [phone]

## 2021-08-25 ENCOUNTER — TRANSFERRED RECORDS (OUTPATIENT)
Dept: HEALTH INFORMATION MANAGEMENT | Facility: CLINIC | Age: 66
End: 2021-08-25

## 2021-08-25 ENCOUNTER — ANTICOAGULATION THERAPY VISIT (OUTPATIENT)
Dept: FAMILY MEDICINE | Facility: CLINIC | Age: 66
End: 2021-08-25

## 2021-08-25 DIAGNOSIS — Z79.01 LONG TERM CURRENT USE OF ANTICOAGULANT THERAPY: ICD-10-CM

## 2021-08-25 DIAGNOSIS — Z95.2 H/O MECHANICAL AORTIC VALVE REPLACEMENT: Primary | ICD-10-CM

## 2021-08-25 LAB — INR (EXTERNAL): 2.5 (ref 0.9–1.1)

## 2021-08-25 NOTE — PROGRESS NOTES
ANTICOAGULATION  MANAGEMENT-Patient Home Monitoring Result    Assessment     Therapeutic INR result of 2.5 . Goal range 2.0-3.0. Received via fax from [employer] home INR monitoring company.        Previous INR was therapeutic    Min was last contacted by phone: 21    Plan     Per home monitoring agreement with patient, patient was NOT contacted regarding therapeutic result today.  Patient is to continue current dose and continue to check INR with home monitor per protocol.  ?       OBJECTIVE    INR (External)   Date Value Ref Range Status   2021 2.5 (A) 0.9 - 1.1 Final       ASSESSMENT / PLAN  No question data found.  Anticoagulation Summary  As of 2021    INR goal:  2.0-3.0   TTR:  95.4 % (8.9 mo)   INR used for dosin.5 (2021)   Warfarin maintenance plan:  3 mg (1 mg x 3) every Sun; 4 mg (4 mg x 1) all other days   Full warfarin instructions:  3 mg every Sun; 4 mg all other days   Weekly warfarin total:  27 mg   No change documented:  Shantal Hoover RN   Plan last modified:  Nigel Rubio RN (2021)   Next INR check:     Priority:  Maintenance   Target end date:  2050    Indications    Mechanical heart valve present (Resolved) [Z95.2]  H/O mechanical aortic valve replacement [Z95.2]  Long term current use of anticoagulant therapy [Z79.01]             Anticoagulation Episode Summary     INR check location:      Preferred lab:  EXTERNAL LAB    Send INR reminders to:  R[facility] MCLAUGHLIN    Comments:  Okay to Manage By Exception      Anticoagulation Care Providers     Provider Role Specialty Phone number    John Blair MD Referring Internal Medicine [phone]

## 2021-08-26 NOTE — PROGRESS NOTES
Assessment & Plan     1.  Hypertension with blood pressure still not at goal particular in the morning.  Will increase amlodipine from 5mg to 10 mg a day.  Side effects to watch out for discussed particular leg edema.  Continue with losartan 100 mg a day and metoprolol  mg a day.  Follow-up in couple of months.  Patient does have a follow-up appointment with Dr. Vallecillo in a month.  2.  Stage IIIa chronic kidney disease with creatinine of 1.54 GFR 46 measured on 7/30/2021.  3.  Chronic systolic heart failure well compensated.  Previous EF around 26% and improved with exercise to 45% at [facility].  Currently on ARB and beta-blocker.  4.  S/p mechanical aortic valve replacement.  Patient anticoagulated with warfarin.  Followed by INR clinic.  5.  Hyperlipidemia well-controlled with atorvastatin 40 mg a day.  6.  Bilateral impacted cerumen.  Irrigated today.  7.  History of vascular disease including aortic aneurysm with status post abdominal aortic endograft.       Return in about 2 months (around 10/27/2021) for Routine Visit.    John Blair MD  [facility] is a 65 year old who presents for the following health issues     History of Present Illness       Hypertension: He presents for follow up of hypertension.  He does check blood pressure  regularly outside of the clinic. Outside blood pressures have been over 140/90. He follows a low salt diet.     He eats 0-1 servings of fruits and vegetables daily.He consumes 1 sweetened beverage(s) daily.He exercises with enough effort to increase his heart rate 20 to 29 minutes per day.  He exercises with enough effort to increase his heart rate 5 days per week.   He is taking medications regularly.       Hyperlipidemia Follow-Up      Are you regularly taking any medication or supplement to lower your cholesterol?   Yes- atorvastatin    Are you having muscle aches or other side effects that you think could be caused by  your cholesterol lowering medication?  No    Patient comes in for follow-up regarding hypertension and other health problems as mentioned in the problem list.  He brings his blood pressure doing some home.  His blood pressure is highest in the morning and it varies from 157 -175/80-90.  At about 10:00 AM it is 112-130 7/60-78 and in early afternoon it is 135-150/60 4-74.  And 8 in the evening it is 117-150/60-82.  He denies chest pain or shortness of breath.  Has no leg edema.  No lightheadedness or dizziness      Review of Systems   Constitutional, HEENT, cardiovascular, pulmonary, GI, , musculoskeletal, neuro, skin, endocrine and psych systems are negative, except as otherwise noted.      Objective    There were no vitals taken for this visit.  There is no height or weight on file to calculate BMI.  Physical Exam   GENERAL: healthy, alert and no distress  HEENT: cerumen impaction bilateral, right more than left  RESP: lungs clear to auscultation - no rales, rhonchi or wheezes  CV: regular rates and rhythm with grade 2 systolic murmur, click present, no peripheral edema  MS: no gross musculoskeletal defects noted, no edema

## 2021-08-27 ENCOUNTER — OFFICE VISIT (OUTPATIENT)
Dept: FAMILY MEDICINE | Facility: CLINIC | Age: 66
End: 2021-08-27
Payer: MEDICARE

## 2021-08-27 VITALS
BODY MASS INDEX: 30.94 KG/M2 | SYSTOLIC BLOOD PRESSURE: 136 MMHG | WEIGHT: 225 LBS | DIASTOLIC BLOOD PRESSURE: 66 MMHG | HEART RATE: 69 BPM | TEMPERATURE: 96.8 F | RESPIRATION RATE: 16 BRPM | OXYGEN SATURATION: 97 %

## 2021-08-27 DIAGNOSIS — H61.23 BILATERAL IMPACTED CERUMEN: ICD-10-CM

## 2021-08-27 DIAGNOSIS — Z95.2 H/O MECHANICAL AORTIC VALVE REPLACEMENT: ICD-10-CM

## 2021-08-27 DIAGNOSIS — N18.31 STAGE 3A CHRONIC KIDNEY DISEASE (H): ICD-10-CM

## 2021-08-27 DIAGNOSIS — I10 HYPERTENSION, GOAL BELOW 140/90: Primary | ICD-10-CM

## 2021-08-27 DIAGNOSIS — E78.00 PURE HYPERCHOLESTEROLEMIA: ICD-10-CM

## 2021-08-27 DIAGNOSIS — I50.22 CHRONIC SYSTOLIC CONGESTIVE HEART FAILURE (H): ICD-10-CM

## 2021-08-27 PROBLEM — N18.9 ANEMIA OF CHRONIC RENAL FAILURE, UNSPECIFIED CKD STAGE: Status: ACTIVE | Noted: 2021-08-27

## 2021-08-27 PROBLEM — R60.0 BILATERAL LEG EDEMA: Status: RESOLVED | Noted: 2020-02-10 | Resolved: 2021-08-27

## 2021-08-27 PROBLEM — R79.89 HIGH SERUM CREATININE: Status: RESOLVED | Noted: 2019-08-16 | Resolved: 2021-08-27

## 2021-08-27 PROBLEM — I11.9 HYPERTENSIVE HEART DISEASE WITHOUT HEART FAILURE: Status: RESOLVED | Noted: 2019-08-01 | Resolved: 2021-08-27

## 2021-08-27 PROBLEM — D63.1 ANEMIA OF CHRONIC RENAL FAILURE, UNSPECIFIED CKD STAGE: Status: ACTIVE | Noted: 2021-08-27

## 2021-08-27 PROCEDURE — 69210 REMOVE IMPACTED EAR WAX UNI: CPT | Performed by: INTERNAL MEDICINE

## 2021-08-27 PROCEDURE — 99214 OFFICE O/P EST MOD 30 MIN: CPT | Mod: 25 | Performed by: INTERNAL MEDICINE

## 2021-08-27 RX ORDER — AMLODIPINE BESYLATE 10 MG/1
10 TABLET ORAL DAILY
Qty: 90 TABLET | Refills: 3 | Status: SHIPPED | OUTPATIENT
Start: 2021-08-27 | End: 2021-12-02

## 2021-08-31 DIAGNOSIS — N18.31 STAGE 3A CHRONIC KIDNEY DISEASE (H): Primary | ICD-10-CM

## 2021-09-08 ENCOUNTER — ANTICOAGULATION THERAPY VISIT (OUTPATIENT)
Dept: FAMILY MEDICINE | Facility: CLINIC | Age: 66
End: 2021-09-08

## 2021-09-08 ENCOUNTER — OFFICE VISIT (OUTPATIENT)
Dept: FAMILY MEDICINE | Facility: OTHER | Age: 66
End: 2021-09-08
Payer: MEDICARE

## 2021-09-08 ENCOUNTER — TRANSFERRED RECORDS (OUTPATIENT)
Dept: HEALTH INFORMATION MANAGEMENT | Facility: CLINIC | Age: 66
End: 2021-09-08

## 2021-09-08 VITALS
BODY MASS INDEX: 31.19 KG/M2 | OXYGEN SATURATION: 97 % | DIASTOLIC BLOOD PRESSURE: 80 MMHG | WEIGHT: 226.8 LBS | TEMPERATURE: 98.6 F | SYSTOLIC BLOOD PRESSURE: 120 MMHG | HEART RATE: 62 BPM | RESPIRATION RATE: 16 BRPM

## 2021-09-08 DIAGNOSIS — Z95.2 H/O MECHANICAL AORTIC VALVE REPLACEMENT: Primary | ICD-10-CM

## 2021-09-08 DIAGNOSIS — H61.23 BILATERAL IMPACTED CERUMEN: Primary | ICD-10-CM

## 2021-09-08 DIAGNOSIS — Z79.01 LONG TERM CURRENT USE OF ANTICOAGULANT THERAPY: ICD-10-CM

## 2021-09-08 LAB — INR (EXTERNAL): 2.4 (ref 0.9–1.1)

## 2021-09-08 PROCEDURE — 99212 OFFICE O/P EST SF 10 MIN: CPT | Mod: 25 | Performed by: PHYSICIAN ASSISTANT

## 2021-09-08 PROCEDURE — 69210 REMOVE IMPACTED EAR WAX UNI: CPT | Performed by: PHYSICIAN ASSISTANT

## 2021-09-08 RX ORDER — DOCUSATE SODIUM 100 MG/1
CAPSULE, LIQUID FILLED ORAL DAILY
COMMUNITY
Start: 2021-01-03

## 2021-09-08 RX ORDER — MULTIVIT-MIN/IRON/FOLIC ACID/K 18-600-40
CAPSULE ORAL 2 TIMES DAILY
COMMUNITY
Start: 2020-12-01

## 2021-09-08 NOTE — PROGRESS NOTES
Assessment & Plan     Bilateral impacted cerumen  Wax was removed.   At this point no further wax is noted on exam and recommended he start more maintenance treatment with use of in home ear flush with warm water/hydroxygen peroxide in the shower once every 2-4 weeks with a bulb syringe. Ok to continue drops to help soften wax.     Return in about 2 months (around 11/8/2021) for Medication Re-check, Lab Work.     Options for treatment and follow-up care were reviewed with the patient and/or guardian. Patient and/or guardian engaged in the decision making process and verbalized understanding of the options discussed and agreed with the final plan.     MADINA [facility] MALATHI Copeland is a 65 year old who presents for the following health issues     HPI     Concern - Plugged ear  Onset: 1 year  Description: right ear  Intensity: moderate  Progression of Symptoms:  same  Accompanying Signs & Symptoms: none  Previous history of similar problem: none  Precipitating factors:        Worsened by: none  Alleviating factors:        Improved by: none  Therapies tried and outcome: drops in ears at night.    Has been dealing with wax in the ears since last winter. They work to get it out but not all of it comes out when they flush it.  He has no significant change in hearing but wife says he has hard time.  No pain. No drainage.      Review of Systems   Constitutional, HEENT, msk, skin systems are negative, except as otherwise noted.      Objective    /80 (BP Location: Left arm, Patient Position: Sitting, Cuff Size: Adult Regular)   Pulse 62   Temp 98.6  F (37  C) (Temporal)   Resp 16   Wt 102.9 kg (226 lb 12.8 oz)   SpO2 97%   BMI 31.19 kg/m    Body mass index is 31.19 kg/m .  Physical Exam   GENERAL: healthy, alert and no distress  HENT: normal cephalic/atraumatic, right ear: normal: no effusions, no erythema, normal landmarks and Cerumen occluding 1/2 the EAC removed  with curette by provider and left ear: normal: no effusions, no erythema, normal landmarks and mild cerumen noted on floor of the EAC removed with curette by provider.   PSYCH: mentation appears normal, affect normal/bright

## 2021-09-08 NOTE — PROGRESS NOTES
ANTICOAGULATION  MANAGEMENT-Patient Home Monitoring Result    Assessment     Therapeutic INR result of 2.4 . Goal range 2.0-3.0. Received via fax from [employer] home INR monitoring company.        Previous INR was therapeutic    Min was last contacted by phone: 21    Plan     Per home monitoring agreement with patient, patient was NOT contacted regarding therapeutic result today.  Patient is to continue current dose and continue to check INR with home monitor per protocol.  ?       OBJECTIVE    INR (External)   Date Value Ref Range Status   2021 2.4 (A) 0.9 - 1.1 Final       ASSESSMENT / PLAN  No question data found.  Anticoagulation Summary  As of 2021    INR goal:  2.0-3.0   TTR:  95.7 % (9.4 mo)   INR used for dosin.4 (2021)   Warfarin maintenance plan:  3 mg (1 mg x 3) every Sun; 4 mg (4 mg x 1) all other days   Full warfarin instructions:  3 mg every Sun; 4 mg all other days   Weekly warfarin total:  27 mg   No change documented:  Shantal Hoover RN   Plan last modified:  Nigel Rubio RN (2021)   Next INR check:     Priority:  Maintenance   Target end date:  2050    Indications    Mechanical heart valve present (Resolved) [Z95.2]  H/O mechanical aortic valve replacement [Z95.2]  Long term current use of anticoagulant therapy [Z79.01]             Anticoagulation Episode Summary     INR check location:      Preferred lab:  EXTERNAL LAB    Send INR reminders to:  RIVER MCLAUGHLIN    Comments:  Okay to Manage By Exception      Anticoagulation Care Providers     Provider Role Specialty Phone number    John Blair MD Referring Internal Medicine [phone]

## 2021-09-11 ENCOUNTER — HEALTH MAINTENANCE LETTER (OUTPATIENT)
Age: 66
End: 2021-09-11

## 2021-09-18 ENCOUNTER — TRANSFERRED RECORDS (OUTPATIENT)
Dept: HEALTH INFORMATION MANAGEMENT | Facility: CLINIC | Age: 66
End: 2021-09-18

## 2021-09-18 LAB — INR (EXTERNAL): 2.6 (ref 2–3)

## 2021-09-20 ENCOUNTER — DOCUMENTATION ONLY (OUTPATIENT)
Dept: FAMILY MEDICINE | Facility: CLINIC | Age: 66
End: 2021-09-20

## 2021-09-20 DIAGNOSIS — Z95.2 H/O MECHANICAL AORTIC VALVE REPLACEMENT: Primary | ICD-10-CM

## 2021-09-20 DIAGNOSIS — Z79.01 LONG TERM CURRENT USE OF ANTICOAGULANT THERAPY: ICD-10-CM

## 2021-09-20 NOTE — PROGRESS NOTES
ANTICOAGULATION  MANAGEMENT-Patient Home Monitoring Result    Assessment     Therapeutic INR result of 2.6 . Goal range 2.0-3.0. Received via fax from [employer] home INR monitoring company.        Previous INR was therapeutic    Min was last contacted by phone: 2021    Plan     Per home monitoring agreement with patient, patient was NOT contacted regarding therapeutic result today.  Patient is to continue current dose and continue to check INR with home monitor per protocol.  ?       OBJECTIVE    INR (External)   Date Value Ref Range Status   2021 2.6 (A) 0.9 - 1.1 Final       ASSESSMENT / PLAN  No question data found.  Anticoagulation Summary  As of 2021    INR goal:  2.0-3.0   TTR:  95.8 % (9.7 mo)   INR used for dosin.6 (2021)   Warfarin maintenance plan:  3 mg (1 mg x 3) every Sun; 4 mg (4 mg x 1) all other days   Full warfarin instructions:  3 mg every Sun; 4 mg all other days   Weekly warfarin total:  27 mg   No change documented:  Nigel Rubio RN   Plan last modified:  Ngiel Rubio RN (2021)   Next INR check:  10/1/2021   Priority:  Maintenance   Target end date:  2050    Indications    Mechanical heart valve present (Resolved) [Z95.2]  H/O mechanical aortic valve replacement [Z95.2]  Long term current use of anticoagulant therapy [Z79.01]             Anticoagulation Episode Summary     INR check location:      Preferred lab:  EXTERNAL LAB    Send INR reminders to:  R[facility] MCLAUGHLIN    Comments:  Okay to Manage By Exception      Anticoagulation Care Providers     Provider Role Specialty Phone number    John Blair MD Referring Internal Medicine [phone]

## 2021-09-24 ENCOUNTER — LAB (OUTPATIENT)
Dept: LAB | Facility: OTHER | Age: 66
End: 2021-09-24

## 2021-09-24 ENCOUNTER — IMMUNIZATION (OUTPATIENT)
Dept: FAMILY MEDICINE | Facility: OTHER | Age: 66
End: 2021-09-24
Payer: MEDICARE

## 2021-09-24 DIAGNOSIS — N18.31 STAGE 3A CHRONIC KIDNEY DISEASE (H): ICD-10-CM

## 2021-09-24 LAB
ALBUMIN SERPL-MCNC: 3.7 G/DL (ref 3.4–5)
ANION GAP SERPL CALCULATED.3IONS-SCNC: 5 MMOL/L (ref 3–14)
BUN SERPL-MCNC: 32 MG/DL (ref 7–30)
CALCIUM SERPL-MCNC: 8.4 MG/DL (ref 8.5–10.1)
CHLORIDE BLD-SCNC: 110 MMOL/L (ref 94–109)
CO2 SERPL-SCNC: 28 MMOL/L (ref 20–32)
CREAT SERPL-MCNC: 1.68 MG/DL (ref 0.66–1.25)
CREAT UR-MCNC: 127 MG/DL
GFR SERPL CREATININE-BSD FRML MDRD: 42 ML/MIN/1.73M2
GLUCOSE BLD-MCNC: 81 MG/DL (ref 70–99)
HGB BLD-MCNC: 13.1 G/DL (ref 13.3–17.7)
PHOSPHATE SERPL-MCNC: 2.8 MG/DL (ref 2.5–4.5)
POTASSIUM BLD-SCNC: 4.9 MMOL/L (ref 3.4–5.3)
PROT UR-MCNC: 0.18 G/L
PROT/CREAT 24H UR: 0.14 G/G CR (ref 0–0.2)
SODIUM SERPL-SCNC: 143 MMOL/L (ref 133–144)

## 2021-09-24 PROCEDURE — 36415 COLL VENOUS BLD VENIPUNCTURE: CPT

## 2021-09-24 PROCEDURE — 85018 HEMOGLOBIN: CPT

## 2021-09-24 PROCEDURE — 80069 RENAL FUNCTION PANEL: CPT

## 2021-09-24 PROCEDURE — 83970 ASSAY OF PARATHORMONE: CPT

## 2021-09-24 PROCEDURE — G0008 ADMIN INFLUENZA VIRUS VAC: HCPCS

## 2021-09-24 PROCEDURE — 84156 ASSAY OF PROTEIN URINE: CPT

## 2021-09-24 PROCEDURE — 90662 IIV NO PRSV INCREASED AG IM: CPT

## 2021-09-25 LAB — PTH-INTACT SERPL-MCNC: 66 PG/ML (ref 18–80)

## 2021-09-28 ENCOUNTER — VIRTUAL VISIT (OUTPATIENT)
Dept: NEPHROLOGY | Facility: CLINIC | Age: 66
End: 2021-09-28
Payer: MEDICARE

## 2021-09-28 DIAGNOSIS — N18.31 STAGE 3A CHRONIC KIDNEY DISEASE (H): ICD-10-CM

## 2021-09-28 DIAGNOSIS — N25.81 SECONDARY RENAL HYPERPARATHYROIDISM (H): ICD-10-CM

## 2021-09-28 DIAGNOSIS — D64.9 ANEMIA, UNSPECIFIED TYPE: ICD-10-CM

## 2021-09-28 DIAGNOSIS — I10 ESSENTIAL HYPERTENSION: Primary | ICD-10-CM

## 2021-09-28 PROCEDURE — 99215 OFFICE O/P EST HI 40 MIN: CPT | Mod: 95 | Performed by: INTERNAL MEDICINE

## 2021-09-28 RX ORDER — CARVEDILOL 12.5 MG/1
12.5 TABLET ORAL 2 TIMES DAILY WITH MEALS
Qty: 180 TABLET | Refills: 3 | Status: SHIPPED | OUTPATIENT
Start: 2021-09-28 | End: 2022-09-14

## 2021-09-28 NOTE — PROGRESS NOTES
"9/28/21    CC: CKD    HPI: Min Andino is a 64 year old male who presents for evaluation of CKD. I last saw Mr. Andino in 2014. To review from my previous note: Mr. Andino's past medical hx was unremarkable leading up to Dec 2013 when he noted chest tightness. He was seen at [facility] at that time and noted to have aortic dissection. He was initially monitored but later underwent aortic repair/aortic valve replacement/CABG in early April 2014. His post op course was complicated by the need for ECMO as well as pressor support. His wife reports today that he was told that he has \"normal\" creatinine levels when undergoing physicals in the past. His creatinine was 2.2 post surgery in April 2014 but improved to 1.3 at the time of discharge in April. 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 At that time his creatinine was 2.2 post op but improved to 1.3. Since that time, creatinine readings have included 1.48 on 7/17/14, 1.39 no 7/27/14, and 1.35 on 8/13/14. 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                 02/24/20: today he presents to reestablish care in our clinic. His creatinine was stable at ~ 1.1 on last check in 2014 but is now at a new baseline of ~ 1.6 since august. Mr. Andino underwent thoracoabdominal aortic repair august 2019. The placement of this stent compromised blood flow to the left kidney moiety which was noted on imaging later. His creatinine post this procedure was ~ 1.6. There have been a few episodes when the creatinine has increased, most recently a few weeks ago (to 1.9). With his most recent creatinine rise, lasix was held. He has not noted much change in his swelling or breathing with holding the lasix for the past few weeks. Today I noted SOB during our conversation. His oxygen saturation was fine but his breathing seemed labored. He and his wife report that this is his " breathing baseline over the past 5 years and is not worse than typical. They are following weights at home and he has been dropping weight - no increase since stopping the lasix. He does have difficulty with empyting his bladder - last imaging in the fall showed no hydronephrosis. Flomax did not help him in the past. He has a HHN coming once a week to the house. He is not lightheaded. He has plans to see cardiology at [place] in March, is looking to establish care in urology, is also looking to establish care with internal medicine potentially in our clinic for continuity in one location.         06/08/20:  Virtual visit. Lasix was increased last month. He reports that the swelling is there but better. He denies any change in eating. No diarrhea. Weight was 240 lbs down to 215 lbs. He had been losing 1 lb a day. He was in ED last week and  Dx with hernia. He held lasix since Friday - was on lasix 40 mg daily held over the weekend - weight was 215 lbs on Thursday and 217 lbs this AM. He denies lightheadedness/dizziness. No orthostatic sxs. He quit wearing compression stockings a week ago. He feels he has slightly more endurance; can't lift a lot, needing to take breaks. He feels his urinary retention is not as problematic - has not seen urology. He has been taking iron BID.      7/13/20: video visit. He has continued to see weight loss over time - was 215 lbs in June but now 205 lbs. He has seen a change in his clothing size by 2 over the past few months. He is hungry often - eating well. No diarrhea. He has some constipation at times. He feels that maybe his breathing is improved. He is wearing compression stockings. He is using lasix 20 mg daily.      10/20/20: video visit - started on [facility] and did exam that way - then converted to telephone given echo noted. Feeling the best he has felt since before last winter. NO new issues. He has endovascular stent repair planned in the coming week. Activity improving. No  swelling. Weight most recently 203 lbs. Over the past month - 201-203 lbs. BP has been well controlled; 115/61 at a recent physician appt. He tends to have higher readings at home; 140s at home, sometimes 130s.     01/25/21:  In the setting of COVID-19 pandemic, this visit was changed to a telephone visit. Patient reports feeling good overall. No difficulty with fluid overload/swelling/shortness of breath. His weight is up slightly but he also has a bigger appetite lately and snacking more with being at home. BP was 105/58 at a recent clinic visit but they report pressures of 130-176 at home most recently - mostly above goal. They have been working with cardiology clinic at [place] in regards to medication adjustments  And plan to reach out to them with these updates. He was hospitalized 11/11/20-11/17/20 following left internal iliac artery stenting for endoleak. Returned to operating room 11/13 for [id number] of left common femoral aneurysm and previous graft left external iliac to superficial femoral and deep femoral artery interposition graft. Creatinine was stable at 1.5-1.7 while inpatient. He has been taking iron BID but with some constipation.     9/28/21: video visit. No vascular interventions or hospitalizations since last visit. Vascular stent/leak stable on recent CT. Checking BP 4x/day with average SBP in AM 160s-180s, mid day 110s-120s, PM 140s. Amlodipine (takes in AM) increased to 10 mg daily approximately one month ago by PCP. Losartan (take at night) 100 mg daily increased march 2021. Metoprolol succinate increased to 100 mg daily (not sure when this was increased, take in AM). BP sligthly improved but not much. Denies any dizziness, CP, abdominal pain, SOB (some BROWN with moderate exertion), LE swelling or abdominal distention. Doesn't check weights regularly, last weight 220.    amLODIPine (NORVASC) 10 MG tablet, Take 1 tablet (10 mg) by mouth daily  aspirin (ASA) 81 MG chewable tablet, Take 81 mg by  mouth daily  atorvastatin (LIPITOR) 40 MG tablet, TAKE 1 TABLET(40 MG) BY MOUTH DAILY  docusate sodium (COLACE) 100 MG capsule,   esomeprazole (NEXIUM) 40 MG DR capsule, TAKE ONE CAPSULE BY MOUTH TWICE DAILY 30-60 MINUTES BEFORE EATING  Ferrous Sulfate (IRON PO), Take 45 mg by mouth 2 times daily Slow release  losartan (COZAAR) 100 MG tablet,   methylcellulose (CITRUCEL) 500 MG TABS tablet,   multivitamin, therapeutic with minerals (MULTI-VITAMIN) TABS, Take 1 tablet by mouth daily  nitroGLYcerin (NITROSTAT) 0.4 MG sublingual tablet, Place 0.4 mg under the tongue   Vitamin D, Cholecalciferol, 25 MCG (1000 UT) TABS,   warfarin ANTICOAGULANT (COUMADIN) 1 MG tablet, TAKE 4 TABLETS BY MOUTH DAILY ON SUNDAY, TUESDAY AND THURSDAY AND TAKE 5 TABLETS ALL OTHER DAYS OR AS DIRECTED BY ANTIGCOAGULATION CLINIC  warfarin ANTICOAGULANT (COUMADIN) 4 MG tablet, One tablet daily    No current facility-administered medications on file prior to visit.      Exam:  There were no vitals taken for this visit.    Results:   No visits with results within 1 Day(s) from this visit.   Latest known visit with results is:   Lab on 09/24/2021   Component Date Value Ref Range Status     Hemoglobin 09/24/2021 13.1* 13.3 - 17.7 g/dL Final     Parathyroid Hormone Intact 09/24/2021 66  18 - 80 pg/mL Final     Total Protein Random Urine g/L 09/24/2021 0.18  g/L Final    The reference range has not been established for total protein in random urine samples.  The result should be integrated into the clinical context for interpretation.     Total Protein Urine g/gr Creatinine 09/24/2021 0.14  0.00 - 0.20 g/g Cr Final     Creatinine Urine mg/dL 09/24/2021 127  mg/dL Final     Sodium 09/24/2021 143  133 - 144 mmol/L Final     Potassium 09/24/2021 4.9  3.4 - 5.3 mmol/L Final     Chloride 09/24/2021 110* 94 - 109 mmol/L Final     Carbon Dioxide (CO2) 09/24/2021 28  20 - 32 mmol/L Final     Anion Gap 09/24/2021 5  3 - 14 mmol/L Final     Urea Nitrogen  09/24/2021 32* 7 - 30 mg/dL Final     Creatinine 09/24/2021 1.68* 0.66 - 1.25 mg/dL Final     Calcium 09/24/2021 8.4* 8.5 - 10.1 mg/dL Final     Glucose 09/24/2021 81  70 - 99 mg/dL Final     Albumin 09/24/2021 3.7  3.4 - 5.0 g/dL Final     Phosphorus 09/24/2021 2.8  2.5 - 4.5 mg/dL Final     GFR Estimate 09/24/2021 42* >60 mL/min/1.73m2 Final    As of July 11, 2021, eGFR is calculated by the CKD-EPI creatinine equation, without race adjustment. eGFR can be influenced by muscle mass, exercise, and diet. The reported eGFR is an estimation only and is only applicable if the renal function is stable.     Lab results were reviewed and interpreted.       Assessment/Plan:   1. CKD Stage 3: has CKD in the setting of previous ULICES as well as vascular compromise to the left kidney moiety from his vascular procedure in August 2019. Addt ULICES recently which was likely hemodynamic in the setting of poor oral intake and being on lasix. Creatinine is relatively stable. Will monitor routinely.      2. Hypertension: changing from toprol XL to carvedilol 12.5 mg BID to see if this helps to keep BP even throughout the day.      3. Anemia: hemoglobin 13.1     4. Aneurysms/AVR: following with [facility]    Patient Instructions   1. Stop the toprol XL  2. Start carvedilol 12.5 mg twice a day  3. Follow blood pressure and heart rate at home and we will make adjustments weekly until we are at goal.   4. On the day of your CT scan, make sure you are well hydrated before and after the test and hold the losartan that AM.   5. Labs a week after your [facility] imaging  6. Follow-up in 3 months but we will be in touch in the meantime about your blood pressure.        43 minutes spent on the date of the encounter doing review of outside records, review of test results, interpretation of tests, patient visit and documentation     Patient was seen and discussed with Dr. Vallecillo.    Devin Santos MD  Internal Medicine, PGY-3  Pager:  [phone]    Attending Note: I have seen and examined this patient and the above note reflects my historical findings, exam findings, and assessment and plan.   Patient Instructions   1. Stop the toprol XL  2. Start carvedilol 12.5 mg twice a day  3. Follow blood pressure and heart rate at home and we will make adjustments weekly until we are at goal.   4. On the day of your CT scan, make sure you are well hydrated before and after the test and hold the losartan that AM.   5. Labs a week after your [place] imaging  6. Follow-up in 3 months but we will be in touch in the meantime about your blood pressure.      Madelin Vallecillo, DO

## 2021-09-28 NOTE — Clinical Note
Hi Dr. Blair,  Lots of variability with Min's BP still. If you are ok with it, I stopped the metoprolol XL and I'm starting him on coreg instead with hopes of getting some alpha blockade as well. Please let me know if you have any concerns with this plan.  Madelin

## 2021-09-28 NOTE — PATIENT INSTRUCTIONS
1. Stop the toprol XL  2. Start carvedilol 12.5 mg twice a day  3. Follow blood pressure and heart rate at home and we will make adjustments weekly until we are at goal.   4. On the day of your CT scan, make sure you are well hydrated before and after the test and hold the losartan that AM.   5. Labs a week after your [facility] imaging  6. Follow-up in 3 months but we will be in touch in the meantime about your blood pressure.

## 2021-09-28 NOTE — PROGRESS NOTES
Min is a 65 year old who is being evaluated via a billable video visit.      How would you like to obtain your AVS? eMoov[employer]  If the video visit is dropped, the invitation should be resent by: Text to cell phone: [phone]  Will anyone else be joining your video visit? No

## 2021-09-29 DIAGNOSIS — Z95.2 H/O MECHANICAL AORTIC VALVE REPLACEMENT: Primary | ICD-10-CM

## 2021-10-01 ENCOUNTER — TRANSFERRED RECORDS (OUTPATIENT)
Dept: HEALTH INFORMATION MANAGEMENT | Facility: CLINIC | Age: 66
End: 2021-10-01

## 2021-10-02 LAB — INR (EXTERNAL): 2.4 (ref 2–3)

## 2021-10-04 ENCOUNTER — ANTICOAGULATION THERAPY VISIT (OUTPATIENT)
Dept: FAMILY MEDICINE | Facility: CLINIC | Age: 66
End: 2021-10-04

## 2021-10-04 DIAGNOSIS — Z79.01 LONG TERM CURRENT USE OF ANTICOAGULANT THERAPY: ICD-10-CM

## 2021-10-04 DIAGNOSIS — Z95.2 H/O MECHANICAL AORTIC VALVE REPLACEMENT: Primary | ICD-10-CM

## 2021-10-04 NOTE — PROGRESS NOTES
ANTICOAGULATION  MANAGEMENT-Home Monitor Managed by Exception    Min Hill Andino 65 year old male is on warfarin with therapeutic INR result. (Goal INR 2.0-3.0)    Recent labs: (last 7 days)     10/02/21  0200   INR 2.4*         Previous INR was Therapeutic    Medication, diet, health changes since last INR:chart reviewed; none idientified    Contacted within the last 12 weeks by phone on 8/16      [place]     Min was NOT contacted regarding therapeutic result today per home monitoring policy manage by exception agreement.   Current warfarin dose is to be continued:     Summary  As of 10/4/2021    Full warfarin instructions:  3 mg every Sun; 4 mg all other days   Next INR check:             ?   Samantha Ty RN  Anticoagulation Clinic  10/4/2021    _______________________________________________________________________     Anticoagulation Episode Summary     Current INR goal:  2.0-3.0   TTR:  96.0 % (10.2 mo)   Target end date:  11/20/2050   Send INR reminders to:  [facility]    Indications    Mechanical heart valve present (Resolved) [Z95.2]  H/O mechanical aortic valve replacement [Z95.2]  Long term current use of anticoagulant therapy [Z79.01]           Comments:  Okay to Manage By Exception         Anticoagulation Care Providers     Provider Role Specialty Phone number    John Blair MD Referring Internal Medicine [phone]          Samantha Estrada RN, BSN, PHN  Anticoagulation Nurse  [phone]

## 2021-10-06 ENCOUNTER — VIRTUAL VISIT (OUTPATIENT)
Dept: GASTROENTEROLOGY | Facility: CLINIC | Age: 66
End: 2021-10-06
Payer: MEDICARE

## 2021-10-06 DIAGNOSIS — K59.00 CONSTIPATION, UNSPECIFIED CONSTIPATION TYPE: ICD-10-CM

## 2021-10-06 DIAGNOSIS — K21.9 GASTROESOPHAGEAL REFLUX DISEASE, UNSPECIFIED WHETHER ESOPHAGITIS PRESENT: Primary | ICD-10-CM

## 2021-10-06 PROCEDURE — 99214 OFFICE O/P EST MOD 30 MIN: CPT | Mod: 95 | Performed by: INTERNAL MEDICINE

## 2021-10-06 NOTE — PATIENT INSTRUCTIONS
Stop taking pepcid (famotidine) - you can switch to using this as needed instead.    Please decrease your nexium to once a day (esopmeprazole) - make sure you take this on an empty stomach and eat 30 to 60 minutes later    Try to increase the amount of water that you drink    Start taking miralax daily.   
Statement Selected

## 2021-10-06 NOTE — PROGRESS NOTES
Min is a 65 year old who is being evaluated via a billable video visit.      How would you like to obtain your AVS? MyChart  If the video visit is dropped, the invitation should be resent by: Text to cell phone: [phone]  Will anyone else be joining your video visit? No      Sayra Guzman LPN on 10/6/21 at 8:06 AM

## 2021-10-14 ENCOUNTER — TRANSFERRED RECORDS (OUTPATIENT)
Dept: HEALTH INFORMATION MANAGEMENT | Facility: CLINIC | Age: 66
End: 2021-10-14
Payer: COMMERCIAL

## 2021-10-14 LAB — INR (EXTERNAL): 2.5 (ref 0.9–1.1)

## 2021-10-18 ENCOUNTER — DOCUMENTATION ONLY (OUTPATIENT)
Dept: FAMILY MEDICINE | Facility: CLINIC | Age: 66
End: 2021-10-18

## 2021-10-18 DIAGNOSIS — Z79.01 LONG TERM CURRENT USE OF ANTICOAGULANT THERAPY: ICD-10-CM

## 2021-10-18 DIAGNOSIS — Z95.2 H/O MECHANICAL AORTIC VALVE REPLACEMENT: Primary | ICD-10-CM

## 2021-10-18 NOTE — PROGRESS NOTES
ANTICOAGULATION  MANAGEMENT-Home Monitor Managed by Exception    Min Hill Andino 65 year old male is on warfarin with therapeutic INR result. (Goal INR 2.0-3.0)    Recent labs: (last 7 days)     10/14/21  1800   INR 2.5*         Previous INR was Therapeutic    Medication, diet, health changes since last INR:chart reviewed; none idientified    Contacted within the last 12 weeks by phone on 08/16/2021      [place]     Min was NOT contacted regarding therapeutic result today per home monitoring policy manage by exception agreement.   Current warfarin dose is to be continued:     Summary  As of 10/18/2021    Full warfarin instructions:  3 mg every Sun; 4 mg all other days   Next INR check:  10/28/2021           ?   Nigel Rubio RN  Anticoagulation Clinic  10/18/2021    _______________________________________________________________________     Anticoagulation Episode Summary     Current INR goal:  2.0-3.0   TTR:  96.2 % (10.6 mo)   Target end date:  11/20/2050   Send INR reminders to:  [facility]    Indications    Mechanical heart valve present (Resolved) [Z95.2]  H/O mechanical aortic valve replacement [Z95.2]  Long term current use of anticoagulant therapy [Z79.01]           Comments:  Okay to Manage By Exception         Anticoagulation Care Providers     Provider Role Specialty Phone number    John Blair MD Referring Internal Medicine [phone]

## 2021-10-27 LAB — INR (EXTERNAL): 2.2 (ref 0.9–1.1)

## 2021-10-28 ENCOUNTER — ANTICOAGULATION THERAPY VISIT (OUTPATIENT)
Dept: FAMILY MEDICINE | Facility: CLINIC | Age: 66
End: 2021-10-28

## 2021-10-28 ENCOUNTER — TRANSFERRED RECORDS (OUTPATIENT)
Dept: HEALTH INFORMATION MANAGEMENT | Facility: CLINIC | Age: 66
End: 2021-10-28
Payer: COMMERCIAL

## 2021-10-28 DIAGNOSIS — Z95.2 H/O MECHANICAL AORTIC VALVE REPLACEMENT: Primary | ICD-10-CM

## 2021-10-28 DIAGNOSIS — Z79.01 LONG TERM CURRENT USE OF ANTICOAGULANT THERAPY: ICD-10-CM

## 2021-10-28 NOTE — PROGRESS NOTES
ANTICOAGULATION  MANAGEMENT-Home Monitor Managed by Exception    Min Hill Andino 65 year old male is on warfarin with therapeutic INR result. (Goal INR 2.0-3.0)    Recent labs: (last 7 days)     10/28/21  1317   INR 2.2*         Previous INR was Therapeutic    Medication, diet, health changes since last INR:chart reviewed; none idientified    Contacted within the last 12 weeks by phone on 8/16/21      [place]     Min was NOT contacted regarding therapeutic result today per home monitoring policy manage by exception agreement.   Current warfarin dose is to be continued:     Summary  As of 10/28/2021    Full warfarin instructions:  3 mg every Sun; 4 mg all other days   Next INR check:  11/11/2021           ?   Brittni Lemus RN  Anticoagulation Clinic  10/28/2021    _______________________________________________________________________     Anticoagulation Episode Summary     Current INR goal:  2.0-3.0   TTR:  96.3 % (11.1 mo)   Target end date:  11/20/2050   Send INR reminders to:  [facility]    Indications    Mechanical heart valve present (Resolved) [Z95.2]  H/O mechanical aortic valve replacement [Z95.2]  Long term current use of anticoagulant therapy [Z79.01]           Comments:  Okay to Manage By Exception         Anticoagulation Care Providers     Provider Role Specialty Phone number    John Blair MD Referring Internal Medicine [phone]

## 2021-11-05 ENCOUNTER — TRANSFERRED RECORDS (OUTPATIENT)
Dept: HEALTH INFORMATION MANAGEMENT | Facility: CLINIC | Age: 66
End: 2021-11-05
Payer: COMMERCIAL

## 2021-11-07 LAB — INR (EXTERNAL): 2.2 (ref 0.9–1.1)

## 2021-11-08 ENCOUNTER — ANTICOAGULATION THERAPY VISIT (OUTPATIENT)
Dept: FAMILY MEDICINE | Facility: CLINIC | Age: 66
End: 2021-11-08
Payer: COMMERCIAL

## 2021-11-08 DIAGNOSIS — Z79.01 LONG TERM CURRENT USE OF ANTICOAGULANT THERAPY: ICD-10-CM

## 2021-11-08 DIAGNOSIS — Z95.2 H/O MECHANICAL AORTIC VALVE REPLACEMENT: Primary | ICD-10-CM

## 2021-11-08 NOTE — PROGRESS NOTES
ANTICOAGULATION MANAGEMENT     Min Andino 65 year old male is on warfarin with therapeutic INR result. (Goal INR 2.0-3.0)    No results for input(s): INR in the last 168 hours.    ASSESSMENT     Source(s): Chart Review       Warfarin doses taken: Reviewed in chart    Previous INR: Therapeutic last 2(+) visits    Additional findings: Manage by exception, every 12 week check in.      PLAN     Recommended plan for no diet, medication or health factor changes affecting INR     Dosing Instructions: Continue your current warfarin dose with next INR in 2 weeks       Summary  As of 11/8/2021    Full warfarin instructions:  3 mg every Sun; 4 mg all other days   Next INR check:  11/22/2021             Detailed voice message left for Min with dosing instructions and follow up date.     Patient to recheck with home meter    Education provided: Goal range and significance of current result and Importance of therapeutic range    Plan made per [facility] anticoagulation protocol    Dave Thomas RN  Anticoagulation Clinic  11/8/2021    _______________________________________________________________________     Anticoagulation Episode Summary     Current INR goal:  2.0-3.0   TTR:  96.4 % (11.4 mo)   Target end date:  11/20/2050   Send INR reminders to:  [facility]    Indications    Mechanical heart valve present (Resolved) [Z95.2]  H/O mechanical aortic valve replacement [Z95.2]  Long term current use of anticoagulant therapy [Z79.01]           Comments:  11/8/21 - [facility] Home Monitor, Okay to Manage By Exception         Anticoagulation Care Providers     Provider Role Specialty Phone number    John Blair MD Referring Internal Medicine [phone]

## 2021-11-12 ENCOUNTER — TELEPHONE (OUTPATIENT)
Dept: NEPHROLOGY | Facility: CLINIC | Age: 66
End: 2021-11-12
Payer: COMMERCIAL

## 2021-11-12 NOTE — TELEPHONE ENCOUNTER
Pt had a Video Visit with Dr. Vallecillo 9/28/21.  Dr. Vallecillo recommended 3mo Return Appt.    Called and spoke with pt.  Assisted with scheduling a Future appt.  Video per pt request.  Lab appt scheduled day prior.    Encouraged pt to MyChart/call if any further questions or concerns.    Lizet Cary LPN

## 2021-11-18 ENCOUNTER — TRANSFERRED RECORDS (OUTPATIENT)
Dept: HEALTH INFORMATION MANAGEMENT | Facility: CLINIC | Age: 66
End: 2021-11-18
Payer: COMMERCIAL

## 2021-11-18 ENCOUNTER — ANTICOAGULATION THERAPY VISIT (OUTPATIENT)
Dept: FAMILY MEDICINE | Facility: CLINIC | Age: 66
End: 2021-11-18
Payer: COMMERCIAL

## 2021-11-18 DIAGNOSIS — Z79.01 LONG TERM CURRENT USE OF ANTICOAGULANT THERAPY: ICD-10-CM

## 2021-11-18 DIAGNOSIS — Z95.2 H/O MECHANICAL AORTIC VALVE REPLACEMENT: Primary | ICD-10-CM

## 2021-11-18 LAB — INR (EXTERNAL): 3.4 (ref 2–3)

## 2021-11-18 NOTE — PROGRESS NOTES
Incoming fax from [employer] home monitoring company    Date of result 11/18    INR result 3.4

## 2021-11-18 NOTE — PROGRESS NOTES
ANTICOAGULATION MANAGEMENT     Min Andino 65 year old male is on warfarin with supratherapeutic INR result. (Goal INR 2.0-3.0)    Recent labs: (last 7 days)     11/18/21  1332   INR 3.4*       ASSESSMENT     Source(s): Chart Review and Patient/Caregiver Call       Warfarin doses taken: More warfarin taken than planned which may be affecting INR patient was taking 4 mg daily--patient will start 3 mg on Sundays for now on    Diet: Decreased greens/vitamin K in diet; ongoing change But patient states this change occurred about 3 weeks ago and patient was 2.2 the last 2 checks.    New illness, injury, or hospitalization: No    Medication/supplement changes: None noted    Signs or symptoms of bleeding or clotting: No    Previous INR: Therapeutic last 2(+) visits    Additional findings: None     PLAN     Recommended plan for temporary change(s) and ongoing change(s) affecting INR     Dosing Instructions: Partial hold then continue your current warfarin dose with next INR in 1 week. If INR remains on high end at next check--may need maintenance reduction.    Summary  As of 11/18/2021    Full warfarin instructions:  11/18: 3 mg; Otherwise 3 mg every Sun; 4 mg all other days   Next INR check:               Telephone call with Min who verbalizes understanding and agrees to plan and who agrees to plan and repeated back plan correctly    Patient to recheck with home meter    Education provided: Please call back if any changes to your diet, medications or how you've been taking warfarin, Goal range and significance of current result and Education around INR and dosing    Plan made per [facility] anticoagulation protocol    Tyesha Zambrano, RN  Anticoagulation Clinic  11/18/2021    _______________________________________________________________________     Anticoagulation Episode Summary     Current INR goal:  2.0-3.0   TTR:  95.4 % (11.8 mo)   Target end date:  11/20/2050   Send INR reminders to:  [facility]     Indications    Mechanical heart valve present (Resolved) [Z95.2]  H/O mechanical aortic valve replacement [Z95.2]  Long term current use of anticoagulant therapy [Z79.01]           Comments:  11/8/21 - [employer] Home Monitor, Okay to Manage By Exception         Anticoagulation Care Providers     Provider Role Specialty Phone number    John Blair MD Referring Internal Medicine [phone]

## 2021-11-26 ENCOUNTER — TRANSFERRED RECORDS (OUTPATIENT)
Dept: HEALTH INFORMATION MANAGEMENT | Facility: CLINIC | Age: 66
End: 2021-11-26
Payer: COMMERCIAL

## 2021-11-26 LAB — INR (EXTERNAL): 3 (ref 0.9–1.1)

## 2021-11-29 ENCOUNTER — ANTICOAGULATION THERAPY VISIT (OUTPATIENT)
Dept: ANTICOAGULATION | Facility: CLINIC | Age: 66
End: 2021-11-29
Payer: COMMERCIAL

## 2021-11-29 ENCOUNTER — TELEPHONE (OUTPATIENT)
Dept: ANTICOAGULATION | Facility: CLINIC | Age: 66
End: 2021-11-29
Payer: COMMERCIAL

## 2021-11-29 DIAGNOSIS — Z79.01 ANTICOAGULATION MONITORING, INR RANGE 2-3: ICD-10-CM

## 2021-11-29 DIAGNOSIS — Z79.01 LONG TERM CURRENT USE OF ANTICOAGULANT THERAPY: ICD-10-CM

## 2021-11-29 DIAGNOSIS — Z95.2 H/O MECHANICAL AORTIC VALVE REPLACEMENT: Primary | ICD-10-CM

## 2021-11-29 NOTE — TELEPHONE ENCOUNTER
ANTICOAGULATION MANAGEMENT      Min Andino due for annual renewal of referral to anticoagulation monitoring. Order pended for your review and signature.      ANTICOAGULATION SUMMARY      Warfarin indication(s)     DVT  Heart Valve Replacement    Heart valve present?  Mechanical  AVR-Bileaflet       Current goal range   INR: 2.0-3.0     Goal appropriate for indication? Yes, INR 2-3 appropriate for hx of DVT, PE, hypercoagulable state, Afib, LVAD, or bileaflet AVR without risk factors     Current duration of therapy Indefinite/long term therapy   Time in Therapeutic Range (TTR)  (Goal > 60%) 93.4%       Office visit with referring provider's group within last year yes on 08/27/2021       Morena Dempsey RN

## 2021-11-29 NOTE — PROGRESS NOTES
ANTICOAGULATION MANAGEMENT     Min Andino 65 year old male is on warfarin with therapeutic INR result. (Goal INR 2.0-3.0)    Recent labs: (last 7 days)     11/26/21  0926   INR 3.0*       ASSESSMENT     Source(s): Chart Review and Patient/Caregiver Call       Warfarin doses taken: Warfarin taken as instructed    Diet: No new diet changes identified    New illness, injury, or hospitalization: No    Medication/supplement changes: None noted    Signs or symptoms of bleeding or clotting: No    Previous INR: Therapeutic last visit; previously outside of goal range    Additional findings: None     PLAN     Recommended plan for no diet, medication or health factor changes affecting INR     Dosing Instructions: Continue your current warfarin dose with next INR in 1 week       Summary  As of 11/29/2021    Full warfarin instructions:  3 mg every Sun; 4 mg all other days   Next INR check:  12/3/2021             Telephone call with Min who verbalizes understanding and agrees to plan    Patient to recheck with home meter    Education provided: Please call back if any changes to your diet, medications or how you've been taking warfarin and Importance of notifying clinic for changes in medications; a sooner lab recheck maybe needed.    Plan made per [facility] anticoagulation protocol    Morena Dempsey RN  Anticoagulation Clinic  11/29/2021    _______________________________________________________________________     Anticoagulation Episode Summary     Current INR goal:  2.0-3.0   TTR:  93.4 % (1 y)   Target end date:  11/20/2050   Send INR reminders to:  [facility]    Indications    Mechanical heart valve present (Resolved) [Z95.2]  H/O mechanical aortic valve replacement [Z95.2]  Long term current use of anticoagulant therapy [Z79.01]           Comments:  11/8/21 - [employer] Home Monitor, Okay to Manage By Exception         Anticoagulation Care Providers     Provider Role Specialty Phone number    John Blair MD  [facility] [phone]

## 2021-12-08 ENCOUNTER — ANTICOAGULATION THERAPY VISIT (OUTPATIENT)
Dept: FAMILY MEDICINE | Facility: CLINIC | Age: 66
End: 2021-12-08
Payer: COMMERCIAL

## 2021-12-08 ENCOUNTER — TRANSFERRED RECORDS (OUTPATIENT)
Dept: HEALTH INFORMATION MANAGEMENT | Facility: CLINIC | Age: 66
End: 2021-12-08
Payer: COMMERCIAL

## 2021-12-08 DIAGNOSIS — Z79.01 LONG TERM CURRENT USE OF ANTICOAGULANT THERAPY: ICD-10-CM

## 2021-12-08 DIAGNOSIS — Z79.01 ANTICOAGULATION MONITORING, INR RANGE 2-3: ICD-10-CM

## 2021-12-08 DIAGNOSIS — Z95.2 H/O MECHANICAL AORTIC VALVE REPLACEMENT: Primary | ICD-10-CM

## 2021-12-08 LAB — INR (EXTERNAL): 2.7 (ref 0.9–1.1)

## 2021-12-08 NOTE — PROGRESS NOTES
Received a fax INR result for Min from [facility]    INR result dated 12/8/21 is 2.7    Karina Kennedy RN, BSN  Anticoagulation Clinic

## 2021-12-08 NOTE — PROGRESS NOTES
ANTICOAGULATION  MANAGEMENT-Home Monitor Managed by Exception    Min Hill Andino 65 year old male is on warfarin with therapeutic INR result. (Goal INR 2.0-3.0)    Recent labs: (last 7 days)     12/08/21  0000   INR 2.7*         Previous INR was Therapeutic    Medication, diet, health changes since last INR:chart reviewed; none identified    Contacted within the last 12 weeks by phone on 11/29/1      [place]     Min was NOT contacted regarding therapeutic result today per home monitoring policy manage by exception agreement.   Current warfarin dose is to be continued:     Summary  As of 12/8/2021    Full warfarin instructions:  3 mg every Sun; 4 mg all other days   Next INR check:  12/22/2021           ?   Dave Thomas RN  Anticoagulation Clinic  12/8/2021    _______________________________________________________________________     Anticoagulation Episode Summary     Current INR goal:  2.0-3.0   TTR:  93.4 % (1 y)   Target end date:  11/20/2050   Send INR reminders to:  RIVER [facility]IN    Indications    Mechanical heart valve present (Resolved) [Z95.2]  H/O mechanical aortic valve replacement [Z95.2]  Long term current use of anticoagulant therapy [Z79.01]  Anticoagulation monitoring  INR range 2-3 [Z79.01]           Comments:  11/8/21 - [employer] Home Monitor, Okay to Manage By Exception         Anticoagulation Care Providers     Provider Role Specialty Phone number    John Blair MD Referring Internal Medicine [phone]

## 2021-12-17 DIAGNOSIS — N18.31 STAGE 3A CHRONIC KIDNEY DISEASE (H): Primary | ICD-10-CM

## 2021-12-20 ENCOUNTER — MYC MEDICAL ADVICE (OUTPATIENT)
Dept: NEPHROLOGY | Facility: CLINIC | Age: 66
End: 2021-12-20
Payer: COMMERCIAL

## 2021-12-20 DIAGNOSIS — I10 HYPERTENSION, GOAL BELOW 140/90: Primary | ICD-10-CM

## 2021-12-21 ENCOUNTER — TRANSFERRED RECORDS (OUTPATIENT)
Dept: HEALTH INFORMATION MANAGEMENT | Facility: CLINIC | Age: 66
End: 2021-12-21
Payer: COMMERCIAL

## 2021-12-21 ENCOUNTER — ANTICOAGULATION THERAPY VISIT (OUTPATIENT)
Dept: FAMILY MEDICINE | Facility: CLINIC | Age: 66
End: 2021-12-21
Payer: COMMERCIAL

## 2021-12-21 DIAGNOSIS — Z95.2 H/O MECHANICAL AORTIC VALVE REPLACEMENT: Primary | ICD-10-CM

## 2021-12-21 DIAGNOSIS — Z79.01 LONG TERM CURRENT USE OF ANTICOAGULANT THERAPY: ICD-10-CM

## 2021-12-21 DIAGNOSIS — Z79.01 ANTICOAGULATION MONITORING, INR RANGE 2-3: ICD-10-CM

## 2021-12-21 LAB — INR HOME MONITORING: 2.4 (ref 2–3)

## 2021-12-21 RX ORDER — LOSARTAN POTASSIUM 50 MG/1
50 TABLET ORAL 2 TIMES DAILY
Qty: 180 TABLET | Refills: 3 | Status: SHIPPED | OUTPATIENT
Start: 2021-12-21 | End: 2022-03-08

## 2021-12-21 NOTE — PROGRESS NOTES
ANTICOAGULATION  MANAGEMENT-Home Monitor Managed by Exception    Min Hill Andino 65 year old male is on warfarin with therapeutic INR result. (Goal INR 2.0-3.0)    Recent labs: (last 7 days)     12/21/21  0000   INR 2.40         Previous INR was Therapeutic    Medication, diet, health changes since last INR:chart reviewed; none identified    Contacted within the last 12 weeks by phone on 11/29/21          [place]     Min was NOT contacted regarding therapeutic result today per home monitoring policy manage by exception agreement.   Current warfarin dose is to be continued:     Summary  As of 12/21/2021    Full warfarin instructions:  3 mg every Sun; 4 mg all other days   Next INR check:             ?   Shantal Hooevr RN  Anticoagulation Clinic  12/21/2021    _______________________________________________________________________     Anticoagulation Episode Summary     Current INR goal:  2.0-3.0   TTR:  93.4 % (1 y)   Target end date:  11/20/2050   Send INR reminders to:  RIVER [facility]GHLIN    Indications    Mechanical heart valve present (Resolved) [Z95.2]  H/O mechanical aortic valve replacement [Z95.2]  Long term current use of anticoagulant therapy [Z79.01]  Anticoagulation monitoring  INR range 2-3 [Z79.01]           Comments:  11/8/21 - [employer] Home Monitor, Okay to Manage By Exception         Anticoagulation Care Providers     Provider Role Specialty Phone number    John Blair MD Referring Internal Medicine [phone]

## 2022-01-01 ENCOUNTER — HEALTH MAINTENANCE LETTER (OUTPATIENT)
Age: 67
End: 2022-01-01

## 2022-01-03 ENCOUNTER — LAB (OUTPATIENT)
Dept: LAB | Facility: OTHER | Age: 67
End: 2022-01-03
Payer: MEDICARE

## 2022-01-03 DIAGNOSIS — N18.31 STAGE 3A CHRONIC KIDNEY DISEASE (H): ICD-10-CM

## 2022-01-03 LAB
ALBUMIN SERPL-MCNC: 3.6 G/DL (ref 3.4–5)
ANION GAP SERPL CALCULATED.3IONS-SCNC: 2 MMOL/L (ref 3–14)
BUN SERPL-MCNC: 31 MG/DL (ref 7–30)
CALCIUM SERPL-MCNC: 9 MG/DL (ref 8.5–10.1)
CHLORIDE BLD-SCNC: 110 MMOL/L (ref 94–109)
CO2 SERPL-SCNC: 30 MMOL/L (ref 20–32)
CREAT SERPL-MCNC: 1.51 MG/DL (ref 0.66–1.25)
CREAT UR-MCNC: 106 MG/DL
GFR SERPL CREATININE-BSD FRML MDRD: 50 ML/MIN/1.73M2
GLUCOSE BLD-MCNC: 90 MG/DL (ref 70–99)
HGB BLD-MCNC: 13.2 G/DL (ref 13.3–17.7)
PHOSPHATE SERPL-MCNC: 3.8 MG/DL (ref 2.5–4.5)
POTASSIUM BLD-SCNC: 4.9 MMOL/L (ref 3.4–5.3)
PROT UR-MCNC: 0.14 G/L
PROT/CREAT 24H UR: 0.13 G/G CR (ref 0–0.2)
PTH-INTACT SERPL-MCNC: 61 PG/ML (ref 18–80)
SODIUM SERPL-SCNC: 142 MMOL/L (ref 133–144)

## 2022-01-03 PROCEDURE — 83970 ASSAY OF PARATHORMONE: CPT

## 2022-01-03 PROCEDURE — 36415 COLL VENOUS BLD VENIPUNCTURE: CPT

## 2022-01-03 PROCEDURE — 85018 HEMOGLOBIN: CPT

## 2022-01-03 PROCEDURE — 84156 ASSAY OF PROTEIN URINE: CPT

## 2022-01-03 PROCEDURE — 80069 RENAL FUNCTION PANEL: CPT

## 2022-01-04 ENCOUNTER — VIRTUAL VISIT (OUTPATIENT)
Dept: NEPHROLOGY | Facility: CLINIC | Age: 67
End: 2022-01-04
Payer: MEDICARE

## 2022-01-04 DIAGNOSIS — N18.31 STAGE 3A CHRONIC KIDNEY DISEASE (H): Primary | ICD-10-CM

## 2022-01-04 DIAGNOSIS — I10 ESSENTIAL HYPERTENSION: ICD-10-CM

## 2022-01-04 DIAGNOSIS — D64.9 ANEMIA, UNSPECIFIED TYPE: ICD-10-CM

## 2022-01-04 DIAGNOSIS — N25.81 SECONDARY RENAL HYPERPARATHYROIDISM (H): ICD-10-CM

## 2022-01-04 PROCEDURE — 99443 PR PHYSICIAN TELEPHONE EVALUATION 21-30 MIN: CPT | Mod: 95 | Performed by: INTERNAL MEDICINE

## 2022-01-04 NOTE — PROGRESS NOTES
"01/04/22     CC: CKD    HPI: Min Andino is a 64 year old male who presents for evaluation of CKD. I last saw Mr. Andino in 2014. To review from my previous note: Mr. Andino's past medical hx was unremarkable leading up to Dec 2013 when he noted chest tightness. He was seen at [facility] at that time and noted to have aortic dissection. He was initially monitored but later underwent aortic repair/aortic valve replacement/CABG in early April 2014. His post op course was complicated by the need for ECMO as well as pressor support. His wife reports today that he was told that he has \"normal\" creatinine levels when undergoing physicals in the past. His creatinine was 2.2 post surgery in April 2014 but improved to 1.3 at the time of discharge in April. 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 At that time his creatinine was 2.2 post op but improved to 1.3. Since that time, creatinine readings have included 1.48 on 7/17/14, 1.39 no 7/27/14, and 1.35 on 8/13/14. 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                 02/24/20: today he presents to reestablish care in our clinic. His creatinine was stable at ~ 1.1 on last check in 2014 but is now at a new baseline of ~ 1.6 since august. Mr. Andino underwent thoracoabdominal aortic repair august 2019. The placement of this stent compromised blood flow to the left kidney moiety which was noted on imaging later. His creatinine post this procedure was ~ 1.6. There have been a few episodes when the creatinine has increased, most recently a few weeks ago (to 1.9). With his most recent creatinine rise, lasix was held. He has not noted much change in his swelling or breathing with holding the lasix for the past few weeks. Today I noted SOB during our conversation. His oxygen saturation was fine but his breathing seemed labored. He and his wife report that this is his " breathing baseline over the past 5 years and is not worse than typical. They are following weights at home and he has been dropping weight - no increase since stopping the lasix. He does have difficulty with empyting his bladder - last imaging in the fall showed no hydronephrosis. Flomax did not help him in the past. He has a HHN coming once a week to the house. He is not lightheaded. He has plans to see cardiology at [place] in March, is looking to establish care in urology, is also looking to establish care with internal medicine potentially in our clinic for continuity in one location.         06/08/20:  Virtual visit. Lasix was increased last month. He reports that the swelling is there but better. He denies any change in eating. No diarrhea. Weight was 240 lbs down to 215 lbs. He had been losing 1 lb a day. He was in ED last week and  Dx with hernia. He held lasix since Friday - was on lasix 40 mg daily held over the weekend - weight was 215 lbs on Thursday and 217 lbs this AM. He denies lightheadedness/dizziness. No orthostatic sxs. He quit wearing compression stockings a week ago. He feels he has slightly more endurance; can't lift a lot, needing to take breaks. He feels his urinary retention is not as problematic - has not seen urology. He has been taking iron BID.      7/13/20: video visit. He has continued to see weight loss over time - was 215 lbs in June but now 205 lbs. He has seen a change in his clothing size by 2 over the past few months. He is hungry often - eating well. No diarrhea. He has some constipation at times. He feels that maybe his breathing is improved. He is wearing compression stockings. He is using lasix 20 mg daily.      10/20/20: video visit - started on [facility] and did exam that way - then converted to telephone given echo noted. Feeling the best he has felt since before last winter. NO new issues. He has endovascular stent repair planned in the coming week. Activity improving. No  swelling. Weight most recently 203 lbs. Over the past month - 201-203 lbs. BP has been well controlled; 115/61 at a recent physician appt. He tends to have higher readings at home; 140s at home, sometimes 130s.     01/25/21:  In the setting of COVID-19 pandemic, this visit was changed to a telephone visit. Patient reports feeling good overall. No difficulty with fluid overload/swelling/shortness of breath. His weight is up slightly but he also has a bigger appetite lately and snacking more with being at home. BP was 105/58 at a recent clinic visit but they report pressures of 130-176 at home most recently - mostly above goal. They have been working with cardiology clinic at [place] in regards to medication adjustments  And plan to reach out to them with these updates. He was hospitalized 11/11/20-11/17/20 following left internal iliac artery stenting for endoleak. Returned to operating room 11/13 for [id number] of left common femoral aneurysm and previous graft left external iliac to superficial femoral and deep femoral artery interposition graft. Creatinine was stable at 1.5-1.7 while inpatient. He has been taking iron BID but with some constipation.     9/28/21: video visit. No vascular interventions or hospitalizations since last visit. Vascular stent/leak stable on recent CT. Checking BP 4x/day with average SBP in AM 160s-180s, mid day 110s-120s, PM 140s. Amlodipine (takes in AM) increased to 10 mg daily approximately one month ago by PCP. Losartan (take at night) 100 mg daily increased march 2021. Metoprolol succinate increased to 100 mg daily (not sure when this was increased, take in AM). BP sligthly improved but not much. Denies any dizziness, CP, abdominal pain, SOB (some BROWN with moderate exertion), LE swelling or abdominal distention. Doesn't check weights regularly, last weight 220.    01/04/22: went to [place] in first part of November - things are looking goo/stable.  NO swelling related concerns at this  time. Breathing is stable. IN the past few weeks - blood pressure one time 97 in the AM - evening always 30 points higher.  Has had a consistent pattern of low in the AM and higher in evening for the past few months. Currently taking amlodipine 5 mg AM, losartan 50 mg BID, and carvedilol. 12.5 mg BID.     amLODIPine (NORVASC) 5 MG tablet, Take 1 tablet (5 mg) by mouth daily  aspirin (ASA) 81 MG chewable tablet, Take 81 mg by mouth daily  atorvastatin (LIPITOR) 40 MG tablet, TAKE 1 TABLET(40 MG) BY MOUTH DAILY  carvedilol (COREG) 12.5 MG tablet, Take 1 tablet (12.5 mg) by mouth 2 times daily (with meals)  docusate sodium (COLACE) 100 MG capsule, daily   esomeprazole (NEXIUM) 40 MG DR capsule, TAKE ONE CAPSULE BY MOUTH TWICE DAILY 30-60 MINUTES BEFORE EATING  Ferrous Sulfate (IRON PO), Take 45 mg by mouth 2 times daily Slow release  losartan (COZAAR) 50 MG tablet, Take 1 tablet (50 mg) by mouth 2 times daily  methylcellulose (CITRUCEL) 500 MG TABS tablet,   multivitamin, therapeutic with minerals (MULTI-VITAMIN) TABS, Take 1 tablet by mouth daily  Vitamin D, Cholecalciferol, 25 MCG (1000 UT) TABS, 2 times daily   warfarin ANTICOAGULANT (COUMADIN) 1 MG tablet, TAKE 4 TABLETS BY MOUTH DAILY ON SUNDAY, TUESDAY AND THURSDAY AND TAKE 5 TABLETS ALL OTHER DAYS OR AS DIRECTED BY ANTIGCOAGULATION CLINIC  warfarin ANTICOAGULANT (COUMADIN) 4 MG tablet, One tablet daily  nitroGLYcerin (NITROSTAT) 0.4 MG sublingual tablet, Place 0.4 mg under the tongue  (Patient not taking: Reported on 1/4/2022)    No current facility-administered medications on file prior to visit.      Exam:  There were no vitals taken for this visit.    Results:  No visits with results within 1 Day(s) from this visit.   Latest known visit with results is:   Lab on 01/03/2022   Component Date Value Ref Range Status     Sodium 01/03/2022 142  133 - 144 mmol/L Final     Potassium 01/03/2022 4.9  3.4 - 5.3 mmol/L Final     Chloride 01/03/2022 110* 94 - 109 mmol/L  Final     Carbon Dioxide (CO2) 01/03/2022 30  20 - 32 mmol/L Final     Anion Gap 01/03/2022 2* 3 - 14 mmol/L Final     Urea Nitrogen 01/03/2022 31* 7 - 30 mg/dL Final     Creatinine 01/03/2022 1.51* 0.66 - 1.25 mg/dL Final     Calcium 01/03/2022 9.0  8.5 - 10.1 mg/dL Final     Glucose 01/03/2022 90  70 - 99 mg/dL Final     Albumin 01/03/2022 3.6  3.4 - 5.0 g/dL Final     Phosphorus 01/03/2022 3.8  2.5 - 4.5 mg/dL Final     GFR Estimate 01/03/2022 50* >60 mL/min/1.73m2 Final    Effective December 21, 2021 eGFRcr in adults is calculated using the 2021 CKD-EPI creatinine equation which includes age and gender (Keena box al., [facility], DOI: 10.1056/CZWWsr1185710)     Total Protein Random Urine g/L 01/03/2022 0.14  g/L Final    The reference range has not been established for total protein in random urine samples.  The result should be integrated into the clinical context for interpretation.     Total Protein Urine g/gr Creatinine 01/03/2022 0.13  0.00 - 0.20 g/g Cr Final     Creatinine Urine mg/dL 01/03/2022 106  mg/dL Final     Parathyroid Hormone Intact 01/03/2022 61  18 - 80 pg/mL Final     Hemoglobin 01/03/2022 13.2* 13.3 - 17.7 g/dL Final   Lab were reviewed and interpreted.       Assessment/Plan:   1. CKD Stage 3: has CKD in the setting of previous ULICES as well as vascular compromise to the left kidney moiety from his vascular procedure in August 2019. Addt ULICES recently which was likely hemodynamic in the setting of poor oral intake and being on lasix. Creatinine is relatively stable. Will monitor routinely.      2. Hypertension: Will trial changing the losartan from 50 mg BID to 100 mg in the AM to see if this helps to avoid the low AM pressures and avoid high pressures in the evening.      3. Anemia: hemoglobin 13.2     4. Aneurysms/AVR: following with [facility]    Patient Instructions   1. Trial changing the losartan to all being taken in the AM - 100 mg in the AM.     - amlodipine 5 mg AM  - losartan 100 mg AM  -  Carvedilol 12.5 mg twice a day.     2. Feel free to mychart if challenges with the blood pressure    3. Tentative plan to follow-up in 6 months     Telephone visit: 22 minutes  Madelin Vallecillo DO

## 2022-01-04 NOTE — PROGRESS NOTES
Min is a 66 year old who is being evaluated via a billable telephone visit.      What phone number would you like to be contacted at? [phone]  How would you like to obtain your AVS? Keyonna Cary LPN

## 2022-01-04 NOTE — NURSING NOTE
Message sent to Procedure  to contact pt and assist with scheduling Future appts recommended by Dr. Vallecillo:    Instructions:  1. Trial changing the losartan to all being taken in the AM - 100 mg in the AM.      - amlodipine 5 mg AM  - losartan 100 mg AM  - Carvedilol 12.5 mg twice a day.      2. Feel free to mychart if challenges with the blood pressure     3. Tentative plan to follow-up in 6 months     Lizet Cary LPN

## 2022-01-04 NOTE — PATIENT INSTRUCTIONS
1. Trial changing the losartan to all being taken in the AM - 100 mg in the AM.     - amlodipine 5 mg AM  - losartan 100 mg AM  - Carvedilol 12.5 mg twice a day.     2. Feel free to mychart if challenges with the blood pressure    3. Tentative plan to follow-up in 6 months

## 2022-01-05 ENCOUNTER — VIRTUAL VISIT (OUTPATIENT)
Dept: GASTROENTEROLOGY | Facility: CLINIC | Age: 67
End: 2022-01-05
Payer: MEDICARE

## 2022-01-05 DIAGNOSIS — D50.9 IRON DEFICIENCY ANEMIA, UNSPECIFIED IRON DEFICIENCY ANEMIA TYPE: ICD-10-CM

## 2022-01-05 DIAGNOSIS — K21.9 GASTROESOPHAGEAL REFLUX DISEASE WITHOUT ESOPHAGITIS: Primary | ICD-10-CM

## 2022-01-05 PROCEDURE — 99213 OFFICE O/P EST LOW 20 MIN: CPT | Mod: 95 | Performed by: INTERNAL MEDICINE

## 2022-01-05 NOTE — PROGRESS NOTES
Min is a 66 year old who is being evaluated via a billable telephone visit.      What phone number would you like to be contacted at? [phone]  How would you like to obtain your AVS? [place]

## 2022-01-05 NOTE — PROGRESS NOTES
"     HPI:    Min presents today for a telephone visit to follow-up on LPR - a few weeks ago developed worsening symptoms after decreasing his nexium to once a day - started a few weeks after decreasing to once daily but got much worse a few weeks ago.  Has since increased his nexium back to twice daily and things are already starting to improve.    Anemia has improved lately - most recent hemoglobin 13.2.  Has had his iron dose decreased to once daily.  No blood in the stool.    Past Medical History:   Diagnosis Date     Anemia of chronic renal failure, unspecified CKD stage 8/27/2021     Aortic dissection (H)      Aortic root aneurysm (H)      Arthritis      C. difficile colitis     April 2014 following surgery     CKD (chronic kidney disease) stage 3, GFR 30-59 ml/min (H) 9/28/2014     Connective tissue disease (H)     \"probable\" per [facility] Notes     DVT (deep venous thrombosis) (H)     April 2014 following surgery     History of blood transfusion      Horseshoe kidney      Hypertension      S/P insertion of iliac artery stent 11/11/2020    Left internal iliac artery stenting for endoleak     Urinary urgency 10/12/2020     Valvular cardiomyopathy (H)        Past Surgical History:   Procedure Laterality Date     ABDOMEN SURGERY       AORTIC VALVE REPLACEMENT  4/7/14     ARTHROSCOPY KNEE RT/LT  2005    left, repair meniscus     COLONOSCOPY       REPAIR AORTIC ROOT  4/7/14    surgery followed by ECMO, vasopressor therapy     SOFT TISSUE SURGERY       THORACIC SURGERY       VASCULAR SURGERY       [facility] CABG, ARTERIAL, SINGLE  4/7/14     [facility] REPAIR CRUCIATE LIGAMENT,KNEE  1999    left       Family History   Problem Relation Age of Onset     Family History Negative Father      Aneurysm Other         family hx     Breast Cancer Mother      C.A.D. No family hx of      Diabetes No family hx of      Hypertension No family hx of        Social History     Tobacco Use     Smoking status: Never Smoker     Smokeless tobacco: " Never Used   Substance Use Topics     Alcohol use: Yes     Comment: reports very little etoh use.         Assessment and Plan:    # cough, throat clearing - likely LPR - did attempt decreasing nexium to once daily with return of symptoms - will increase back to twice a day.    # anemia - improved - requiring less iron.  No overt GI bleeding at present.  Did discuss EGD and colonoscopy today with Min and his Wife - they are understandably concerned about his comorbidities and bleeding issues he has had with procedures in the past and need to bridge from coumadin to lovenox for procedures.  Would like to hold off at present - may reconsider when covid situation has improved.    RTC 6-12 months or sooner if needed    Padmaja Chavira DO     Phone call duration: 10 minutes  Answers for HPI/ROS submitted by the patient on 1/5/2022  General Symptoms: No  Skin Symptoms: No  HENT Symptoms: No  EYE SYMPTOMS: No  HEART SYMPTOMS: No  LUNG SYMPTOMS: No  INTESTINAL SYMPTOMS: No  URINARY SYMPTOMS: No  REPRODUCTIVE SYMPTOMS: No  SKELETAL SYMPTOMS: No  BLOOD SYMPTOMS: No  NERVOUS SYSTEM SYMPTOMS: No  MENTAL HEALTH SYMPTOMS: No

## 2022-01-10 ENCOUNTER — TRANSFERRED RECORDS (OUTPATIENT)
Dept: HEALTH INFORMATION MANAGEMENT | Facility: CLINIC | Age: 67
End: 2022-01-10
Payer: COMMERCIAL

## 2022-01-10 ENCOUNTER — TELEPHONE (OUTPATIENT)
Dept: GASTROENTEROLOGY | Facility: CLINIC | Age: 67
End: 2022-01-10
Payer: COMMERCIAL

## 2022-01-10 NOTE — TELEPHONE ENCOUNTER
Prior Authorization Retail Medication Request    Medication/Dose: Esomeprazole 40 mg twice daily  ICD code (if different than what is on RX):    Previously Tried and Failed:    Rationale:      Insurance Name:    Insurance ID:        Pharmacy Information (if different than what is on RX)  Name:    Phone:      Sayra Guzman LPN

## 2022-01-11 NOTE — TELEPHONE ENCOUNTER
Central Prior Authorization Team   Phone: [phone]    Prior Authorization Approval    Authorization Effective Date: 12/12/2021  Authorization Expiration Date: 1/11/2023  Medication: Esomeprazole 40 mg-APPROVED  Approved Dose/Quantity:   Reference #:     Insurance Company: [employer] - Phone [phone] Fax [phone]  Expected CoPay:       CoPay Card Available:      Foundation Assistance Needed:    Which Pharmacy is filling the prescription (Not needed for infusion/clinic administered): [id number] - [place], [place] - [address] AT [facility] [address] [facility] [address]  Pharmacy Notified: Yes  Patient Notified: No

## 2022-01-12 ENCOUNTER — DOCUMENTATION ONLY (OUTPATIENT)
Dept: FAMILY MEDICINE | Facility: CLINIC | Age: 67
End: 2022-01-12
Payer: COMMERCIAL

## 2022-01-12 ENCOUNTER — DOCUMENTATION ONLY (OUTPATIENT)
Dept: ANTICOAGULATION | Facility: CLINIC | Age: 67
End: 2022-01-12
Payer: COMMERCIAL

## 2022-01-12 DIAGNOSIS — Z79.01 LONG TERM CURRENT USE OF ANTICOAGULANT THERAPY: ICD-10-CM

## 2022-01-12 DIAGNOSIS — Z79.01 ANTICOAGULATION MONITORING, INR RANGE 2-3: ICD-10-CM

## 2022-01-12 DIAGNOSIS — Z95.2 H/O MECHANICAL AORTIC VALVE REPLACEMENT: Primary | ICD-10-CM

## 2022-01-12 LAB — INR HOME MONITORING: 2.3 (ref 2–3)

## 2022-01-12 NOTE — PROGRESS NOTES
ANTICOAGULATION  MANAGEMENT-Home Monitor Managed by Exception    Min Hill Andino 66 year old male is on warfarin with therapeutic INR result. (Goal INR 2.0-3.0)    Recent labs: (last 7 days)     01/10/22  0000   INR 2.30         Previous INR was Therapeutic    Medication, diet, health changes since last INR:chart reviewed; none identified    Contacted within the last 12 weeks by phone on 11/29/2021      [place]     Min was NOT contacted regarding therapeutic result today per home monitoring policy manage by exception agreement.   Current warfarin dose is to be continued:     Summary  As of 1/12/2022    Full warfarin instructions:  3 mg every Sun; 4 mg all other days   Next INR check:  1/26/2022           ?   Che Cuadra RN  Anticoagulation Clinic  1/12/2022    _______________________________________________________________________     Anticoagulation Episode Summary     Current INR goal:  2.0-3.0   TTR:  94.4 % (1 y)   Target end date:  11/20/2050   Send INR reminders to:  [facility]    Indications    Mechanical heart valve present (Resolved) [Z95.2]  H/O mechanical aortic valve replacement [Z95.2]  Long term current use of anticoagulant therapy [Z79.01]  Anticoagulation monitoring  INR range 2-3 [Z79.01]           Comments:  11/8/21 - [employer] Home Monitor, Okay to Manage By Exception         Anticoagulation Care Providers     Provider Role Specialty Phone number    John Blair MD Referring Internal Medicine [phone]

## 2022-01-24 LAB — INR HOME MONITORING: 2.1 (ref 2–3)

## 2022-01-25 ENCOUNTER — DOCUMENTATION ONLY (OUTPATIENT)
Dept: FAMILY MEDICINE | Facility: CLINIC | Age: 67
End: 2022-01-25
Payer: COMMERCIAL

## 2022-01-25 DIAGNOSIS — Z79.01 ANTICOAGULATION MONITORING, INR RANGE 2-3: ICD-10-CM

## 2022-01-25 DIAGNOSIS — Z79.01 LONG TERM CURRENT USE OF ANTICOAGULANT THERAPY: ICD-10-CM

## 2022-01-25 DIAGNOSIS — Z95.2 H/O MECHANICAL AORTIC VALVE REPLACEMENT: Primary | ICD-10-CM

## 2022-01-25 NOTE — PROGRESS NOTES
ANTICOAGULATION  MANAGEMENT-Home Monitor Managed by Exception    Min Hill Andino 66 year old male is on warfarin with therapeutic INR result. (Goal INR 2.0-3.0)    Recent labs: (last 7 days)     01/24/22  0000   INR 2.10         Previous INR was Therapeutic    Medication, diet, health changes since last INR:chart reviewed; none identified    Contacted within the last 12 weeks by phone on 11/29      [place]     Min was NOT contacted regarding therapeutic result today per home monitoring policy manage by exception agreement.   Current warfarin dose is to be continued:     Summary  As of 1/25/2022    Full warfarin instructions:  3 mg every Sun; 4 mg all other days   Next INR check:  2/7/2022           ?   Tyesha Zambrano, RN  Anticoagulation Clinic  1/25/2022    _______________________________________________________________________     Anticoagulation Episode Summary     Current INR goal:  2.0-3.0   TTR:  96.8 % (1 y)   Target end date:  11/20/2050   Send INR reminders to:  [facility]    Indications    Mechanical heart valve present (Resolved) [Z95.2]  H/O mechanical aortic valve replacement [Z95.2]  Long term current use of anticoagulant therapy [Z79.01]  Anticoagulation monitoring  INR range 2-3 [Z79.01]           Comments:  11/8/21 - [employer] Home Monitor, Okay to Manage By Exception         Anticoagulation Care Providers     Provider Role Specialty Phone number    John Blair MD Referring Internal Medicine [phone]

## 2022-02-04 ENCOUNTER — ANTICOAGULATION THERAPY VISIT (OUTPATIENT)
Dept: ANTICOAGULATION | Facility: CLINIC | Age: 67
End: 2022-02-04
Payer: COMMERCIAL

## 2022-02-04 DIAGNOSIS — Z79.01 LONG TERM CURRENT USE OF ANTICOAGULANT THERAPY: ICD-10-CM

## 2022-02-04 DIAGNOSIS — Z95.2 H/O MECHANICAL AORTIC VALVE REPLACEMENT: Primary | ICD-10-CM

## 2022-02-04 DIAGNOSIS — Z79.01 ANTICOAGULATION MONITORING, INR RANGE 2-3: ICD-10-CM

## 2022-02-05 LAB — INR HOME MONITORING: 2.2 (ref 2–3)

## 2022-02-07 NOTE — PROGRESS NOTES
ANTICOAGULATION  MANAGEMENT-Home Monitor Managed by Exception    Min Hill Andino 66 year old male is on warfarin with therapeutic INR result. (Goal INR 2.0-3.0)    Recent labs: (last 7 days)     02/04/22  0000   INR 2.20         Previous INR was Therapeutic    Medication, diet, health changes since last INR:chart reviewed; none identified    Contacted within the last 12 weeks by phone on 11/29      [place]     Min was NOT contacted regarding therapeutic result today per home monitoring policy manage by exception agreement.   Current warfarin dose is to be continued:     Summary  As of 2/4/2022    Full warfarin instructions:  3 mg every Sun; 4 mg all other days   Next INR check:             ?   Kalee Galeano RN  Anticoagulation Clinic  2/7/2022    _______________________________________________________________________     Anticoagulation Episode Summary     Current INR goal:  2.0-3.0   TTR:  96.8 % (1 y)   Target end date:  11/20/2050   Send INR reminders to:  [facility]    Indications    Mechanical heart valve present (Resolved) [Z95.2]  H/O mechanical aortic valve replacement [Z95.2]  Long term current use of anticoagulant therapy [Z79.01]  Anticoagulation monitoring  INR range 2-3 [Z79.01]           Comments:  11/8/21 - [employer] Home Monitor, Okay to Manage By Exception         Anticoagulation Care Providers     Provider Role Specialty Phone number    John Blair MD Referring Internal Medicine [phone]

## 2022-02-23 ENCOUNTER — DOCUMENTATION ONLY (OUTPATIENT)
Dept: FAMILY MEDICINE | Facility: CLINIC | Age: 67
End: 2022-02-23
Payer: COMMERCIAL

## 2022-03-02 ENCOUNTER — ANTICOAGULATION THERAPY VISIT (OUTPATIENT)
Dept: ANTICOAGULATION | Facility: CLINIC | Age: 67
End: 2022-03-02
Payer: COMMERCIAL

## 2022-03-02 ENCOUNTER — TELEPHONE (OUTPATIENT)
Dept: ANTICOAGULATION | Facility: CLINIC | Age: 67
End: 2022-03-02
Payer: COMMERCIAL

## 2022-03-02 DIAGNOSIS — Z79.01 ANTICOAGULATION MONITORING, INR RANGE 2-3: ICD-10-CM

## 2022-03-02 DIAGNOSIS — Z95.2 H/O MECHANICAL AORTIC VALVE REPLACEMENT: Primary | ICD-10-CM

## 2022-03-02 DIAGNOSIS — Z95.2 S/P AVR: ICD-10-CM

## 2022-03-02 DIAGNOSIS — Z79.01 LONG TERM CURRENT USE OF ANTICOAGULANT THERAPY: ICD-10-CM

## 2022-03-02 LAB
INR (EXTERNAL): 2.5 (ref 0.9–1.1)
INR HOME MONITORING: 2.5 (ref 2–3)

## 2022-03-02 RX ORDER — WARFARIN SODIUM 4 MG/1
TABLET ORAL
Qty: 100 TABLET | Refills: 1 | Status: SHIPPED | OUTPATIENT
Start: 2022-03-02 | End: 2022-04-06

## 2022-03-02 NOTE — PROGRESS NOTES
"ANTICOAGULATION MANAGEMENT     Min Alejandre Thu 66 year old male is on warfarin with therapeutic INR result. (Goal INR 2.0-3.0)    Recent labs: (last 7 days)     03/02/22  0000   INR 2.5*       ASSESSMENT       Source(s): Chart Review and Patient/Caregiver Call       Warfarin doses taken: More warfarin taken than planned which may be affecting INR Patient self adjusted dose about a month ago to 4 mg daily because he was on the \"lower\" end    Diet: No new diet changes identified    New illness, injury, or hospitalization: No    Medication/supplement changes: None noted    Signs or symptoms of bleeding or clotting: No    Previous INR: Therapeutic last 2(+) visits    Additional findings: return to managed by exception     Patient stated he needs a refill of 4 mg tablets and is requesting a 90 day supply because he keeps getting \"goofy amounts\" that is not even a 30 day supply.  Reviewed chart and the last Rx sent in was 7/21/21 by PCP for 90 tabs and 3 refills.  Unsure why patient is receiving odd amounts of medication when he has refills remaining.    Advised patient if he is going to self-adjust to notify [facility] because otherwise the documentation does not match and would not if refills were needed.  Patient stated understanding.       PLAN     Recommended plan for no diet, medication or health factor changes affecting INR     Dosing Instructions: Continue your current warfarin dose with next INR in 2 weeks. Adjusted calendar to reflect how patient has been taking medication which is a 3.7% increase      Summary  As of 3/2/2022    Full warfarin instructions:  4 mg every day   Next INR check:  3/16/2022             Telephone call with Min who verbalizes understanding and agrees to plan    Patient to recheck with home meter    Education provided: Goal range and significance of current result, Importance of therapeutic range, Importance of following up at instructed interval and Importance of taking warfarin as " instructed    Plan made per [facility] anticoagulation protocol    Che Cuadra, RN  Anticoagulation Clinic  3/2/2022    _______________________________________________________________________     Anticoagulation Episode Summary     Current INR goal:  2.0-3.0   TTR:  96.8 % (1 y)   Target end date:  11/20/2050   Send INR reminders to:  [facility]    Indications    Mechanical heart valve present (Resolved) [Z95.2]  H/O mechanical aortic valve replacement [Z95.2]  Long term current use of anticoagulant therapy [Z79.01]  Anticoagulation monitoring  INR range 2-3 [Z79.01]           Comments:  11/8/21 - [employer] Home Monitor, Okay to Manage By Exception         Anticoagulation Care Providers     Provider Role Specialty Phone number    John Blair MD Referring Internal Medicine [phone]

## 2022-03-02 NOTE — TELEPHONE ENCOUNTER
ANTICOAGULATION     Min Andino is overdue for INR check.      Spoke with Min instructed to test INR with home meter and call results to home monitoring company as soon as possible.   Patient states he just checked his INR but has not called it into [employer] yet      Ceh Cuadra RN

## 2022-03-08 ENCOUNTER — OFFICE VISIT (OUTPATIENT)
Dept: FAMILY MEDICINE | Facility: CLINIC | Age: 67
End: 2022-03-08
Payer: MEDICARE

## 2022-03-08 VITALS
BODY MASS INDEX: 34.3 KG/M2 | SYSTOLIC BLOOD PRESSURE: 136 MMHG | WEIGHT: 245 LBS | TEMPERATURE: 97.6 F | RESPIRATION RATE: 18 BRPM | OXYGEN SATURATION: 98 % | DIASTOLIC BLOOD PRESSURE: 68 MMHG | HEART RATE: 62 BPM | HEIGHT: 71 IN

## 2022-03-08 DIAGNOSIS — H61.23 BILATERAL IMPACTED CERUMEN: ICD-10-CM

## 2022-03-08 DIAGNOSIS — E66.09 CLASS 1 OBESITY DUE TO EXCESS CALORIES WITH SERIOUS COMORBIDITY AND BODY MASS INDEX (BMI) OF 34.0 TO 34.9 IN ADULT: ICD-10-CM

## 2022-03-08 DIAGNOSIS — Z12.5 SCREENING FOR PROSTATE CANCER: ICD-10-CM

## 2022-03-08 DIAGNOSIS — N18.31 STAGE 3A CHRONIC KIDNEY DISEASE (H): ICD-10-CM

## 2022-03-08 DIAGNOSIS — I10 HYPERTENSION, GOAL BELOW 140/90: ICD-10-CM

## 2022-03-08 DIAGNOSIS — I71.20 THORACIC AORTIC ANEURYSM WITHOUT RUPTURE (H): ICD-10-CM

## 2022-03-08 DIAGNOSIS — I50.22 CHRONIC SYSTOLIC CONGESTIVE HEART FAILURE (H): ICD-10-CM

## 2022-03-08 DIAGNOSIS — Z79.01 ANTICOAGULATION MONITORING, INR RANGE 2-3: ICD-10-CM

## 2022-03-08 DIAGNOSIS — E55.9 VITAMIN D DEFICIENCY: ICD-10-CM

## 2022-03-08 DIAGNOSIS — E66.811 CLASS 1 OBESITY DUE TO EXCESS CALORIES WITH SERIOUS COMORBIDITY AND BODY MASS INDEX (BMI) OF 34.0 TO 34.9 IN ADULT: ICD-10-CM

## 2022-03-08 DIAGNOSIS — Z00.00 ENCOUNTER FOR MEDICARE ANNUAL WELLNESS EXAM: Primary | ICD-10-CM

## 2022-03-08 DIAGNOSIS — E78.00 PURE HYPERCHOLESTEROLEMIA: ICD-10-CM

## 2022-03-08 DIAGNOSIS — Z95.2 H/O MECHANICAL AORTIC VALVE REPLACEMENT: ICD-10-CM

## 2022-03-08 LAB
ALBUMIN SERPL-MCNC: 3.8 G/DL (ref 3.4–5)
ALP SERPL-CCNC: 103 U/L (ref 40–150)
ALT SERPL W P-5'-P-CCNC: 21 U/L (ref 0–70)
ANION GAP SERPL CALCULATED.3IONS-SCNC: 6 MMOL/L (ref 3–14)
AST SERPL W P-5'-P-CCNC: 20 U/L (ref 0–45)
BILIRUB SERPL-MCNC: 0.7 MG/DL (ref 0.2–1.3)
BUN SERPL-MCNC: 33 MG/DL (ref 7–30)
CALCIUM SERPL-MCNC: 9.1 MG/DL (ref 8.5–10.1)
CHLORIDE BLD-SCNC: 109 MMOL/L (ref 94–109)
CHOLEST SERPL-MCNC: 156 MG/DL
CO2 SERPL-SCNC: 25 MMOL/L (ref 20–32)
CREAT SERPL-MCNC: 1.61 MG/DL (ref 0.66–1.25)
ERYTHROCYTE [DISTWIDTH] IN BLOOD BY AUTOMATED COUNT: 14.1 % (ref 10–15)
FASTING STATUS PATIENT QL REPORTED: YES
GFR SERPL CREATININE-BSD FRML MDRD: 47 ML/MIN/1.73M2
GLUCOSE BLD-MCNC: 95 MG/DL (ref 70–99)
HCT VFR BLD AUTO: 42.1 % (ref 40–53)
HDLC SERPL-MCNC: 41 MG/DL
HGB BLD-MCNC: 13.5 G/DL (ref 13.3–17.7)
LDLC SERPL CALC-MCNC: 96 MG/DL
MCH RBC QN AUTO: 30.4 PG (ref 26.5–33)
MCHC RBC AUTO-ENTMCNC: 32.1 G/DL (ref 31.5–36.5)
MCV RBC AUTO: 95 FL (ref 78–100)
NONHDLC SERPL-MCNC: 115 MG/DL
PLATELET # BLD AUTO: 153 10E3/UL (ref 150–450)
POTASSIUM BLD-SCNC: 4.7 MMOL/L (ref 3.4–5.3)
PROT SERPL-MCNC: 7.2 G/DL (ref 6.8–8.8)
PSA SERPL-MCNC: 0.96 UG/L (ref 0–4)
RBC # BLD AUTO: 4.44 10E6/UL (ref 4.4–5.9)
SODIUM SERPL-SCNC: 140 MMOL/L (ref 133–144)
TRIGL SERPL-MCNC: 96 MG/DL
WBC # BLD AUTO: 6.8 10E3/UL (ref 4–11)

## 2022-03-08 PROCEDURE — 99214 OFFICE O/P EST MOD 30 MIN: CPT | Mod: 25 | Performed by: INTERNAL MEDICINE

## 2022-03-08 PROCEDURE — 80053 COMPREHEN METABOLIC PANEL: CPT | Performed by: INTERNAL MEDICINE

## 2022-03-08 PROCEDURE — 80061 LIPID PANEL: CPT | Performed by: INTERNAL MEDICINE

## 2022-03-08 PROCEDURE — 69209 REMOVE IMPACTED EAR WAX UNI: CPT | Mod: 50 | Performed by: INTERNAL MEDICINE

## 2022-03-08 PROCEDURE — 85027 COMPLETE CBC AUTOMATED: CPT | Performed by: INTERNAL MEDICINE

## 2022-03-08 PROCEDURE — G0103 PSA SCREENING: HCPCS | Performed by: INTERNAL MEDICINE

## 2022-03-08 PROCEDURE — 36415 COLL VENOUS BLD VENIPUNCTURE: CPT | Performed by: INTERNAL MEDICINE

## 2022-03-08 PROCEDURE — G0439 PPPS, SUBSEQ VISIT: HCPCS | Performed by: INTERNAL MEDICINE

## 2022-03-08 PROCEDURE — 82306 VITAMIN D 25 HYDROXY: CPT | Performed by: INTERNAL MEDICINE

## 2022-03-08 RX ORDER — LOSARTAN POTASSIUM 100 MG/1
50 TABLET ORAL DAILY
COMMUNITY
Start: 2022-03-08 | End: 2023-10-24

## 2022-03-08 ASSESSMENT — ENCOUNTER SYMPTOMS
PARESTHESIAS: 0
CHILLS: 0
SORE THROAT: 0
HEMATOCHEZIA: 0
COUGH: 0
JOINT SWELLING: 0
MYALGIAS: 0
DYSURIA: 0
HEADACHES: 0
SHORTNESS OF BREATH: 0
NAUSEA: 0
FEVER: 0
ARTHRALGIAS: 1
DIZZINESS: 0
FREQUENCY: 1
ABDOMINAL PAIN: 0
HEMATURIA: 0
WEAKNESS: 0
DIARRHEA: 0
EYE PAIN: 0
HEARTBURN: 0
NERVOUS/ANXIOUS: 0
PALPITATIONS: 0
CONSTIPATION: 0

## 2022-03-08 ASSESSMENT — ACTIVITIES OF DAILY LIVING (ADL): CURRENT_FUNCTION: NO ASSISTANCE NEEDED

## 2022-03-08 NOTE — PATIENT INSTRUCTIONS
Patient Education   Personalized Prevention Plan  You are due for the preventive services outlined below.  Your care team is available to assist you in scheduling these services.  If you have already completed any of these items, please share that information with your care team to update in your medical record.  Health Maintenance Due   Topic Date Due     Heart Failure Action Plan  Never done     Thyroid Function Lab  Never done     ANNUAL REVIEW OF HM ORDERS  Never done     Hepatitis C Screening  Never done     Liver Monitoring Lab  09/16/2016     Complete Blood Count  05/26/2021     FALL RISK ASSESSMENT  10/12/2021     Cholesterol Lab  10/16/2021       Urinary Incontinence (Male)    Urinary incontinence means not being able to control the release of urine from the bladder.   Causes  Common causes of urinary incontinence in men include:    Infection    Certain medicines    Aging    Poor pelvic muscle tone    Bladder spasms    Obesity    Trouble urinating and fully emptying the bladder (urinary retention)  Other things that can cause incontinence are:     Nervous system diseases    Diabetes    Sleep apnea    Urinary tract infections    Prostate surgery    Pelvic injury  Constipation and smoking have also been identified as risk factors.   Symptoms    Urge incontinence (overactive bladder). This is a sudden urge to urinate. It occurs even though there may not be much urine in the bladder. The need to urinate often during the night is common. It's due to bladder spasms.    Stress incontinence. This is urine leakage that you can't control. It can occur with sneezing, coughing, and other actions that put stress on the bladder.    Treatment  Treatment depends on what is causing the condition. Bladder infections are treated with antibiotics. Urinary retention is treated with a bladder catheter.   Home care  Follow these guidelines when caring for yourself at home:    Don't have any foods and drinks that may irritate  the bladder. This includes:  ? Chocolate  ? Alcohol  ? Caffeine  ? Carbonated drinks  ? Acidic fruits and juices    Limit fluids to 6 to 8 cups a day.    Lose weight if you are overweight. This will reduce your symptoms.    If advised, do regular pelvic muscle-strengthening exercises such as Kegel exercises.    If needed, wear absorbent pads to catch urine. Change the pads often. This is for good hygiene and to prevent skin and bladder infections.    Bathe daily for good hygiene.    If an antibiotic was prescribed to treat a bladder infection, take it until it's finished. Keep taking it even if you are feeling better. This is to make sure your infection has cleared.    If a catheter was left in place, keep bacteria from getting into the collection bag. Don't disconnect the catheter from the collection bag.    Use a leg band to secure the catheter drainage tube, so it does not pull on the catheter. Drain the collection bag when it becomes full. To do this, use the drain spout at the bottom of the bag. Don't disconnect the bag from the catheter.    Don't pull on or try to remove a catheter. The catheter must be removed by a healthcare provider.    If you smoke, stop. Ask your provider for help if you can't do this on your own.  Follow-up care  Follow up with your healthcare provider, or as advised.  When to get medical advice  Call your healthcare provider right away if any of these occur:    Fever over 100.4 F (38 C), or as directed by your provider    Bladder pain or fullness    Belly swelling, nausea, or vomiting    Back pain    Weakness, dizziness, or fainting    If a catheter was left in place, return if:  ? The catheter falls out  ? The catheter stops draining for 6 hours  ? Your urine gets cloudy or smells bad  Declan last reviewed this educational content on 1/1/2020 2000-2021 [employer]. All rights reserved. This information is not intended as a substitute for professional medical care.  Always follow your healthcare professional's instructions.

## 2022-03-08 NOTE — PROGRESS NOTES
"SUBJECTIVE:   Min Andino is a 66 year old male who presents for Preventive Visit.    66 several gentleman comes in for Medicare annual wellness exam.  He has history of hypertension, chronic kidney disease stage III, hyperlipidemia, chronic systolic heart failure, he is overall doing well.  He also has a mechanical aortic valve replacement as well as endograft repair of the thoracic and abdominal aorta.  Is followed by vascular surgery department at [facility].  He says that his morning blood pressure is excellent but in the evening it seems to rise and his evening blood pressures can be between 132-150/60 to 80s.  He was recently seen at the nephrology clinic.  In the past 2 years he has gained quite a bit of weight almost 45 pounds.        Patient has been advised of split billing requirements and indicates understanding: Yes  Are you in the first 12 months of your Medicare coverage?  Yes,  Patient had a recent eye exam October 2021    Healthy Habits:     In general, how would you rate your overall health?  Good    Frequency of exercise:  4-5 days/week    Duration of exercise:  15-30 minutes    Do you usually eat at least 4 servings of fruit and vegetables a day, include whole grains    & fiber and avoid regularly eating high fat or \"junk\" foods?  Yes    Taking medications regularly:  Yes    Medication side effects:  None    Ability to successfully perform activities of daily living:  No assistance needed    Home Safety:  No safety concerns identified    Hearing Impairment:  No hearing concerns    In the past 6 months, have you been bothered by leaking of urine? Yes    In general, how would you rate your overall mental or emotional health?  Good      PHQ-2 Total Score: 0    Additional concerns today:  No    Do you feel safe in your environment? Yes    Have you ever done Advance Care Planning? (For example, a Health Directive, POLST, or a discussion with a medical provider or your loved ones about your wishes): " Yes, advance care planning is on file.      Right Ear:      1000 Hz RESPONSE- on Level: 40 db (Conditioning sound)   1000 Hz: RESPONSE- on Level:   20 db    2000 Hz: RESPONSE- on Level:   20 db    4000 Hz: RESPONSE- on Level:   20 db     Left Ear:      4000 Hz: RESPONSE- on Level:   20 db    2000 Hz: RESPONSE- on Level:   20 db    1000 Hz: RESPONSE- on Level:   20 db     500 Hz: RESPONSE- on Level:   20 db     Right Ear:    500 Hz: RESPONSE- on Level:   20 db     Hearing Acuity: Pass    Hearing Assessment: normal   Fall risk  Fallen 2 or more times in the past year?: (P) No  Any fall with injury in the past year?: (P) No    Cognitive Screening   1) Repeat 3 items (Leader, Season, Table)  Normal  2) Clock draw: NORMAL  3) 3 item recall: Recalls 3 objects  Results: 3 items recalled: COGNITIVE IMPAIRMENT LESS LIKELY    Mini-CogTM Copyright S Baldomero. Licensed by the author for use in [facility]; reprinted with permission (soob@[facility]). All rights reserved.      Do you have sleep apnea, excessive snoring or daytime drowsiness?: no     Patient states he does experience urine frequency and has for some time, he's unsure if anything can be done for this. Patient has also been fasting today so he would like his labs drawn.     Reviewed and updated as needed this visit by clinical staff                  Reviewed and updated as needed this visit by Provider                 Social History     Tobacco Use     Smoking status: Never Smoker     Smokeless tobacco: Never Used   Substance Use Topics     Alcohol use: Yes     Comment: reports very little etoh use.      If you drink alcohol do you typically have >3 drinks per day or >7 drinks per week? No    Alcohol Use 3/8/2022   Prescreen: >3 drinks/day or >7 drinks/week? No                 Current providers sharing in care for this patient include:   Patient Care Team:  John Blair MD as PCP - General (Internal Medicine)  Madelin Vallecillo DO as Assigned  Nephrology Provider  John Blair MD as Assigned PCP  Padmaja Chavira DO as Assigned Gastroenterology Provider    The following health maintenance items are reviewed in Epic and correct as of today:  Health Maintenance Due   Topic Date Due     HF ACTION PLAN  Never done     TSH W/FREE T4 REFLEX  Never done     ANNUAL REVIEW OF HM ORDERS  Never done     HEPATITIS C SCREENING  Never done     ALT  09/16/2016     CBC  05/26/2021     MEDICARE ANNUAL WELLNESS VISIT  10/12/2021     FALL RISK ASSESSMENT  10/12/2021     LIPID  10/16/2021     BP Readings from Last 3 Encounters:   03/08/22 136/68   09/08/21 120/80   08/27/21 136/66    Wt Readings from Last 3 Encounters:   03/08/22 111.1 kg (245 lb)   09/08/21 102.9 kg (226 lb 12.8 oz)   08/27/21 102.1 kg (225 lb)                  Patient Active Problem List   Diagnosis     CARDIOVASCULAR SCREENING; LDL GOAL LESS THAN 160     Hypertension, goal below 140/90     CKD (chronic kidney disease) stage 3, GFR 30-59 ml/min (H)     Hx of aortic aneurysm     Aneurysm of iliac artery (H)     Anticoagulation monitoring, INR range 2-3     Aortic dissection (H)     Aortic regurgitation     Callus     Congestive heart failure (H)     Coronary atherosclerosis     Delirium, acute     Gastroesophageal reflux disease without esophagitis     Hyperlipidemia     Presence of aortocoronary bypass graft     Pseudoaneurysm of femoral artery (H)     History of endovascular stent graft for abdominal aortic aneurysm (AAA)     Tailors bunion     Vitamin D deficiency     Urinary urgency     H/O mechanical aortic valve replacement     Long term current use of anticoagulant therapy     S/P insertion of iliac artery stent     Anemia of chronic renal failure, unspecified CKD stage     Thoracic aortic aneurysm without rupture (H)     Past Surgical History:   Procedure Laterality Date     ABDOMEN SURGERY       AORTIC VALVE REPLACEMENT  4/7/14     ARTHROSCOPY KNEE RT/LT  2005    left, repair meniscus      COLONOSCOPY       REPAIR AORTIC ROOT  4/7/14    surgery followed by ECMO, vasopressor therapy     SOFT TISSUE SURGERY       THORACIC SURGERY       VASCULAR SURGERY       [facility] CABG, ARTERIAL, SINGLE  4/7/14     [facility] REPAIR CRUCIATE LIGAMENT,KNEE  1999    left       Social History     Tobacco Use     Smoking status: Never Smoker     Smokeless tobacco: Never Used   Substance Use Topics     Alcohol use: Yes     Comment: reports very little etoh use.      Family History   Problem Relation Age of Onset     Breast Cancer Mother 85     Cancer Father      Aneurysm Other         family hx     C.A.D. No family hx of      Diabetes No family hx of      Hypertension No family hx of          Current Outpatient Medications   Medication Sig Dispense Refill     amLODIPine (NORVASC) 5 MG tablet Take 1 tablet (5 mg) by mouth daily 90 tablet 3     aspirin (ASA) 81 MG chewable tablet Take 81 mg by mouth daily       atorvastatin (LIPITOR) 40 MG tablet TAKE 1 TABLET(40 MG) BY MOUTH DAILY 90 tablet 1     carvedilol (COREG) 12.5 MG tablet Take 1 tablet (12.5 mg) by mouth 2 times daily (with meals) 180 tablet 3     docusate sodium (COLACE) 100 MG capsule daily        esomeprazole (NEXIUM) 40 MG DR capsule TAKE ONE CAPSULE BY MOUTH TWICE DAILY 30-60 MINUTES BEFORE EATING 180 capsule 3     Ferrous Sulfate (IRON PO) Take 45 mg by mouth 2 times daily Slow release       losartan (COZAAR) 100 MG tablet Take 1 tablet (100 mg) by mouth daily       methylcellulose (CITRUCEL) 500 MG TABS tablet        multivitamin, therapeutic with minerals (MULTI-VITAMIN) TABS Take 1 tablet by mouth daily       Vitamin D, Cholecalciferol, 25 MCG (1000 UT) TABS 2 times daily        warfarin ANTICOAGULANT (COUMADIN) 1 MG tablet TAKE 4 TABLETS BY MOUTH DAILY ON SUNDAY, TUESDAY AND THURSDAY AND TAKE 5 TABLETS ALL OTHER DAYS OR AS DIRECTED BY ANTIGCOAGULATION CLINIC 420 tablet 0     warfarin ANTICOAGULANT (COUMADIN) 4 MG tablet Take 4 mg daily or as directed by the INR  "clinic 100 tablet 1     nitroGLYcerin (NITROSTAT) 0.4 MG sublingual tablet Place 0.4 mg under the tongue  (Patient not taking: Reported on 1/4/2022)       No Known Allergies      Review of Systems   Constitutional: Negative for chills and fever.   HENT: Negative for congestion, ear pain, hearing loss and sore throat.    Eyes: Negative for pain and visual disturbance.   Respiratory: Negative for cough and shortness of breath.    Cardiovascular: Negative for chest pain, palpitations and peripheral edema.   Gastrointestinal: Negative for abdominal pain, constipation, diarrhea, heartburn, hematochezia and nausea.   Genitourinary: Positive for frequency. Negative for dysuria, genital sores, hematuria, impotence, penile discharge and urgency.   Musculoskeletal: Positive for arthralgias. Negative for joint swelling and myalgias.   Skin: Negative for rash.   Neurological: Negative for dizziness, weakness, headaches and paresthesias.   Psychiatric/Behavioral: Negative for mood changes. The patient is not nervous/anxious.      Constitutional, HEENT, cardiovascular, pulmonary, GI, , musculoskeletal, neuro, skin, endocrine and psych systems are negative, except as otherwise noted.    OBJECTIVE:   There were no vitals taken for this visit. Estimated body mass index is 31.19 kg/m  as calculated from the following:    Height as of 4/14/21: 1.816 m (5' 11.5\").    Weight as of 9/8/21: 102.9 kg (226 lb 12.8 oz).  Physical Exam  GENERAL: healthy, alert and no distress  EYES: Eyes grossly normal to inspection, PERRL and conjunctivae and sclerae normal  HENT: normal cephalic/atraumatic, right ear: occluded with wax, left ear: occluded with wax, nose and mouth without ulcers or lesions, oropharynx clear and oral mucous membranes moist  NECK: no adenopathy, no asymmetry, masses, or scars and thyroid normal to palpation  RESP: lungs clear to auscultation - no rales, rhonchi or wheezes  CV: regular rate and rhythm, normal S1 S2, no S3 or " S4, no murmur, click or rub, no peripheral edema and peripheral pulses strong  ABDOMEN: soft, nontender, no hepatosplenomegaly, no masses and bowel sounds normal   (male): normal male genitalia without lesions or urethral discharge, no hernia  RECTAL: normal sphincter tone, no rectal masses, prostate normal size, smooth, nontender without nodules or masses  MS: no gross musculoskeletal defects noted, no edema  SKIN: no suspicious lesions or rashes  NEURO: Normal strength and tone, mentation intact and speech normal  PSYCH: mentation appears normal, affect normal/bright  LYMPH: no cervical, supraclavicular, axillary, or inguinal adenopathy    Diagnostic Test Results:  Labs reviewed in Epic    ASSESSMENT / PLAN:     1.  Encounter for Medicare annual wellness exam.  2.  Essential hypertension with blood pressure rising to his evening.  In the past couple of years he has gained almost 45 pounds.  Discussed diet with him and weight reduction.  I think that we will go a long ways in controlling his blood pressure.  Advised to monitor blood pressure at home.  3.  Stage IIIa chronic kidney disease.  Last creatinine 1.51 GFR 50.  He is been followed by Dr. Vallecillo in nephrology  4.  Status post mechanical aortic valve replacement 2014.  Has a bileaflet valve.  Anticoagulated Coumadin.  5.  Biventricular heart failure with stress echo showing EF around 45 to 50%.  7.  Atherosclerotic coronary artery disease with status post single vessel bypass x1 and repair of the aortic root aneurysm along with replacement of the aortic valve June 2014.  8.  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  Regular follow-up at [facility] vascular lab.  9.  Status post bilateral iliac artery aneurysm repair as well as superior mesenteric artery stent and stenting of the hypogastric artery in December 2019.  10.  Hypercholesterolemia been treated with atorvastatin 40 mg a day.  I will check lipids and get back to the  "results and recommendation.  11.  Bilateral impacted cerumen.  Irrigated in the clinic.  12.  Vitamin D deficiency in the past.  Will check vitamin D level.    We will check lipids, CMP, CBC, vitamin D level, PSA and get back to him with results.      COUNSELING:  Reviewed preventive health counseling, as reflected in patient instructions       Regular exercise       Healthy diet/nutrition       Vision screening    Estimated body mass index is 31.19 kg/m  as calculated from the following:    Height as of 4/14/21: 1.816 m (5' 11.5\").    Weight as of 9/8/21: 102.9 kg (226 lb 12.8 oz).    Weight management plan: Discussed healthy diet and exercise guidelines    He reports that he has never smoked. He has never used smokeless tobacco.      Appropriate preventive services were discussed with this patient, including applicable screening as appropriate for cardiovascular disease, diabetes, osteopenia/osteoporosis, and glaucoma.  As appropriate for age/gender, discussed screening for colorectal cancer, prostate cancer, breast cancer, and cervical cancer. Checklist reviewing preventive services available has been given to the patient.    Reviewed patients plan of care and provided an AVS. The Basic Care Plan (routine screening as documented in Health Maintenance) for Min meets the Care Plan requirement. This Care Plan has been established and reviewed with the Patient.    Counseling Resources:  ATP IV Guidelines  Pooled Cohorts Equation Calculator  Breast Cancer Risk Calculator  Breast Cancer: Medication to Reduce Risk  FRAX Risk Assessment  ICSI Preventive Guidelines  Dietary Guidelines for Americans, 2010  USDA's MyPlate  ASA Prophylaxis  Lung CA Screening    John Blair MD  [facility]    Identified Health Risks:  "

## 2022-03-09 LAB — DEPRECATED CALCIDIOL+CALCIFEROL SERPL-MC: 38 UG/L (ref 20–75)

## 2022-03-16 ENCOUNTER — DOCUMENTATION ONLY (OUTPATIENT)
Dept: ANTICOAGULATION | Facility: CLINIC | Age: 67
End: 2022-03-16
Payer: COMMERCIAL

## 2022-03-16 DIAGNOSIS — Z79.01 LONG TERM CURRENT USE OF ANTICOAGULANT THERAPY: ICD-10-CM

## 2022-03-16 DIAGNOSIS — Z95.2 H/O MECHANICAL AORTIC VALVE REPLACEMENT: Primary | ICD-10-CM

## 2022-03-16 LAB — INR HOME MONITORING: 2.7 (ref 2–3)

## 2022-03-16 NOTE — PROGRESS NOTES
ANTICOAGULATION  MANAGEMENT-Home Monitor Managed by Exception    Min Hill Andino 66 year old male is on warfarin with therapeutic INR result. (Goal INR 2.0-3.0)    Recent labs: (last 7 days)     03/16/22  0000   INR 2.70         Previous INR was Therapeutic    Medication, diet, health changes since last INR:chart reviewed; none identified that effect INR.    Contacted within the last 12 weeks by phone on 03/02/2022      [place]     Min was NOT contacted regarding therapeutic result today per home monitoring policy manage by exception agreement.   Current warfarin dose is to be continued:     Summary  As of 3/16/2022    Full warfarin instructions:  4 mg every day   Next INR check:  3/30/2022           ?   Nigel Rubio RN  Anticoagulation Clinic  3/16/2022    _______________________________________________________________________     Anticoagulation Episode Summary     Current INR goal:  2.0-3.0   TTR:  96.8 % (1 y)   Target end date:  11/20/2050   Send INR reminders to:  [facility]    Indications    Mechanical heart valve present (Resolved) [Z95.2]  H/O mechanical aortic valve replacement [Z95.2]  Long term current use of anticoagulant therapy [Z79.01]  Anticoagulation monitoring  INR range 2-3 (Resolved) [Z79.01]           Comments:  11/8/21 - [employer] Home Monitor, Okay to Manage By Exception         Anticoagulation Care Providers     Provider Role Specialty Phone number    John Blair MD Referring Internal Medicine [phone]

## 2022-03-29 ENCOUNTER — ANTICOAGULATION THERAPY VISIT (OUTPATIENT)
Dept: ANTICOAGULATION | Facility: CLINIC | Age: 67
End: 2022-03-29
Payer: COMMERCIAL

## 2022-03-29 DIAGNOSIS — Z79.01 LONG TERM CURRENT USE OF ANTICOAGULANT THERAPY: ICD-10-CM

## 2022-03-29 DIAGNOSIS — Z95.2 H/O MECHANICAL AORTIC VALVE REPLACEMENT: Primary | ICD-10-CM

## 2022-03-29 LAB — INR HOME MONITORING: 3.1 (ref 2–3)

## 2022-03-29 NOTE — PROGRESS NOTES
ANTICOAGULATION MANAGEMENT     Min Andino 66 year old male is on warfarin with supratherapeutic INR result. (Goal INR 2.0-3.0)    Recent labs: (last 7 days)     03/29/22  0000   INR 3.10*       ASSESSMENT       Source(s): Chart Review    Previous INR was Therapeutic last 2(+) visits    Medication, diet, health changes since last INR chart reviewed; none identified           PLAN     Unable to reach Min today.    Left message to continue current dose of warfarin 4 mg tonight. Request call back for assessment.    Follow up required to confirm warfarin dose taken and assess for changes, discuss out of range result  and discuss dosing instructions and confirm understanding of instructions    Morena Dempsey RN  Anticoagulation Clinic  3/29/2022

## 2022-03-29 NOTE — PROGRESS NOTES
ANTICOAGULATION MANAGEMENT     Min Andino 66 year old male is on warfarin with supratherapeutic INR result. (Goal INR 2.0-3.0)    Recent labs: (last 7 days)     03/29/22  0000   INR 3.10*       ASSESSMENT       Source(s): Chart Review and Patient/Caregiver Call       Warfarin doses taken: Warfarin taken as instructed    Diet: No new diet changes identified    New illness, injury, or hospitalization: No    Medication/supplement changes: None noted    Signs or symptoms of bleeding or clotting: No    Previous INR: Therapeutic last 2(+) visits    Additional findings: patient requesting maintenance dose decrease. He is concerned with trending up of INR over previous 6 weeks.  Request to stay closer to 2.5 range.       PLAN     Recommended plan for no diet, medication or health factor changes affecting INR     Dosing Instructions:  Decrease your warfarin dose (7.1% change) with next INR in 2 weeks.      Summary  As of 3/29/2022    Full warfarin instructions:  2 mg every Tue; 4 mg all other days   Next INR check:  4/5/2022             Telephone call with min who verbalizes understanding and agrees to plan    Patient to recheck with home meter    Education provided: Please call back if any changes to your diet, medications or how you've been taking warfarin, Goal range and significance of current result and Importance of notifying clinic for changes in medications; a sooner lab recheck maybe needed.    Plan made with [facility] Pharmacist Charo Dempsey, RN  Anticoagulation Clinic  3/29/2022    _______________________________________________________________________     Anticoagulation Episode Summary     Current INR goal:  2.0-3.0   TTR:  95.9 % (1 y)   Target end date:  11/20/2050   Send INR reminders to:  [facility]    Indications    Mechanical heart valve present (Resolved) [Z95.2]  H/O mechanical aortic valve replacement [Z95.2]  Long term current use of anticoagulant therapy  [Z79.01]  Anticoagulation monitoring  INR range 2-3 (Resolved) [Z79.01]           Comments:  11/8/21 - [employer] Home Monitor, Okay to Manage By Exception         Anticoagulation Care Providers     Provider Role Specialty Phone number    John Blair MD Referring Internal Medicine [phone]

## 2022-04-01 NOTE — PROGRESS NOTES
Chart reviewed and plan made with [facility] RN at time of encounter.    Charo Jacques, [facility]

## 2022-04-05 LAB — INR HOME MONITORING: 3.1 (ref 2–3)

## 2022-04-06 ENCOUNTER — ANTICOAGULATION THERAPY VISIT (OUTPATIENT)
Dept: ANTICOAGULATION | Facility: CLINIC | Age: 67
End: 2022-04-06
Payer: COMMERCIAL

## 2022-04-06 DIAGNOSIS — Z95.2 S/P AVR: ICD-10-CM

## 2022-04-06 DIAGNOSIS — Z95.2 S/P AVR (AORTIC VALVE REPLACEMENT): ICD-10-CM

## 2022-04-06 DIAGNOSIS — Z79.01 LONG TERM CURRENT USE OF ANTICOAGULANT THERAPY: ICD-10-CM

## 2022-04-06 DIAGNOSIS — Z95.2 H/O MECHANICAL AORTIC VALVE REPLACEMENT: Primary | ICD-10-CM

## 2022-04-06 RX ORDER — WARFARIN SODIUM 4 MG/1
TABLET ORAL
Qty: 75 TABLET | Refills: 1 | Status: SHIPPED | OUTPATIENT
Start: 2022-04-06

## 2022-04-06 RX ORDER — WARFARIN SODIUM 1 MG/1
TABLET ORAL
Qty: 55 TABLET | Refills: 0 | Status: SHIPPED | OUTPATIENT
Start: 2022-04-06 | End: 2023-02-16

## 2022-04-06 RX ORDER — WARFARIN SODIUM 1 MG/1
TABLET ORAL
Qty: 300 TABLET | Refills: 0 | Status: SHIPPED | OUTPATIENT
Start: 2022-04-06 | End: 2022-04-06 | Stop reason: DRUGHIGH

## 2022-04-06 NOTE — PROGRESS NOTES
ANTICOAGULATION MANAGEMENT     Min Hill Andino 66 year old male is on warfarin with supratherapeutic INR result. (Goal INR 2.0-3.0)    Recent labs: (last 7 days)     04/05/22  0000   INR 3.10*       ASSESSMENT       Source(s): Chart Review and Patient/Caregiver Call       Warfarin doses taken: Warfarin taken as instructed    Diet: No new diet changes identified    New illness, injury, or hospitalization: No    Medication/supplement changes: None noted    Signs or symptoms of bleeding or clotting: No    Previous INR: Supratherapeutic    Additional findings: None       PLAN     Recommended plan for no diet, medication or health factor changes affecting INR     Dosing Instructions: decrease your warfarin dose (7.7% change) with next INR in 1 week       Summary  As of 4/6/2022    Full warfarin instructions:  2 mg every Tue, Fri; 4 mg all other days   Next INR check:  4/12/2022             Telephone call with min who verbalizes understanding and agrees to plan    Patient to recheck with home meter    Education provided: Please call back if any changes to your diet, medications or how you've been taking warfarin    Plan made per [facility] anticoagulation protocol    Shantal Hoover RN  Anticoagulation Clinic  4/6/2022    _______________________________________________________________________     Anticoagulation Episode Summary     Current INR goal:  2.0-3.0   TTR:  94.0 % (1 y)   Target end date:  11/20/2050   Send INR reminders to:  [facility]    Indications    Mechanical heart valve present (Resolved) [Z95.2]  H/O mechanical aortic valve replacement [Z95.2]  Long term current use of anticoagulant therapy [Z79.01]  Anticoagulation monitoring  INR range 2-3 (Resolved) [Z79.01]           Comments:  11/8/21 - [employer] Home Monitor, Okay to Manage By Exception         Anticoagulation Care Providers     Provider Role Specialty Phone number    John Blair MD Referring Internal Medicine [phone]

## 2022-04-20 ENCOUNTER — DOCUMENTATION ONLY (OUTPATIENT)
Dept: ANTICOAGULATION | Facility: CLINIC | Age: 67
End: 2022-04-20
Payer: COMMERCIAL

## 2022-04-20 ENCOUNTER — ANTICOAGULATION THERAPY VISIT (OUTPATIENT)
Dept: FAMILY MEDICINE | Facility: CLINIC | Age: 67
End: 2022-04-20
Payer: COMMERCIAL

## 2022-04-20 DIAGNOSIS — Z79.01 LONG TERM CURRENT USE OF ANTICOAGULANT THERAPY: ICD-10-CM

## 2022-04-20 DIAGNOSIS — Z95.2 H/O MECHANICAL AORTIC VALVE REPLACEMENT: Primary | ICD-10-CM

## 2022-04-20 LAB — INR HOME MONITORING: 2.6 (ref 2–3)

## 2022-04-20 NOTE — PROGRESS NOTES
ANTICOAGULATION MANAGEMENT     Min Andino 66 year old male is on warfarin with therapeutic INR result. (Goal INR 2.0-3.0)    Recent labs: (last 7 days)     04/20/22  0000   INR 2.60       ASSESSMENT       Source(s): Chart Review and Patient/Caregiver Call       Warfarin doses taken: Warfarin taken as instructed    Diet: No new diet changes identified    New illness, injury, or hospitalization: No    Medication/supplement changes: None noted    Signs or symptoms of bleeding or clotting: No    Previous INR: Supratherapeutic    Additional findings: None       PLAN     Recommended plan for no diet, medication or health factor changes affecting INR     Dosing Instructions: continue your current warfarin dose with next INR in 2 weeks       Summary  As of 4/20/2022    Full warfarin instructions:  2 mg every Tue, Sat; 4 mg all other days   Next INR check:  5/4/2022             Telephone call with min who verbalizes understanding and agrees to plan and who agrees to plan and repeated back plan correctly    Patient to recheck with home meter    Education provided: Please call back if any changes to your diet, medications or how you've been taking warfarin    Plan made per [facility] anticoagulation protocol    Tyesha Zambrano, RN  Anticoagulation Clinic  4/20/2022    _______________________________________________________________________     Anticoagulation Episode Summary     Current INR goal:  2.0-3.0   TTR:  93.2 % (1 y)   Target end date:  11/20/2050   Send INR reminders to:  [facility]    Indications    Mechanical heart valve present (Resolved) [Z95.2]  H/O mechanical aortic valve replacement [Z95.2]  Long term current use of anticoagulant therapy [Z79.01]  Anticoagulation monitoring  INR range 2-3 (Resolved) [Z79.01]           Comments:  11/8/21 - [employer] Home Monitor, Okay to Manage By Exception         Anticoagulation Care Providers     Provider Role Specialty Phone number    John Blair MD Referring  Internal Medicine [phone]

## 2022-05-13 ENCOUNTER — TELEPHONE (OUTPATIENT)
Dept: GASTROENTEROLOGY | Facility: CLINIC | Age: 67
End: 2022-05-13
Payer: COMMERCIAL

## 2022-05-13 ENCOUNTER — DOCUMENTATION ONLY (OUTPATIENT)
Dept: ANTICOAGULATION | Facility: CLINIC | Age: 67
End: 2022-05-13
Payer: COMMERCIAL

## 2022-05-13 DIAGNOSIS — D50.9 IRON DEFICIENCY ANEMIA, UNSPECIFIED IRON DEFICIENCY ANEMIA TYPE: Primary | ICD-10-CM

## 2022-05-13 DIAGNOSIS — K21.9 GASTROESOPHAGEAL REFLUX DISEASE WITHOUT ESOPHAGITIS: Primary | ICD-10-CM

## 2022-05-13 RX ORDER — PANTOPRAZOLE SODIUM 40 MG/1
40 TABLET, DELAYED RELEASE ORAL 2 TIMES DAILY
Qty: 60 TABLET | Refills: 1 | Status: SHIPPED | OUTPATIENT
Start: 2022-05-13 | End: 2022-11-18

## 2022-05-13 NOTE — TELEPHONE ENCOUNTER
Padmaja Chavira, DO  You 9 minutes ago (11:10 AM)     Is he taking it once a day or twice a day? We can try switching to twice daily protonix 40mg BID but if his symptoms are worsening we should reconsider doing an upper endoscopy.      Rx sent per provider order (will likely require PA) and [employer] message to patient with provider advice. Patient is scheduled for visit on 7/6/2022 to discuss symptoms and procedure orders - inquiring if patient is interested in moving forward at this time.    Lois Khan RN

## 2022-05-13 NOTE — TELEPHONE ENCOUNTER
[facility] Call Center    Phone Message    May a detailed message be left on voicemail: yes     Reason for Call: Medication Question or concern regarding medication   Prescription Clarification  Name of Medication: esomeprazole (NEXIUM) 40 MG DR capsule  Prescribing Provider: Dr. Orlin Chavira   Pharmacy: [facility] #[id number]   What on the order needs clarification? This medication doesn't seem to be working well for patient.  He has increased coughing and throat clearing.  Is there another medication patient can try?  [facility] per spouse - Jalyn.    Action Taken: Message routed to:  [facility]    Travel Screening: Not Applicable

## 2022-05-13 NOTE — PROGRESS NOTES
ANTICOAGULATION     Min Andino is overdue for INR check.      Left message  reminding patient to check INR with their home meter and call results to the home monitoring company as soon as possible.     Vonda Izaguirre RN

## 2022-05-16 ENCOUNTER — TELEPHONE (OUTPATIENT)
Dept: GASTROENTEROLOGY | Facility: CLINIC | Age: 67
End: 2022-05-16
Payer: COMMERCIAL

## 2022-05-16 ENCOUNTER — MYC MEDICAL ADVICE (OUTPATIENT)
Dept: FAMILY MEDICINE | Facility: CLINIC | Age: 67
End: 2022-05-16
Payer: COMMERCIAL

## 2022-05-16 DIAGNOSIS — Z29.89 SBE (SUBACUTE BACTERIAL ENDOCARDITIS) PROPHYLAXIS CANDIDATE: Primary | ICD-10-CM

## 2022-05-16 NOTE — TELEPHONE ENCOUNTER
Screening Questions  BlueKIND OF PREP RedLOCATION [review exclusion criteria] GreenSEDATION TYPE  1. Have you had a positive covid test in the last 90 days? N     2. Do you have a legal guardian or medical Power of ?  Are you able to give consent for your medical care?Y (Sedation review/consideration needed)    3. Are you active on mychart? Y    4. What insurance is in the chart? BCBS  /MEDICARE     3.   Ordering/Referring Provider: Padmaja Chavira    4. BMI 34.8[BMI OVER 40-EXTENDED PREP]  If greater than 40 review exclusion criteria [PAC APPT IF @ [facility]]        5.  Respiratory Screening :  [If yes to any of the following HOSPITAL setting only]     Do you use daily home oxygen? N    Do you have mod to severe Obstructive Sleep Apnea? N  [OKAY @ [facility] SH PH RI]   Do you have Pulmonary Hypertension? N     Do you have UNCONTROLLED asthma? N        6.   Have you had a heart or lung transplant? N      7.   Are you currently on dialysis? N [ If yes, G-PREP & HOSPITAL setting only]     8.   Do you have chronic kidney disease? Y [ If yes, G-PREP ]    9.   Have you had a stroke or Transient ischemic attack (TIA - aka  mini stroke ) within 6 months?  N (If yes, please review exclusion criteria)    10.   In the past 6 months, have you had any heart related issues including cardiomyopathy or heart attack? N           If yes, did it require cardiac stenting or other implantable device?   DOES HAVE STENTS IN QUITE A FEW    11.   Do you have any implantable devices in your body (pacemaker, defib, LVAD)? N (If yes, please review exclusion criteria) Does have an Aorta Valve-pt and spouse prefer him to be at the [facility] still for procedures per [facility] MD's     12.   Do you take nitroglycerin?N           If yes, how often?   (if yes, HOSPITAL setting ONLY    13.   Are you currently taking any blood thinners? WARFIN           [IF YES, INFORM PATIENT TO FOLLOW UP W/ ORDERING PROVIDER FOR BRIDGING INSTRUCTIONS]      14.   Do you have a diagnosis of diabetes? N   [ If yes, G-PREP ]    15.   [FEMALES] Are you currently pregnant?     If yes, how many weeks?     16.   Are you taking any prescription pain medications on a routine schedule?  N  [ If yes, EXTENDED PREP.] [If yes, MAC]    17.   Do you have any chemical dependencies such as alcohol, street drugs, or methadone?  N [If yes, MAC]    18.   Do you have any history of post-traumatic stress syndrome, severe anxiety or history of psychosis?  N  [If yes, MAC]    19.   Do you transfer independently?  Y    20.  On a regular basis do you go 3-5 days between bowel movements? N   [ If yes, EXTENDED PREP.]    21.   Preferred LOCAL Pharmacy for Pre Prescription   [id number] [place], [place] - [address]  AT [facility] OF  & MAIN      Scheduling Details      Caller : Spouse Jalyn [phone]  (Please ask for phone number if not scheduled by patient)    Type of Procedure Scheduled: egd and Colon  Which Colonoscopy Prep was Sent?: kobe WINN CF PATIENTS & GRODAWSON'S PATIENTS NEEDS EXTENDED PREP  Surgeon: Andrew  Date of Procedure: 6/22/22  Location: [facility]  Padmaja Chavira DO Milles, Lezli  Yes - he's fine to see another provider at the [facility] GI would be preferred.   Thanks!             Previous Messages       ----- Message -----   From: Mike Beltran   Sent: 5/16/2022  10:11 AM CDT   To: Padmaja Chavira DO   Subject: Another Provider ok?                             Min Worthy has some heart issues and ok to still go to  or [place] with his criteria, but his wife and him would prefer he be at the main [facility] for just in case something were to go wrong. Also [facility] preference as well.     Are you ok with him seeing another MD for Egd and Colon at [facility]?     Thank you!     Mike-scheduling            Sedation Type:   Conscious Sedation- Needs  for 6 hours after the procedure  MAC/General-Needs  for 24 hours after  procedure    Pre-op Required at [facility] [place] and OR for MAC sedation:   (advise patient they will need a pre-op prior to procedure -)      Informed patient they will need an adult  Y  Cannot take any type of public or medical transportation alone    Pre-Procedure Covid test to be [facility] or Externally: [place] 6/18  Confirmed Nurse will call to complete assessment Y    Additional comments: I will call spouse back after making sure  is ok with him going to the [facility] per family's request

## 2022-05-16 NOTE — TELEPHONE ENCOUNTER
Please review/advise.     Prescription pended below.     Routing refill request to provider for review/approval because:  Drug not on the [facility] refill protocol   Medication is reported/historical    Requested Prescriptions   Pending Prescriptions Disp Refills     amoxicillin (AMOXIL) 500 MG capsule       Sig: Take 1 capsule (500 mg) by mouth 2 times daily       There is no refill protocol information for this order        Jacinta Moseley RN, BSN  [facility]

## 2022-05-17 RX ORDER — AMOXICILLIN 500 MG/1
2000 CAPSULE ORAL ONCE
Qty: 4 CAPSULE | Refills: 1 | Status: SHIPPED | OUTPATIENT
Start: 2022-05-17 | End: 2022-05-17

## 2022-05-19 RX ORDER — AMOXICILLIN 500 MG/1
CAPSULE ORAL
Qty: 4 CAPSULE | Refills: 5 | Status: SHIPPED | OUTPATIENT
Start: 2022-05-19 | End: 2024-05-30

## 2022-05-20 ENCOUNTER — DOCUMENTATION ONLY (OUTPATIENT)
Dept: ANTICOAGULATION | Facility: CLINIC | Age: 67
End: 2022-05-20
Payer: COMMERCIAL

## 2022-05-20 ENCOUNTER — MYC MEDICAL ADVICE (OUTPATIENT)
Dept: GASTROENTEROLOGY | Facility: CLINIC | Age: 67
End: 2022-05-20
Payer: COMMERCIAL

## 2022-05-20 NOTE — TELEPHONE ENCOUNTER
Sent [employer] message inquiring if pantoprazole was covered by insurance or if PA is needed.    Lois Khan RN

## 2022-05-23 ENCOUNTER — TELEPHONE (OUTPATIENT)
Dept: GASTROENTEROLOGY | Facility: CLINIC | Age: 67
End: 2022-05-23
Payer: COMMERCIAL

## 2022-05-23 LAB
INR HOME MONITORING: 2.3 (ref 2–3)
INR HOME MONITORING: 2.5 (ref 2–3)

## 2022-05-23 NOTE — TELEPHONE ENCOUNTER
New encounter created from [facility] Advise encounter for tracking.     Central Prior Authorization Team   Phone: [phone]      PA Initiation    Medication: pantoprazole (PROTONIX) 40 MG EC tablet-PA initiated  Insurance Company: [employer] - Phone [phone] Fax [phone]  Pharmacy Filling the Rx: [id number] [place], [place] - [address] AT [facility] OF  & MAIN  Filling Pharmacy Phone: [phone]  Filling Pharmacy Fax:    Start Date: 5/23/2022

## 2022-05-23 NOTE — TELEPHONE ENCOUNTER
Patient notified via prettysecrets message that the PA for protonix has been approved and patient should be contacted by the pharmacy when medication is ready for .     Sayra Guzman LPN

## 2022-05-23 NOTE — TELEPHONE ENCOUNTER
Prior Authorization Approval    Authorization Effective Date: 4/23/2022  Authorization Expiration Date: 5/23/2023  Medication: pantoprazole (PROTONIX) 40 MG EC tablet-PA approved  Approved Dose/Quantity:   Reference #:     Insurance Company: BCBS Platinum Blue - Phone [phone] Fax [phone]  Expected CoPay:       CoPay Card Available:      Foundation Assistance Needed:    Which Pharmacy is filling the prescription (Not needed for infusion/clinic administered): [id number] [place], [place] - [address] AT [facility] OF [facility] [address]  Pharmacy Notified: Yes  Patient Notified: No

## 2022-05-23 NOTE — TELEPHONE ENCOUNTER
Prior Authorization Retail Medication Request    Medication/Dose: Protonix 40mg EC tablet by mouth 2 times daily.

## 2022-05-24 ENCOUNTER — ANTICOAGULATION THERAPY VISIT (OUTPATIENT)
Dept: ANTICOAGULATION | Facility: CLINIC | Age: 67
End: 2022-05-24
Payer: COMMERCIAL

## 2022-05-24 DIAGNOSIS — Z95.2 H/O MECHANICAL AORTIC VALVE REPLACEMENT: Primary | ICD-10-CM

## 2022-05-24 DIAGNOSIS — Z79.01 LONG TERM CURRENT USE OF ANTICOAGULANT THERAPY: ICD-10-CM

## 2022-05-24 NOTE — PROGRESS NOTES
ANTICOAGULATION  MANAGEMENT-Home Monitor Managed by Exception    Min Hill Andino 66 year old male is on warfarin with therapeutic INR result. (Goal INR 2.0-3.0)    Recent labs: (last 7 days)     05/18/22  0000   INR 2.30         Previous INR was Therapeutic    Medication, diet, health changes since last INR:chart reviewed; none identified    Contacted within the last 12 weeks by phone on 4/20/22      [place]     Min was NOT contacted regarding therapeutic result today per home monitoring policy manage by exception agreement.   Current warfarin dose is to be continued:     Summary  As of 5/24/2022    Full warfarin instructions:  2 mg every Tue, Sat; 4 mg all other days   Next INR check:  6/1/2022           ?   Vonda Izaguirre, RN  Anticoagulation Clinic  5/24/2022    _______________________________________________________________________     Anticoagulation Episode Summary     Current INR goal:  2.0-3.0   TTR:  93.0 % (12 mo)   Target end date:  11/20/2050   Send INR reminders to:  RIVER MCLAUGHLIN    Indications    Mechanical heart valve present (Resolved) [Z95.2]  H/O mechanical aortic valve replacement [Z95.2]  Long term current use of anticoagulant therapy [Z79.01]  Anticoagulation monitoring  INR range 2-3 (Resolved) [Z79.01]           Comments:  11/8/21 - [employer] Home Monitor, Okay to Manage By Exception         Anticoagulation Care Providers     Provider Role Specialty Phone number    John Blair MD Referring Internal Medicine [phone]

## 2022-06-02 ENCOUNTER — DOCUMENTATION ONLY (OUTPATIENT)
Dept: ANTICOAGULATION | Facility: CLINIC | Age: 67
End: 2022-06-02
Payer: COMMERCIAL

## 2022-06-02 DIAGNOSIS — Z95.2 H/O MECHANICAL AORTIC VALVE REPLACEMENT: Primary | ICD-10-CM

## 2022-06-02 DIAGNOSIS — Z79.01 LONG TERM CURRENT USE OF ANTICOAGULANT THERAPY: ICD-10-CM

## 2022-06-02 LAB — INR HOME MONITORING: 2.8 (ref 2–3)

## 2022-06-02 NOTE — PROGRESS NOTES
ANTICOAGULATION  MANAGEMENT-Home Monitor Managed by Exception    Min Hill Andino 66 year old male is on warfarin with therapeutic INR result. (Goal INR 2.0-3.0)    Recent labs: (last 7 days)     06/02/22  0000   INR 2.80         Previous INR was Therapeutic    Medication, diet, health changes since last INR:chart reviewed; none identified    Contacted within the last 12 weeks by phone on 4/20/22          [place]     Min was NOT contacted regarding therapeutic result today per home monitoring policy manage by exception agreement.   Current warfarin dose is to be continued:     Summary  As of 6/2/2022    Full warfarin instructions:  2 mg every Tue, Sat; 4 mg all other days   Next INR check:  6/16/2022           ?   Shantal Hoover RN  Anticoagulation Clinic  6/2/2022    _______________________________________________________________________     Anticoagulation Episode Summary     Current INR goal:  2.0-3.0   TTR:  93.1 % (1 y)   Target end date:  11/20/2050   Send INR reminders to:  IRVER MCLAUGHLIN    Indications    Mechanical heart valve present (Resolved) [Z95.2]  H/O mechanical aortic valve replacement [Z95.2]  Long term current use of anticoagulant therapy [Z79.01]  Anticoagulation monitoring  INR range 2-3 (Resolved) [Z79.01]           Comments:  11/8/21 - [employer] Home Monitor, Okay to Manage By Exception         Anticoagulation Care Providers     Provider Role Specialty Phone number    John Blair MD Referring Internal Medicine [phone]

## 2022-06-08 ENCOUNTER — TELEPHONE (OUTPATIENT)
Dept: ANTICOAGULATION | Facility: CLINIC | Age: 67
End: 2022-06-08
Payer: COMMERCIAL

## 2022-06-08 ENCOUNTER — TELEPHONE (OUTPATIENT)
Dept: GASTROENTEROLOGY | Facility: CLINIC | Age: 67
End: 2022-06-08

## 2022-06-08 DIAGNOSIS — Z79.01 LONG TERM CURRENT USE OF ANTICOAGULANT THERAPY: ICD-10-CM

## 2022-06-08 DIAGNOSIS — Z12.11 SPECIAL SCREENING FOR MALIGNANT NEOPLASMS, COLON: Primary | ICD-10-CM

## 2022-06-08 DIAGNOSIS — Z95.2 H/O MECHANICAL AORTIC VALVE REPLACEMENT: Primary | ICD-10-CM

## 2022-06-08 RX ORDER — BISACODYL 5 MG/1
TABLET, DELAYED RELEASE ORAL
Qty: 4 TABLET | Refills: 0 | Status: SHIPPED | OUTPATIENT
Start: 2022-06-08 | End: 2022-07-20

## 2022-06-08 NOTE — TELEPHONE ENCOUNTER
KEVEN-PROCEDURAL ANTICOAGULATION  MANAGEMENT      ASSESSMENT     Warfarin interruption plan for colonoscopy on 2022.    Indication for Anticoagulation: Mechanical AVR and DVT      AVR placed 2014     DVT while inpatient 2014      Keven-Procedure Risk stratification for thromboembolism: moderate (2012 Chest guidelines)    AVR: 2020 AHA/ACC Management of Valvular Heart disease guidelines suggests bridging is reasonable on individual basis with risk of bleeding weighed against risks of clotting for mechanical AVR patients with risk factors for thrombosis (Afib, previous thromboembolism, hypercoagulable condition, LV systolic dysfunction, older generation valves, or > 1 valve)     RECOMMENDATION     Needs updated SCr, BMP placed to confirm Lovenox dose correct.    Pre-Procedure:  o Hold warfarin for 5 days, until after procedure startin2022   o Enoxaparin (Lovenox) 80 mg subq Q 12 hrs (0.75 mg/kg Q 12 hrs for CrCl 30-60 ml/min per [facility] P&T approved dose adjustment protocol)   - Start enoxaparin: 2022  - Last dose of enoxaparin prior to procedure: 2022 AM  (~24 hours prior to procedure)      Post-Procedure:  o Resume warfarin dose if okay with provider doing procedure on night of procedure, 2022 PM: 6mg  o Resume enoxaparin (Lovenox) ~ 24-72 hrs post procedure when okay with provider doing procedure. Continue until INR >= 2.3 or INR >= 2.0 x 2 (ACC protocol goal INR 2-3)  o Recheck INR ~5 days after resuming warfarin   ?       Plan routed to referring provider for approval  ?   Sydney Vang [facility]    SUBJECTIVE/OBJECTIVE     Min Andino, a 66 year old male    Goal INR Range: 2.0-3.0     Patient bridged in past: Yes:     Pertinent History: n/a    Wt Readings from Last 3 Encounters:   22 111.1 kg (245 lb)   21 102.9 kg (226 lb 12.8 oz)   21 102.1 kg (225 lb)      Ideal body weight: 75.3 kg (166 lb 0.1 oz)  Adjusted ideal body weight: 89.6  "kg (197 lb 9.7 oz)     Estimated body mass index is 34.17 kg/m  as calculated from the following:    Height as of 3/8/22: 1.803 m (5' 11\").    Weight as of 3/8/22: 111.1 kg (245 lb).    Lab Results   Component Value Date    INR 2.80 06/02/2022    INR 2.30 05/18/2022    INR 2.50 05/04/2022     Lab Results   Component Value Date    HGB 13.5 03/08/2022    HGB 12.6 04/14/2021    HCT 42.1 03/08/2022    HCT 35.1 05/26/2020     03/08/2022     05/26/2020     Lab Results   Component Value Date    CR 1.61 (H) 03/08/2022    CR 1.51 (H) 01/03/2022    CR 1.68 (H) 09/24/2021     Calc CrCl 56ml/min  "

## 2022-06-08 NOTE — TELEPHONE ENCOUNTER
Attempted to contact patient regarding upcoming colonoscopy/EGD procedure on 6.22.22 for pre assessment questions. No answer.     Left message to return call to [phone] #2    Discuss at home rapid antigen COVID test 1-2 days prior to procedure.    Pre op exam scheduled: N/A    Arrival time: 1200    Facility location: [facility]    Sedation type: CS    Indication for procedure: Screening/reflux    Anticoagulants: Warfarin. Holding interval of 5 days. Message sent to anticoagulation clinic.    Bowel prep recommendation: Standard Golytely d/t stage 3 CKD.    Golytely script sent [facility] #[id number] [id number] AT [facility] [address] . Prep instructions sent via [employer].    Yas Gray RN

## 2022-06-08 NOTE — TELEPHONE ENCOUNTER
Min is having a colonoscopy on 06/22/2022 with Dr. Morales at [facility]. The request is for a 5 day hold. Routing to [facility] for hold/bridge assessment.

## 2022-06-09 DIAGNOSIS — I25.10 ATHEROSCLEROSIS OF CORONARY ARTERY OF NATIVE HEART WITHOUT ANGINA PECTORIS, UNSPECIFIED VESSEL OR LESION TYPE: ICD-10-CM

## 2022-06-09 DIAGNOSIS — E78.00 PURE HYPERCHOLESTEROLEMIA: ICD-10-CM

## 2022-06-09 NOTE — TELEPHONE ENCOUNTER
Wife, Jalyn returned call. Verbal consent from  to speak to wife.     Pre assessment questions completed for upcoming colonoscopy/EGD procedure scheduled on 6/22/22    COVID policy reviewed. Patient to complete rapid antigen test one to two days before their scheduled procedure. Patient to bring photo of the results when they come in for their procedure.     Reviewed procedural arrival time 1200 and facility location [facility].    Designated  policy reviewed. Instructed to have someone stay CS  hours post procedure.     Reviewed Golytely prep instructions with patient's wife. No fiber/iron supplements or foods that contain nuts/seeds 7 days prior to procedure.     Anticoagulation/blood thinners? Coumadin (Warfarin). Hold 5 days prior to procedure. Per anticoagulation encounter patient is to bridge. Patient to consult with INR clinic regarding bridging and holding instructions. Wife agreeable to plan.    Patient's wife verbalized understanding and had no questions or concerns at this time.    Che Babin RN

## 2022-06-09 NOTE — TELEPHONE ENCOUNTER
Spoke with Jalyn and scheduled BMP for 06/14. Once BMP is resulted and lovenox dosing is confirmed with [facility], ACC RN to:    1) Order lovenox to [facility]  2) Send Wriggle message to patient with below hold/bridge schedule. Please include lovenox dose in myChart message.        06/16/2022, Take last dose of warfarin  06/17/2022, NO warfarin  06/18/2022, NO warfarin  06/19/2022, NO warfarin, enoxaparin every 12 hours (AM and PM)  06/20/2022, NO warfarin, enoxaparin every 12 hours (AM and PM)  06/21/2022, NO warfarin, enoxaparin AM only (no enoxaparin 24 hours prior to surgery)    06/22/2022, DAY OF PROCEDURE, NO enoxaparin. Restart warfarin 6mg in the evening, if okay with provider doing the procedure.    Make sure to ask the provider doing your procedure if it is okay to begin your warfarin and enoxaparin as planned     06/23/2022, Restart enoxaparin every 12 hours (AM and PM), unless instructed otherwise by the physician, and 4 mg warfarin in the evening.   06/24/2022, Enoxaparin every 12 hours (AM and PM) and 4 mg warfarin in the evening.  06/25/2022, Enoxaparin every 12 hours (AM and PM) and 2 mg warfarin in the evening.  06/26/2022, Enoxaparin every 12 hours (AM and PM) and 4 mg warfarin in the evening.  06/27/2022, Enoxaparin every 12 hours (AM and PM) and 4 mg warfarin in the evening.  06/28/2022, Enoxaparin every 12 hours (AM and PM) and 4 mg warfarin in the evening.  06/29/2022, Enoxaparin in the AM. Recheck INR. Anticoagulation clinic to call with further dosing instructions.

## 2022-06-10 RX ORDER — ATORVASTATIN CALCIUM 40 MG/1
TABLET, FILM COATED ORAL
Qty: 90 TABLET | Refills: 1 | Status: SHIPPED | OUTPATIENT
Start: 2022-06-10 | End: 2022-12-02

## 2022-06-14 ENCOUNTER — LAB (OUTPATIENT)
Dept: LAB | Facility: OTHER | Age: 67
End: 2022-06-14
Payer: MEDICARE

## 2022-06-14 DIAGNOSIS — N18.31 STAGE 3A CHRONIC KIDNEY DISEASE (H): ICD-10-CM

## 2022-06-14 DIAGNOSIS — D64.9 ANEMIA, UNSPECIFIED TYPE: ICD-10-CM

## 2022-06-14 DIAGNOSIS — Z95.2 H/O MECHANICAL AORTIC VALVE REPLACEMENT: ICD-10-CM

## 2022-06-14 LAB
ALBUMIN SERPL-MCNC: 3.5 G/DL (ref 3.4–5)
ANION GAP SERPL CALCULATED.3IONS-SCNC: 4 MMOL/L (ref 3–14)
BUN SERPL-MCNC: 40 MG/DL (ref 7–30)
CALCIUM SERPL-MCNC: 8.4 MG/DL (ref 8.5–10.1)
CHLORIDE BLD-SCNC: 115 MMOL/L (ref 94–109)
CO2 SERPL-SCNC: 26 MMOL/L (ref 20–32)
CREAT SERPL-MCNC: 1.83 MG/DL (ref 0.66–1.25)
CREAT UR-MCNC: 226 MG/DL
FERRITIN SERPL-MCNC: 115 NG/ML (ref 26–388)
GFR SERPL CREATININE-BSD FRML MDRD: 40 ML/MIN/1.73M2
GLUCOSE BLD-MCNC: 94 MG/DL (ref 70–99)
HGB BLD-MCNC: 12.5 G/DL (ref 13.3–17.7)
IRON SATN MFR SERPL: 26 % (ref 15–46)
IRON SERPL-MCNC: 65 UG/DL (ref 35–180)
PHOSPHATE SERPL-MCNC: 3 MG/DL (ref 2.5–4.5)
POTASSIUM BLD-SCNC: 4.8 MMOL/L (ref 3.4–5.3)
PROT UR-MCNC: 0.31 G/L
PROT/CREAT 24H UR: 0.14 G/G CR (ref 0–0.2)
PTH-INTACT SERPL-MCNC: 64 PG/ML (ref 18–80)
SODIUM SERPL-SCNC: 145 MMOL/L (ref 133–144)
TIBC SERPL-MCNC: 253 UG/DL (ref 240–430)

## 2022-06-14 PROCEDURE — 82728 ASSAY OF FERRITIN: CPT

## 2022-06-14 PROCEDURE — 80069 RENAL FUNCTION PANEL: CPT

## 2022-06-14 PROCEDURE — 83550 IRON BINDING TEST: CPT

## 2022-06-14 PROCEDURE — 36415 COLL VENOUS BLD VENIPUNCTURE: CPT

## 2022-06-14 PROCEDURE — 84156 ASSAY OF PROTEIN URINE: CPT

## 2022-06-14 PROCEDURE — 83970 ASSAY OF PARATHORMONE: CPT

## 2022-06-14 PROCEDURE — 85018 HEMOGLOBIN: CPT

## 2022-06-15 NOTE — TELEPHONE ENCOUNTER
LVM requesting call back--please ask to speak with me or with available INR nurse.      Thanks! Tyesha Zambrano RN      Please transfer to [phone]

## 2022-06-16 RX ORDER — ENOXAPARIN SODIUM 100 MG/ML
0.75 INJECTION SUBCUTANEOUS 2 TIMES DAILY
Qty: 12.8 ML | Refills: 1 | Status: SHIPPED | OUTPATIENT
Start: 2022-06-16 | End: 2022-07-20

## 2022-06-16 NOTE — TELEPHONE ENCOUNTER
Estimated Creatinine Clearance: 49.6 mL/min (A) (based on SCr of 1.83 mg/dL (H)).      Lovenox dose in original plan correct for 0.75mg/kg based on CrCL 30-60ml/min    Sent to preferred pharmacy for .    Sydney Vang, PharmD BCACP  Anticoagulation Clinical Pharmacist

## 2022-06-16 NOTE — TELEPHONE ENCOUNTER
Discussed dosing plan with patient. Patient aware Lovenox at pharmacy. [facility]    Thanks! Tyesha Zambrano RN

## 2022-06-21 ENCOUNTER — NURSE TRIAGE (OUTPATIENT)
Dept: NURSING | Facility: CLINIC | Age: 67
End: 2022-06-21
Payer: MEDICARE

## 2022-06-21 DIAGNOSIS — N18.31 STAGE 3A CHRONIC KIDNEY DISEASE (H): Primary | ICD-10-CM

## 2022-06-21 NOTE — TELEPHONE ENCOUNTER
Pt's wife is calling. Consent on file.    Procedure tomorrow AM. Upper endoscopy and colonoscopy.  He is drinking the prep. There is no flavoring to it, and he has a sensitive gag reflex. He is unable to drink it, as it is making him very ill.    I advised her to make sure that the prep is really cold. May mix in some lemonade flavoring or other sugar free flavoring that isn't red or purple in color. She stated that it would go down better if flavored and will try that for now.    Lois Mathew RN  [facility] Nurse Advisor  6/21/2022 at 6:32 PM        COVID 19 Nurse Triage Plan/Patient Instructions    Please be aware that novel coronavirus (COVID-19) may be circulating in the community. If you develop symptoms such as fever, cough, or SOB or if you have concerns about the presence of another infection including coronavirus (COVID-19), please contact your health care provider or visit [email or web address].     Disposition/Instructions    Home care recommended. Follow home care protocol based instructions.    Thank you for taking steps to prevent the spread of this virus.  o Limit your contact with others.  o Wear a simple mask to cover your cough.  o Wash your hands well and often.    Resources    [facility]: About COVID-19: [email or web address]    CDC: What to Do If You're Sick: www.cdc.gov/coronavirus/2019-ncov/about/steps-when-sick.html    CDC: Ending Home Isolation: www.cdc.gov/coronavirus/2019-ncov/hcp/disposition-in-home-patients.html     CDC: Caring for Someone: www.cdc.gov/coronavirus/2019-ncov/if-you-are-sick/care-for-someone.html     [facility]: Interim Guidance for Hospital Discharge to Home: www.health.[facility].[place]/diseases/coronavirus/hcp/hospdischarge.pdf    [facility] clinical trials (COVID-19 research studies): clinicalaffairs.[facility]/umn-clinical-trials     Below are the COVID-19 hotlines at the [place] Department of Health ([facility]). Interpreters are available.    o For health questions: Call 851-338-1558 or 1-542.512.8400 (7 a.m. to 7 p.m.)  o For questions about schools and childcare: Call 677-597-1475 or 1-931.528.3377 (7 a.m. to 7 p.m.)     Reason for Disposition    Bowel prep for colonoscopy, questions about    Additional Information    Negative: Shock suspected (e.g., cold/pale/clammy skin, too weak to stand, low BP, rapid pulse)    Negative: Difficult to awaken or acting confused (e.g., disoriented, slurred speech)    Negative: Passed out (i.e., lost consciousness, collapsed and was not responding)    Negative: Sounds like a life-threatening emergency to the triager    Negative: Breathing difficulty    Negative: Chest pain    Negative: [1] Abdomen pain is main concern AND [2] started > 3 days (72 hours) after colonoscopy AND [3] Female    Negative: [1] Abdomen pain is main concern AND [2] started > 3 days (72 hours) after colonoscopy AND [3] male    Negative: [1] Vomiting is main concern AND [2] started > 3 days after colonoscopy    Negative: SEVERE abdomen pain (e.g., excruciating)    Negative: SEVERE rectal bleeding (large blood clots; on and off, or constant bleeding)    Negative: SEVERE dizziness (e.g., unable to stand, requires support to walk, feels like passing out now)    Negative: SEVERE vomiting (e.g., 6 or more times/day)    Negative: [1] MODERATE rectal bleeding (small blood clots, passing blood without stool, or toilet water turns red) AND [2] more than once    Negative: [1] MILD-MODERATE abdomen pain AND [2] constant AND [3] present > 2 hours    Negative: [1] Drinking very little AND [2] dehydration suspected (e.g., no urine > 12 hours, very dry mouth, very lightheaded)    Negative: Patient sounds very sick or weak to the triager    Negative: Fever > 100.4 F (38.0 C)    Negative: [1] Abdominal bloating, cramping, nausea, or vomiting while drinking bowel prep AND [2] CANNOT finish bowel prep AND [3] has tried recommended Care Advice    Negative: [1]  Caller has URGENT question or concern AND [2] triager unable to answer question    Negative: [1] MILD-MODERATE abdomen pain (e.g., does not interfere with normal activities) AND [2] pain comes and goes (cramps) [3] lasting > 48 hours    Negative: [1] MILD to MODERATE vomiting (e.g., 1 to 5 times a day) AND [2] lasting > 24 hours    Negative: Any rectal bleeding occurring > 24 hours after colonoscopy    Negative: [1] Caller has NON-URGENT question AND [2] triager unable to answer question    Negative: Abdominal cramping and bloating after colonoscopy, questions about    Negative: Rectal bleeding (slight; streaks or less than 1 teaspoon or 5 ml) after colonoscopy, questions about    Negative: Passing gas (flatus) after colonoscopy, questions about    Negative: Abdominal bloating, cramping, nausea, or vomiting while drinking bowel prep, questions about    Protocols used: COLONOSCOPY SYMPTOMS AND [id number]-NY

## 2022-06-22 ENCOUNTER — DOCUMENTATION ONLY (OUTPATIENT)
Dept: ANTICOAGULATION | Facility: CLINIC | Age: 67
End: 2022-06-22

## 2022-06-22 ENCOUNTER — HOSPITAL ENCOUNTER (OUTPATIENT)
Facility: CLINIC | Age: 67
Discharge: HOME | End: 2022-06-22
Attending: INTERNAL MEDICINE | Admitting: INTERNAL MEDICINE
Payer: MEDICARE

## 2022-06-22 VITALS
HEIGHT: 72 IN | HEART RATE: 75 BPM | DIASTOLIC BLOOD PRESSURE: 93 MMHG | BODY MASS INDEX: 33.5 KG/M2 | WEIGHT: 247.36 LBS | RESPIRATION RATE: 16 BRPM | TEMPERATURE: 98.3 F | OXYGEN SATURATION: 96 % | SYSTOLIC BLOOD PRESSURE: 131 MMHG

## 2022-06-22 DIAGNOSIS — Z79.01 LONG TERM CURRENT USE OF ANTICOAGULANT THERAPY: ICD-10-CM

## 2022-06-22 DIAGNOSIS — Z95.2 H/O MECHANICAL AORTIC VALVE REPLACEMENT: Primary | ICD-10-CM

## 2022-06-22 LAB
COLONOSCOPY: NORMAL
INR BLD: 1.2 (ref 2–3)
UPPER GI ENDOSCOPY: NORMAL

## 2022-06-22 PROCEDURE — 45385 COLONOSCOPY W/LESION REMOVAL: CPT | Mod: PT | Performed by: INTERNAL MEDICINE

## 2022-06-22 PROCEDURE — 43239 EGD BIOPSY SINGLE/MULTIPLE: CPT | Performed by: INTERNAL MEDICINE

## 2022-06-22 PROCEDURE — 45382 COLONOSCOPY W/CONTROL BLEED: CPT | Performed by: INTERNAL MEDICINE

## 2022-06-22 PROCEDURE — 88305 TISSUE EXAM BY PATHOLOGIST: CPT | Mod: TC | Performed by: INTERNAL MEDICINE

## 2022-06-22 PROCEDURE — 250N000011 HC RX IP 250 OP 636: Performed by: INTERNAL MEDICINE

## 2022-06-22 PROCEDURE — G0500 MOD SEDAT ENDO SERVICE >5YRS: HCPCS | Performed by: INTERNAL MEDICINE

## 2022-06-22 PROCEDURE — 99153 MOD SED SAME PHYS/QHP EA: CPT | Performed by: INTERNAL MEDICINE

## 2022-06-22 PROCEDURE — 43255 EGD CONTROL BLEEDING ANY: CPT | Performed by: INTERNAL MEDICINE

## 2022-06-22 PROCEDURE — 45380 COLONOSCOPY AND BIOPSY: CPT | Performed by: INTERNAL MEDICINE

## 2022-06-22 PROCEDURE — 85610 PROTHROMBIN TIME: CPT

## 2022-06-22 RX ORDER — LIDOCAINE 40 MG/G
CREAM TOPICAL
Status: DISCONTINUED | OUTPATIENT
Start: 2022-06-22 | End: 2022-06-22 | Stop reason: HOSPADM

## 2022-06-22 RX ORDER — ONDANSETRON 2 MG/ML
4 INJECTION INTRAMUSCULAR; INTRAVENOUS
Status: DISCONTINUED | OUTPATIENT
Start: 2022-06-22 | End: 2022-06-22 | Stop reason: HOSPADM

## 2022-06-22 RX ORDER — FENTANYL CITRATE 50 UG/ML
INJECTION, SOLUTION INTRAMUSCULAR; INTRAVENOUS PRN
Status: COMPLETED | OUTPATIENT
Start: 2022-06-22 | End: 2022-06-22

## 2022-06-22 RX ADMIN — FENTANYL CITRATE 100 MCG: 50 INJECTION, SOLUTION INTRAMUSCULAR; INTRAVENOUS at 13:39

## 2022-06-22 RX ADMIN — MIDAZOLAM 1 MG: 1 INJECTION INTRAMUSCULAR; INTRAVENOUS at 14:05

## 2022-06-22 RX ADMIN — MIDAZOLAM 1 MG: 1 INJECTION INTRAMUSCULAR; INTRAVENOUS at 13:41

## 2022-06-22 RX ADMIN — FENTANYL CITRATE 50 MCG: 50 INJECTION, SOLUTION INTRAMUSCULAR; INTRAVENOUS at 13:50

## 2022-06-22 RX ADMIN — MIDAZOLAM 1 MG: 1 INJECTION INTRAMUSCULAR; INTRAVENOUS at 13:44

## 2022-06-22 RX ADMIN — MIDAZOLAM 1 MG: 1 INJECTION INTRAMUSCULAR; INTRAVENOUS at 13:39

## 2022-06-22 NOTE — PROGRESS NOTES
ANTICOAGULATION  MANAGEMENT: Discharge Review    Min Andino chart reviewed for anticoagulation continuity of care    Outpatient surgery/procedure on 6/22/22 for iron deficiency anemia.   - s/p EGD with biopsy.    Discharge disposition: Home    Results:    Recent labs: (last 7 days)     06/22/22  1323   INR 1.2*     Anticoagulation inpatient management:     not applicable    Patient held warfarin from 6/17 - 21, or as instructed.  Bridge started on 6/19 with last dose on 621 morning dose only.    Anticoagulation discharge instructions:     Warfarin dosing:  when clearance to resume warfarin - 1st dose will be one time booster with 6mg warfarin, then to resume usual dose.   Bridging: bridging with enoxaparin (Lovenox) to  Restart on 6/23 or as instructed.   INR goal change: No      Medication changes affecting anticoagulation: No    Additional factors affecting anticoagulation: No     PLAN     No adjustment to anticoagulation plan needed    Patient not contacted - will recheck INR in 5-7 days with home INR monitor thru [employer].    No adjustment to Anticoagulation Calendar was required    Jasmyne Lozano RN

## 2022-06-22 NOTE — OR NURSING
Patient had EGD with biopsies and clip placement x1 and colonoscopy with polypectomies and clip placement x1  Patient tolerated procedure under conscious sedation and 2 liters nasal cannula.

## 2022-06-22 NOTE — H&P
"Gastroenterology Pre-op History and Physical    Min Andino MRN# [id number]   Age: 66 year old YOB: 1955      Date of Surgery: 06/22/22  [facility]      Date of Exam 6/22/2022 Facility Same Day       Primary care provider: John Blair         Chief Complaint and/or Reason for Procedure:   Screening colonoscopy. History of colon polyps however details unclear. Last colonoscopy 12/11/2014 negative except diverticulae.    EGD for reflux/cough.    Reference in chart to iron deficiency. Iron studies available in Epic not classic for this and may indicate chronic disease (low TIBC).         Past Medical and Surgical History:     Past Medical History:   Diagnosis Date     Anemia of chronic renal failure, unspecified CKD stage 8/27/2021     Aortic dissection (H)      Aortic root aneurysm (H)      Arthritis      C. difficile colitis     April 2014 following surgery     CKD (chronic kidney disease) stage 3, GFR 30-59 ml/min (H) 9/28/2014     Class 1 obesity due to excess calories with serious comorbidity and body mass index (BMI) of 34.0 to 34.9 in adult 3/8/2022     Connective tissue disease (H)     \"probable\" per [facility] Notes     DVT (deep venous thrombosis) (H)     April 2014 following surgery     History of blood transfusion      Horseshoe kidney      Hypertension      S/P insertion of iliac artery stent 11/11/2020    Left internal iliac artery stenting for endoleak     Thoracic aortic aneurysm without rupture (H) 3/8/2014     Urinary urgency 10/12/2020     Valvular cardiomyopathy (H)      Past Surgical History:   Procedure Laterality Date     ABDOMEN SURGERY       AORTIC VALVE REPLACEMENT  4/7/14     ARTHROSCOPY KNEE RT/LT  2005    left, repair meniscus     COLONOSCOPY       REPAIR AORTIC ROOT  4/7/14    surgery followed by ECMO, vasopressor therapy     SOFT TISSUE SURGERY       THORACIC SURGERY       VASCULAR SURGERY       ZZC CABG, ARTERIAL, SINGLE  " 4/7/14     [facility] REPAIR CRUCIATE LIGAMENT,KNEE  1999    left            Medications (include herbals and vitamins):     Description    Also has 3 mg tabs on hand.             Medications Prior to Admission   Medication Sig Dispense Refill Last Dose     amLODIPine (NORVASC) 5 MG tablet Take 1 tablet (5 mg) by mouth daily 90 tablet 3 6/22/2022 at 0700     amoxicillin (AMOXIL) 500 MG capsule Take 4 capsules 1 hour before dental work 4 capsule 5 6/21/2022 at 0700     aspirin (ASA) 81 MG chewable tablet Take 81 mg by mouth daily   Past Week at Unknown time     atorvastatin (LIPITOR) 40 MG tablet TAKE 1 TABLET(40 MG) BY MOUTH DAILY 90 tablet 1 6/21/2022 at 2000     bisacodyl (DULCOLAX) 5 MG EC tablet Take as directed. One day before exam take 2 tablets at 3 PM. Day of exam take 2 tablets at 6 AM. 4 tablet 0 6/21/2022 at Unknown time     carvedilol (COREG) 12.5 MG tablet Take 1 tablet (12.5 mg) by mouth 2 times daily (with meals) 180 tablet 3 6/22/2022 at 0700     docusate sodium (COLACE) 100 MG capsule daily    6/21/2022 at Unknown time     enoxaparin ANTICOAGULANT (LOVENOX) 80 MG/0.8ML syringe Inject 0.8 mLs (80 mg) Subcutaneous 2 times daily 12.8 mL 1 Past Week at Unknown time     esomeprazole (NEXIUM) 40 MG DR capsule TAKE ONE CAPSULE BY MOUTH TWICE DAILY 30-60 MINUTES BEFORE EATING 180 capsule 3 6/22/2022 at 0700     Ferrous Sulfate (IRON PO) Take 45 mg by mouth 2 times daily Slow release   Past Week at Unknown time     losartan (COZAAR) 100 MG tablet Take 1 tablet (100 mg) by mouth daily   6/21/2022 at 2000     methylcellulose (CITRUCEL) 500 MG TABS tablet    6/21/2022 at Unknown time     multivitamin w/minerals (THERA-VIT-M) tablet Take 1 tablet by mouth daily   Past Week at Unknown time     pantoprazole (PROTONIX) 40 MG EC tablet Take 1 tablet (40 mg) by mouth 2 times daily 60 tablet 1 6/22/2022 at 0700     polyethylene glycol (GOLYTELY) 236 g suspension Take as directed. One day before exam fill the jug with water.  Cover and shake until well mixed. At 6 PM start drinking an 8oz glass of mixture every 15 minutes until jug is 1/2 empty. Store remainder in the refrigerator. Day of exam at 6 AM Drink the other half of the Golytely jug. Drink one 8-ounce glass every 15 minutes until the jug is empty. You should finish the prep 4 hours before the exam. 4000 mL 0 6/21/2022 at Unknown time     Vitamin D, Cholecalciferol, 25 MCG (1000 UT) TABS 2 times daily    6/21/2022 at 0700     warfarin ANTICOAGULANT (COUMADIN) 1 MG tablet Take 2 mg on Tues/Fri or as directed by INR clinic 55 tablet 0 Past Week at Unknown time     warfarin ANTICOAGULANT (COUMADIN) 4 MG tablet Take 4 mg on Monday, Wed, Thurs, Friday, Sunday or as directed by INR clinic 75 tablet 1 Past Week at Unknown time     nitroGLYcerin (NITROSTAT) 0.4 MG sublingual tablet Place 1 tablet (0.4 mg) under the tongue every 5 minutes as needed for chest pain 10 tablet 0              Allergies:    No Known Allergies            Physical Exam:   All vitals have been reviewed  Patient Vitals for the past 8 hrs:   BP Temp Temp src Pulse Resp SpO2 Height Weight   06/22/22 1100 (!) 145/104 98.3  F (36.8  C) Oral 78 16 96 % 1.829 m (6') 112.2 kg (247 lb 5.7 oz)     No intake/output data recorded.  Airway assessment:   Mallampatti classification: Class I (visualization of the soft palate, fauces, uvula, anterior and posterior pillars)}      Lungs:   No increased work of breathing, good air exchange, clear to auscultation bilaterally, no crackles or wheezing     Cardiovascular:   regular rate and rhythm, mechanical heart tones                 Anesthetic risk and/or ASA classification:   ASA 3    Josh Morales MD

## 2022-06-23 LAB
PATH REPORT.COMMENTS IMP SPEC: NORMAL
PATH REPORT.COMMENTS IMP SPEC: NORMAL
PATH REPORT.FINAL DX SPEC: NORMAL
PATH REPORT.GROSS SPEC: NORMAL
PATH REPORT.MICROSCOPIC SPEC OTHER STN: NORMAL
PATH REPORT.RELEVANT HX SPEC: NORMAL
PHOTO IMAGE: NORMAL

## 2022-06-23 PROCEDURE — 88305 TISSUE EXAM BY PATHOLOGIST: CPT | Mod: 26 | Performed by: PATHOLOGY

## 2022-06-24 ENCOUNTER — NURSE TRIAGE (OUTPATIENT)
Dept: NURSING | Facility: CLINIC | Age: 67
End: 2022-06-24

## 2022-06-24 ENCOUNTER — HOSPITAL ENCOUNTER (EMERGENCY)
Facility: CLINIC | Age: 67
Discharge: HOME | End: 2022-06-24
Attending: PHYSICIAN ASSISTANT | Admitting: PHYSICIAN ASSISTANT
Payer: MEDICARE

## 2022-06-24 ENCOUNTER — DOCUMENTATION ONLY (OUTPATIENT)
Dept: ANTICOAGULATION | Facility: CLINIC | Age: 67
End: 2022-06-24

## 2022-06-24 VITALS
DIASTOLIC BLOOD PRESSURE: 77 MMHG | BODY MASS INDEX: 33.4 KG/M2 | SYSTOLIC BLOOD PRESSURE: 141 MMHG | WEIGHT: 246.6 LBS | HEART RATE: 72 BPM | HEIGHT: 72 IN | OXYGEN SATURATION: 97 % | TEMPERATURE: 98 F | RESPIRATION RATE: 18 BRPM

## 2022-06-24 DIAGNOSIS — K62.5 RECTAL BLEEDING: ICD-10-CM

## 2022-06-24 DIAGNOSIS — Z98.890 S/P COLONOSCOPIC POLYPECTOMY: ICD-10-CM

## 2022-06-24 LAB
ANION GAP SERPL CALCULATED.3IONS-SCNC: 3 MMOL/L (ref 3–14)
BASOPHILS # BLD AUTO: 0 10E3/UL (ref 0–0.2)
BASOPHILS NFR BLD AUTO: 1 %
BUN SERPL-MCNC: 26 MG/DL (ref 7–30)
CALCIUM SERPL-MCNC: 8.5 MG/DL (ref 8.5–10.1)
CHLORIDE BLD-SCNC: 110 MMOL/L (ref 94–109)
CO2 SERPL-SCNC: 26 MMOL/L (ref 20–32)
CREAT SERPL-MCNC: 1.5 MG/DL (ref 0.66–1.25)
EOSINOPHIL # BLD AUTO: 0.1 10E3/UL (ref 0–0.7)
EOSINOPHIL NFR BLD AUTO: 3 %
ERYTHROCYTE [DISTWIDTH] IN BLOOD BY AUTOMATED COUNT: 14.1 % (ref 10–15)
GFR SERPL CREATININE-BSD FRML MDRD: 51 ML/MIN/1.73M2
GLUCOSE BLD-MCNC: 107 MG/DL (ref 70–99)
HCT VFR BLD AUTO: 36.3 % (ref 40–53)
HGB BLD-MCNC: 12.1 G/DL (ref 13.3–17.7)
IMM GRANULOCYTES # BLD: 0 10E3/UL
IMM GRANULOCYTES NFR BLD: 0 %
INR PPP: 1.37 (ref 0.85–1.15)
LYMPHOCYTES # BLD AUTO: 0.6 10E3/UL (ref 0.8–5.3)
LYMPHOCYTES NFR BLD AUTO: 15 %
MCH RBC QN AUTO: 31.5 PG (ref 26.5–33)
MCHC RBC AUTO-ENTMCNC: 33.3 G/DL (ref 31.5–36.5)
MCV RBC AUTO: 95 FL (ref 78–100)
MONOCYTES # BLD AUTO: 0.5 10E3/UL (ref 0–1.3)
MONOCYTES NFR BLD AUTO: 11 %
NEUTROPHILS # BLD AUTO: 3 10E3/UL (ref 1.6–8.3)
NEUTROPHILS NFR BLD AUTO: 70 %
NRBC # BLD AUTO: 0 10E3/UL
NRBC BLD AUTO-RTO: 0 /100
PLATELET # BLD AUTO: 109 10E3/UL (ref 150–450)
POTASSIUM BLD-SCNC: 4.3 MMOL/L (ref 3.4–5.3)
RBC # BLD AUTO: 3.84 10E6/UL (ref 4.4–5.9)
SODIUM SERPL-SCNC: 139 MMOL/L (ref 133–144)
WBC # BLD AUTO: 4.3 10E3/UL (ref 4–11)

## 2022-06-24 PROCEDURE — 85025 COMPLETE CBC W/AUTO DIFF WBC: CPT | Performed by: PHYSICIAN ASSISTANT

## 2022-06-24 PROCEDURE — 99284 EMERGENCY DEPT VISIT MOD MDM: CPT | Performed by: PHYSICIAN ASSISTANT

## 2022-06-24 PROCEDURE — 85610 PROTHROMBIN TIME: CPT | Performed by: PHYSICIAN ASSISTANT

## 2022-06-24 PROCEDURE — 36415 COLL VENOUS BLD VENIPUNCTURE: CPT | Performed by: PHYSICIAN ASSISTANT

## 2022-06-24 PROCEDURE — 99283 EMERGENCY DEPT VISIT LOW MDM: CPT | Performed by: PHYSICIAN ASSISTANT

## 2022-06-24 PROCEDURE — 80048 BASIC METABOLIC PNL TOTAL CA: CPT | Performed by: PHYSICIAN ASSISTANT

## 2022-06-24 NOTE — DISCHARGE INSTRUCTIONS
It was a pleasure working with you today!  I hope your condition improves rapidly!     Thankfully, all of your testing came back okay today.  Your bleeding is likely coming from the polyp removal sites in your colon.  Thankfully, you are not having significant bleeding in between your bowel movements.  Please return to the emergency department, and consider going to the [facility], if you develop increased rectal bleeding, lightheadedness, dizziness, generalized weakness, or looking quite pale.  If you have ongoing bleeding and development of these type of symptoms, we would need to recheck your lab tests and then also have Dr. Morales's team consider scoping you again to see what continues to bleed.  Usually, this will stop on its own as your body heals from the biopsy sites.    In the interim, please try and eat more constipating foods.  Avoid your fiber supplement over the next couple days.  Avoid raw or cooked vegetables and fruits if at all possible for the next couple days.  More starchy type foods such as banana, rice, applesauce, toast, etc. help to slow the GI tract down.  Having frequent bowel movements just creates more irritation to the bleeding sites.  Make sure that you are drinking adequate amounts of fluids.  Please rest for the next couple days.  Please do not do any significant chores or activity over the next 2 days.

## 2022-06-24 NOTE — TELEPHONE ENCOUNTER
"Call from wife and Min, see triage note  Min is on warfarin, which he was directed to restart 6/22 pm.  Is also bridging with lovenox twice daily.  Has not taken this morning lovenox dose  Call to gastroenterology, spoke with Afua Copeland agrees to ED, going to [facility] ED  Nurse Triage SBAR    Is this a 2nd Level Triage? YES, LICENSED PRACTITIONER REVIEW IS REQUIRED    Situation:   black runny stool and bright red blood in toilet, started yesterday afternoon    Background:   6/22/22 EGD, colonoscopy.  Restarted warfarin 6/22, bridging with lovenox twice daily, has not taken this am dose    Assessment:   Going to ED    Protocol Recommended Disposition:   Go To ED/UCC Now (Or To Office With PCP Approval)        Reason for Disposition    Sounds like a serious complication to the triager    Additional Information    Negative: Sounds like a life-threatening emergency to the triager    Negative: Chest pain    Negative: Difficulty breathing    Negative: Acting confused (e.g., disoriented, slurred speech) or excessively sleepy    Negative: Surgical incision symptoms and questions    Negative: Discomfort (pain, burning or stinging) when passing urine and male    Negative: Discomfort (pain, burning or stinging) when passing urine and female    Negative: Constipation    Negative: New or worsening leg (calf, thigh) pain    Negative: New or worsening leg swelling    Negative: Dizziness is severe, or persists > 24 hours after surgery    Negative: Symptoms arising from use of a urinary catheter (Moore or Coude)    Negative: Cast problems or questions    Negative: Medication question    Negative: Bright red, wide-spread, sunburn-like rash    Answer Assessment - Initial Assessment Questions  1. SYMPTOM: \"What's the main symptom you're concerned about?\" (e.g., pain, fever, vomiting)      Black runny stool with bright red blood, is filling toilet  2. ONSET: \"When did bledding  start?\"      Yesterday afternoon  3. SURGERY: " "\"What surgery was performed?\"      EGD, colonoscopy  4. DATE of SURGERY: \"When was surgery performed?\"       6/22/22  5. ANESTHESIA: \" What type of anesthesia did you have?\" (e.g., general, spinal, epidural, local)      MAC  6. PAIN: \"Is there any pain?\" If so, ask: \"How bad is it?\"  (Scale 1-10; or mild, moderate, severe)      Denies abdominal pain  7. FEVER: \"Do you have a fever?\" If so, ask: \"What is your temperature, how was it measured, and when did it start?\"      Denies fever  8. VOMITING: \"Is there any vomiting?\" If yes, ask: \"How many times?\"      Denies nausea/vomiting  9. BLEEDING: \"Is there any bleeding?\" If so, ask: \"How much?\" and \"Where?\"      Yes, rectally, fills toilet with bright red blood and black runny stool  10. OTHER SYMPTOMS: \"Do you have any other symptoms?\" (e.g., drainage from wound, painful urination, constipation)        Denies dizziness/lightheadedness, shortness of breath    Protocols used: POST-OP SYMPTOMS AND [id number]      "

## 2022-06-24 NOTE — ED NOTES
Patient asked for coffee, per provider ok. Coffee brought in and patient did not need anything else.

## 2022-06-24 NOTE — ED PROVIDER NOTES
History     Chief Complaint   Patient presents with     Rectal Bleeding     HPI  Min Andino is a 66 year old male who presents for evaluation of rectal bleeding starting last night.  He states that he had loose stool last night and there is some blood in the toilet.  Also some blood with wiping.  Denies any painful wiping.  No masses at the rectum.  Denies any recent fever or chills.  He did undergo endoscopy and colonoscopy 2 days ago at the [facility].  States that he wakes up every 2-3 hours throughout the night to urinate regularly which is his baseline.  Reports that he had very small amounts of loose stool each time he urinated and there was some blood in it.  He denies any rectal bleeding in between bathroom episodes.  Nothing in his undergarments.  He did have 1 episode of bright red blood in the toilet this morning.  He denies feeling lightheaded, dizzy, weak, syncope, chest pain, dyspnea, abdominal pain, nausea, vomiting, fever, or chills.  Has a chronic cough, for which is the reason he had the endoscopy per his report.  No change in this cough.  He started his warfarin the evening of his procedure once again.  Preprocedure INR was 1.2.  See below for details.              Excerpt from his colonoscopy on 6/22/22:    Impression:            - One 5 mm polyp in the transverse colon, removed with                          a cold snare. Resected and retrieved.                          - One 9 mm polyp in the sigmoid colon, removed with a                          cold snare. Resected and retrieved. Clip (MR                          conditional) was placed.                          - Diverticulosis in the entire examined colon.                          - The distal rectum and anal verge are normal on                          retroflexion view.   Recommendation:        - Await pathology results.                          - Repeat colonoscopy after studies are complete for                           surveillance based on pathology results.                          - Resume warfarin tonight unless signs of bleeding.                                                                                       Electronically signed by Dr. Dudley Boyle   _______________________   ENID BOYLE MD       Excerpt from his endoscopy on 6/22/22:        Impression:            - Normal esophagus. No active esophagitis, stricture                          or Lr's                          - Multiple gastric polyps.                          - A single erythematous mucosal papule (nodule) found                          in the stomach. Biopsied with apparentl complete                          removal of the lesion. Clip (MR conditional) was                          placed due to oozing and need for anticoagulation.                          Etiology unclear but unlikely of clinical                          significance. Pathology pending.                          - Normal examined duodenum. Biopsied to assess for                          possible celiac in light of chart mention or prior                          iron deficiency, however this is unlikely.                          No duodenal AVMs were seen.   Recommendation:        - Await pathology results.                          - Perform a colonoscopy today.                          - Resume warfarin tonight unless signs of bleeding.                                                                                       Electronically signed by Dr. Dudley Boyle   _______________________   ENID BOYLE MD   6/22/2022 2:42:23 PM         Biopsy results:    Case Report   Surgical Pathology Report                         Case: [id number]                                   Authorizing Provider:  Enid Boyle MD   Collected:           06/22/2022 01:46 PM           Ordering Location:     [facility]          Received:            06/22/2022 02:42 PM                                   Endoscopy                                                                     Pathologist:           Jason Jennings MD                                                                  Specimens:   A) - Other, duodenal biopsies                                                                        B) - Other, gastric nodule                                                                           C) - Other, Transverse colon polyp                                                                   D) - Other, Sigmoid colon polyp                                                            Final Diagnosis   A. DUODENAL BIOPSIES:  -Duodenal mucosa with intact villous architecture and no significant histologic abnormality  -Negative for celiac sprue and peptic duodenitis    B. GASTRIC NODULE, BIOPSY:  -Gastric oxyntic mucosa with mild chronic inactive gastritis, prominence of foveolar epithelium  -No H. pylori-like organisms identified on routine stain  -Negative for intestinal metaplasia and dysplasia     C. TRANSVERSE COLON POLYP, POLYPECTOMY:  -Tubular adenoma; negative for high-grade dysplasia     D. SIGMOID COLON POLYP, POLYPECTOMY:  -Tubular adenoma; negative for high-grade dysplasia                    Allergies:  No Known Allergies    Problem List:    Patient Active Problem List    Diagnosis Date Noted     Class 1 obesity due to excess calories with serious comorbidity and body mass index (BMI) of 34.0 to 34.9 in adult 03/08/2022     Priority: Medium     Anemia of chronic renal failure, unspecified CKD stage 08/27/2021     Priority: Medium     H/O mechanical aortic valve replacement 11/27/2020     Priority: Medium     Long term current use of anticoagulant therapy 11/27/2020     Priority: Medium     S/P insertion of iliac artery stent 11/11/2020     Priority: Medium     Left internal iliac  artery stenting for endoleak       Urinary urgency 10/12/2020     Priority: Medium     Congestive heart failure (H) 01/27/2020     Priority: Medium     Presence of aortocoronary bypass graft 12/22/2019     Priority: Medium     Pseudoaneurysm of femoral artery (H) 12/22/2019     Priority: Medium     Added automatically from request for surgery [id number]       History of endovascular stent graft for abdominal aortic aneurysm (AAA) 08/12/2019     Priority: Medium     Gastroesophageal reflux disease without esophagitis 08/01/2019     Priority: Medium     Hyperlipidemia 08/01/2019     Priority: Medium     Callus 06/22/2016     Priority: Medium     Tailors bunion 06/22/2016     Priority: Medium     Hypertension, goal below 140/90 09/28/2014     Priority: Medium     CKD (chronic kidney disease) stage 3, GFR 30-59 ml/min (H) 09/28/2014     Priority: Medium     Hx of aortic aneurysm 09/28/2014     Priority: Medium     Coronary atherosclerosis 05/08/2014     Priority: Medium     CAD (coronary artery disease): CABGSx / Aortic dissection Sx / Aotic mechanical valve surgery 4/2014       Aneurysm of iliac artery (H) 03/25/2014     Priority: Medium     Thoracic aortic aneurysm without rupture (H) 03/08/2014     Priority: Medium     Aortic regurgitation 12/24/2013     Priority: Medium     Delirium, acute 12/24/2013     Priority: Medium     Aortic dissection (H) 12/21/2013     Priority: Medium     Overview:   CT Chest 12/21/2013  Impression:   1. Aortic dissection involving the descending thoracic aorta and abdominal aorta (Inderjit type B).   2. No involvement of the ascending aorta. The arch vessels are patent and uninvolved by the dissection.   3. The abdominal component of the dissection is not optimally evaluated as it was scanned only in the delayed phase of enhancement. Nonetheless, the dissection appears to extend to the bifurcation. Extension into the iliac vessels is not well assessed. The dissection flap extends into the  SMA which is primarily fed by the false lumen.   4. Horseshoe kidney. No definite evidence of renal infarction although sensitivity is reduced as the kidney is only assessed in the delayed phase of enhancement.       Vitamin D deficiency 11/17/2011     Priority: Medium     CARDIOVASCULAR SCREENING; LDL GOAL LESS THAN 160 10/31/2010     Priority: Medium        Past Medical History:    Past Medical History:   Diagnosis Date     Anemia of chronic renal failure, unspecified CKD stage 8/27/2021     Aortic dissection (H)      Aortic root aneurysm (H)      Arthritis      C. difficile colitis      CKD (chronic kidney disease) stage 3, GFR 30-59 ml/min (H) 9/28/2014     Class 1 obesity due to excess calories with serious comorbidity and body mass index (BMI) of 34.0 to 34.9 in adult 3/8/2022     Connective tissue disease (H)      DVT (deep venous thrombosis) (H)      History of blood transfusion      Horseshoe kidney      Hypertension      S/P insertion of iliac artery stent 11/11/2020     Thoracic aortic aneurysm without rupture (H) 3/8/2014     Urinary urgency 10/12/2020     Valvular cardiomyopathy (H)        Past Surgical History:    Past Surgical History:   Procedure Laterality Date     ABDOMEN SURGERY       AORTIC VALVE REPLACEMENT  4/7/14     ARTHROSCOPY KNEE RT/LT  2005    left, repair meniscus     COLONOSCOPY       COLONOSCOPY N/A 6/22/2022    Procedure: COLONOSCOPY, WITH POLYPECTOMY AND BIOPSY;  Surgeon: Josh Morales MD;  Location:  GI     ESOPHAGOSCOPY, GASTROSCOPY, DUODENOSCOPY (EGD), COMBINED N/A 6/22/2022    Procedure: ESOPHAGOGASTRODUODENOSCOPY, WITH BIOPSY;  Surgeon: Josh Morales MD;  Location:  GI     REPAIR AORTIC ROOT  4/7/14    surgery followed by ECMO, vasopressor therapy     SOFT TISSUE SURGERY       THORACIC SURGERY       VASCULAR SURGERY       [facility] CABG, ARTERIAL, SINGLE  4/7/14     [facility] REPAIR CRUCIATE LIGAMENT,KNEE  1999    left       Family History:    Family History   Problem  Relation Age of Onset     Breast Cancer Mother 85     Cancer Father      Aneurysm Other         family hx     C.A.D. No family hx of      Diabetes No family hx of      Hypertension No family hx of        Social History:  Marital Status:   [2]  Social History     Tobacco Use     Smoking status: Never Smoker     Smokeless tobacco: Never Used   Vaping Use     Vaping Use: Never used   Substance Use Topics     Alcohol use: Yes     Comment: reports very little etoh use.      Drug use: No        Medications:    amLODIPine (NORVASC) 5 MG tablet  amoxicillin (AMOXIL) 500 MG capsule  aspirin (ASA) 81 MG chewable tablet  atorvastatin (LIPITOR) 40 MG tablet  bisacodyl (DULCOLAX) 5 MG EC tablet  carvedilol (COREG) 12.5 MG tablet  docusate sodium (COLACE) 100 MG capsule  enoxaparin ANTICOAGULANT (LOVENOX) 80 MG/0.8ML syringe  esomeprazole (NEXIUM) 40 MG DR capsule  Ferrous Sulfate (IRON PO)  losartan (COZAAR) 100 MG tablet  methylcellulose (CITRUCEL) 500 MG TABS tablet  multivitamin w/minerals (THERA-VIT-M) tablet  nitroGLYcerin (NITROSTAT) 0.4 MG sublingual tablet  pantoprazole (PROTONIX) 40 MG EC tablet  polyethylene glycol (GOLYTELY) 236 g suspension  Vitamin D, Cholecalciferol, 25 MCG (1000 UT) TABS  warfarin ANTICOAGULANT (COUMADIN) 1 MG tablet  warfarin ANTICOAGULANT (COUMADIN) 4 MG tablet          Review of Systems   All other systems reviewed and are negative.      Physical Exam   BP: 127/81  Pulse: 78  Temp: 98  F (36.7  C)  Resp: 18  Height: 182.9 cm (6')  Weight: 111.9 kg (246 lb 9.6 oz)  SpO2: 98 %      Physical Exam  Vitals and nursing note reviewed.   Constitutional:       General: He is not in acute distress.     Appearance: He is not diaphoretic.   HENT:      Head: Normocephalic and atraumatic.      Right Ear: External ear normal.      Left Ear: External ear normal.      Nose: Nose normal.      Mouth/Throat:      Pharynx: No oropharyngeal exudate.   Eyes:      General: No scleral icterus.        Right eye:  No discharge.         Left eye: No discharge.      Conjunctiva/sclera: Conjunctivae normal.      Pupils: Pupils are equal, round, and reactive to light.   Neck:      Thyroid: No thyromegaly.   Cardiovascular:      Rate and Rhythm: Normal rate and regular rhythm.      Heart sounds: Normal heart sounds. No murmur heard.  Pulmonary:      Effort: Pulmonary effort is normal. No respiratory distress.      Breath sounds: Normal breath sounds. No wheezing or rales.   Chest:      Chest wall: No tenderness.   Abdominal:      General: Bowel sounds are normal. There is no distension.      Palpations: Abdomen is soft. There is no mass.      Tenderness: There is no abdominal tenderness. There is no right CVA tenderness, left CVA tenderness, guarding or rebound.   Musculoskeletal:         General: No tenderness or deformity. Normal range of motion.      Cervical back: Normal range of motion and neck supple.   Lymphadenopathy:      Cervical: No cervical adenopathy.   Skin:     General: Skin is warm and dry.      Capillary Refill: Capillary refill takes less than 2 seconds.      Findings: No erythema or rash.   Neurological:      Mental Status: He is alert and oriented to person, place, and time.      Cranial Nerves: No cranial nerve deficit.   Psychiatric:         Behavior: Behavior normal.         Thought Content: Thought content normal.         ED Course                 Procedures              Critical Care time:  none               Results for orders placed or performed during the hospital encounter of 06/24/22 (from the past 24 hour(s))   CBC with platelets differential    Narrative    The following orders were created for panel order CBC with platelets differential.  Procedure                               Abnormality         Status                     ---------                               -----------         ------                     CBC with platelets and d...[[id number]]  Abnormal            Final result                  Please view results for these tests on the individual orders.   INR   Result Value Ref Range    INR 1.37 (H) 0.85 - 1.15   Basic metabolic panel   Result Value Ref Range    Sodium 139 133 - 144 mmol/L    Potassium 4.3 3.4 - 5.3 mmol/L    Chloride 110 (H) 94 - 109 mmol/L    Carbon Dioxide (CO2) 26 20 - 32 mmol/L    Anion Gap 3 3 - 14 mmol/L    Urea Nitrogen 26 7 - 30 mg/dL    Creatinine 1.50 (H) 0.66 - 1.25 mg/dL    Calcium 8.5 8.5 - 10.1 mg/dL    Glucose 107 (H) 70 - 99 mg/dL    GFR Estimate 51 (L) >60 mL/min/1.73m2   CBC with platelets and differential   Result Value Ref Range    WBC Count 4.3 4.0 - 11.0 10e3/uL    RBC Count 3.84 (L) 4.40 - 5.90 10e6/uL    Hemoglobin 12.1 (L) 13.3 - 17.7 g/dL    Hematocrit 36.3 (L) 40.0 - 53.0 %    MCV 95 78 - 100 fL    MCH 31.5 26.5 - 33.0 pg    MCHC 33.3 31.5 - 36.5 g/dL    RDW 14.1 10.0 - 15.0 %    Platelet Count 109 (L) 150 - 450 10e3/uL    % Neutrophils 70 %    % Lymphocytes 15 %    % Monocytes 11 %    % Eosinophils 3 %    % Basophils 1 %    % Immature Granulocytes 0 %    NRBCs per 100 WBC 0 <1 /100    Absolute Neutrophils 3.0 1.6 - 8.3 10e3/uL    Absolute Lymphocytes 0.6 (L) 0.8 - 5.3 10e3/uL    Absolute Monocytes 0.5 0.0 - 1.3 10e3/uL    Absolute Eosinophils 0.1 0.0 - 0.7 10e3/uL    Absolute Basophils 0.0 0.0 - 0.2 10e3/uL    Absolute Immature Granulocytes 0.0 <=0.4 10e3/uL    Absolute NRBCs 0.0 10e3/uL       Medications - No data to display    Assessments & Plan (with Medical Decision Making)     Rectal bleeding  S/P colonoscopic polypectomy     66 year old male presents with bright red blood in his loose stool 2 days status post endoscopy/colonoscopy with biopsies and polypectomies.  Biopsy results with tubular adenomas.  No cancer.  Denies abdominal pain, nausea, vomiting, fever, chills, lightheadedness, dizziness, or weakness.  Restarted his warfarin the night of his procedure.  Preprocedure INR was 1.2.  On exam blood pressure 127/81, temperature 98.0, pulse 78,  respiration 18, oxygen saturation 98% on room air.  Patient is in no acute distress.  Good skin color.  No skin rashes.  Good bowel sounds.  No abdominal tenderness with palpation.  No exam abnormalities as noted above.  IV was established.  Laboratory levels display stable CBC with a hemoglobin of 12.1.  10 days ago his hemoglobin was 12.4.  WBC normal at 4300.  Platelet count mildly low at 109,000.  Basic metabolic panel with improved creatinine down to 1.5 from his baseline which is typically higher than that.  Electrolytes normal.  INR is slightly increasing up to 1.37.  Vital signs remained stable throughout his ED evaluation.  No hypotension or tachycardia.  He did not have any rectal bleeding while he was here in the ED.   Reassured the patient.  Bleeding with bowel movements are likely related to the recent polypectomy.  He does not have any bleeding in between bowel movements.  Hemodynamically stable.  Certainly his anticoagulant status complicates things.  This should improve with time.  Strict ED return instructions reviewed with the patient.  Encouraged him to avoid his fiber supplement avoid foods that can cause diarrhea.  Eat more constipating foods.  Rest for the next 2 days.  If he develops more systemic symptoms and increased bleeding, I encouraged him to consider returning to the [facility] emergency department given the fact that Dr. Morales's team may need to evaluate him as we do not have GI staff here that can emergently scope someone.  Patient was in agreement with this plan and was suitable for discharge.     I have reviewed the nursing notes.    I have reviewed the findings, diagnosis, plan and need for follow up with the patient.       New Prescriptions    No medications on file       Final diagnoses:   Rectal bleeding   S/P colonoscopic polypectomy     Disclaimer: This note consists of symbols derived from keyboarding, dictation and/or voice recognition software. As a result,  there may be errors in the script that have gone undetected. Please consider this when interpreting information found in this chart.      6/24/2022   [facility] EMERGENCY DEPT     Pierre Radford PA-C  06/24/22 1205

## 2022-06-24 NOTE — PROGRESS NOTES
"ANTICOAGULATION  MANAGEMENT: Discharge Review    Min Andino chart reviewed for anticoagulation continuity of care    Emergency room visit on 6/24 for rectal bleeding.    Discharge disposition: Home    Results:    Recent labs: (last 7 days)     06/22/22  1323 06/24/22  1112   INR 1.2* 1.37*     Anticoagulation inpatient management:     not applicable \"Restarted his warfarin the night of his procedure.  Preprocedure INR was 1.2.\"  Held for procedure as planned and restarted as planned--bleeding noted was expected--see notes    Anticoagulation discharge instructions:     Warfarin dosing: home regimen continued  ?  Bridging: yes--\"Resume enoxaparin (Lovenox) ~ 24-72 hrs post procedure when okay with provider doing procedure. Continue until INR >= 2.3 or INR >= 2.0 x 2 ([facility] protocol goal INR 2-3)  ? Recheck INR ~5 days after resuming warfarin\"  ? \"Bleeding with bowel movements are likely related to the recent polypectomy.  He does not have any bleeding in between bowel movements.\"      INR goal change: No      Medication changes affecting anticoagulation: No    Additional factors affecting anticoagulation: No     PLAN     No adjustment to anticoagulation plan needed    Patient not contacted    No adjustment to Anticoagulation Calendar was required    Tyesha Zambrano RN  "

## 2022-06-24 NOTE — ED NOTES
Is on blood thinners and did not take his Lovenox this morning, trying to bridge from Coumadin from procedure.

## 2022-06-24 NOTE — ED TRIAGE NOTES
States had a colonoscopy and endoscopy on Wednesday..started having bloody stools yesterday evening.      Triage Assessment     Row Name 06/24/22 1019       Triage Assessment (Adult)    Airway WDL WDL       Respiratory WDL    Respiratory WDL WDL       Cardiac WDL    Cardiac WDL WDL       Cognitive/Neuro/Behavioral WDL    Cognitive/Neuro/Behavioral WDL WDL

## 2022-06-28 DIAGNOSIS — K21.9 GASTROESOPHAGEAL REFLUX DISEASE WITHOUT ESOPHAGITIS: Primary | ICD-10-CM

## 2022-06-29 ENCOUNTER — TELEPHONE (OUTPATIENT)
Dept: FAMILY MEDICINE | Facility: CLINIC | Age: 67
End: 2022-06-29

## 2022-06-29 ENCOUNTER — ANTICOAGULATION THERAPY VISIT (OUTPATIENT)
Dept: ANTICOAGULATION | Facility: CLINIC | Age: 67
End: 2022-06-29

## 2022-06-29 DIAGNOSIS — Z95.2 H/O MECHANICAL AORTIC VALVE REPLACEMENT: Primary | ICD-10-CM

## 2022-06-29 DIAGNOSIS — Z79.01 LONG TERM CURRENT USE OF ANTICOAGULANT THERAPY: ICD-10-CM

## 2022-06-29 LAB — INR HOME MONITORING: 2.1 (ref 2–3)

## 2022-06-29 NOTE — TELEPHONE ENCOUNTER
Left a voicemail message stating that according to instructions, he should have restarted warfarin last week on the evening of the procedure. If he didn't please call and update us.    According to directed instructions, pt should have taken lovenox injection this morning and due for INR check today 6/29/22.    Pt to call back to [facility] if further questions

## 2022-06-29 NOTE — TELEPHONE ENCOUNTER
Left message for patient to return call.    Mica Browning RN    [facility] Anticoagulation [facility]   [phone]

## 2022-06-29 NOTE — TELEPHONE ENCOUNTER
Reason for Call:  Other call back    Detailed comments: Pt states that he has additional questions for INR nurse and would like to request a call back. Thank you.    Phone Number Patient can be reached at: Home number on file [phone] (home)    Best Time: Any    Can we leave a detailed message on this number? YES    Call taken on 6/29/2022 at 1:12 PM by Shantal Booth

## 2022-06-29 NOTE — PROGRESS NOTES
ANTICOAGULATION MANAGEMENT     Min Andino 66 year old male is on warfarin with therapeutic INR result. (Goal INR 2.0-3.0)    Recent labs: (last 7 days)     06/29/22  0000   INR 2.1       ASSESSMENT       Source(s): Chart Review and Patient/Caregiver Call       Warfarin doses taken: Warfarin taken as instructed    Diet: No new diet changes identified    New illness, injury, or hospitalization: No    Medication/supplement changes: patient took last dose of Lovenox yesterday; patient will check INR on Friday to ensure INR remains in range    Signs or symptoms of bleeding or clotting: Yes: patient still reporting some blood with bowel movement(expected-see ER visit) patient does endorse the blood has decreased and only happens with BM.    Previous INR: Subtherapeutic    Additional findings: None       PLAN     Recommended plan for temporary change(s) affecting INR     Dosing Instructions: continue your current warfarin dose with next INR in 1 week       Summary  As of 6/29/2022    Full warfarin instructions:  2 mg every Tue, Sat; 4 mg all other days   Next INR check:  7/6/2022             Telephone call with min who verbalizes understanding and agrees to plan and who agrees to plan and repeated back plan correctly    Patient to recheck with home meter    Education provided: Please call back if any changes to your diet, medications or how you've been taking warfarin    Plan made per [facility] anticoagulation protocol    Tyesha Zambrano, RN  Anticoagulation Clinic  6/29/2022    _______________________________________________________________________     Anticoagulation Episode Summary     Current INR goal:  2.0-3.0   TTR:  88.8 % (1 y)   Target end date:  11/20/2050   Send INR reminders to:  [facility]    Indications    Mechanical heart valve present (Resolved) [Z95.2]  H/O mechanical aortic valve replacement [Z95.2]  Long term current use of anticoagulant therapy [Z79.01]  Anticoagulation monitoring  INR range  2-3 (Resolved) [Z79.01]           Comments:  11/8/21 - [employer] Home Monitor, Okay to Manage By Exception         Anticoagulation Care Providers     Provider Role Specialty Phone number    John Blair MD Referring Internal Medicine [phone]

## 2022-06-29 NOTE — TELEPHONE ENCOUNTER
Reason for Call:  Other anti-coag    Detailed comments: Patient called for instructions on restarting medication after going off for a procedure & taking Lovenox. Please advise.    Phone Number Patient can be reached at: Home number on file [phone] (home)    Best Time: any    Can we leave a detailed message on this number? YES    Call taken on 6/29/2022 at 9:34 AM by Doreen Harris

## 2022-06-30 ENCOUNTER — ANTICOAGULATION THERAPY VISIT (OUTPATIENT)
Dept: ANTICOAGULATION | Facility: CLINIC | Age: 67
End: 2022-06-30

## 2022-06-30 DIAGNOSIS — Z79.01 LONG TERM CURRENT USE OF ANTICOAGULANT THERAPY: ICD-10-CM

## 2022-06-30 DIAGNOSIS — Z95.2 H/O MECHANICAL AORTIC VALVE REPLACEMENT: Primary | ICD-10-CM

## 2022-06-30 LAB — INR HOME MONITORING: 2.4 (ref 2–3)

## 2022-06-30 NOTE — PROGRESS NOTES
ANTICOAGULATION MANAGEMENT     Min Hill Hamlinadri 66 year old male is on warfarin with therapeutic INR result. (Goal INR 2.0-3.0)    Recent labs: (last 7 days)     06/30/22  0000   INR 2.4       ASSESSMENT       Source(s): Chart Review and Patient/Caregiver Call       Warfarin doses taken: Warfarin taken as instructed    Diet: No new diet changes identified    New illness, injury, or hospitalization: No    Medication/supplement changes: None noted    Signs or symptoms of bleeding or clotting: No    Previous INR: Therapeutic last visit; previously outside of goal range    Additional findings: Patient off lovenox, INR remains steady. returning to maintenance dosing and recheck 1 week.       PLAN     Recommended plan for no diet, medication or health factor changes affecting INR     Dosing Instructions: continue your current warfarin dose with next INR in 1 week       Summary  As of 6/30/2022    Full warfarin instructions:  2 mg every Tue, Sat; 4 mg all other days   Next INR check:  7/7/2022             Telephone call with min who verbalizes understanding and agrees to plan    Patient to recheck with home meter    Education provided: Please call back if any changes to your diet, medications or how you've been taking warfarin    Plan made per [facility] anticoagulation protocol    Shantal Hoover, RN  Anticoagulation Clinic  6/30/2022    _______________________________________________________________________     Anticoagulation Episode Summary     Current INR goal:  2.0-3.0   TTR:  88.8 % (1 y)   Target end date:  11/20/2050   Send INR reminders to:  RIVER MCLAUGHLIN    Indications    Mechanical heart valve present (Resolved) [Z95.2]  H/O mechanical aortic valve replacement [Z95.2]  Long term current use of anticoagulant therapy [Z79.01]  Anticoagulation monitoring  INR range 2-3 (Resolved) [Z79.01]           Comments:  11/8/21 - [employer] Home Monitor, Okay to Manage By Exception         Anticoagulation Care Providers      Provider Role Specialty Phone number    John Blair MD Referring Internal Medicine [phone]

## 2022-06-30 NOTE — TELEPHONE ENCOUNTER
Called patient and he notes that he got another INR nurse yesterday evening and his questions were answered.    Mica Browning RN    [facility]

## 2022-07-06 ENCOUNTER — VIRTUAL VISIT (OUTPATIENT)
Dept: GASTROENTEROLOGY | Facility: CLINIC | Age: 67
End: 2022-07-06
Payer: MEDICARE

## 2022-07-06 DIAGNOSIS — R05.3 CHRONIC COUGH: ICD-10-CM

## 2022-07-06 DIAGNOSIS — R09.89 THROAT CLEARING: Primary | ICD-10-CM

## 2022-07-06 DIAGNOSIS — K29.70 GASTRITIS WITHOUT BLEEDING, UNSPECIFIED CHRONICITY, UNSPECIFIED GASTRITIS TYPE: ICD-10-CM

## 2022-07-06 DIAGNOSIS — D36.9 TUBULAR ADENOMA: ICD-10-CM

## 2022-07-06 PROCEDURE — 99214 OFFICE O/P EST MOD 30 MIN: CPT | Mod: 95 | Performed by: INTERNAL MEDICINE

## 2022-07-06 NOTE — PROGRESS NOTES
huber is a 66 year old who is being evaluated via a billable video visit.      How would you like to obtain your AVS? MyChart  If the video visit is dropped, the invitation should be resent by: Send to e-mail at: [email or web address]  Will anyone else be joining your video

## 2022-07-06 NOTE — PROGRESS NOTES
"       HPI:    Min presents today for a video visit with his wife to follow-up on recent endoscopies.  EGD with gastritis but otherwise unremarkable duodenum and esophagus.  No evidence of Lr's or esophagitis.  Colonoscopy with 2 tubular adenomas that were removed. Did have some bleeding after colonoscopy - seen in ER - hemoglobin was stable and symptoms resolved.  No bleeding since.    Called in May with worsening throat clearing - did try switching to protonix twice daily but has noticed no change in his symptoms of throat clearing/cough.  Thinks symptoms are the worst they have ever been at present.  Has also over the winter developed nasal congestion which seems to have exacerbated his cough.  Also frequently has to blow his nose.  Is wondering if this is contributing.    Past Medical History:   Diagnosis Date     Anemia of chronic renal failure, unspecified CKD stage 8/27/2021     Aortic dissection (H)      Aortic root aneurysm (H)      Arthritis      C. difficile colitis     April 2014 following surgery     CKD (chronic kidney disease) stage 3, GFR 30-59 ml/min (H) 9/28/2014     Class 1 obesity due to excess calories with serious comorbidity and body mass index (BMI) of 34.0 to 34.9 in adult 3/8/2022     Connective tissue disease (H)     \"probable\" per [facility] Notes     DVT (deep venous thrombosis) (H)     April 2014 following surgery     History of blood transfusion      Horseshoe kidney      Hypertension      S/P insertion of iliac artery stent 11/11/2020    Left internal iliac artery stenting for endoleak     Thoracic aortic aneurysm without rupture (H) 3/8/2014     Urinary urgency 10/12/2020     Valvular cardiomyopathy (H)        Past Surgical History:   Procedure Laterality Date     ABDOMEN SURGERY       AORTIC VALVE REPLACEMENT  4/7/14     ARTHROSCOPY KNEE RT/LT  2005    left, repair meniscus     COLONOSCOPY       COLONOSCOPY N/A 6/22/2022    Procedure: COLONOSCOPY, WITH POLYPECTOMY AND BIOPSY; "  Surgeon: Josh Morales MD;  Location:  GI     ESOPHAGOSCOPY, GASTROSCOPY, DUODENOSCOPY (EGD), COMBINED N/A 6/22/2022    Procedure: ESOPHAGOGASTRODUODENOSCOPY, WITH BIOPSY;  Surgeon: Josh Morales MD;  Location:  GI     REPAIR AORTIC ROOT  4/7/14    surgery followed by ECMO, vasopressor therapy     SOFT TISSUE SURGERY       THORACIC SURGERY       VASCULAR SURGERY       [facility] CABG, ARTERIAL, SINGLE  4/7/14     [facility] REPAIR CRUCIATE LIGAMENT,KNEE  1999    left       Family History   Problem Relation Age of Onset     Breast Cancer Mother 85     Cancer Father      Aneurysm Other         family hx     C.A.D. No family hx of      Diabetes No family hx of      Hypertension No family hx of        Social History     Tobacco Use     Smoking status: Never Smoker     Smokeless tobacco: Never Used   Substance Use Topics     Alcohol use: Yes     Comment: reports very little etoh use.         O:    Gen: no acute distress  HEENT: NCAT  Neck: normal ROM  Resp: nonlabored breathing  Neuro: no gross deficits  Psych: appropriate mood and affect    Assessment and Plan:    # chronic throat clearing - initially with good response to PPI therapy but now with worsening symptoms over the past few months.  EGD unrevealing.  No improvement with increase to BID PPI and switch to protonix.  Now with new symptoms of nasal congestion - question if post nasal drip could be contributing.  Will have patient re-evaluated by ENT (last seen years ago).  If persistent symptoms and ENT eval unrevealing could consider BRAVO placement.    # history of tubular adenomas - repeat c-scope 2029    # anemia - chronic - likely anemia of chronic disease. Stable recently.    RTC after ENT eval if persistent symptoms    Padmaja Chavira, DO     Video-Visit Details     Type of service:  Video Visit     Video Start Time: 9:30 AM  Video End Time (time video stopped): 9:45 AM    Originating Location (pt. Location): home   Distant Location (provider  location):  [facility]      Mode of Communication:  Video Conference via [employer]

## 2022-07-08 ENCOUNTER — DOCUMENTATION ONLY (OUTPATIENT)
Dept: ANTICOAGULATION | Facility: CLINIC | Age: 67
End: 2022-07-08

## 2022-07-08 DIAGNOSIS — Z95.2 H/O MECHANICAL AORTIC VALVE REPLACEMENT: Primary | ICD-10-CM

## 2022-07-08 DIAGNOSIS — Z79.01 LONG TERM CURRENT USE OF ANTICOAGULANT THERAPY: ICD-10-CM

## 2022-07-08 LAB — INR HOME MONITORING: 2.2 (ref 2–3)

## 2022-07-08 NOTE — PROGRESS NOTES
ANTICOAGULATION  MANAGEMENT-Home Monitor Managed by Exception    Min Hill Andino 66 year old male is on warfarin with therapeutic INR result. (Goal INR 2.0-3.0)    Recent labs: (last 7 days)     07/08/22  0000   INR 2.2         Previous INR was Therapeutic    Medication, diet, health changes since last INR:chart reviewed; none identified    Contacted within the last 12 weeks by phone on 6/30      [place]     Min was NOT contacted regarding therapeutic result today per home monitoring policy manage by exception agreement.   Current warfarin dose is to be continued:     Summary  As of 7/8/2022    Full warfarin instructions:  2 mg every Tue, Sat; 4 mg all other days   Next INR check:  7/22/2022           ?   Tyesha Zambrano, RN  Anticoagulation Clinic  7/8/2022    _______________________________________________________________________     Anticoagulation Episode Summary     Current INR goal:  2.0-3.0   TTR:  88.8 % (1 y)   Target end date:  11/20/2050   Send INR reminders to:  RIVER MCLAUGHLIN    Indications    Mechanical heart valve present (Resolved) [Z95.2]  H/O mechanical aortic valve replacement [Z95.2]  Long term current use of anticoagulant therapy [Z79.01]  Anticoagulation monitoring  INR range 2-3 (Resolved) [Z79.01]           Comments:  11/8/21 - [employer] Home Monitor, Okay to Manage By Exception         Anticoagulation Care Providers     Provider Role Specialty Phone number    John Blair MD Referring Internal Medicine [phone]

## 2022-07-12 ENCOUNTER — VIRTUAL VISIT (OUTPATIENT)
Dept: FAMILY MEDICINE | Facility: CLINIC | Age: 67
End: 2022-07-12
Payer: MEDICARE

## 2022-07-12 DIAGNOSIS — U07.1 INFECTION DUE TO 2019 NOVEL CORONAVIRUS: ICD-10-CM

## 2022-07-12 DIAGNOSIS — N25.81 SECONDARY RENAL HYPERPARATHYROIDISM (H): ICD-10-CM

## 2022-07-12 DIAGNOSIS — I71.03 DISSECTION OF THORACOABDOMINAL AORTA (H): ICD-10-CM

## 2022-07-12 DIAGNOSIS — R05.8 UPPER AIRWAY COUGH SYNDROME: Primary | ICD-10-CM

## 2022-07-12 PROCEDURE — 99213 OFFICE O/P EST LOW 20 MIN: CPT | Mod: CS | Performed by: INTERNAL MEDICINE

## 2022-07-12 RX ORDER — CEFDINIR 300 MG/1
300 CAPSULE ORAL 2 TIMES DAILY
Qty: 20 CAPSULE | Refills: 1 | Status: SHIPPED | OUTPATIENT
Start: 2022-07-12 | End: 2022-11-18

## 2022-07-12 RX ORDER — GUAIFENESIN 600 MG/1
600 TABLET, EXTENDED RELEASE ORAL 2 TIMES DAILY
Qty: 20 TABLET | Refills: 1 | Status: SHIPPED | OUTPATIENT
Start: 2022-07-12 | End: 2022-11-18

## 2022-07-12 RX ORDER — DEXAMETHASONE 2 MG/1
TABLET ORAL
Qty: 20 TABLET | Refills: 0 | Status: SHIPPED | OUTPATIENT
Start: 2022-07-12 | End: 2022-11-18

## 2022-07-12 NOTE — PATIENT INSTRUCTIONS
At [facility], we strive to deliver an exceptional experience to you, every time we see you. If you receive a survey, please complete it as we do value your feedback.  If you have MyChart, you can expect to receive results automatically within 24 hours of their completion.  Your provider will send a note interpreting your results as well.   If you do not have MyChart, you should receive your results in about a week by mail.    Your care team:                            Family Medicine Internal Medicine   MD Ankit Munoz MD Shantel Branch-Fleming, MD Srinivasa Vaka, MD Katya Belousova, JANIA Limon CNP, MD ([facility]   Hunter Correa, MD Naya Euceda MD Amelia Massimini APRN CNP Kim Thein, APRN CNP Bethany Templen, MD             Clinic hours: Monday - Thursday 7 am-6 pm; Fridays 7 am-5 pm.   Urgent care: Monday - Friday 10 am- 8 pm; Saturday and Sunday 9 am-5 pm.    Clinic: [phone]       [facility]: Monday - Thursday 8 am - 7 pm; Friday 8 am - 6 pm  [facility]: [phone]

## 2022-07-12 NOTE — PROGRESS NOTES
This is a 66 year old male who is being evaluated via a billable telephone visit.      How would you like to obtain your AVS? MyChart  If the video visit is dropped, the invitation should be resent by: Send to e-mail at: [phone]  Will anyone else be joining your video visit? Yes, wife in same room      SUBJECTIVE   This is a 66 year old male with history of mechanical aortic valve replacement (anticoagulated with Warfarin), accompanied by his spouse, presenting for the following health issues:    COVID-19 Symptom Review  How many days ago did these symptoms start? About July 4, 2022 (when patient's chronic cough escalated).  Are any of the following symptoms significant for you?    New or worsening difficulty breathing? No    Worsening cough? Yes, it's a dry cough, but escalated last July 4, 2022.    Fever or chills? No    Headache: YES, few days ago.    Sore throat: YES, Thursday and Friday.    Chest pain: No    Diarrhea: No    Body aches? YES    Malaise: 7 days.    What treatments has patient tried? Acetaminophen with minimal relief of HA  Does patient live in a nursing home, group home, or shelter? No  Does patient have a way to get food/medications during quarantined? Yes, I have a friend or family member  who can help me (wife).     DIAGNOSTICS  Contains abnormal data COVID-19 VIRUS (CORONAVIRUS) BY PCR (EXTERNAL RESULT)  Order: [id number]   Ref Range & Units 2 wk ago   COVID-19 Virus by PCR (External Result) Not Detected Detected Abnormal     Source  Nares, left and right    [facility]ing [facility]     Narrative  Performed by [facility]  Test performed by Transcription Mediated Amplification. TMA has been shown to be equivalent to commercial real-time PCR tests. This test has been authorized by the FDA under Emergency Use Authorization (EUA) for use by authorized laboratories.  Specimen Collected: 07/12/22  8:21 AM Last Resulted: 07/12/22 10:13 PM   [facility]  Result Received: 07/18/22  2:47 PM         ASSESSMENT/PLAN  Upper airway cough syndrome  - dexamethasone (DECADRON) 2 MG tablet; Take 3 tabs by mouth daily x 3 days, then 2 tabs daily x 3 days, then 1 tab daily x 3 days, then 1/2 tab daily x 3 days.  - guaiFENesin (MUCINEX) 600 MG 12 hr tablet; Take 1 tablet (600 mg) by mouth 2 times daily  - cefdinir (OMNICEF) 300 MG capsule; Take 1 capsule (300 mg) by mouth 2 times daily  - XR Sinus Complete G/E 3 Views; Future  - XR Chest 2 Views; Future    Infection due to 2019 novel coronavirus  Not treated with anti-viral medications due to onset of symptoms of more than 5 days.    Disposition:  Follow-up in 4 weeks.    Ankit Alaniz MD  Internal Medicine

## 2022-07-13 ENCOUNTER — ANCILLARY PROCEDURE (OUTPATIENT)
Dept: GENERAL RADIOLOGY | Facility: OTHER | Age: 67
End: 2022-07-13
Payer: MEDICARE

## 2022-07-13 DIAGNOSIS — R05.8 UPPER AIRWAY COUGH SYNDROME: ICD-10-CM

## 2022-07-13 PROCEDURE — 71046 X-RAY EXAM CHEST 2 VIEWS: CPT | Mod: TC | Performed by: RADIOLOGY

## 2022-07-13 NOTE — TELEPHONE ENCOUNTER
FUTURE VISIT INFORMATION      FUTURE VISIT INFORMATION:    Date: 10/11/22    Time: 8AM    Location: [facility]  REFERRAL INFORMATION:    Referring provider: Padmaja Chavira DO    Referring providers clinic:  [facility]     Reason for visit/diagnosis  Per Pt, dx Throat clearing [R68.89] and cough referring from eduardo Boyd in epic    RECORDS REQUESTED FROM:       Clinic name Comments Records Status Imaging Status   [facility]  7/6/22 note and referral from Padmaja Chavira DO [facility]    Imaging 7/13/22 XR CHEST  [facility] PACS   procedures 6/22/22 Upper Gi Endoscopy  [facility]  7/12/22 note from Ankit Alaniz MD   [facility]    ENT [facility]   11/22/16 note from Devonte Benoit MD Care everywhere     [facility]   Fax: [phone] 8/2/22 CT Chest -pacs  11/1/21 CT Chest -pacs  4/19/21 CT Chest -pacs  9/17/18 MR Chest   12/20/17 MR CHEST -pacs [facility] everywhere req 9/8/22 - PACS   [facility]  12/10/2020 CT HEAD  Care everywhere  req 9/8/22  -PACS           9/8/22 6:25PM sent a fax to [facility] and [facility] for images - Amay   9/21/22 11:19AM attach all images that was received in pacs to pt. Still missing 9/17/18 MR Chest image from [facility]. - Shama   * 9/30/22 7:46 AM Faxed urg req to [facility] for missing MRI to be pushed to [place] [facility]. - Gricelda  10/5/22 7:30AM images received in PACS - Amay

## 2022-07-18 ENCOUNTER — ANTICOAGULATION THERAPY VISIT (OUTPATIENT)
Dept: ANTICOAGULATION | Facility: CLINIC | Age: 67
End: 2022-07-18

## 2022-07-18 ENCOUNTER — LAB (OUTPATIENT)
Dept: LAB | Facility: OTHER | Age: 67
End: 2022-07-18
Payer: MEDICARE

## 2022-07-18 DIAGNOSIS — Z79.01 LONG TERM CURRENT USE OF ANTICOAGULANT THERAPY: ICD-10-CM

## 2022-07-18 DIAGNOSIS — D64.9 ANEMIA, UNSPECIFIED TYPE: ICD-10-CM

## 2022-07-18 DIAGNOSIS — N18.31 STAGE 3A CHRONIC KIDNEY DISEASE (H): ICD-10-CM

## 2022-07-18 DIAGNOSIS — Z95.2 H/O MECHANICAL AORTIC VALVE REPLACEMENT: Primary | ICD-10-CM

## 2022-07-18 LAB
ALBUMIN SERPL-MCNC: 3.8 G/DL (ref 3.4–5)
ANION GAP SERPL CALCULATED.3IONS-SCNC: 3 MMOL/L (ref 3–14)
BUN SERPL-MCNC: 45 MG/DL (ref 7–30)
CALCIUM SERPL-MCNC: 8.7 MG/DL (ref 8.5–10.1)
CHLORIDE BLD-SCNC: 110 MMOL/L (ref 94–109)
CO2 SERPL-SCNC: 25 MMOL/L (ref 20–32)
CREAT SERPL-MCNC: 1.72 MG/DL (ref 0.66–1.25)
CREAT UR-MCNC: 71 MG/DL
FERRITIN SERPL-MCNC: 144 NG/ML (ref 26–388)
GFR SERPL CREATININE-BSD FRML MDRD: 43 ML/MIN/1.73M2
GLUCOSE BLD-MCNC: 140 MG/DL (ref 70–99)
HGB BLD-MCNC: 13.5 G/DL (ref 13.3–17.7)
INR HOME MONITORING: 3.9 (ref 2–3)
IRON SATN MFR SERPL: 21 % (ref 15–46)
IRON SERPL-MCNC: 63 UG/DL (ref 35–180)
PHOSPHATE SERPL-MCNC: 2.7 MG/DL (ref 2.5–4.5)
POTASSIUM BLD-SCNC: 5.5 MMOL/L (ref 3.4–5.3)
PROT UR-MCNC: 0.18 G/L
PROT/CREAT 24H UR: 0.25 G/G CR (ref 0–0.2)
SODIUM SERPL-SCNC: 138 MMOL/L (ref 133–144)
TIBC SERPL-MCNC: 304 UG/DL (ref 240–430)

## 2022-07-18 PROCEDURE — 85018 HEMOGLOBIN: CPT

## 2022-07-18 PROCEDURE — 83970 ASSAY OF PARATHORMONE: CPT

## 2022-07-18 PROCEDURE — 80069 RENAL FUNCTION PANEL: CPT

## 2022-07-18 PROCEDURE — 83550 IRON BINDING TEST: CPT

## 2022-07-18 PROCEDURE — 84156 ASSAY OF PROTEIN URINE: CPT

## 2022-07-18 PROCEDURE — 82728 ASSAY OF FERRITIN: CPT

## 2022-07-18 PROCEDURE — 36415 COLL VENOUS BLD VENIPUNCTURE: CPT

## 2022-07-18 NOTE — PROGRESS NOTES
ANTICOAGULATION MANAGEMENT     Min Andino 66 year old male is on warfarin with supratherapeutic INR result. (Goal INR 2.0-3.0)    Recent labs: (last 7 days)     07/18/22  0000   INR 3.9*       ASSESSMENT       Source(s): Chart Review and Patient/Caregiver Call       Warfarin doses taken: Warfarin taken as instructed    Diet: No new diet changes identified    New illness, injury, or hospitalization: Yes: Covid-19 positive    Medication/supplement changes: Dexamethasone taper completes 7/30; cefdinir is on standby if needed.    Signs or symptoms of bleeding or clotting: No    Previous INR: Therapeutic last 2(+) visits    Additional findings: None       PLAN     Recommended plan for temporary change(s) affecting INR     Dosing Instructions: hold dose then continue your current warfarin dose with next INR in 2 days       Summary  As of 7/18/2022    Full warfarin instructions:  7/18: Hold; Otherwise 2 mg every Tue, Sat; 4 mg all other days   Next INR check:  7/21/2022             Telephone call with min who verbalizes understanding and agrees to plan and who agrees to plan and repeated back plan correctly    Patient to recheck with home meter    Education provided: Please call back if any changes to your diet, medications or how you've been taking warfarin and Potential interaction between warfarin and Dexamethasone    Plan made per [facility] anticoagulation protocol    Tyesha Zambrano, RN  Anticoagulation Clinic  7/18/2022    _______________________________________________________________________     Anticoagulation Episode Summary     Current INR goal:  2.0-3.0   TTR:  87.3 % (1 y)   Target end date:  11/20/2050   Send INR reminders to:  RIVER MCLAUGHLIN    Indications    Mechanical heart valve present (Resolved) [Z95.2]  H/O mechanical aortic valve replacement [Z95.2]  Long term current use of anticoagulant therapy [Z79.01]  Anticoagulation monitoring  INR range 2-3 (Resolved) [Z79.01]           Comments:  11/8/21  - [employer] Home Monitor, Okay to Manage By Exception         Anticoagulation Care Providers     Provider Role Specialty Phone number    John Blair MD Referring Internal Medicine [phone]

## 2022-07-19 LAB — PTH-INTACT SERPL-MCNC: 70 PG/ML (ref 15–65)

## 2022-07-20 NOTE — TELEPHONE ENCOUNTER
Reason for Call:  Other call back    Detailed comments: Pt wife has called an would like a call back with questions regrd to the pt Bridging process     Phone Number Patient can be reached at: Cell number on file:    Telephone Information:   Mobile [phone]       Best Time: Anytime    Can we leave a detailed message on this number? YES    Call taken on 6/15/2022 at 12:43 PM by Kelley Colindres     As certified below, I, or a nurse practitioner or physician assistant working with me, had a face-to-face encounter that meets the physician face-to-face encounter requirements.

## 2022-07-21 ENCOUNTER — ANTICOAGULATION THERAPY VISIT (OUTPATIENT)
Dept: ANTICOAGULATION | Facility: CLINIC | Age: 67
End: 2022-07-21

## 2022-07-21 DIAGNOSIS — Z79.01 LONG TERM CURRENT USE OF ANTICOAGULANT THERAPY: ICD-10-CM

## 2022-07-21 DIAGNOSIS — Z95.2 H/O MECHANICAL AORTIC VALVE REPLACEMENT: Primary | ICD-10-CM

## 2022-07-21 LAB — INR HOME MONITORING: 2.6 (ref 2–3)

## 2022-07-21 NOTE — PROGRESS NOTES
ANTICOAGULATION MANAGEMENT     Min Andino 66 year old male is on warfarin with therapeutic INR result. (Goal INR 2.0-3.0)    Recent labs: (last 7 days)     07/21/22  0000   INR 2.6       ASSESSMENT       Source(s): Chart Review and Patient/Caregiver Call       Warfarin doses taken: Warfarin taken as instructed    Diet: No new diet changes identified    New illness, injury, or hospitalization: Yes: Recent Covid     Medication/supplement changes: Last 4 days of tapering dose (1/2 tab) of dexamethasone    Signs or symptoms of bleeding or clotting: No    Previous INR: Supratherapeutic    Additional findings: Feeling well. Did not need to use Antibiotics on hand       PLAN     Recommended plan for temporary change(s) affecting INR     Dosing Instructions: continue your current warfarin dose with next INR in 1 week       Summary  As of 7/21/2022    Full warfarin instructions:  2 mg every Tue, Sat; 4 mg all other days   Next INR check:  7/28/2022             Telephone call with min who verbalizes understanding and agrees to plan    Patient to recheck with home meter    Education provided: Please call back if any changes to your diet, medications or how you've been taking warfarin    Plan made per [facility] anticoagulation protocol    Vonda Izaguirre, RN  Anticoagulation Clinic  7/21/2022    _______________________________________________________________________     Anticoagulation Episode Summary     Current INR goal:  2.0-3.0   TTR:  86.8 % (1 y)   Target end date:  11/20/2050   Send INR reminders to:  RIVER MCLAUGHLIN    Indications    Mechanical heart valve present (Resolved) [Z95.2]  H/O mechanical aortic valve replacement [Z95.2]  Long term current use of anticoagulant therapy [Z79.01]  Anticoagulation monitoring  INR range 2-3 (Resolved) [Z79.01]           Comments:  11/8/21 - [employer] Home Monitor, Okay to Manage By Exception         Anticoagulation Care Providers     Provider Role Specialty Phone number    Johnnie  John URIBE MD [facility] [place] Internal Medicine [phone]

## 2022-07-28 ENCOUNTER — MYC MEDICAL ADVICE (OUTPATIENT)
Dept: NEPHROLOGY | Facility: CLINIC | Age: 67
End: 2022-07-28

## 2022-07-28 ENCOUNTER — ANTICOAGULATION THERAPY VISIT (OUTPATIENT)
Dept: ANTICOAGULATION | Facility: CLINIC | Age: 67
End: 2022-07-28

## 2022-07-28 DIAGNOSIS — Z95.2 H/O MECHANICAL AORTIC VALVE REPLACEMENT: Primary | ICD-10-CM

## 2022-07-28 DIAGNOSIS — Z79.01 LONG TERM CURRENT USE OF ANTICOAGULANT THERAPY: ICD-10-CM

## 2022-07-28 LAB — INR HOME MONITORING: 2.5 (ref 2–3)

## 2022-07-28 NOTE — PROGRESS NOTES
ANTICOAGULATION MANAGEMENT     Min Andino 66 year old male is on warfarin with therapeutic INR result. (Goal INR 2.0-3.0)    Recent labs: (last 7 days)     07/28/22  0000   INR 2.5       ASSESSMENT       Source(s): Chart Review    Previous INR was Therapeutic last visit; previously outside of goal range    Medication, diet, health changes since last INR chart reviewed; none identified     Should have finished dexamethasone dose by now           PLAN     Recommended plan for no diet, medication or health factor changes affecting INR     Dosing Instructions: Continue your current warfarin dose with next INR in 1 week       Summary  As of 7/28/2022    Full warfarin instructions:  2 mg every Tue, Sat; 4 mg all other days   Next INR check:  8/11/2022             Detailed voice message left for min with dosing instructions and follow up date.     Patient to recheck with home meter    Education provided: Please call back if any changes to your diet, medications or how you've been taking warfarin    Plan made per [facility] anticoagulation protocol    Brittni Lemus RN  Anticoagulation Clinic  7/28/2022    _______________________________________________________________________     Anticoagulation Episode Summary     Current INR goal:  2.0-3.0   TTR:  86.8 % (1 y)   Target end date:  11/20/2050   Send INR reminders to:  RIVER [facility]GHLIN    Indications    Mechanical heart valve present (Resolved) [Z95.2]  H/O mechanical aortic valve replacement [Z95.2]  Long term current use of anticoagulant therapy [Z79.01]  Anticoagulation monitoring  INR range 2-3 (Resolved) [Z79.01]           Comments:  11/8/21 - [employer] Home Monitor, Okay to Manage By Exception         Anticoagulation Care Providers     Provider Role Specialty Phone number    John Blair MD Referring Internal Medicine [phone]

## 2022-07-31 DIAGNOSIS — E87.5 HYPERKALEMIA: Primary | ICD-10-CM

## 2022-08-01 NOTE — TELEPHONE ENCOUNTER
Dr. Vallecillo sent patient mychart results. Patient reviewed.    Gricelda Salazar, RN, BSN  Nephrology Care Coordinator  [facility]

## 2022-08-03 DIAGNOSIS — N18.31 STAGE 3A CHRONIC KIDNEY DISEASE (H): Primary | ICD-10-CM

## 2022-08-07 LAB — INR HOME MONITORING: 2.4 (ref 2–3)

## 2022-08-08 ENCOUNTER — ANTICOAGULATION THERAPY VISIT (OUTPATIENT)
Dept: ANTICOAGULATION | Facility: CLINIC | Age: 67
End: 2022-08-08

## 2022-08-08 DIAGNOSIS — Z79.01 LONG TERM CURRENT USE OF ANTICOAGULANT THERAPY: ICD-10-CM

## 2022-08-08 DIAGNOSIS — Z95.2 H/O MECHANICAL AORTIC VALVE REPLACEMENT: Primary | ICD-10-CM

## 2022-08-08 NOTE — PROGRESS NOTES
ANTICOAGULATION MANAGEMENT     Min Andino 66 year old male is on warfarin with therapeutic INR result. (Goal INR 2.0-3.0)    Recent labs: (last 7 days)     08/06/22  0000   INR 2.4       ASSESSMENT       Source(s): Chart Review    Previous INR was Therapeutic last visit; previously outside of goal range    Medication, diet, health changes since last INR chart reviewed; none identified     PLAN     Recommended plan for no diet, medication or health factor changes affecting INR     Dosing Instructions: Continue your current warfarin dose with next INR in 2 weeks       Summary  As of 8/8/2022    Full warfarin instructions:  2 mg every Tue, Sat; 4 mg all other days   Next INR check:  8/22/2022             Detailed voice message left for Min with dosing instructions and follow up date.     Patient to recheck with home meter    Education provided: Please call back if any changes to your diet, medications or how you've been taking warfarin, Goal range and significance of current result and Resume manage by exception with home monitor. Continue to submit INR results to home monitor company.You will only be called when your result is out of range. Please call and notify [facility] if new medication started, dose missed, signs or symptoms of bleeding or clotting, or a surgery/procedure is scheduled.    Plan made per [facility] anticoagulation protocol    Briana Whitman RN  Anticoagulation Clinic  8/8/2022    _______________________________________________________________________     Anticoagulation Episode Summary     Current INR goal:  2.0-3.0   TTR:  86.7 % (1 y)   Target end date:  11/20/2050   Send INR reminders to:  CHARISSEAG ARNOLDO    Indications    Mechanical heart valve present (Resolved) [Z95.2]  H/O mechanical aortic valve replacement [Z95.2]  Long term current use of anticoagulant therapy [Z79.01]  Anticoagulation monitoring  INR range 2-3 (Resolved) [Z79.01]           Comments:  11/8/21 - [employer] Home Monitor, Akshat  to Manage By Exception         Anticoagulation Care Providers     Provider Role Specialty Phone number    John Blair MD Referring Internal Medicine [phone]

## 2022-08-09 ENCOUNTER — LAB (OUTPATIENT)
Dept: LAB | Facility: OTHER | Age: 67
End: 2022-08-09
Payer: COMMERCIAL

## 2022-08-09 DIAGNOSIS — N18.31 STAGE 3A CHRONIC KIDNEY DISEASE (H): ICD-10-CM

## 2022-08-09 LAB
ALBUMIN SERPL-MCNC: 3.4 G/DL (ref 3.4–5)
ANION GAP SERPL CALCULATED.3IONS-SCNC: 2 MMOL/L (ref 3–14)
BUN SERPL-MCNC: 35 MG/DL (ref 7–30)
CALCIUM SERPL-MCNC: 8.6 MG/DL (ref 8.5–10.1)
CHLORIDE BLD-SCNC: 110 MMOL/L (ref 94–109)
CO2 SERPL-SCNC: 29 MMOL/L (ref 20–32)
CREAT SERPL-MCNC: 1.61 MG/DL (ref 0.66–1.25)
GFR SERPL CREATININE-BSD FRML MDRD: 47 ML/MIN/1.73M2
GLUCOSE BLD-MCNC: 99 MG/DL (ref 70–99)
PHOSPHATE SERPL-MCNC: 3 MG/DL (ref 2.5–4.5)
POTASSIUM BLD-SCNC: 5.1 MMOL/L (ref 3.4–5.3)
SODIUM SERPL-SCNC: 141 MMOL/L (ref 133–144)

## 2022-08-09 PROCEDURE — 36415 COLL VENOUS BLD VENIPUNCTURE: CPT

## 2022-08-09 PROCEDURE — 80069 RENAL FUNCTION PANEL: CPT

## 2022-08-18 ENCOUNTER — ANTICOAGULATION THERAPY VISIT (OUTPATIENT)
Dept: ANTICOAGULATION | Facility: CLINIC | Age: 67
End: 2022-08-18

## 2022-08-18 DIAGNOSIS — Z95.2 H/O MECHANICAL AORTIC VALVE REPLACEMENT: Primary | ICD-10-CM

## 2022-08-18 DIAGNOSIS — Z79.01 LONG TERM CURRENT USE OF ANTICOAGULANT THERAPY: ICD-10-CM

## 2022-08-18 LAB — INR HOME MONITORING: 1.6 (ref 2–3)

## 2022-08-18 NOTE — PROGRESS NOTES
ANTICOAGULATION MANAGEMENT     Min Andino 66 year old male is on warfarin with subtherapeutic INR result. (Goal INR 2.0-3.0)    Recent labs: (last 7 days)     08/18/22  0000   INR 1.6*       ASSESSMENT       Source(s): Chart Review    Previous INR was Therapeutic last 2(+) visits    Medication, diet, health changes since last INR chart reviewed; none identified           PLAN     Unable to reach Min today.    Left message to take a booster dose of warfarin,  6 mg tonight. Request call back for assessment. [facility].    Follow up required to confirm warfarin dose taken and assess for changes    Mica Browning, RN  Anticoagulation Clinic  8/18/2022

## 2022-08-19 ENCOUNTER — TELEPHONE (OUTPATIENT)
Dept: FAMILY MEDICINE | Facility: CLINIC | Age: 67
End: 2022-08-19

## 2022-08-19 NOTE — TELEPHONE ENCOUNTER
Reason for Call:  Other call back    Detailed comments: Pt called to speak with INR nurse regarding results.     Phone Number Patient can be reached at: Home number on file [phone] (home)    Best Time: anytime     Can we leave a detailed message on this number? YES    Call taken on 8/19/2022 at 9:40 AM by Flori Camp

## 2022-08-19 NOTE — PROGRESS NOTES
ANTICOAGULATION MANAGEMENT     Min Andino 66 year old male is on warfarin with subtherapeutic INR result. (Goal INR 2.0-3.0)    Recent labs: (last 7 days)     08/18/22  0000   INR 1.6*       ASSESSMENT       Source(s): Chart Review and Patient/Caregiver Call       Warfarin doses taken: Patient thinks he could have mixed up his warfarin dose in his pill box last week, and could have missed 2 mg last week but isn't sure what day.    Diet: No new diet changes identified    New illness, injury, or hospitalization: No    Medication/supplement changes: Started Diphenhydramine nasal spray, this is not anticipated to interact with warfarin    Signs or symptoms of bleeding or clotting: No    Previous INR: Therapeutic last 2(+) visits    Additional findings: None       PLAN     Recommended plan for temporary change(s) affecting INR     Dosing Instructions: booster dose then continue your current warfarin dose with next INR in 2 weeks       Summary  As of 8/18/2022    Full warfarin instructions:  8/18: 6 mg; Otherwise 2 mg every Tue, Sat; 4 mg all other days   Next INR check:  9/1/2022             Telephone call with Min who verbalizes understanding and agrees to plan    Patient to recheck with home meter    Education provided: Please call back if any changes to your diet, medications or how you've been taking warfarin, Importance of consistent vitamin K intake, Monitoring for bleeding signs and symptoms, Monitoring for clotting signs and symptoms, Importance of notifying clinic for changes in medications; a sooner lab recheck maybe needed. and Contact [phone]  with any changes, questions or concerns.     Plan made per [facility] anticoagulation protocol    Brittni Lemus RN  Anticoagulation Clinic  8/19/2022    _______________________________________________________________________     Anticoagulation Episode Summary     Current INR goal:  2.0-3.0   TTR:  85.1 % (1 y)   Target end date:  11/20/2050   Send INR reminders  to:  RIVER MCLAUGHLIN    Indications    Mechanical heart valve present (Resolved) [Z95.2]  H/O mechanical aortic valve replacement [Z95.2]  Long term current use of anticoagulant therapy [Z79.01]  Anticoagulation monitoring  INR range 2-3 (Resolved) [Z79.01]           Comments:  11/8/21 - [employer] Home Monitor, Okay to Manage By Exception         Anticoagulation Care Providers     Provider Role Specialty Phone number    John Blair MD Referring Internal Medicine [phone]

## 2022-08-31 ENCOUNTER — ANTICOAGULATION THERAPY VISIT (OUTPATIENT)
Dept: ANTICOAGULATION | Facility: CLINIC | Age: 67
End: 2022-08-31

## 2022-08-31 DIAGNOSIS — Z95.2 H/O MECHANICAL AORTIC VALVE REPLACEMENT: Primary | ICD-10-CM

## 2022-08-31 DIAGNOSIS — Z79.01 LONG TERM CURRENT USE OF ANTICOAGULANT THERAPY: ICD-10-CM

## 2022-08-31 LAB — INR HOME MONITORING: 1.9 (ref 2–3)

## 2022-08-31 NOTE — PROGRESS NOTES
ANTICOAGULATION MANAGEMENT     Min Andino 66 year old male is on warfarin with subtherapeutic INR result. (Goal INR 2.0-3.0)    Recent labs: (last 7 days)     08/31/22  0000   INR 1.9*       ASSESSMENT       Source(s): Chart Review and Patient/Caregiver Call       Warfarin doses taken: Warfarin taken as instructed    Diet: No new diet changes identified    New illness, injury, or hospitalization: No    Medication/supplement changes: None noted    Signs or symptoms of bleeding or clotting: No    Previous INR: Subtherapeutic    Additional findings: None       PLAN     Recommended plan for no diet, medication or health factor changes affecting INR     Dosing Instructions: Increase your warfarin dose (8% change) with next INR in 2 weeks       Summary  As of 8/31/2022    Full warfarin instructions:  2 mg every Tue; 4 mg all other days   Next INR check:  9/14/2022             Telephone call with Min who verbalizes understanding and agrees to plan    Patient to recheck with home meter    Education provided: Goal range and significance of current result    Plan made per [facility] anticoagulation protocol    Nigel Rubio RN  Anticoagulation Clinic  8/31/2022    _______________________________________________________________________     Anticoagulation Episode Summary     Current INR goal:  2.0-3.0   TTR:  81.5 % (1 y)   Target end date:  11/20/2050   Send INR reminders to:  R[facility]AUGHL[facility]    Indications    Mechanical heart valve present (Resolved) [Z95.2]  H/O mechanical aortic valve replacement [Z95.2]  Long term current use of anticoagulant therapy [Z79.01]  Anticoagulation monitoring  INR range 2-3 (Resolved) [Z79.01]           Comments:  11/8/21 - [employer] Home Monitor, Okay to Manage By Exception         Anticoagulation Care Providers     Provider Role Specialty Phone number    John Blair MD Referring Internal Medicine [phone]

## 2022-09-01 DIAGNOSIS — Z95.2 H/O MECHANICAL AORTIC VALVE REPLACEMENT: Primary | ICD-10-CM

## 2022-09-14 DIAGNOSIS — I10 ESSENTIAL HYPERTENSION: ICD-10-CM

## 2022-09-14 RX ORDER — CARVEDILOL 12.5 MG/1
TABLET ORAL
Qty: 180 TABLET | Refills: 3 | Status: SHIPPED | OUTPATIENT
Start: 2022-09-14 | End: 2024-01-16

## 2022-09-15 ENCOUNTER — ANTICOAGULATION THERAPY VISIT (OUTPATIENT)
Dept: ANTICOAGULATION | Facility: CLINIC | Age: 67
End: 2022-09-15

## 2022-09-15 LAB — INR HOME MONITORING: 2.8 (ref 2–3)

## 2022-09-15 NOTE — PROGRESS NOTES
ANTICOAGULATION MANAGEMENT     Min Andino 66 year old male is on warfarin with therapeutic INR result. (Goal INR 2.0-3.0)    Recent labs: (last 7 days)     09/15/22  0000   INR 2.8       ASSESSMENT       Source(s): Chart Review and Patient/Caregiver Call       Warfarin doses taken: Warfarin taken as instructed    Diet: No new diet changes identified    New illness, injury, or hospitalization: No    Medication/supplement changes: None noted    Signs or symptoms of bleeding or clotting: No    Previous INR: Subtherapeutic    Additional findings: None       PLAN     Recommended plan for no diet, medication or health factor changes affecting INR     Dosing Instructions: Continue your current warfarin dose with next INR in 2 weeks       Summary  As of 9/15/2022    Full warfarin instructions:  2 mg every Tue; 4 mg all other days   Next INR check:  9/29/2022             Telephone call with Min who verbalizes understanding and agrees to plan    Patient to recheck with home meter    Education provided: Goal range and significance of current result and Importance of therapeutic range    Plan made per [facility] anticoagulation protocol    Dave Thomas RN  Anticoagulation Clinic  9/15/2022    _______________________________________________________________________     Anticoagulation Episode Summary     Current INR goal:  2.0-3.0   TTR:  81.1 % (1 y)   Target end date:  11/20/2050   Send INR reminders to:  R[facility] MCLAUGHLIN    Indications    Mechanical heart valve present (Resolved) [Z95.2]  H/O mechanical aortic valve replacement [Z95.2]  Long term current use of anticoagulant therapy [Z79.01]  Anticoagulation monitoring  INR range 2-3 (Resolved) [Z79.01]           Comments:  11/8/21 - [employer] Home Monitor, Okay to Manage By Exception         Anticoagulation Care Providers     Provider Role Specialty Phone number    John Blair MD Referring Internal Medicine [phone]

## 2022-09-19 DIAGNOSIS — I10 HYPERTENSION, GOAL BELOW 140/90: ICD-10-CM

## 2022-09-19 RX ORDER — AMLODIPINE BESYLATE 5 MG/1
TABLET ORAL
Qty: 90 TABLET | Refills: 3 | Status: SHIPPED | OUTPATIENT
Start: 2022-09-19 | End: 2023-05-30

## 2022-09-23 DIAGNOSIS — N18.31 STAGE 3A CHRONIC KIDNEY DISEASE (H): Primary | ICD-10-CM

## 2022-09-27 ENCOUNTER — LAB (OUTPATIENT)
Dept: LAB | Facility: OTHER | Age: 67
End: 2022-09-27
Payer: MEDICARE

## 2022-09-27 DIAGNOSIS — N18.31 STAGE 3A CHRONIC KIDNEY DISEASE (H): ICD-10-CM

## 2022-09-27 LAB
ALBUMIN SERPL-MCNC: 3.6 G/DL (ref 3.4–5)
ANION GAP SERPL CALCULATED.3IONS-SCNC: 4 MMOL/L (ref 3–14)
BUN SERPL-MCNC: 36 MG/DL (ref 7–30)
CALCIUM SERPL-MCNC: 8.9 MG/DL (ref 8.5–10.1)
CHLORIDE BLD-SCNC: 112 MMOL/L (ref 94–109)
CO2 SERPL-SCNC: 27 MMOL/L (ref 20–32)
CREAT SERPL-MCNC: 1.85 MG/DL (ref 0.66–1.25)
CREAT UR-MCNC: 126 MG/DL
GFR SERPL CREATININE-BSD FRML MDRD: 39 ML/MIN/1.73M2
GLUCOSE BLD-MCNC: 114 MG/DL (ref 70–99)
HGB BLD-MCNC: 12.5 G/DL (ref 13.3–17.7)
PHOSPHATE SERPL-MCNC: 3.2 MG/DL (ref 2.5–4.5)
POTASSIUM BLD-SCNC: 4.8 MMOL/L (ref 3.4–5.3)
PROT UR-MCNC: 0.77 G/L
PROT/CREAT 24H UR: 0.61 G/G CR (ref 0–0.2)
PTH-INTACT SERPL-MCNC: 57 PG/ML (ref 15–65)
SODIUM SERPL-SCNC: 143 MMOL/L (ref 133–144)

## 2022-09-27 PROCEDURE — 85018 HEMOGLOBIN: CPT

## 2022-09-27 PROCEDURE — 84156 ASSAY OF PROTEIN URINE: CPT

## 2022-09-27 PROCEDURE — 80069 RENAL FUNCTION PANEL: CPT

## 2022-09-27 PROCEDURE — 83970 ASSAY OF PARATHORMONE: CPT

## 2022-09-27 PROCEDURE — 36415 COLL VENOUS BLD VENIPUNCTURE: CPT

## 2022-10-01 LAB — INR HOME MONITORING: 2.9 (ref 2–3)

## 2022-10-03 ENCOUNTER — DOCUMENTATION ONLY (OUTPATIENT)
Dept: ANTICOAGULATION | Facility: CLINIC | Age: 67
End: 2022-10-03

## 2022-10-03 DIAGNOSIS — Z79.01 LONG TERM CURRENT USE OF ANTICOAGULANT THERAPY: ICD-10-CM

## 2022-10-03 DIAGNOSIS — Z95.2 H/O MECHANICAL AORTIC VALVE REPLACEMENT: Primary | ICD-10-CM

## 2022-10-03 NOTE — PROGRESS NOTES
ANTICOAGULATION  MANAGEMENT-Home Monitor Managed by Exception    Min Hill Andino 66 year old male is on warfarin with therapeutic INR result. (Goal INR 2.0-3.0)    Recent labs: (last 7 days)     10/01/22  0000   INR 2.9         Previous INR was Therapeutic    Medication, diet, health changes since last INR:chart reviewed; none identified    Contacted within the last 12 weeks by phone on 9/29/22      FALLON Copeland was NOT contacted regarding therapeutic result today per home monitoring policy manage by exception agreement.   Current warfarin dose is to be continued:     Summary  As of 10/3/2022    Full warfarin instructions:  2 mg every Tue; 4 mg all other days   Next INR check:  10/14/2022           ?   Morena Dempsey RN  Anticoagulation Clinic  10/3/2022    _______________________________________________________________________     Anticoagulation Episode Summary     Current INR goal:  2.0-3.0   TTR:  81.0 % (1 y)   Target end date:  11/20/2050   Send INR reminders to:  [facility]    Indications    Mechanical heart valve present (Resolved) [Z95.2]  H/O mechanical aortic valve replacement [Z95.2]  Long term current use of anticoagulant therapy [Z79.01]  Anticoagulation monitoring  INR range 2-3 (Resolved) [Z79.01]           Comments:  11/8/21 - [employer] Home Monitor, Okay to Manage By Exception         Anticoagulation Care Providers     Provider Role Specialty Phone number    John Blair MD Referring Internal Medicine [phone]

## 2022-10-04 ENCOUNTER — VIRTUAL VISIT (OUTPATIENT)
Dept: NEPHROLOGY | Facility: CLINIC | Age: 67
End: 2022-10-04
Payer: MEDICARE

## 2022-10-04 VITALS — BODY MASS INDEX: 32.55 KG/M2 | WEIGHT: 240 LBS

## 2022-10-04 DIAGNOSIS — I10 ESSENTIAL HYPERTENSION: ICD-10-CM

## 2022-10-04 DIAGNOSIS — Z86.79 HX OF AORTIC ANEURYSM: ICD-10-CM

## 2022-10-04 DIAGNOSIS — N18.31 STAGE 3A CHRONIC KIDNEY DISEASE (H): ICD-10-CM

## 2022-10-04 DIAGNOSIS — D64.9 ANEMIA, UNSPECIFIED TYPE: Primary | ICD-10-CM

## 2022-10-04 PROCEDURE — 99214 OFFICE O/P EST MOD 30 MIN: CPT | Mod: 95 | Performed by: INTERNAL MEDICINE

## 2022-10-04 ASSESSMENT — PAIN SCALES - GENERAL: PAINLEVEL: NO PAIN (0)

## 2022-10-04 NOTE — PROGRESS NOTES
"Min is a 66 year old who is being evaluated via a billable video visit.      How would you like to obtain your AVS? [employer]  If the video visit is dropped, the invitation should be resent by: Text to cell phone: [phone]  Will anyone else be joining your video visit? No      10/04/22     CC: CKD    HPI: Min Andino is a 64 year old male who presents for evaluation of CKD. I last saw Mr. Andino in 2014. To review from my previous note: Mr. Andino's past medical hx was unremarkable leading up to Dec 2013 when he noted chest tightness. He was seen at [facility] at that time and noted to have aortic dissection. He was initially monitored but later underwent aortic repair/aortic valve replacement/CABG in early April 2014. His post op course was complicated by the need for ECMO as well as pressor support. His wife reports today that he was told that he has \"normal\" creatinine levels when undergoing physicals in the past. His creatinine was 2.2 post surgery in April 2014 but improved to 1.3 at the time of discharge in April. 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 At that time his creatinine was 2.2 post op but improved to 1.3. Since that time, creatinine readings have included 1.48 on 7/17/14, 1.39 no 7/27/14, and 1.35 on 8/13/14. 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                 02/24/20: today he presents to reestablish care in our clinic. His creatinine was stable at ~ 1.1 on last check in 2014 but is now at a new baseline of ~ 1.6 since august. Mr. Andino underwent thoracoabdominal aortic repair august 2019. The placement of this stent compromised blood flow to the left kidney moiety which was noted on imaging later. His creatinine post this procedure was ~ 1.6. There have been a few episodes when the creatinine has increased, most recently a few weeks ago (to 1.9). With his most recent " creatinine rise, lasix was held. He has not noted much change in his swelling or breathing with holding the lasix for the past few weeks. Today I noted SOB during our conversation. His oxygen saturation was fine but his breathing seemed labored. He and his wife report that this is his breathing baseline over the past 5 years and is not worse than typical. They are following weights at home and he has been dropping weight - no increase since stopping the lasix. He does have difficulty with empyting his bladder - last imaging in the fall showed no hydronephrosis. Flomax did not help him in the past. He has a HHN coming once a week to the house. He is not lightheaded. He has plans to see cardiology at [place] in March, is looking to establish care in urology, is also looking to establish care with internal medicine potentially in our clinic for continuity in one location.         06/08/20:  Virtual visit. Lasix was increased last month. He reports that the swelling is there but better. He denies any change in eating. No diarrhea. Weight was 240 lbs down to 215 lbs. He had been losing 1 lb a day. He was in ED last week and  Dx with hernia. He held lasix since Friday - was on lasix 40 mg daily held over the weekend - weight was 215 lbs on Thursday and 217 lbs this AM. He denies lightheadedness/dizziness. No orthostatic sxs. He quit wearing compression stockings a week ago. He feels he has slightly more endurance; can't lift a lot, needing to take breaks. He feels his urinary retention is not as problematic - has not seen urology. He has been taking iron BID.      7/13/20: video visit. He has continued to see weight loss over time - was 215 lbs in June but now 205 lbs. He has seen a change in his clothing size by 2 over the past few months. He is hungry often - eating well. No diarrhea. He has some constipation at times. He feels that maybe his breathing is improved. He is wearing compression stockings. He is using lasix 20  mg daily.      10/20/20: video visit - started on [facility] and did exam that way - then converted to telephone given echo noted. Feeling the best he has felt since before last winter. NO new issues. He has endovascular stent repair planned in the coming week. Activity improving. No swelling. Weight most recently 203 lbs. Over the past month - 201-203 lbs. BP has been well controlled; 115/61 at a recent physician appt. He tends to have higher readings at home; 140s at home, sometimes 130s.     01/25/21:  In the setting of COVID-19 pandemic, this visit was changed to a telephone visit. Patient reports feeling good overall. No difficulty with fluid overload/swelling/shortness of breath. His weight is up slightly but he also has a bigger appetite lately and snacking more with being at home. BP was 105/58 at a recent clinic visit but they report pressures of 130-176 at home most recently - mostly above goal. They have been working with cardiology clinic at [place] in regards to medication adjustments  And plan to reach out to them with these updates. He was hospitalized 11/11/20-11/17/20 following left internal iliac artery stenting for endoleak. Returned to operating room 11/13 for [id number] of left common femoral aneurysm and previous graft left external iliac to superficial femoral and deep femoral artery interposition graft. Creatinine was stable at 1.5-1.7 while inpatient. He has been taking iron BID but with some constipation.     9/28/21: video visit. No vascular interventions or hospitalizations since last visit. Vascular stent/leak stable on recent CT. Checking BP 4x/day with average SBP in AM 160s-180s, mid day 110s-120s, PM 140s. Amlodipine (takes in AM) increased to 10 mg daily approximately one month ago by PCP. Losartan (take at night) 100 mg daily increased march 2021. Metoprolol succinate increased to 100 mg daily (not sure when this was increased, take in AM). BP sligthly improved but not much. Denies any  dizziness, CP, abdominal pain, SOB (some BROWN with moderate exertion), LE swelling or abdominal distention. Doesn't check weights regularly, last weight 220.    01/04/22: went to [facility] in first part of November - things are looking goo/stable.  NO swelling related concerns at this time. Breathing is stable. IN the past few weeks - blood pressure one time 97 in the AM - evening always 30 points higher.  Has had a consistent pattern of low in the AM and higher in evening for the past few months. Currently taking amlodipine 5 mg AM, losartan 50 mg BID, and carvedilol. 12.5 mg BID.     10/04/22: video visit. Not as hydrated as he should. No diueretics. No lower ext swelling. NO NSAIDs. Blood pressure recently - takes it every 10 days - 121/76, 129/83, 102/67, 119/75, 127/76, 119/68. We spent time discussing hydration - he admits he is not a great water drinker.     amLODIPine (NORVASC) 5 MG tablet, TAKE 1 TABLET(5 MG) BY MOUTH DAILY  amoxicillin (AMOXIL) 500 MG capsule, Take 4 capsules 1 hour before dental work  aspirin (ASA) 81 MG chewable tablet, Take 81 mg by mouth daily  atorvastatin (LIPITOR) 40 MG tablet, TAKE 1 TABLET(40 MG) BY MOUTH DAILY  carvedilol (COREG) 12.5 MG tablet, TAKE 1 TABLET(12.5 MG) BY MOUTH TWICE DAILY WITH MEALS  cefdinir (OMNICEF) 300 MG capsule, Take 1 capsule (300 mg) by mouth 2 times daily  dexamethasone (DECADRON) 2 MG tablet, Take 3 tabs by mouth daily x 3 days, then 2 tabs daily x 3 days, then 1 tab daily x 3 days, then 1/2 tab daily x 3 days.  docusate sodium (COLACE) 100 MG capsule, daily   esomeprazole (NEXIUM) 40 MG DR capsule, TAKE ONE CAPSULE BY MOUTH TWICE DAILY 30-60 MINUTES BEFORE EATING  Ferrous Sulfate (IRON PO), Take 45 mg by mouth 2 times daily Slow release  guaiFENesin (MUCINEX) 600 MG 12 hr tablet, Take 1 tablet (600 mg) by mouth 2 times daily  losartan (COZAAR) 100 MG tablet, Take 1 tablet (100 mg) by mouth daily  methylcellulose (CITRUCEL) 500 MG TABS tablet,   multivitamin  w/minerals (THERA-VIT-M) tablet, Take 1 tablet by mouth daily  nitroGLYcerin (NITROSTAT) 0.4 MG sublingual tablet, Place 1 tablet (0.4 mg) under the tongue every 5 minutes as needed for chest pain  pantoprazole (PROTONIX) 40 MG EC tablet, Take 1 tablet (40 mg) by mouth 2 times daily  Vitamin D, Cholecalciferol, 25 MCG (1000 UT) TABS, 2 times daily   warfarin ANTICOAGULANT (COUMADIN) 1 MG tablet, Take 2 mg on Tues/Fri or as directed by INR clinic  warfarin ANTICOAGULANT (COUMADIN) 4 MG tablet, Take 4 mg on Monday, Wed, Thurs, Friday, Sunday or as directed by INR clinic    No current facility-administered medications on file prior to visit.      Exam:  Wt 108.9 kg (240 lb)   BMI 32.55 kg/m      Results:  No visits with results within 1 Day(s) from this visit.   Latest known visit with results is:   Orders Only on 10/01/2022   Component Date Value Ref Range Status     INR HOME MONITORING 10/01/2022 2.9  2.000 - 3.000 Final    Lab were reviewed and interpreted.       Assessment/Plan:   1. CKD Stage 3: has CKD in the setting of previous ULICES as well as vascular compromise to the left kidney moiety from his vascular procedure in August 2019. Addt ULICES recently which was likely hemodynamic in the setting of poor oral intake and being on lasix. Variability seen to kidney function. With his vascular hx, need to consider he will be more sensitive to hemodynamic changes - encourage good hydration to avoid this variability we see with his labs.      2. Hypertension: at goal of <130/80 - no changes today.      3. Anemia: hemoglobin 12.5     4. Aneurysms/AVR: following with [facility]    Patient Instructions   1. Labs in 3 months  2. Follow-up in 6 months  3. Focus on good hydration     [id number] AM video visit via CHRISTY Vallecillo DO

## 2022-10-05 ENCOUNTER — TELEPHONE (OUTPATIENT)
Dept: NEPHROLOGY | Facility: CLINIC | Age: 67
End: 2022-10-05

## 2022-10-05 NOTE — TELEPHONE ENCOUNTER
Left Voicemail (1st Attempt) for the patient to call back and schedule the following:    Appointment type: return  Provider: Avel  Return date: 4/4/2023  Specialty phone number: [phone]  Additional appointment(s) needed: labs  Additonal Notes: Labs in 3 months Follow-up in 6 months    Jacinta Bush  Procedure    Cardiology, Rheumatology, GI, Pulmonology, Nephrology Specialties   [facility]  [phone]

## 2022-10-11 ENCOUNTER — PRE VISIT (OUTPATIENT)
Dept: OTOLARYNGOLOGY | Facility: CLINIC | Age: 67
End: 2022-10-11

## 2022-10-17 ENCOUNTER — DOCUMENTATION ONLY (OUTPATIENT)
Dept: ANTICOAGULATION | Facility: CLINIC | Age: 67
End: 2022-10-17

## 2022-10-17 NOTE — PROGRESS NOTES
ANTICOAGULATION     Min Andino is overdue for INR check.      [employer] message sent.    Brittni Lemus RN

## 2022-10-18 ENCOUNTER — DOCUMENTATION ONLY (OUTPATIENT)
Dept: ANTICOAGULATION | Facility: CLINIC | Age: 67
End: 2022-10-18

## 2022-10-18 DIAGNOSIS — Z95.2 H/O MECHANICAL AORTIC VALVE REPLACEMENT: Primary | ICD-10-CM

## 2022-10-18 DIAGNOSIS — Z79.01 LONG TERM CURRENT USE OF ANTICOAGULANT THERAPY: ICD-10-CM

## 2022-10-18 LAB — INR HOME MONITORING: 2.6 (ref 2–3)

## 2022-10-18 NOTE — PROGRESS NOTES
ANTICOAGULATION  MANAGEMENT-Home Monitor Managed by Exception    Min Hill Andino 66 year old male is on warfarin with therapeutic INR result. (Goal INR 2.0-3.0)    Recent labs: (last 7 days)     10/18/22  0000   INR 2.6         Previous INR was Therapeutic    Medication, diet, health changes since last INR:chart reviewed; none identified    Contacted within the last 12 weeks by phone on 9/29/22      FALLON     Min was NOT contacted regarding therapeutic result today per home monitoring policy manage by exception agreement.   Current warfarin dose is to be continued:     Summary  As of 10/18/2022    Full warfarin instructions:  2 mg every Tue; 4 mg all other days   Next INR check:  11/1/2022           ?   Brittni Lemus RN  Anticoagulation Clinic  10/18/2022    _______________________________________________________________________     Anticoagulation Episode Summary     Current INR goal:  2.0-3.0   TTR:  81.1 % (1 y)   Target end date:  11/20/2050   Send INR reminders to:  [facility] MONITOR[facility]    Indications    Mechanical heart valve present (Resolved) [Z95.2]  H/O mechanical aortic valve replacement [Z95.2]  Long term current use of anticoagulant therapy [Z79.01]  Anticoagulation monitoring  INR range 2-3 (Resolved) [Z79.01]           Comments:  11/8/21 - [employer] Home Monitor, Okay to Manage By Exception         Anticoagulation Care Providers     Provider Role Specialty Phone number    John Blair MD Referring Internal Medicine [phone]

## 2022-11-01 ENCOUNTER — DOCUMENTATION ONLY (OUTPATIENT)
Dept: ANTICOAGULATION | Facility: CLINIC | Age: 67
End: 2022-11-01

## 2022-11-01 DIAGNOSIS — Z79.01 LONG TERM CURRENT USE OF ANTICOAGULANT THERAPY: ICD-10-CM

## 2022-11-01 DIAGNOSIS — Z95.2 H/O MECHANICAL AORTIC VALVE REPLACEMENT: Primary | ICD-10-CM

## 2022-11-01 LAB — INR HOME MONITORING: 2.9 (ref 2–3)

## 2022-11-01 NOTE — PROGRESS NOTES
ANTICOAGULATION  MANAGEMENT-Home Monitor Managed by Exception    Min Hill Andino 66 year old male is on warfarin with therapeutic INR result. (Goal INR 2.0-3.0)    Recent labs: (last 7 days)     11/01/22  0000   INR 2.9         Previous INR was Therapeutic    Medication, diet, health changes since last INR:chart reviewed; none identified    Contacted within the last 12 weeks by phone on 9/29/22      FALLON Copeland was NOT contacted regarding therapeutic result today per home monitoring policy manage by exception agreement.   Current warfarin dose is to be continued:     Summary  As of 11/1/2022    Full warfarin instructions:  2 mg every Tue; 4 mg all other days; Starting 11/1/2022   Next INR check:  11/15/2022           ?   Vonda Izaguirre RN  Anticoagulation Clinic  11/1/2022    _______________________________________________________________________     Anticoagulation Episode Summary     Current INR goal:  2.0-3.0   TTR:  81.1 % (1 y)   Target end date:  11/20/2050   Send INR reminders to:  [facility]IT[facility]    Indications    Mechanical heart valve present (Resolved) [Z95.2]  H/O mechanical aortic valve replacement [Z95.2]  Long term current use of anticoagulant therapy [Z79.01]  Anticoagulation monitoring  INR range 2-3 (Resolved) [Z79.01]           Comments:  11/8/21 - [employer] Home Monitor, Okay to Manage By Exception         Anticoagulation Care Providers     Provider Role Specialty Phone number    John Blair MD Referring Internal Medicine [phone]

## 2022-11-16 ENCOUNTER — ANTICOAGULATION THERAPY VISIT (OUTPATIENT)
Dept: ANTICOAGULATION | Facility: CLINIC | Age: 67
End: 2022-11-16

## 2022-11-16 DIAGNOSIS — Z79.01 LONG TERM CURRENT USE OF ANTICOAGULANT THERAPY: ICD-10-CM

## 2022-11-16 DIAGNOSIS — Z95.2 H/O MECHANICAL AORTIC VALVE REPLACEMENT: Primary | ICD-10-CM

## 2022-11-16 LAB — INR HOME MONITORING: 3.2 (ref 2–3)

## 2022-11-16 NOTE — PROGRESS NOTES
ANTICOAGULATION MANAGEMENT     Min Andino 66 year old male is on warfarin with supratherapeutic INR result. (Goal INR 2.0-3.0)    Recent labs: (last 7 days)     11/16/22  0000   INR 3.2*       ASSESSMENT       Source(s): Chart Review and Patient/Caregiver Call       Warfarin doses taken: Warfarin taken as instructed    Diet: No new diet changes identified    New illness, injury, or hospitalization: No    Medication/supplement changes: None noted    Signs or symptoms of bleeding or clotting: No    Previous INR: Therapeutic last 2(+) visits    Additional findings: None       PLAN     Recommended plan for no diet, medication or health factor changes affecting INR     Dosing Instructions: Continue your current warfarin dose with next INR in 1 week       Summary  As of 11/16/2022    Full warfarin instructions:  2 mg every Tue; 4 mg all other days; Starting 11/16/2022   Next INR check:  11/23/2022             Detailed voice message left for Min with dosing instructions and follow up date.   Sent [employer] message with dosing and follow up instructions    Patient to recheck with home meter    Education provided:     Please call back if any changes to your diet, medications or how you've been taking warfarin    Plan made per [facility] anticoagulation protocol    Vonda Izaguirre, RN  Anticoagulation Clinic  11/16/2022    _______________________________________________________________________     Anticoagulation Episode Summary     Current INR goal:  2.0-3.0   TTR:  78.7 % (1 y)   Target end date:  11/20/2050   Send INR reminders to:  [facility]IT[facility]    Indications    Mechanical heart valve present (Resolved) [Z95.2]  H/O mechanical aortic valve replacement [Z95.2]  Long term current use of anticoagulant therapy [Z79.01]  Anticoagulation monitoring  INR range 2-3 (Resolved) [Z79.01]           Comments:  11/8/21 - [employer] Home Monitor, Okay to Manage By Exception         Anticoagulation Care Providers     Provider  Role Specialty Phone number    John Blair MD Referring Internal Medicine [phone]

## 2022-11-18 ENCOUNTER — TELEPHONE (OUTPATIENT)
Dept: FAMILY MEDICINE | Facility: CLINIC | Age: 67
End: 2022-11-18

## 2022-11-18 DIAGNOSIS — Z95.2 H/O MECHANICAL AORTIC VALVE REPLACEMENT: Primary | ICD-10-CM

## 2022-11-18 DIAGNOSIS — Z79.01 LONG TERM CURRENT USE OF ANTICOAGULANT THERAPY: ICD-10-CM

## 2022-11-18 NOTE — TELEPHONE ENCOUNTER
ANTICOAGULATION MANAGEMENT     Min Andino 66 year old male is on warfarin with supratherapeutic INR result. (Goal INR )    Recent labs: (last 7 days)     11/16/22  0000   INR 3.2*       ASSESSMENT       Source(s): Chart Review and Patient/Caregiver Call       Warfarin doses taken: Warfarin taken as instructed    Diet: No new diet changes identified    New illness, injury, or hospitalization: No    Medication/supplement changes: None noted    Signs or symptoms of bleeding or clotting: No    Previous INR: Therapeutic last 2(+) visits    Additional findings: INR steadily increasing. Patient would prefer INR to be around 2.5.       PLAN     Recommended plan for no diet, medication or health factor changes affecting INR     Dosing Instructions: decrease your warfarin dose (8% change) with next INR in 2 weeks       Summary  As of 11/18/2022    Full warfarin instructions:  2 mg every Tue, Fri; 4 mg all other days; Starting 11/18/2022   Next INR check:  11/28/2022             Telephone call with Min who agrees to plan and repeated back plan correctly    Patient to recheck with home meter    Education provided:     Please call back if any changes to your diet, medications or how you've been taking warfarin    Plan made per [facility] anticoagulation protocol    Vonda Izaguirre, RN  Anticoagulation Clinic  11/18/2022    _______________________________________________________________________     Anticoagulation Episode Summary     Current INR goal:  2.0-3.0   TTR:  79.1 % (1 y)   Target end date:  11/20/2050   Send INR reminders to:  [facility]    Indications    Mechanical heart valve present (Resolved) [Z95.2]  H/O mechanical aortic valve replacement [Z95.2]  Long term current use of anticoagulant therapy [Z79.01]  Anticoagulation monitoring  INR range 2-3 (Resolved) [Z79.01]           Comments:  11/8/21 - [employer] Home Monitor, Okay to Manage By Exception         Anticoagulation Care Providers     Provider  Role Specialty Phone number    John Blair MD Referring Internal Medicine [phone]

## 2022-11-18 NOTE — TELEPHONE ENCOUNTER
Reason for Call:  INR    Who is calling?  Patient     Phone number:  [phone]    Fax number:      Name of caller: Min Andino     INR Value:      Are there any other concerns:  Yes: Patient has read My Chart message but has a question     Can we leave a detailed message on this number? YES    Phone number patient can be reached at: Home number on file [phone] (home)      Call taken on 11/18/2022 at 12:10 PM by Lesli Benson

## 2022-11-29 ENCOUNTER — ANTICOAGULATION THERAPY VISIT (OUTPATIENT)
Dept: ANTICOAGULATION | Facility: CLINIC | Age: 67
End: 2022-11-29

## 2022-11-29 DIAGNOSIS — Z95.2 H/O MECHANICAL AORTIC VALVE REPLACEMENT: Primary | ICD-10-CM

## 2022-11-29 DIAGNOSIS — Z79.01 LONG TERM CURRENT USE OF ANTICOAGULANT THERAPY: ICD-10-CM

## 2022-11-29 LAB — INR HOME MONITORING: 2.3 (ref 2–3)

## 2022-11-29 NOTE — PROGRESS NOTES
ANTICOAGULATION MANAGEMENT     Min Alejandre Thu 66 year old male is on warfarin with therapeutic INR result. (Goal INR 2.0-3.0)    Recent labs: (last 7 days)     11/29/22  0000   INR 2.3       ASSESSMENT       Source(s): Chart Review and Patient/Caregiver Call       Warfarin doses taken: Warfarin taken as instructed    Diet: No new diet changes identified    New illness, injury, or hospitalization: No    Medication/supplement changes: 11/18: Cefdinir, Mucinex, and Dexamethasone discontinued, therapy completed/. Patient in trial with [facility], unsure what medications he is on, was told not affecting INR    Signs or symptoms of bleeding or clotting: No    Previous INR: Supratherapeutic    Additional findings: None       PLAN     Recommended plan for no diet, medication or health factor changes affecting INR     Dosing Instructions: Continue your current warfarin dose with next INR in 2 weeks       Summary  As of 11/29/2022    Full warfarin instructions:  2 mg every Tue, Fri; 4 mg all other days; Starting 11/29/2022   Next INR check:  12/13/2022             Telephone call with Min who agrees to plan and repeated back plan correctly    Patient to recheck with home meter    Education provided:     Please call back if any changes to your diet, medications or how you've been taking warfarin    Goal range and lab monitoring: goal range and significance of current result    Contact [phone]  with any changes, questions or concerns.     Plan made per [facility] anticoagulation protocol    Morena Dempsey RN  Anticoagulation Clinic  11/29/2022    _______________________________________________________________________     Anticoagulation Episode Summary     Current INR goal:  2.0-3.0   TTR:  80.8 % (1 y)   Target end date:  11/20/2050   Send INR reminders to:  [facility]    Indications    Mechanical heart valve present (Resolved) [Z95.2]  H/O mechanical aortic valve replacement [Z95.2]  Long term current use of  anticoagulant therapy [Z79.01]  Anticoagulation monitoring  INR range 2-3 (Resolved) [Z79.01]           Comments:  11/8/21 - [employer] Home Monitor, Okay to Manage By Exception         Anticoagulation Care Providers     Provider Role Specialty Phone number    John Blair MD Referring Internal Medicine [phone]

## 2022-12-01 ENCOUNTER — DOCUMENTATION ONLY (OUTPATIENT)
Dept: ANTICOAGULATION | Facility: CLINIC | Age: 67
End: 2022-12-01

## 2022-12-01 DIAGNOSIS — Z95.2 H/O MECHANICAL AORTIC VALVE REPLACEMENT: Primary | ICD-10-CM

## 2022-12-01 NOTE — PROGRESS NOTES
ANTICOAGULATION CLINIC REFERRAL RENEWAL REQUEST       An annual renewal order is required for all patients referred to [facility].?  Please review and sign the pended referral order for Min Andino.       ANTICOAGULATION SUMMARY      Warfarin indication(s)   Mechanical AVR    Mechanical heart valve present?  Mechanical  AVR       Current goal range   INR: 2.0-3.0     Goal appropriate for indication? Goal INR 2-3, standard for indication(s) above     Time in Therapeutic Range (TTR)  (Goal > 60%) 80.8%       Office visit with referring provider's group within last year yes on 3/8/22       Shantal Hoover RN  [facility]

## 2022-12-13 ENCOUNTER — DOCUMENTATION ONLY (OUTPATIENT)
Dept: ANTICOAGULATION | Facility: CLINIC | Age: 67
End: 2022-12-13

## 2022-12-13 DIAGNOSIS — Z79.01 LONG TERM CURRENT USE OF ANTICOAGULANT THERAPY: ICD-10-CM

## 2022-12-13 DIAGNOSIS — Z95.2 H/O MECHANICAL AORTIC VALVE REPLACEMENT: Primary | ICD-10-CM

## 2022-12-13 LAB — INR HOME MONITORING: 2.7 (ref 2–3)

## 2022-12-13 NOTE — PROGRESS NOTES
ANTICOAGULATION  MANAGEMENT-Home Monitor Managed by Exception    Min Hill Andino 66 year old male is on warfarin with therapeutic INR result. (Goal INR 2.0-3.0)    Recent labs: (last 7 days)     12/13/22  0000   INR 2.7         Previous INR was Therapeutic    Medication, diet, health changes since last INR:chart reviewed; none identified    Contacted within the last 12 weeks by phone on 11/29/2022      [place]     Min was NOT contacted regarding therapeutic result today per home monitoring policy manage by exception agreement.   Current warfarin dose is to be continued:     Summary  As of 12/13/2022    Full warfarin instructions:  2 mg every Tue, Fri; 4 mg all other days; Starting 12/13/2022   Next INR check:  12/27/2022           ?   Nigel Rubio RN  Anticoagulation Clinic  12/13/2022    _______________________________________________________________________     Anticoagulation Episode Summary     Current INR goal:  2.0-3.0   TTR:  80.8 % (1 y)   Target end date:  11/20/2050   Send INR reminders to:  [facility]    Indications    Mechanical heart valve present (Resolved) [Z95.2]  H/O mechanical aortic valve replacement [Z95.2]  Long term current use of anticoagulant therapy [Z79.01]  Anticoagulation monitoring  INR range 2-3 (Resolved) [Z79.01]           Comments:  11/8/21 - [employer] Home Monitor, Okay to Manage By Exception         Anticoagulation Care Providers     Provider Role Specialty Phone number    John Blair MD Referring Internal Medicine [phone]

## 2022-12-28 ENCOUNTER — DOCUMENTATION ONLY (OUTPATIENT)
Dept: ANTICOAGULATION | Facility: CLINIC | Age: 67
End: 2022-12-28

## 2022-12-28 DIAGNOSIS — Z79.01 LONG TERM CURRENT USE OF ANTICOAGULANT THERAPY: ICD-10-CM

## 2022-12-28 DIAGNOSIS — Z95.2 H/O MECHANICAL AORTIC VALVE REPLACEMENT: Primary | ICD-10-CM

## 2022-12-28 LAB — INR HOME MONITORING: 2.1 (ref 2–3)

## 2022-12-28 NOTE — PROGRESS NOTES
ANTICOAGULATION  MANAGEMENT-Home Monitor Managed by Exception    Min Hill Andino 67 year old male is on warfarin with therapeutic INR result. (Goal INR 2.0-3.0)    Recent labs: (last 7 days)     12/28/22  0000   INR 2.1         Previous INR was Therapeutic    Medication, diet, health changes since last INR:chart reviewed; none identified    Contacted within the last 12 weeks by phone on 11/29/2022      [place]     Min was NOT contacted regarding therapeutic result today per home monitoring policy manage by exception agreement.   Current warfarin dose is to be continued:     Summary  As of 12/28/2022    Full warfarin instructions:  2 mg every Tue, Fri; 4 mg all other days   Next INR check:  1/11/2023           ?   Shantal Hoover RN  Anticoagulation Clinic  12/28/2022    _______________________________________________________________________     Anticoagulation Episode Summary     Current INR goal:  2.0-3.0   TTR:  80.8 % (1 y)   Target end date:  11/20/2050   Send INR reminders to:  [facility]    Indications    Mechanical heart valve present (Resolved) [Z95.2]  H/O mechanical aortic valve replacement [Z95.2]  Long term current use of anticoagulant therapy [Z79.01]  Anticoagulation monitoring  INR range 2-3 (Resolved) [Z79.01]           Comments:  11/8/21 - [employer] Home Monitor, Okay to Manage By Exception         Anticoagulation Care Providers     Provider Role Specialty Phone number    John Blair MD Referring Internal Medicine [phone]

## 2023-01-03 ENCOUNTER — LAB (OUTPATIENT)
Dept: LAB | Facility: OTHER | Age: 68
End: 2023-01-03
Payer: MEDICARE

## 2023-01-03 ENCOUNTER — ANTICOAGULATION THERAPY VISIT (OUTPATIENT)
Dept: ANTICOAGULATION | Facility: CLINIC | Age: 68
End: 2023-01-03

## 2023-01-03 DIAGNOSIS — D64.9 ANEMIA, UNSPECIFIED TYPE: ICD-10-CM

## 2023-01-03 DIAGNOSIS — N18.31 STAGE 3A CHRONIC KIDNEY DISEASE (H): ICD-10-CM

## 2023-01-03 DIAGNOSIS — Z95.2 H/O MECHANICAL AORTIC VALVE REPLACEMENT: Primary | ICD-10-CM

## 2023-01-03 DIAGNOSIS — Z95.2 H/O MECHANICAL AORTIC VALVE REPLACEMENT: ICD-10-CM

## 2023-01-03 DIAGNOSIS — Z79.01 LONG TERM CURRENT USE OF ANTICOAGULANT THERAPY: ICD-10-CM

## 2023-01-03 LAB
ALBUMIN SERPL-MCNC: 3.5 G/DL (ref 3.4–5)
ANION GAP SERPL CALCULATED.3IONS-SCNC: 5 MMOL/L (ref 3–14)
BUN SERPL-MCNC: 36 MG/DL (ref 7–30)
CALCIUM SERPL-MCNC: 9.1 MG/DL (ref 8.5–10.1)
CHLORIDE BLD-SCNC: 107 MMOL/L (ref 94–109)
CO2 SERPL-SCNC: 30 MMOL/L (ref 20–32)
CREAT SERPL-MCNC: 1.43 MG/DL (ref 0.66–1.25)
FERRITIN SERPL-MCNC: 99 NG/ML (ref 26–388)
GFR SERPL CREATININE-BSD FRML MDRD: 53 ML/MIN/1.73M2
GLUCOSE BLD-MCNC: 117 MG/DL (ref 70–99)
HGB BLD-MCNC: 12.5 G/DL (ref 13.3–17.7)
INR BLD: 2.1 (ref 0.9–1.1)
IRON SATN MFR SERPL: 23 % (ref 15–46)
IRON SERPL-MCNC: 67 UG/DL (ref 35–180)
PHOSPHATE SERPL-MCNC: 3.9 MG/DL (ref 2.5–4.5)
POTASSIUM BLD-SCNC: 5.1 MMOL/L (ref 3.4–5.3)
PTH-INTACT SERPL-MCNC: 47 PG/ML (ref 15–65)
SODIUM SERPL-SCNC: 142 MMOL/L (ref 133–144)
TIBC SERPL-MCNC: 294 UG/DL (ref 240–430)

## 2023-01-03 PROCEDURE — 83540 ASSAY OF IRON: CPT

## 2023-01-03 PROCEDURE — 83970 ASSAY OF PARATHORMONE: CPT

## 2023-01-03 PROCEDURE — 83550 IRON BINDING TEST: CPT

## 2023-01-03 PROCEDURE — 82728 ASSAY OF FERRITIN: CPT

## 2023-01-03 PROCEDURE — 36415 COLL VENOUS BLD VENIPUNCTURE: CPT

## 2023-01-03 PROCEDURE — 85610 PROTHROMBIN TIME: CPT

## 2023-01-03 PROCEDURE — 85018 HEMOGLOBIN: CPT

## 2023-01-03 PROCEDURE — 80069 RENAL FUNCTION PANEL: CPT

## 2023-01-03 PROCEDURE — 84156 ASSAY OF PROTEIN URINE: CPT

## 2023-01-03 NOTE — PROGRESS NOTES
ANTICOAGULATION MANAGEMENT     Min Andino 67 year old male is on warfarin with therapeutic INR result. (Goal INR 2.0-3.0)    Recent labs: (last 7 days)     01/03/23  1053   INR 2.1*       ASSESSMENT       Source(s): Chart Review and Patient/Caregiver Call       Warfarin doses taken: Warfarin taken as instructed    Diet: No new diet changes identified    New illness, injury, or hospitalization: evaluated for mouth sores, prescribed oral rinse, patient o follow up with ENT and ear pain.    Medication/supplement changes: None noted    Signs or symptoms of bleeding or clotting: No    Previous INR: Therapeutic last 2(+) visits    Additional findings: None       PLAN     Recommended plan for no diet, medication or health factor changes affecting INR     Dosing Instructions: Continue your current warfarin dose with next INR in 2 weeks       Summary  As of 1/3/2023    Full warfarin instructions:  2 mg every Tue, Fri; 4 mg all other days   Next INR check:  1/17/2023             Telephone call with Min who agrees to plan and repeated back plan correctly    Patient to recheck with home meter    Education provided:     Please call back if any changes to your diet, medications or how you've been taking warfarin    Goal range and lab monitoring: goal range and significance of current result    Plan made per [facility] anticoagulation protocol    Morena Dempsey RN  Anticoagulation Clinic  1/3/2023    _______________________________________________________________________     Anticoagulation Episode Summary     Current INR goal:  2.0-3.0   TTR:  80.8 % (1 y)   Target end date:  11/20/2050   Send INR reminders to:  [facility]    Indications    Mechanical heart valve present (Resolved) [Z95.2]  H/O mechanical aortic valve replacement [Z95.2]  Long term current use of anticoagulant therapy [Z79.01]  Anticoagulation monitoring  INR range 2-3 (Resolved) [Z79.01]           Comments:  11/8/21 - [employer] Monitor, Akshat  to Manage By Exception         Anticoagulation Care Providers     Provider Role Specialty Phone number    John Blair MD Referring Internal Medicine [phone]

## 2023-01-04 LAB
CREAT UR-MCNC: 134 MG/DL
PROT UR-MCNC: 3.06 G/L
PROT/CREAT 24H UR: 2.28 G/G CR (ref 0–0.2)

## 2023-01-17 ENCOUNTER — DOCUMENTATION ONLY (OUTPATIENT)
Dept: ANTICOAGULATION | Facility: CLINIC | Age: 68
End: 2023-01-17
Payer: MEDICARE

## 2023-01-17 DIAGNOSIS — Z79.01 LONG TERM CURRENT USE OF ANTICOAGULANT THERAPY: ICD-10-CM

## 2023-01-17 DIAGNOSIS — Z95.2 H/O MECHANICAL AORTIC VALVE REPLACEMENT: Primary | ICD-10-CM

## 2023-01-17 LAB — INR HOME MONITORING: 2.9 (ref 2–3)

## 2023-01-17 NOTE — PROGRESS NOTES
ANTICOAGULATION  MANAGEMENT-Home Monitor Managed by Exception    Min Hill Andino 67 year old male is on warfarin with therapeutic INR result. (Goal INR 2.0-3.0)    Recent labs: (last 7 days)     01/17/23  0000   INR 2.9         Previous INR was Therapeutic    Medication, diet, health changes since last INR:chart reviewed; none identified    Contacted within the last 12 weeks by phone on 01/03/2023      [place]     Min was NOT contacted regarding therapeutic result today per home monitoring policy manage by exception agreement.   Current warfarin dose is to be continued:     Summary  As of 1/17/2023    Full warfarin instructions:  2 mg every Tue, Fri; 4 mg all other days   Next INR check:  1/31/2023           ?   Mica Browning RN  Anticoagulation Clinic  1/17/2023    _______________________________________________________________________     Anticoagulation Episode Summary     Current INR goal:  2.0-3.0   TTR:  80.8 % (1 y)   Target end date:  11/20/2050   Send INR reminders to:  [facility]    Indications    Mechanical heart valve present (Resolved) [Z95.2]  H/O mechanical aortic valve replacement [Z95.2]  Long term current use of anticoagulant therapy [Z79.01]  Anticoagulation monitoring  INR range 2-3 (Resolved) [Z79.01]           Comments:  11/8/21 - [employer] Home Monitor, Okay to Manage By Exception         Anticoagulation Care Providers     Provider Role Specialty Phone number    John Blair MD Referring Internal Medicine [phone]

## 2023-01-23 ENCOUNTER — TELEPHONE (OUTPATIENT)
Dept: NEPHROLOGY | Facility: CLINIC | Age: 68
End: 2023-01-23
Payer: MEDICARE

## 2023-01-23 NOTE — TELEPHONE ENCOUNTER
"Called and spoke with pts Wife \"Jalyn\" (Auth to Discuss in chart).  Informed her of Dr. Vallecillo's message below:  Epic Notification received, pt has not read Result Note message sent by Dr. Vallecillo:    Your most recent lab results are attached.  I'm pleased to see that your kidney function is stable. Hemoglobin is good at 12.5. Your urine protein level went up slightly. When able, please confirm that you are still taking losartan 100 mg daily and please update if you are seeing your blood pressure greater than 130/80 consistently.      IN regards to your hemoglobin A1C, this is not done through your kidney testing but instead would be deferred to primary care.      I think it is reasonable to follow-up as planned but please update if you are not on losartan now and if your blood pressure is elevated.      Sincerely,  Madelin Vallecillo DO   Written by Madelin Vallecillo DO on 1/17/2023 12:53 PM CST    Jalyn stated the message had been seen by them.  Jalyn reports that pt is taking the Losartan 100mg tablet daily.  Has no further questions.  Encouraged to MyChart or call if any further questions or concerns.    Lizet Cary LPN      "

## 2023-02-01 NOTE — TELEPHONE ENCOUNTER
Reviewed this encounter.  Dr. Vallecillo viewed this message Tue Jan 24, 2023  2:02 PM       Lizet Cary LPN

## 2023-02-02 ENCOUNTER — DOCUMENTATION ONLY (OUTPATIENT)
Dept: ANTICOAGULATION | Facility: CLINIC | Age: 68
End: 2023-02-02

## 2023-02-02 DIAGNOSIS — Z95.2 H/O MECHANICAL AORTIC VALVE REPLACEMENT: Primary | ICD-10-CM

## 2023-02-02 DIAGNOSIS — Z79.01 LONG TERM CURRENT USE OF ANTICOAGULANT THERAPY: ICD-10-CM

## 2023-02-02 LAB — INR HOME MONITORING: 2.7 (ref 2–3)

## 2023-02-02 NOTE — PROGRESS NOTES
ANTICOAGULATION  MANAGEMENT-Home Monitor Managed by Exception    Min Hill Andino 67 year old male is on warfarin with therapeutic INR result. (Goal INR 2.0-3.0)    Recent labs: (last 7 days)     02/01/23  0000   INR 2.7         Previous INR was Therapeutic    Medication, diet, health changes since last INR:chart reviewed; none identified    Contacted within the last 12 weeks by phone on  01/03/2023      [place] Copeland was NOT contacted regarding therapeutic result today per home monitoring policy manage by exception agreement.   Current warfarin dose is to be continued:     Summary  As of 2/2/2023    Full warfarin instructions:  2 mg every Tue, Fri; 4 mg all other days   Next INR check:  2/16/2023           ?   Shantal Hoover RN  Anticoagulation Clinic  2/2/2023    _______________________________________________________________________     Anticoagulation Episode Summary     Current INR goal:  2.0-3.0   TTR:  80.7 % (1 y)   Target end date:  11/20/2050   Send INR reminders to:  [facility]    Indications    Mechanical heart valve present (Resolved) [Z95.2]  H/O mechanical aortic valve replacement [Z95.2]  Long term current use of anticoagulant therapy [Z79.01]  Anticoagulation monitoring  INR range 2-3 (Resolved) [Z79.01]           Comments:  11/8/21 - [employer] Home Monitor, Okay to Manage By Exception         Anticoagulation Care Providers     Provider Role Specialty Phone number    John Blair MD Referring Internal Medicine [phone]

## 2023-02-15 ENCOUNTER — ANTICOAGULATION THERAPY VISIT (OUTPATIENT)
Dept: ANTICOAGULATION | Facility: CLINIC | Age: 68
End: 2023-02-15
Payer: MEDICARE

## 2023-02-15 DIAGNOSIS — Z79.01 LONG TERM CURRENT USE OF ANTICOAGULANT THERAPY: ICD-10-CM

## 2023-02-15 DIAGNOSIS — Z95.2 H/O MECHANICAL AORTIC VALVE REPLACEMENT: Primary | ICD-10-CM

## 2023-02-15 LAB — INR HOME MONITORING: 3.4 (ref 2–3)

## 2023-02-15 NOTE — PROGRESS NOTES
ANTICOAGULATION MANAGEMENT     Min Hill Andino 67 year old male is on warfarin with supratherapeutic INR result. (Goal INR 2.0-3.0)    Recent labs: (last 7 days)     02/15/23  0000   INR 3.4*       ASSESSMENT       Source(s): Chart Review and Patient/Caregiver Call       Warfarin doses taken: Warfarin taken as instructed    Diet: Decreased greens/vitamin K in diet; plans to resume previous intake    New illness, injury, or hospitalization: No    Medication/supplement changes: None noted    Signs or symptoms of bleeding or clotting: No    Previous INR: Therapeutic last 2(+) visits    Additional findings: None       PLAN     Recommended plan for temporary change(s) affecting INR     Dosing Instructions: partial hold then continue your current warfarin dose with next INR in 2 weeks       Summary  As of 2/15/2023    Full warfarin instructions:  2/15: 2 mg; Otherwise 2 mg every Tue, Fri; 4 mg all other days   Next INR check:  3/1/2023             Telephone call with Min who verbalizes understanding and agrees to plan    Patient to recheck with home meter    Education provided:     Please call back if any changes to your diet, medications or how you've been taking warfarin    Plan made per [facility] anticoagulation protocol    Shantal Hoover, RN  Anticoagulation Clinic  2/15/2023    _______________________________________________________________________     Anticoagulation Episode Summary     Current INR goal:  2.0-3.0   TTR:  78.6 % (1 y)   Target end date:  11/20/2050   Send INR reminders to:  [facility]    Indications    Mechanical heart valve present (Resolved) [Z95.2]  H/O mechanical aortic valve replacement [Z95.2]  Long term current use of anticoagulant therapy [Z79.01]  Anticoagulation monitoring  INR range 2-3 (Resolved) [Z79.01]           Comments:  11/8/21 - [employer] Home Monitor, Okay to Manage By Exception         Anticoagulation Care Providers     Provider Role Specialty Phone number    Johnnie,  John URIBE MD [facility] Internal Medicine [phone]

## 2023-03-01 DIAGNOSIS — I25.10 ATHEROSCLEROSIS OF CORONARY ARTERY OF NATIVE HEART WITHOUT ANGINA PECTORIS, UNSPECIFIED VESSEL OR LESION TYPE: ICD-10-CM

## 2023-03-01 DIAGNOSIS — E78.00 PURE HYPERCHOLESTEROLEMIA: ICD-10-CM

## 2023-03-01 RX ORDER — ATORVASTATIN CALCIUM 40 MG/1
TABLET, FILM COATED ORAL
Qty: 90 TABLET | Refills: 0 | Status: SHIPPED | OUTPATIENT
Start: 2023-03-01 | End: 2023-05-30

## 2023-03-02 ENCOUNTER — ANTICOAGULATION THERAPY VISIT (OUTPATIENT)
Dept: ANTICOAGULATION | Facility: CLINIC | Age: 68
End: 2023-03-02
Payer: MEDICARE

## 2023-03-02 LAB — INR HOME MONITORING: 3.3 (ref 2–3)

## 2023-03-02 NOTE — PROGRESS NOTES
ANTICOAGULATION MANAGEMENT     Min Andino 67 year old male is on warfarin with supratherapeutic INR result. (Goal INR 2.0-3.0)    Recent labs: (last 7 days)     03/02/23  0000   INR 3.3*       ASSESSMENT       Source(s): Chart Review    Previous INR was Supratherapeutic     According to mychart message, pt reported swelling in his feet and legs. Also, pt will be on lasix 20 mg X 7 days. No interaction anticipated.    For now, would recommend a maintenance dose decrease.    Left a detailed voicemail message with dosing and a mychart message sent. Pt to call back if questions, updates or concerns.           PLAN     Recommended plan for ongoing change(s) affecting INR     Dosing Instructions: decrease your warfarin dose (8.3% change) with next INR in 2 weeks       Summary  As of 3/2/2023    Full warfarin instructions:  2 mg every Tue, Thu, Sat; 4 mg all other days   Next INR check:  3/16/2023             Detailed voice message left for Min with dosing instructions and follow up date.   Sent [employer] message with dosing and follow up instructions    Patient to recheck with home meter    Education provided:     Please call back if any changes to your diet, medications or how you've been taking warfarin    Contact [phone]  with any changes, questions or concerns.     Plan made per [facility] anticoagulation protocol    Lesli Huang RN  Anticoagulation Clinic  3/2/2023    _______________________________________________________________________     Anticoagulation Episode Summary     Current INR goal:  2.0-3.0   TTR:  74.4 % (1 y)   Target end date:  11/20/2050   Send INR reminders to:  [facility]    Indications    Mechanical heart valve present (Resolved) [Z95.2]  H/O mechanical aortic valve replacement [Z95.2]  Long term current use of anticoagulant therapy [Z79.01]  Anticoagulation monitoring  INR range 2-3 (Resolved) [Z79.01]           Comments:  11/8/21 - [employer] Home Monitor, Okay to Manage By  Exception         Anticoagulation Care Providers     Provider Role Specialty Phone number    John Blair MD Referring Internal Medicine [phone]

## 2023-03-16 ENCOUNTER — ANTICOAGULATION THERAPY VISIT (OUTPATIENT)
Dept: ANTICOAGULATION | Facility: CLINIC | Age: 68
End: 2023-03-16
Payer: MEDICARE

## 2023-03-16 DIAGNOSIS — Z79.01 LONG TERM CURRENT USE OF ANTICOAGULANT THERAPY: ICD-10-CM

## 2023-03-16 DIAGNOSIS — Z95.2 H/O MECHANICAL AORTIC VALVE REPLACEMENT: Primary | ICD-10-CM

## 2023-03-16 LAB — INR HOME MONITORING: 4.4 (ref 2–3)

## 2023-03-16 NOTE — PROGRESS NOTES
ANTICOAGULATION MANAGEMENT     Min Andino 67 year old male is on warfarin with supratherapeutic INR result. (Goal INR 2.0-3.0)    Recent labs: (last 7 days)     03/16/23  0000   INR 4.4*       ASSESSMENT       Source(s): Chart Review and Patient/Caregiver Call       Warfarin doses taken: Warfarin taken as instructed    Diet: No new diet changes identified    New illness, injury, or hospitalization: Edema in LE. Patient states some reduction but still edematous    Medication/supplement changes: Lasix for 7 days    Signs or symptoms of bleeding or clotting: No    Previous INR: Supratherapeutic    Additional findings: Leaving for vacation on Weds 3/22 for 5 days         PLAN     Recommended plan for ongoing change(s) affecting INR     Dosing Instructions: hold dose and partial then decrease your warfarin dose (9.1% change) with next INR in 5 days       Summary  As of 3/16/2023    Full warfarin instructions:  3/16: Hold; 3/17: 3 mg; Otherwise 4 mg every Mon, Wed, Fri; 2 mg all other days   Next INR check:  3/21/2023             Telephone call with Min who verbalizes understanding and agrees to plan and who agrees to plan and repeated back plan correctly    Patient to recheck with home meter    Education provided:     Please call back if any changes to your diet, medications or how you've been taking warfarin    Plan made per [facility] anticoagulation protocol    Tyesha Zambrano, RN  Anticoagulation Clinic  3/16/2023    _______________________________________________________________________     Anticoagulation Episode Summary     Current INR goal:  2.0-3.0   TTR:  70.6 % (1 y)   Target end date:  11/20/2050   Send INR reminders to:  [facility]    Indications    Mechanical heart valve present (Resolved) [Z95.2]  H/O mechanical aortic valve replacement [Z95.2]  Long term current use of anticoagulant therapy [Z79.01]  Anticoagulation monitoring  INR range 2-3 (Resolved) [Z79.01]           Comments:   11/8/21 - [employer] Home Monitor, Okay to Manage By Exception         Anticoagulation Care Providers     Provider Role Specialty Phone number    John Blair MD Referring Internal Medicine [phone]

## 2023-03-21 ENCOUNTER — ANTICOAGULATION THERAPY VISIT (OUTPATIENT)
Dept: ANTICOAGULATION | Facility: CLINIC | Age: 68
End: 2023-03-21
Payer: MEDICARE

## 2023-03-21 ENCOUNTER — NURSE TRIAGE (OUTPATIENT)
Dept: NURSING | Facility: CLINIC | Age: 68
End: 2023-03-21
Payer: MEDICARE

## 2023-03-21 ENCOUNTER — TELEPHONE (OUTPATIENT)
Dept: FAMILY MEDICINE | Facility: CLINIC | Age: 68
End: 2023-03-21
Payer: MEDICARE

## 2023-03-21 DIAGNOSIS — Z79.01 LONG TERM CURRENT USE OF ANTICOAGULANT THERAPY: ICD-10-CM

## 2023-03-21 DIAGNOSIS — Z95.2 H/O MECHANICAL AORTIC VALVE REPLACEMENT: Primary | ICD-10-CM

## 2023-03-21 LAB
INR HOME MONITORING: 3 (ref 2–3)
INR HOME MONITORING: 3 (ref 2–3)

## 2023-03-21 NOTE — TELEPHONE ENCOUNTER
S: Pt calling to report INR of 3.0 today.    B: He does home monitoring and isn't sure if telephone report worked properly and INR clinic will get his result.      A: Pt leaving to go out of town tomorrow. Offered to connect pt with [facility] RN but he stated he had to go and requested a msg be sent.    R: Please contact pt with dosing and recheck @[phone] (home), ok to lv dt msg.      Reason for Disposition    Caller has NON-URGENT medicine question about med that PCP or specialist prescribed and triager unable to answer question    Additional Information    Negative: Intentional drug overdose and suicidal thoughts or ideas    Negative: Drug overdose and triager unable to answer question    Negative: Caller requesting a renewal or refill of a medicine patient is currently taking    Negative: Caller requesting information unrelated to medicine    Negative: Caller requesting information about COVID-19 Vaccine    Negative: Caller requesting information about Emergency Contraception    Negative: Caller requesting information about Combined Birth Control Pills    Negative: Caller requesting information about Progestin Birth Control Pills    Negative: Caller requesting information about Post-Op pain or medicines    Negative: Caller requesting a prescription antibiotic (such as penicillin) for Strep throat and has a positive culture result    Negative: Caller requesting a prescription anti-viral med (such as Tamiflu) and has influenza (flu) symptoms    Negative: Immunization reaction suspected    Negative: Rash while taking a medicine or within 3 days of stopping it    Negative: Asthma and having symptoms of asthma (cough, wheezing, etc.)    Negative: Symptom of illness (e.g., headache, abdominal pain, earache, vomiting) that are more than mild    Negative: Breastfeeding questions about mother's medicines and diet    Negative: MORE THAN A DOUBLE DOSE of a prescription or over-the-counter (OTC) drug    Negative: DOUBLE DOSE  (an extra dose or lesser amount) of prescription drug and any symptoms (e.g., dizziness, nausea, pain, sleepiness)    Negative: DOUBLE DOSE (an extra dose or lesser amount) of over-the-counter (OTC) drug and any symptoms (e.g., dizziness, nausea, pain, sleepiness)    Negative: Took another person's prescription drug    Negative: DOUBLE DOSE (an extra dose or lesser amount) of prescription drug and NO symptoms  (Exception: A double dose of antibiotics.)    Negative: Diabetes drug error or overdose (e.g., took wrong type of insulin or took extra dose)    Negative: Caller has medication question about med NOT prescribed by PCP and triager unable to answer question (e.g., compatibility with other med, storage)    Negative: Prescription not at pharmacy and was prescribed by PCP recently  (Exception: triager has access to EMR and prescription is recorded there. Go to Home Care and confirm for pharmacy.)    Negative: Pharmacy calling with prescription question and triager unable to answer question    Negative: Caller has URGENT medicine question about med that PCP or specialist prescribed and triager unable to answer question    Protocols used: MEDICATION QUESTION CALL-A-OH

## 2023-03-21 NOTE — TELEPHONE ENCOUNTER
Please see the March 21, 2023 Anticoagulation encounter for further information.     Mica Browning RN    [facility]

## 2023-03-21 NOTE — TELEPHONE ENCOUNTER
Patient Returning Call    Reason for call:  Speak to INR karol Nino     Information relayed to patient:  n/a    Patient has additional questions:  No    What are your questions/concerns:  Waiting for call back     Could we send this information to you in [employer] or would you prefer to receive a phone call?:   Patient would prefer a phone call   Okay to leave a detailed message?: Yes at Cell number on file:    Telephone Information:   Mobile [phone]

## 2023-03-21 NOTE — PROGRESS NOTES
ANTICOAGULATION MANAGEMENT     Min Andino 67 year old male is on warfarin with therapeutic INR result. (Goal INR 2.0-3.0)    Recent labs: (last 7 days)     03/21/23  0000   INR 3.0  3.0       ASSESSMENT       Source(s): Chart Review and Patient/Caregiver Call       Warfarin doses taken: Warfarin taken as instructed    Diet: No new diet changes identified    New illness, injury, or hospitalization: Yes: leg swelling lower legs, top on feet and ankles. Left elbow swelling.    Medication/supplement changes: lasix every other day for swelling in legs.    Signs or symptoms of bleeding or clotting: No    Previous INR: Supratherapeutic    Additional findings: leaves tomorrow for [place] till 3/27.     Ok to speak to wife also ok to leave a detailed message.         PLAN     Recommended plan for ongoing change(s) affecting INR     Dosing Instructions: decrease your warfarin dose (10% change) with next INR in 1 week       Summary  As of 3/21/2023    Full warfarin instructions:  2 mg every Tue, Thu, Sat; 3 mg all other days   Next INR check:  3/27/2023             Detailed voice message left for Min with dosing instructions and follow up date.     Patient to recheck with home meter    Education provided:     left message of how to find dosing in the AVS in [facility].    Plan made with [facility] Pharmacist Sydney Browning, RN  Anticoagulation Clinic  3/21/2023    _______________________________________________________________________     Anticoagulation Episode Summary     Current INR goal:  2.0-3.0   TTR:  69.3 % (1 y)   Target end date:  11/20/2050   Send INR reminders to:  ANTICOAG HOME MONITORING    Indications    Mechanical heart valve present (Resolved) [Z95.2]  H/O mechanical aortic valve replacement [Z95.2]  Long term current use of anticoagulant therapy [Z79.01]  Anticoagulation monitoring  INR range 2-3 (Resolved) [Z79.01]           Comments:  11/8/21 - [employer] Home Monitor, Okay to Manage By  Exception         Anticoagulation Care Providers     Provider Role Specialty Phone number    John Blair MD Referring Internal Medicine [phone]

## 2023-03-21 NOTE — TELEPHONE ENCOUNTER
Please see the March 21, 2023 Anticoagulation encounter for further information.     Mica Browning RN    [facility]

## 2023-03-28 ENCOUNTER — DOCUMENTATION ONLY (OUTPATIENT)
Dept: ANTICOAGULATION | Facility: CLINIC | Age: 68
End: 2023-03-28
Payer: MEDICARE

## 2023-03-28 DIAGNOSIS — Z95.2 H/O MECHANICAL AORTIC VALVE REPLACEMENT: Primary | ICD-10-CM

## 2023-03-28 DIAGNOSIS — Z79.01 LONG TERM CURRENT USE OF ANTICOAGULANT THERAPY: ICD-10-CM

## 2023-03-28 LAB — INR HOME MONITORING: 3 (ref 2–3)

## 2023-03-28 NOTE — PROGRESS NOTES
ANTICOAGULATION  MANAGEMENT-Home Monitor Managed by Exception    Min Hill Andino 67 year old male is on warfarin with therapeutic INR result. (Goal INR 2.0-3.0)    Recent labs: (last 7 days)     03/28/23  0000   INR 3.0         Previous INR was Therapeutic    Medication, diet, health changes since last INR:chart reviewed; none identified    Contacted within the last 12 weeks by phone on 3/21/23      FALLON Copeland was NOT contacted regarding therapeutic result today per home monitoring policy manage by exception agreement.   Current warfarin dose is to be continued:     Summary  As of 3/28/2023    Full warfarin instructions:  2 mg every Tue, Thu, Sat; 3 mg all other days   Next INR check:  4/11/2023           ?   Vonda Izaguirre RN  Anticoagulation Clinic  3/28/2023    _______________________________________________________________________     Anticoagulation Episode Summary     Current INR goal:  2.0-3.0   TTR:  69.9 % (1 y)   Target end date:  11/20/2050   Send INR reminders to:  [facility] MONITOR[facility]    Indications    Mechanical heart valve present (Resolved) [Z95.2]  H/O mechanical aortic valve replacement [Z95.2]  Long term current use of anticoagulant therapy [Z79.01]  Anticoagulation monitoring  INR range 2-3 (Resolved) [Z79.01]           Comments:  11/8/21 - [employer] Home Monitor, Okay to Manage By Exception         Anticoagulation Care Providers     Provider Role Specialty Phone number    John Blair MD Referring Internal Medicine [phone]

## 2023-04-04 ENCOUNTER — ANTICOAGULATION THERAPY VISIT (OUTPATIENT)
Dept: ANTICOAGULATION | Facility: CLINIC | Age: 68
End: 2023-04-04
Payer: MEDICARE

## 2023-04-04 DIAGNOSIS — Z95.2 H/O MECHANICAL AORTIC VALVE REPLACEMENT: Primary | ICD-10-CM

## 2023-04-04 DIAGNOSIS — Z79.01 LONG TERM CURRENT USE OF ANTICOAGULANT THERAPY: ICD-10-CM

## 2023-04-04 LAB — INR HOME MONITORING: 1.4 (ref 2–3)

## 2023-04-04 NOTE — PROGRESS NOTES
ANTICOAGULATION MANAGEMENT     iMn Andino 67 year old male is on warfarin with subtherapeutic INR result. (Goal INR 2.0-3.0)    Recent labs: (last 7 days)     04/04/23  0000   INR 1.4*       ASSESSMENT       Source(s): Chart Review and Patient/Caregiver Call       Warfarin doses taken: Less warfarin taken than planned which may be affecting INR    Diet: Increased greens/vitamin K in diet; ongoing change    New illness, injury, or hospitalization: Yes: still have some swelling- has gone down some has OV with PCP next week.    Medication/supplement changes: Lasix every other day ~ No interaction anticipated    Signs or symptoms of bleeding or clotting: reported bruises from bumping on hard surface.    Previous INR: Therapeutic last 2(+) visits    Additional findings: Was in [place] 3/22-3/27/23         PLAN     Recommended plan for temporary change(s) and ongoing change(s) affecting INR     Dosing Instructions: booster dose then Increase your warfarin dose (16.7% change) with next INR in 1 week       Summary  As of 4/4/2023    Full warfarin instructions:  4/4: 4 mg; Otherwise 3 mg every day   Next INR check:  4/11/2023             Telephone call with Min who verbalizes understanding and agrees to plan and who agrees to plan and repeated back plan correctly    Patient to recheck with home meter    Education provided:     Goal range and lab monitoring: goal range and significance of current result, Importance of therapeutic range and Importance of following up at instructed interval    Dietary considerations: importance of consistent vitamin K intake, impact of vitamin K foods on INR and vitamin K content of foods    Symptom monitoring: monitoring for clotting signs and symptoms    Plan made with [facility] Pharmacist Sydney Riley, JASPER  Anticoagulation Clinic  4/4/2023    _______________________________________________________________________     Anticoagulation Episode Summary     Current INR  goal:  2.0-3.0   TTR:  71.0 % (1 y)   Target end date:  11/20/2050   Send INR reminders to:  [facility] HOME MONITORING    Indications    Mechanical heart valve present (Resolved) [Z95.2]  H/O mechanical aortic valve replacement [Z95.2]  Long term current use of anticoagulant therapy [Z79.01]  Anticoagulation monitoring  INR range 2-3 (Resolved) [Z79.01]           Comments:  11/8/21 - [employer] Home Monitor, Okay to Manage By Exception         Anticoagulation Care Providers     Provider Role Specialty Phone number    John Blair MD Referring Internal Medicine [phone]

## 2023-04-12 ENCOUNTER — ANTICOAGULATION THERAPY VISIT (OUTPATIENT)
Dept: ANTICOAGULATION | Facility: CLINIC | Age: 68
End: 2023-04-12
Payer: MEDICARE

## 2023-04-12 DIAGNOSIS — Z79.01 LONG TERM CURRENT USE OF ANTICOAGULANT THERAPY: ICD-10-CM

## 2023-04-12 DIAGNOSIS — Z95.2 H/O MECHANICAL AORTIC VALVE REPLACEMENT: Primary | ICD-10-CM

## 2023-04-12 LAB — INR HOME MONITORING: 1.4 (ref 2–3)

## 2023-04-12 NOTE — PROGRESS NOTES
ANTICOAGULATION MANAGEMENT     Min Andino 67 year old male is on warfarin with subtherapeutic INR result. (Goal INR 2.0-3.0)    Recent labs: (last 7 days)     04/12/23  0000   INR 1.4*       ASSESSMENT       Source(s): Chart Review and Patient/Caregiver Call       Warfarin doses taken: Warfarin taken as instructed, notes there is always a chance he missed something when filling pill box.    Diet: No new diet changes identified, continued increased greens.    New illness, injury, or hospitalization: No    Medication/supplement changes: None noted    Signs or symptoms of bleeding or clotting: No    Previous INR: Subtherapeutic    Additional findings: None         PLAN     Recommended plan for no diet, medication or health factor changes affecting INR     Dosing Instructions: Increase your warfarin dose (14.3% change) with next INR in 6 days       Summary  As of 4/12/2023    Full warfarin instructions:  4 mg every Sun, Wed, Fri; 3 mg all other days   Next INR check:  4/18/2023             Telephone call with Min who agrees to plan and repeated back plan correctly    Patient to recheck with home meter    Education provided:     None required    Plan made per [facility] anticoagulation protocol    Mica Browning, RN  Anticoagulation Clinic  4/12/2023    _______________________________________________________________________     Anticoagulation Episode Summary     Current INR goal:  2.0-3.0   TTR:  69.9 % (1 y)   Target end date:  11/20/2050   Send INR reminders to:  ANT[facility]AG HOME MONITORING    Indications    Mechanical heart valve present (Resolved) [Z95.2]  H/O mechanical aortic valve replacement [Z95.2]  Long term current use of anticoagulant therapy [Z79.01]  Anticoagulation monitoring  INR range 2-3 (Resolved) [Z79.01]           Comments:  11/8/21 - [employer] Home Monitor, Okay to Manage By Exception         Anticoagulation Care Providers     Provider Role Specialty Phone number    John Blair MD Referring  Internal Medicine [phone]

## 2023-04-20 ENCOUNTER — MYC MEDICAL ADVICE (OUTPATIENT)
Dept: ANTICOAGULATION | Facility: CLINIC | Age: 68
End: 2023-04-20
Payer: MEDICARE

## 2023-04-20 ENCOUNTER — DOCUMENTATION ONLY (OUTPATIENT)
Dept: ANTICOAGULATION | Facility: CLINIC | Age: 68
End: 2023-04-20
Payer: MEDICARE

## 2023-04-20 DIAGNOSIS — Z95.2 H/O MECHANICAL AORTIC VALVE REPLACEMENT: Primary | ICD-10-CM

## 2023-04-20 DIAGNOSIS — Z79.01 LONG TERM CURRENT USE OF ANTICOAGULANT THERAPY: ICD-10-CM

## 2023-04-20 NOTE — TELEPHONE ENCOUNTER
Patient reports [facility] is now monitoring his INR.    ANTICOAGULATION  MANAGEMENT    Min Andino is being discharged from the [facility] ([facility]).    Reason for discharge: care has been transferred to [facility]    Anticoagulation episode resolved, [facility] referral closed and Standing order discontinued    If patient needs warfarin management in the future, please send a new referral    Brittni Lemus RN

## 2023-04-20 NOTE — PROGRESS NOTES
ANTICOAGULATION     Min Andino is overdue for INR check.      [employer] message sent     Brittni Lemus RN

## 2023-04-25 ENCOUNTER — TELEPHONE (OUTPATIENT)
Dept: LAB | Facility: CLINIC | Age: 68
End: 2023-04-25
Payer: MEDICARE

## 2023-05-02 ENCOUNTER — VIRTUAL VISIT (OUTPATIENT)
Dept: NEPHROLOGY | Facility: CLINIC | Age: 68
End: 2023-05-02
Payer: MEDICARE

## 2023-05-02 VITALS — BODY MASS INDEX: 32.28 KG/M2 | WEIGHT: 238 LBS

## 2023-05-02 DIAGNOSIS — I10 ESSENTIAL HYPERTENSION: ICD-10-CM

## 2023-05-02 DIAGNOSIS — Z86.79 HX OF AORTIC ANEURYSM: ICD-10-CM

## 2023-05-02 DIAGNOSIS — N18.32 STAGE 3B CHRONIC KIDNEY DISEASE (H): Primary | ICD-10-CM

## 2023-05-02 DIAGNOSIS — D63.1 ANEMIA OF CHRONIC RENAL FAILURE, UNSPECIFIED CKD STAGE: ICD-10-CM

## 2023-05-02 DIAGNOSIS — N18.9 ANEMIA OF CHRONIC RENAL FAILURE, UNSPECIFIED CKD STAGE: ICD-10-CM

## 2023-05-02 PROCEDURE — 99215 OFFICE O/P EST HI 40 MIN: CPT | Mod: VID | Performed by: INTERNAL MEDICINE

## 2023-05-02 ASSESSMENT — PAIN SCALES - GENERAL: PAINLEVEL: NO PAIN (0)

## 2023-05-02 NOTE — NURSING NOTE
Is the patient currently in the state of [place]? YES    Visit mode:VIDEO    If the visit is dropped, the patient can be reconnected by: VIDEO VISIT: Text to cell phone: [phone]    Will anyone else be joining the visit? NO      How would you like to obtain your AVS? MyChart    Are changes needed to the allergy or medication list? NO    Reason for visit: Video Visit (6 month follow up)

## 2023-05-02 NOTE — PROGRESS NOTES
"05/02/23     CC: CKD    HPI: Min Andino is a 64 year old male who presents for evaluation of CKD. I last saw Mr. Andino in 2014. To review from my previous note: Mr. Andino's past medical hx was unremarkable leading up to Dec 2013 when he noted chest tightness. He was seen at [facility] at that time and noted to have aortic dissection. He was initially monitored but later underwent aortic repair/aortic valve replacement/CABG in early April 2014. His post op course was complicated by the need for ECMO as well as pressor support. His wife reports today that he was told that he has \"normal\" creatinine levels when undergoing physicals in the past. His creatinine was 2.2 post surgery in April 2014 but improved to 1.3 at the time of discharge in April. 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 At that time his creatinine was 2.2 post op but improved to 1.3. Since that time, creatinine readings have included 1.48 on 7/17/14, 1.39 no 7/27/14, and 1.35 on 8/13/14. 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                 02/24/20: today he presents to reestablish care in our clinic. His creatinine was stable at ~ 1.1 on last check in 2014 but is now at a new baseline of ~ 1.6 since august. Mr. Andino underwent thoracoabdominal aortic repair august 2019. The placement of this stent compromised blood flow to the left kidney moiety which was noted on imaging later. His creatinine post this procedure was ~ 1.6. There have been a few episodes when the creatinine has increased, most recently a few weeks ago (to 1.9). With his most recent creatinine rise, lasix was held. He has not noted much change in his swelling or breathing with holding the lasix for the past few weeks. Today I noted SOB during our conversation. His oxygen saturation was fine but his breathing seemed labored. He and his wife report that this is his " breathing baseline over the past 5 years and is not worse than typical. They are following weights at home and he has been dropping weight - no increase since stopping the lasix. He does have difficulty with empyting his bladder - last imaging in the fall showed no hydronephrosis. Flomax did not help him in the past. He has a HHN coming once a week to the house. He is not lightheaded. He has plans to see cardiology at [place] in March, is looking to establish care in urology, is also looking to establish care with internal medicine potentially in our clinic for continuity in one location.         06/08/20:  Virtual visit. Lasix was increased last month. He reports that the swelling is there but better. He denies any change in eating. No diarrhea. Weight was 240 lbs down to 215 lbs. He had been losing 1 lb a day. He was in ED last week and  Dx with hernia. He held lasix since Friday - was on lasix 40 mg daily held over the weekend - weight was 215 lbs on Thursday and 217 lbs this AM. He denies lightheadedness/dizziness. No orthostatic sxs. He quit wearing compression stockings a week ago. He feels he has slightly more endurance; can't lift a lot, needing to take breaks. He feels his urinary retention is not as problematic - has not seen urology. He has been taking iron BID.      7/13/20: video visit. He has continued to see weight loss over time - was 215 lbs in June but now 205 lbs. He has seen a change in his clothing size by 2 over the past few months. He is hungry often - eating well. No diarrhea. He has some constipation at times. He feels that maybe his breathing is improved. He is wearing compression stockings. He is using lasix 20 mg daily.      10/20/20: video visit - started on [facility] and did exam that way - then converted to telephone given echo noted. Feeling the best he has felt since before last winter. NO new issues. He has endovascular stent repair planned in the coming week. Activity improving. No  swelling. Weight most recently 203 lbs. Over the past month - 201-203 lbs. BP has been well controlled; 115/61 at a recent physician appt. He tends to have higher readings at home; 140s at home, sometimes 130s.     01/25/21:  In the setting of COVID-19 pandemic, this visit was changed to a telephone visit. Patient reports feeling good overall. No difficulty with fluid overload/swelling/shortness of breath. His weight is up slightly but he also has a bigger appetite lately and snacking more with being at home. BP was 105/58 at a recent clinic visit but they report pressures of 130-176 at home most recently - mostly above goal. They have been working with cardiology clinic at [place] in regards to medication adjustments  And plan to reach out to them with these updates. He was hospitalized 11/11/20-11/17/20 following left internal iliac artery stenting for endoleak. Returned to operating room 11/13 for [id number] of left common femoral aneurysm and previous graft left external iliac to superficial femoral and deep femoral artery interposition graft. Creatinine was stable at 1.5-1.7 while inpatient. He has been taking iron BID but with some constipation.     9/28/21: video visit. No vascular interventions or hospitalizations since last visit. Vascular stent/leak stable on recent CT. Checking BP 4x/day with average SBP in AM 160s-180s, mid day 110s-120s, PM 140s. Amlodipine (takes in AM) increased to 10 mg daily approximately one month ago by PCP. Losartan (take at night) 100 mg daily increased march 2021. Metoprolol succinate increased to 100 mg daily (not sure when this was increased, take in AM). BP sligthly improved but not much. Denies any dizziness, CP, abdominal pain, SOB (some BROWN with moderate exertion), LE swelling or abdominal distention. Doesn't check weights regularly, last weight 220.    01/04/22: went to [place] in first part of November - things are looking goo/stable.  NO swelling related concerns at this  time. Breathing is stable. IN the past few weeks - blood pressure one time 97 in the AM - evening always 30 points higher.  Has had a consistent pattern of low in the AM and higher in evening for the past few months. Currently taking amlodipine 5 mg AM, losartan 50 mg BID, and carvedilol. 12.5 mg BID.     10/04/22: video visit. Not as hydrated as he should. No diueretics. No lower ext swelling. NO NSAIDs. Blood pressure recently - takes it every 10 days - 121/76, 129/83, 102/67, 119/75, 127/76, 119/68. We spent time discussing hydration - he admits he is not a great water drinker.     05/02/23: telephone visit -  Sara messaged me on 3/1/23 given increased swelling noted in his feet and legs - was given lasix 20 mg to be taken for 7 days. Creatinine was 1.85 in Sept 2022 in our system but then 1.43 more recently in Jan. Labs were done through [facility] in Feb and Creatinine was up to 1.94 and then last week 1.9. He was seen by his cardiologist at [facility] last week - felt to have minimally decompensated left ventricular systolic function but exacerbation of right ventricular failure perhabs due to underlying lung disease exacerbated by hypoventilation and sleep apnea. Plan is cardiac MR, lasix 20 mg daily, sleep study. Potential right heart cath in the future.    At this time, swelling is about the same. Weight is down about 10-12 lbs during the month of March - stayed stable through April. Feet and legs don't look normal but not terrible. Recent BP readings - 122/71, 127/77, 124/82. Pretty much always In the 120s. A few will go to 116. His physical stamina has declined - much more fatigued through the winter. He has been less active through the winter - more active in spring/fall/summer. Wheelchair most recently. Home pressures don't go above 140. SOB with activity. That has been the case for a long time - worse for the past 6-8 months. MRI of hte heart is the 10th, cards the 11th, sleep study  the 21st/22nd. Cardiologist consult end of June at [place].     amLODIPine (NORVASC) 5 MG tablet, TAKE 1 TABLET(5 MG) BY MOUTH DAILY  amoxicillin (AMOXIL) 500 MG capsule, Take 4 capsules 1 hour before dental work  aspirin (ASA) 81 MG chewable tablet, Take 81 mg by mouth daily  atorvastatin (LIPITOR) 40 MG tablet, TAKE 1 TABLET(40 MG) BY MOUTH DAILY  carvedilol (COREG) 12.5 MG tablet, TAKE 1 TABLET(12.5 MG) BY MOUTH TWICE DAILY WITH MEALS  docusate sodium (COLACE) 100 MG capsule, daily   losartan (COZAAR) 100 MG tablet, Take 1 tablet (100 mg) by mouth daily  multivitamin w/minerals (THERA-VIT-M) tablet, Take 1 tablet by mouth daily  nitroGLYcerin (NITROSTAT) 0.4 MG sublingual tablet, Place 1 tablet (0.4 mg) under the tongue every 5 minutes as needed for chest pain  Vitamin D, Cholecalciferol, 25 MCG (1000 UT) TABS, 2 times daily   warfarin ANTICOAGULANT (COUMADIN) 1 MG tablet, Take 2 mg on Tues/Fri or as directed by INR clinic  warfarin ANTICOAGULANT (COUMADIN) 4 MG tablet, Take 4 mg on Monday, Wed, Thurs, Friday, Sunday or as directed by INR clinic  methylcellulose (CITRUCEL) 500 MG TABS tablet,     No current facility-administered medications on file prior to visit.      Exam:  Wt 108 kg (238 lb)   BMI 32.28 kg/m      Results:  No visits with results within 1 Day(s) from this visit.   Latest known visit with results is:   Orders Only on 04/12/2023   Component Date Value Ref Range Status     INR HOME MONITORING 04/12/2023 1.4 (L)  2.000 - 3.000 Final   Labs reviewed through [facility].     Lab were reviewed and interpreted.       Assessment/Plan:   1. CKD Stage 3: has CKD in the setting of previous ULICES as well as vascular compromise to the left kidney moiety from his vascular procedure in August 2019. Addt ULICES recently which was likely hemodynamic in the setting of poor oral intake and being on lasix. Variability seen to kidney function. With his vascular hx, need to consider he will be more sensitive to hemodynamic  changes - encourage good hydration to avoid this variability we see with his labs. More recently, concerns with pulmonary hypertension contributing to diminished renal perfusion - continues to work closely with cardiology      2. Hypertension: given edema, will trial holding amlodipine. Asked him to monitor BP at home. If elevated, could retrial a very low dose of amlodipine taken at night.      3. Anemia: hemoglobin 12.1 - iron saturation 24%     4. Aneurysms/AVR: following with [facility]    5. CHF/Pulmonary hypertension: Following with Dr. Boyd through [facility]. They are planning cardiac stress MR and started him on lasix 20 mg daily. Also recommended sleep eval.     Patient Instructions   1. Trial holding the amlodipine  2. MOnitor blood pressure readings at home  3. If you find you are going above 135/90, please plan to start amlodipine 2.5 mg at night.   4. Follow-up with Avel next [facility]ANNA appointment slot in 1-2 months    41 minutes spent by me on the date of the encounter doing chart review, review of test results, interpretation of tests, patient visit and documentation      [phone] - I could not see them through [facility] but they could see and hear me. For this reason, will change to telephone visit.   Madelin Vallecillo, DO

## 2023-05-02 NOTE — PROGRESS NOTES
Min Andino is a 67 year old male who is being evaluated via a billable telephone visit.      Virtual Visit Details    Type of service:  telephone visit    Originating Location (pt. Location): Home    Distant Location (provider location):  Off-site  Platform used for Video Visit: [employer] - no visual of patient so changed to telephone encounter.

## 2023-05-02 NOTE — PATIENT INSTRUCTIONS
Trial holding the amlodipine  MOnitor blood pressure readings at home  If you find you are going above 135/90, please plan to start amlodipine 2.5 mg at night.   Follow-up with Avel sullivan [facility]NA appointment slot in 1-2 months

## 2023-05-03 ENCOUNTER — TELEPHONE (OUTPATIENT)
Dept: NEPHROLOGY | Facility: CLINIC | Age: 68
End: 2023-05-03

## 2023-05-03 NOTE — TELEPHONE ENCOUNTER
[facility] Call Center    Phone Message    May a detailed message be left on voicemail: yes     Reason for Call: Patient is calling to schedule a 1 month follow up visit per Dr. Vallecillo. Mentioned that Dr. Vallecillo would squeeze him in. Writer not able to locate an appt until July. Please reach out. Thank you    Action Taken: Message routed to:  [facility]    Travel Screening: Not Applicable

## 2023-05-03 NOTE — CONFIDENTIAL NOTE
Patient Contacted for the patient to call back and schedule the following:    Appointment type: return nephrology  Provider:   Return date: 06/02/2023  Specialty phone number: [phone]  Additional appointment(s) needed: labs prior  Additonal Notes: Follow-up with Avel next ZHANNA appointment slot in 1-2 months      05/03 Patient stated that he will call back to schedule this appointment at a later time.       Kary mueller Procedure   Cardiology, Nephrology, Rheumatology, GI, Pulmonology, Infectious Disease Specialties   [facility]

## 2023-05-03 NOTE — TELEPHONE ENCOUNTER
Called and spoke with pt.  Assisted with scheduling a Return appt with Dr. Vallecillo.  Pt requested a Video Visit in 1mo.  scheduled 5/30/23 at 12:00pm.  A non-fasting Lab appt at [facility] 5/25/23.    Encouraged pt to call or MyChart if further questions or concerns.    Lizet Cary LPN

## 2023-05-05 ENCOUNTER — LAB (OUTPATIENT)
Dept: LAB | Facility: OTHER | Age: 68
End: 2023-05-05
Payer: MEDICARE

## 2023-05-05 DIAGNOSIS — E87.5 HYPERKALEMIA: ICD-10-CM

## 2023-05-05 DIAGNOSIS — D64.9 ANEMIA, UNSPECIFIED TYPE: ICD-10-CM

## 2023-05-05 DIAGNOSIS — N18.31 STAGE 3A CHRONIC KIDNEY DISEASE (H): ICD-10-CM

## 2023-05-05 LAB
ALBUMIN MFR UR ELPH: 26.7 MG/DL (ref 1–14)
ALBUMIN SERPL BCG-MCNC: 3.5 G/DL (ref 3.5–5.2)
ANION GAP SERPL CALCULATED.3IONS-SCNC: 8 MMOL/L (ref 7–15)
BUN SERPL-MCNC: 28.6 MG/DL (ref 8–23)
CALCIUM SERPL-MCNC: 9.2 MG/DL (ref 8.8–10.2)
CHLORIDE SERPL-SCNC: 108 MMOL/L (ref 98–107)
CREAT SERPL-MCNC: 1.84 MG/DL (ref 0.67–1.17)
CREAT UR-MCNC: 62.8 MG/DL
DEPRECATED HCO3 PLAS-SCNC: 28 MMOL/L (ref 22–29)
FERRITIN SERPL-MCNC: 108 NG/ML (ref 31–409)
GFR SERPL CREATININE-BSD FRML MDRD: 39 ML/MIN/1.73M2
GLUCOSE SERPL-MCNC: 109 MG/DL (ref 70–99)
HGB BLD-MCNC: 12.1 G/DL (ref 13.3–17.7)
IRON BINDING CAPACITY (ROCHE): 321 UG/DL (ref 240–430)
IRON SATN MFR SERPL: 24 % (ref 15–46)
IRON SERPL-MCNC: 76 UG/DL (ref 61–157)
PHOSPHATE SERPL-MCNC: 4.3 MG/DL (ref 2.5–4.5)
POTASSIUM SERPL-SCNC: 5.2 MMOL/L (ref 3.4–5.3)
PROT/CREAT 24H UR: 0.43 MG/MG CR (ref 0–0.2)
PTH-INTACT SERPL-MCNC: 59 PG/ML (ref 15–65)
SODIUM SERPL-SCNC: 144 MMOL/L (ref 136–145)

## 2023-05-05 PROCEDURE — 85018 HEMOGLOBIN: CPT

## 2023-05-05 PROCEDURE — 83550 IRON BINDING TEST: CPT

## 2023-05-05 PROCEDURE — 82728 ASSAY OF FERRITIN: CPT

## 2023-05-05 PROCEDURE — 83540 ASSAY OF IRON: CPT

## 2023-05-05 PROCEDURE — 84156 ASSAY OF PROTEIN URINE: CPT

## 2023-05-05 PROCEDURE — 83970 ASSAY OF PARATHORMONE: CPT

## 2023-05-05 PROCEDURE — 80069 RENAL FUNCTION PANEL: CPT

## 2023-05-05 PROCEDURE — 36415 COLL VENOUS BLD VENIPUNCTURE: CPT

## 2023-05-25 ENCOUNTER — TELEPHONE (OUTPATIENT)
Dept: LAB | Facility: CLINIC | Age: 68
End: 2023-05-25

## 2023-05-25 NOTE — TELEPHONE ENCOUNTER
[facility] Call Center    Phone Message    May a detailed message be left on voicemail: yes     Reason for Call: Other: Patients wife calling to see if they need labs done today still if patient just had recent BMP with [facility] that is in the chart. Please reach out. Thank you     Action Taken: Message routed to:  [facility]    Travel Screening: Not Applicable

## 2023-05-30 ENCOUNTER — VIRTUAL VISIT (OUTPATIENT)
Dept: NEPHROLOGY | Facility: CLINIC | Age: 68
End: 2023-05-30
Payer: MEDICARE

## 2023-05-30 VITALS — SYSTOLIC BLOOD PRESSURE: 122 MMHG | BODY MASS INDEX: 33.91 KG/M2 | DIASTOLIC BLOOD PRESSURE: 77 MMHG | WEIGHT: 250 LBS

## 2023-05-30 DIAGNOSIS — N18.32 STAGE 3B CHRONIC KIDNEY DISEASE (H): Primary | ICD-10-CM

## 2023-05-30 DIAGNOSIS — Z86.79 HX OF AORTIC ANEURYSM: ICD-10-CM

## 2023-05-30 DIAGNOSIS — N18.31 STAGE 3A CHRONIC KIDNEY DISEASE (H): ICD-10-CM

## 2023-05-30 DIAGNOSIS — I10 ESSENTIAL HYPERTENSION: ICD-10-CM

## 2023-05-30 DIAGNOSIS — I50.82 BIVENTRICULAR CONGESTIVE HEART FAILURE (H): ICD-10-CM

## 2023-05-30 PROCEDURE — 99215 OFFICE O/P EST HI 40 MIN: CPT | Mod: VID | Performed by: INTERNAL MEDICINE

## 2023-05-30 PROCEDURE — 99417 PROLNG OP E/M EACH 15 MIN: CPT | Performed by: INTERNAL MEDICINE

## 2023-05-30 RX ORDER — DULOXETIN HYDROCHLORIDE 30 MG/1
30 CAPSULE, DELAYED RELEASE ORAL
COMMUNITY
Start: 2023-01-26 | End: 2023-12-06

## 2023-05-30 RX ORDER — FUROSEMIDE 20 MG
20 TABLET ORAL
COMMUNITY
Start: 2023-03-13 | End: 2023-06-06

## 2023-05-30 RX ORDER — AMIODARONE HYDROCHLORIDE 200 MG/1
TABLET ORAL
COMMUNITY
Start: 2023-05-05

## 2023-05-30 ASSESSMENT — PAIN SCALES - GENERAL: PAINLEVEL: NO PAIN (0)

## 2023-05-30 NOTE — NURSING NOTE
Is the patient currently in the state of [place]? YES    Visit mode:VIDEO    If the visit is dropped, the patient can be reconnected by: VIDEO VISIT: Send to e-mail at: [email or web address]    Will anyone else be joining the visit? YES: How would they like to receive their invitation? Send to e-mail at: [email or web address]      How would you like to obtain your AVS? MyChart    Are changes needed to the allergy or medication list? NO    Reason for visit: RECHECK    Cindy Clay    PT states she has downloaded a copy of Dr. Boyd' notes from previous visit if provider cannot pull from chart.

## 2023-05-30 NOTE — PROGRESS NOTES
"Virtual Visit Details    Type of service:  Video Visit    Originating Location (pt. Location): Home    Distant Location (provider location):  Off-site  Platform used for Video Visit: Иван       05/30/23      CC: CKD    HPI: Min Andino is a 64 year old male who presents for evaluation of CKD. I last saw Mr. Andino in 2014. To review from my previous note: Mr. Andino's past medical hx was unremarkable leading up to Dec 2013 when he noted chest tightness. He was seen at [facility] at that time and noted to have aortic dissection. He was initially monitored but later underwent aortic repair/aortic valve replacement/CABG in early April 2014. His post op course was complicated by the need for ECMO as well as pressor support. His wife reports today that he was told that he has \"normal\" creatinine levels when undergoing physicals in the past. His creatinine was 2.2 post surgery in April 2014 but improved to 1.3 at the time of discharge in April. 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 At that time his creatinine was 2.2 post op but improved to 1.3. Since that time, creatinine readings have included 1.48 on 7/17/14, 1.39 no 7/27/14, and 1.35 on 8/13/14. 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                 02/24/20: today he presents to reestablish care in our clinic. His creatinine was stable at ~ 1.1 on last check in 2014 but is now at a new baseline of ~ 1.6 since august. Mr. Andino underwent thoracoabdominal aortic repair august 2019. The placement of this stent compromised blood flow to the left kidney moiety which was noted on imaging later. His creatinine post this procedure was ~ 1.6. There have been a few episodes when the creatinine has increased, most recently a few weeks ago (to 1.9). With his most recent creatinine rise, lasix was held. He has not noted much change in his swelling or " breathing with holding the lasix for the past few weeks. Today I noted SOB during our conversation. His oxygen saturation was fine but his breathing seemed labored. He and his wife report that this is his breathing baseline over the past 5 years and is not worse than typical. They are following weights at home and he has been dropping weight - no increase since stopping the lasix. He does have difficulty with empyting his bladder - last imaging in the fall showed no hydronephrosis. Flomax did not help him in the past. He has a HHN coming once a week to the house. He is not lightheaded. He has plans to see cardiology at [place] in March, is looking to establish care in urology, is also looking to establish care with internal medicine potentially in our clinic for continuity in one location.         06/08/20:  Virtual visit. Lasix was increased last month. He reports that the swelling is there but better. He denies any change in eating. No diarrhea. Weight was 240 lbs down to 215 lbs. He had been losing 1 lb a day. He was in ED last week and  Dx with hernia. He held lasix since Friday - was on lasix 40 mg daily held over the weekend - weight was 215 lbs on Thursday and 217 lbs this AM. He denies lightheadedness/dizziness. No orthostatic sxs. He quit wearing compression stockings a week ago. He feels he has slightly more endurance; can't lift a lot, needing to take breaks. He feels his urinary retention is not as problematic - has not seen urology. He has been taking iron BID.      7/13/20: video visit. He has continued to see weight loss over time - was 215 lbs in June but now 205 lbs. He has seen a change in his clothing size by 2 over the past few months. He is hungry often - eating well. No diarrhea. He has some constipation at times. He feels that maybe his breathing is improved. He is wearing compression stockings. He is using lasix 20 mg daily.      10/20/20: video visit - started on [facility] and did exam that way -  then converted to telephone given echo noted. Feeling the best he has felt since before last winter. NO new issues. He has endovascular stent repair planned in the coming week. Activity improving. No swelling. Weight most recently 203 lbs. Over the past month - 201-203 lbs. BP has been well controlled; 115/61 at a recent physician appt. He tends to have higher readings at home; 140s at home, sometimes 130s.     01/25/21:  In the setting of COVID-19 pandemic, this visit was changed to a telephone visit. Patient reports feeling good overall. No difficulty with fluid overload/swelling/shortness of breath. His weight is up slightly but he also has a bigger appetite lately and snacking more with being at home. BP was 105/58 at a recent clinic visit but they report pressures of 130-176 at home most recently - mostly above goal. They have been working with cardiology clinic at [place] in regards to medication adjustments  And plan to reach out to them with these updates. He was hospitalized 11/11/20-11/17/20 following left internal iliac artery stenting for endoleak. Returned to operating room 11/13 for [id number] of left common femoral aneurysm and previous graft left external iliac to superficial femoral and deep femoral artery interposition graft. Creatinine was stable at 1.5-1.7 while inpatient. He has been taking iron BID but with some constipation.     9/28/21: video visit. No vascular interventions or hospitalizations since last visit. Vascular stent/leak stable on recent CT. Checking BP 4x/day with average SBP in AM 160s-180s, mid day 110s-120s, PM 140s. Amlodipine (takes in AM) increased to 10 mg daily approximately one month ago by PCP. Losartan (take at night) 100 mg daily increased march 2021. Metoprolol succinate increased to 100 mg daily (not sure when this was increased, take in AM). BP sligthly improved but not much. Denies any dizziness, CP, abdominal pain, SOB (some BROWN with moderate exertion), LE swelling or  abdominal distention. Doesn't check weights regularly, last weight 220.    01/04/22: went to [place] in first part of November - things are looking goo/stable.  NO swelling related concerns at this time. Breathing is stable. IN the past few weeks - blood pressure one time 97 in the AM - evening always 30 points higher.  Has had a consistent pattern of low in the AM and higher in evening for the past few months. Currently taking amlodipine 5 mg AM, losartan 50 mg BID, and carvedilol. 12.5 mg BID.     10/04/22: video visit. Not as hydrated as he should. No diueretics. No lower ext swelling. NO NSAIDs. Blood pressure recently - takes it every 10 days - 121/76, 129/83, 102/67, 119/75, 127/76, 119/68. We spent time discussing hydration - he admits he is not a great water drinker.     05/02/23: telephone visit -  Sara messaged me on 3/1/23 given increased swelling noted in his feet and legs - was given lasix 20 mg to be taken for 7 days. Creatinine was 1.85 in Sept 2022 in our system but then 1.43 more recently in Jan. Labs were done through [facility] in Feb and Creatinine was up to 1.94 and then last week 1.9. He was seen by his cardiologist at [facility] last week - felt to have minimally decompensated left ventricular systolic function but exacerbation of right ventricular failure perhabs due to underlying lung disease exacerbated by hypoventilation and sleep apnea. Plan is cardiac MR, lasix 20 mg daily, sleep study. Potential right heart cath in the future.    At this time, swelling is about the same. Weight is down about 10-12 lbs during the month of March - stayed stable through April. Feet and legs don't look normal but not terrible. Recent BP readings - 122/71, 127/77, 124/82. Pretty much always In the 120s. A few will go to 116. His physical stamina has declined - much more fatigued through the winter. He has been less active through the winter - more active in spring/fall/summer. Wheelchair  most recently. Home pressures don't go above 140. SOB with activity. That has been the case for a long time - worse for the past 6-8 months. MRI of hte heart is the 10th, cards the 11th, sleep study the 21st/22nd. Cardiologist consult end of June at [place].     05/30/23: video visit. They feel the swelling is progressively getting worse day by day.last week they went to 20 mg BID of lasix per cardiology but then creatinine increased so they decreased back to 20 mg of lasix.  They have been losing weight this year with healthier dietary habits but unfortunately the scale is going up for him. Swelling in legs is significantly more uncomfortable. They did their sleep study but currently do not have a follow-up with pulmonary until July to hear the results/next stpeps.   Appt at [facility] in [place] for 2nd opinion.    Not scheduled for cardioversion at this point but they are started on amiodarone since last visit with Dr. Boyd.   Lasix 20 mg daily. When taking 20 BID, he did not see any difference in swelling improvement or urine output.  He is not on norvasc. /77. 132/78. 120s-130s.       amiodarone (PACERONE) 200 MG tablet, 200 mg oral bid for 2 weeks, then 200 mg daily  amoxicillin (AMOXIL) 500 MG capsule, Take 4 capsules 1 hour before dental work  aspirin (ASA) 81 MG chewable tablet, Take 81 mg by mouth daily  atorvastatin (LIPITOR) 40 MG tablet, TAKE 1 TABLET(40 MG) BY MOUTH DAILY  carvedilol (COREG) 12.5 MG tablet, TAKE 1 TABLET(12.5 MG) BY MOUTH TWICE DAILY WITH MEALS  docusate sodium (COLACE) 100 MG capsule, daily   DULoxetine (CYMBALTA) 30 MG capsule, Take 30 mg by mouth  furosemide (LASIX) 20 MG tablet, Take 20 mg by mouth  losartan (COZAAR) 100 MG tablet, Take 1 tablet (100 mg) by mouth daily  multivitamin w/minerals (THERA-VIT-M) tablet, Take 1 tablet by mouth daily  nitroGLYcerin (NITROSTAT) 0.4 MG sublingual tablet, Place 1 tablet (0.4 mg) under the tongue every 5 minutes as needed for chest  pain  psyllium (METAMUCIL/KONSYL) 0.52 g capsule, Take 1.04 g by mouth  Vitamin D, Cholecalciferol, 25 MCG (1000 UT) TABS, 2 times daily   warfarin ANTICOAGULANT (COUMADIN) 1 MG tablet, Take 2 mg on Tues/Fri or as directed by INR clinic  warfarin ANTICOAGULANT (COUMADIN) 4 MG tablet, Take 4 mg on Monday, Wed, Thurs, Friday, Sunday or as directed by INR clinic  methylcellulose (CITRUCEL) 500 MG TABS tablet,     No current facility-administered medications on file prior to visit.      Exam:  /77   Wt 113.4 kg (250 lb)   BMI 33.91 kg/m      Results:  Labs reviewed through [facility].     Lab were reviewed and interpreted.       Assessment/Plan:   1. CKD Stage 3: has CKD in the setting of previous ULICES as well as vascular compromise to the left kidney moiety from his vascular procedure in August 2019. Addt ULICES recently which was likely hemodynamic in the setting of poor oral intake and being on lasix. Variability seen to kidney function. With his vascular hx, need to consider he will be more sensitive to hemodynamic changes.. More recently, concerns with pulmonary hypertension contributing to diminished renal perfusion - continues to work closely with cardiology.    I do question diminished effective circulating volume at this time in the setting of right and left heart failure. He is now on amiodarone and following with Dr. Boyd. Sleep evaluation is critical to try to improve right sided heart failure. I called the sleep lab and then the pulmonary clinic for [facility] today to try to move  Up his follow-up in their clinic. AWait the call back from Dr. Knowles's nurse. Getting started on CPAP is critical to try to improve his cardiac state so ideally will do this ASAP. I also have a message out to Dr. Boyd in the cardiology clinic to assure optimizing cardiac state as able with hopes of improving ECV.      2. Hypertension: BP is reasonable at this time.      3. Anemia: hemoglobin 12.8    4. Aneurysms/AVR:  following with [facility]    5. CHF/Pulmonary hypertension: Following with Dr. Boyd through [facility].   -  Sleep eval completed and per sleep lab, findings c/w LIVIA.   - Await call back from Dr. Knowles's clinic to see if appt can be moved up with hopes of getting CPAP started ASAP.   -  Message left with Dr. Boyd' clinic to assure good communication in the setting of likely cardiorenal syndrome.     72 minutes spent by me on the date of the encounter doing chart review, review of outside records, review of test results, interpretation of tests, patient visit, documentation, discussion with other provider(s) and discussion with family        [id number] PM video visit via [email or web address] - offsite  Madelin Vallecillo,

## 2023-05-31 ENCOUNTER — DOCUMENTATION ONLY (OUTPATIENT)
Dept: LAB | Facility: OTHER | Age: 68
End: 2023-05-31
Payer: MEDICARE

## 2023-05-31 DIAGNOSIS — N18.32 STAGE 3B CHRONIC KIDNEY DISEASE (H): ICD-10-CM

## 2023-05-31 DIAGNOSIS — D64.9 ANEMIA, UNSPECIFIED TYPE: Primary | ICD-10-CM

## 2023-06-02 ENCOUNTER — LAB (OUTPATIENT)
Dept: LAB | Facility: OTHER | Age: 68
End: 2023-06-02
Payer: MEDICARE

## 2023-06-02 DIAGNOSIS — D64.9 ANEMIA, UNSPECIFIED TYPE: ICD-10-CM

## 2023-06-02 DIAGNOSIS — N18.32 STAGE 3B CHRONIC KIDNEY DISEASE (H): ICD-10-CM

## 2023-06-02 LAB
ALBUMIN SERPL BCG-MCNC: 3.3 G/DL (ref 3.5–5.2)
ANION GAP SERPL CALCULATED.3IONS-SCNC: 11 MMOL/L (ref 7–15)
BUN SERPL-MCNC: 37.8 MG/DL (ref 8–23)
CALCIUM SERPL-MCNC: 9 MG/DL (ref 8.8–10.2)
CHLORIDE SERPL-SCNC: 107 MMOL/L (ref 98–107)
CREAT SERPL-MCNC: 2.2 MG/DL (ref 0.67–1.17)
DEPRECATED HCO3 PLAS-SCNC: 25 MMOL/L (ref 22–29)
GFR SERPL CREATININE-BSD FRML MDRD: 32 ML/MIN/1.73M2
GLUCOSE SERPL-MCNC: 103 MG/DL (ref 70–99)
HGB BLD-MCNC: 11.7 G/DL (ref 13.3–17.7)
PHOSPHATE SERPL-MCNC: 4.1 MG/DL (ref 2.5–4.5)
POTASSIUM SERPL-SCNC: 4.6 MMOL/L (ref 3.4–5.3)
SODIUM SERPL-SCNC: 143 MMOL/L (ref 136–145)

## 2023-06-02 PROCEDURE — 85018 HEMOGLOBIN: CPT

## 2023-06-02 PROCEDURE — 80069 RENAL FUNCTION PANEL: CPT

## 2023-06-02 PROCEDURE — 36415 COLL VENOUS BLD VENIPUNCTURE: CPT

## 2023-06-06 DIAGNOSIS — I50.82 BIVENTRICULAR CONGESTIVE HEART FAILURE (H): Primary | ICD-10-CM

## 2023-06-06 RX ORDER — BUMETANIDE 1 MG/1
1 TABLET ORAL DAILY
Qty: 90 TABLET | Refills: 3 | Status: SHIPPED | OUTPATIENT
Start: 2023-06-06 | End: 2024-04-16

## 2023-06-14 ENCOUNTER — LAB (OUTPATIENT)
Dept: LAB | Facility: OTHER | Age: 68
End: 2023-06-14
Payer: MEDICARE

## 2023-06-14 DIAGNOSIS — N18.32 STAGE 3B CHRONIC KIDNEY DISEASE (H): ICD-10-CM

## 2023-06-14 DIAGNOSIS — I50.82 BIVENTRICULAR CONGESTIVE HEART FAILURE (H): ICD-10-CM

## 2023-06-14 LAB
ALBUMIN SERPL BCG-MCNC: 3.4 G/DL (ref 3.5–5.2)
ANION GAP SERPL CALCULATED.3IONS-SCNC: 11 MMOL/L (ref 7–15)
BUN SERPL-MCNC: 36.6 MG/DL (ref 8–23)
CALCIUM SERPL-MCNC: 9 MG/DL (ref 8.8–10.2)
CHLORIDE SERPL-SCNC: 103 MMOL/L (ref 98–107)
CREAT SERPL-MCNC: 1.96 MG/DL (ref 0.67–1.17)
DEPRECATED HCO3 PLAS-SCNC: 28 MMOL/L (ref 22–29)
GFR SERPL CREATININE-BSD FRML MDRD: 37 ML/MIN/1.73M2
GLUCOSE SERPL-MCNC: 92 MG/DL (ref 70–99)
HGB BLD-MCNC: 11 G/DL (ref 13.3–17.7)
PHOSPHATE SERPL-MCNC: 3.4 MG/DL (ref 2.5–4.5)
POTASSIUM SERPL-SCNC: 4.6 MMOL/L (ref 3.4–5.3)
SODIUM SERPL-SCNC: 142 MMOL/L (ref 136–145)

## 2023-06-14 PROCEDURE — 80069 RENAL FUNCTION PANEL: CPT

## 2023-06-14 PROCEDURE — 85018 HEMOGLOBIN: CPT

## 2023-06-14 PROCEDURE — 36415 COLL VENOUS BLD VENIPUNCTURE: CPT

## 2023-07-05 ENCOUNTER — LAB (OUTPATIENT)
Dept: LAB | Facility: OTHER | Age: 68
End: 2023-07-05
Payer: MEDICARE

## 2023-07-05 DIAGNOSIS — I50.82 BIVENTRICULAR CONGESTIVE HEART FAILURE (H): ICD-10-CM

## 2023-07-05 DIAGNOSIS — N18.32 STAGE 3B CHRONIC KIDNEY DISEASE (H): ICD-10-CM

## 2023-07-05 LAB
ALBUMIN SERPL BCG-MCNC: 3.5 G/DL (ref 3.5–5.2)
ANION GAP SERPL CALCULATED.3IONS-SCNC: 9 MMOL/L (ref 7–15)
BUN SERPL-MCNC: 37.8 MG/DL (ref 8–23)
CALCIUM SERPL-MCNC: 8.9 MG/DL (ref 8.8–10.2)
CHLORIDE SERPL-SCNC: 101 MMOL/L (ref 98–107)
CREAT SERPL-MCNC: 1.77 MG/DL (ref 0.67–1.17)
DEPRECATED HCO3 PLAS-SCNC: 30 MMOL/L (ref 22–29)
GFR SERPL CREATININE-BSD FRML MDRD: 41 ML/MIN/1.73M2
GLUCOSE SERPL-MCNC: 102 MG/DL (ref 70–99)
HGB BLD-MCNC: 11.8 G/DL (ref 13.3–17.7)
PHOSPHATE SERPL-MCNC: 3.3 MG/DL (ref 2.5–4.5)
POTASSIUM SERPL-SCNC: 4.2 MMOL/L (ref 3.4–5.3)
SODIUM SERPL-SCNC: 140 MMOL/L (ref 136–145)

## 2023-07-05 PROCEDURE — 85018 HEMOGLOBIN: CPT

## 2023-07-05 PROCEDURE — 80069 RENAL FUNCTION PANEL: CPT

## 2023-07-05 PROCEDURE — 36415 COLL VENOUS BLD VENIPUNCTURE: CPT

## 2023-07-11 ENCOUNTER — VIRTUAL VISIT (OUTPATIENT)
Dept: NEPHROLOGY | Facility: CLINIC | Age: 68
End: 2023-07-11
Payer: MEDICARE

## 2023-07-11 DIAGNOSIS — I10 HYPERTENSION, GOAL BELOW 140/90: ICD-10-CM

## 2023-07-11 DIAGNOSIS — N25.81 SECONDARY RENAL HYPERPARATHYROIDISM (H): ICD-10-CM

## 2023-07-11 DIAGNOSIS — D64.9 ANEMIA, UNSPECIFIED TYPE: ICD-10-CM

## 2023-07-11 DIAGNOSIS — N18.32 STAGE 3B CHRONIC KIDNEY DISEASE (H): Primary | ICD-10-CM

## 2023-07-11 PROCEDURE — 99214 OFFICE O/P EST MOD 30 MIN: CPT | Mod: VID | Performed by: INTERNAL MEDICINE

## 2023-07-11 NOTE — NURSING NOTE
Is the patient currently in the state of [place]? YES    Visit mode:VIDEO    If the visit is dropped, the patient can be reconnected by: VIDEO VISIT: Send to e-mail at: [email or web address]    Will anyone else be joining the visit? YES: How would they like to receive their invitation? Other e-mail: Cierra Gunderson will be on camera      How would you like to obtain your AVS? MyChart    Are changes needed to the allergy or medication list? NO    Reason for visit: RECHECK

## 2023-07-11 NOTE — PROGRESS NOTES
"Virtual Visit Details    Type of service:  Video Visit   Originating Location (pt. Location): Home    Distant Location (provider location):  Off-site  Platform used for Video Visit: Иван       07/11/23     CC: CKD    HPI: Min Andino is a 67 year old male who presents for evaluation of CKD. I last saw Mr. Andino in 2014. To review from my previous note: Mr. Andino's past medical hx was unremarkable leading up to Dec 2013 when he noted chest tightness. He was seen at [facility] at that time and noted to have aortic dissection. He was initially monitored but later underwent aortic repair/aortic valve replacement/CABG in early April 2014. His post op course was complicated by the need for ECMO as well as pressor support. His wife reports today that he was told that he has \"normal\" creatinine levels when undergoing physicals in the past. His creatinine was 2.2 post surgery in April 2014 but improved to 1.3 at the time of discharge in April. 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 At that time his creatinine was 2.2 post op but improved to 1.3. Since that time, creatinine readings have included 1.48 on 7/17/14, 1.39 no 7/27/14, and 1.35 on 8/13/14. 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                 02/24/20: today he presents to reestablish care in our clinic. His creatinine was stable at ~ 1.1 on last check in 2014 but is now at a new baseline of ~ 1.6 since august. Mr. Andino underwent thoracoabdominal aortic repair august 2019. The placement of this stent compromised blood flow to the left kidney moiety which was noted on imaging later. His creatinine post this procedure was ~ 1.6. There have been a few episodes when the creatinine has increased, most recently a few weeks ago (to 1.9). With his most recent creatinine rise, lasix was held. He has not noted much change in his swelling or breathing " with holding the lasix for the past few weeks. Today I noted SOB during our conversation. His oxygen saturation was fine but his breathing seemed labored. He and his wife report that this is his breathing baseline over the past 5 years and is not worse than typical. They are following weights at home and he has been dropping weight - no increase since stopping the lasix. He does have difficulty with empyting his bladder - last imaging in the fall showed no hydronephrosis. Flomax did not help him in the past. He has a HHN coming once a week to the house. He is not lightheaded. He has plans to see cardiology at [place] in March, is looking to establish care in urology, is also looking to establish care with internal medicine potentially in our clinic for continuity in one location.         06/08/20:  Virtual visit. Lasix was increased last month. He reports that the swelling is there but better. He denies any change in eating. No diarrhea. Weight was 240 lbs down to 215 lbs. He had been losing 1 lb a day. He was in ED last week and  Dx with hernia. He held lasix since Friday - was on lasix 40 mg daily held over the weekend - weight was 215 lbs on Thursday and 217 lbs this AM. He denies lightheadedness/dizziness. No orthostatic sxs. He quit wearing compression stockings a week ago. He feels he has slightly more endurance; can't lift a lot, needing to take breaks. He feels his urinary retention is not as problematic - has not seen urology. He has been taking iron BID.      7/13/20: video visit. He has continued to see weight loss over time - was 215 lbs in June but now 205 lbs. He has seen a change in his clothing size by 2 over the past few months. He is hungry often - eating well. No diarrhea. He has some constipation at times. He feels that maybe his breathing is improved. He is wearing compression stockings. He is using lasix 20 mg daily.      10/20/20: video visit - started on [facility] and did exam that way - then  converted to telephone given echo noted. Feeling the best he has felt since before last winter. NO new issues. He has endovascular stent repair planned in the coming week. Activity improving. No swelling. Weight most recently 203 lbs. Over the past month - 201-203 lbs. BP has been well controlled; 115/61 at a recent physician appt. He tends to have higher readings at home; 140s at home, sometimes 130s.     01/25/21:  In the setting of COVID-19 pandemic, this visit was changed to a telephone visit. Patient reports feeling good overall. No difficulty with fluid overload/swelling/shortness of breath. His weight is up slightly but he also has a bigger appetite lately and snacking more with being at home. BP was 105/58 at a recent clinic visit but they report pressures of 130-176 at home most recently - mostly above goal. They have been working with cardiology clinic at [place] in regards to medication adjustments  And plan to reach out to them with these updates. He was hospitalized 11/11/20-11/17/20 following left internal iliac artery stenting for endoleak. Returned to operating room 11/13 for [id number] of left common femoral aneurysm and previous graft left external iliac to superficial femoral and deep femoral artery interposition graft. Creatinine was stable at 1.5-1.7 while inpatient. He has been taking iron BID but with some constipation.     9/28/21: video visit. No vascular interventions or hospitalizations since last visit. Vascular stent/leak stable on recent CT. Checking BP 4x/day with average SBP in AM 160s-180s, mid day 110s-120s, PM 140s. Amlodipine (takes in AM) increased to 10 mg daily approximately one month ago by PCP. Losartan (take at night) 100 mg daily increased march 2021. Metoprolol succinate increased to 100 mg daily (not sure when this was increased, take in AM). BP sligthly improved but not much. Denies any dizziness, CP, abdominal pain, SOB (some BROWN with moderate exertion), LE swelling or  abdominal distention. Doesn't check weights regularly, last weight 220.    01/04/22: went to [place] in first part of November - things are looking goo/stable.  NO swelling related concerns at this time. Breathing is stable. IN the past few weeks - blood pressure one time 97 in the AM - evening always 30 points higher.  Has had a consistent pattern of low in the AM and higher in evening for the past few months. Currently taking amlodipine 5 mg AM, losartan 50 mg BID, and carvedilol. 12.5 mg BID.     10/04/22: video visit. Not as hydrated as he should. No diueretics. No lower ext swelling. NO NSAIDs. Blood pressure recently - takes it every 10 days - 121/76, 129/83, 102/67, 119/75, 127/76, 119/68. We spent time discussing hydration - he admits he is not a great water drinker.     05/02/23: telephone visit -  Sara messaged me on 3/1/23 given increased swelling noted in his feet and legs - was given lasix 20 mg to be taken for 7 days. Creatinine was 1.85 in Sept 2022 in our system but then 1.43 more recently in Jan. Labs were done through [facility] in Feb and Creatinine was up to 1.94 and then last week 1.9. He was seen by his cardiologist at [facility] last week - felt to have minimally decompensated left ventricular systolic function but exacerbation of right ventricular failure perhabs due to underlying lung disease exacerbated by hypoventilation and sleep apnea. Plan is cardiac MR, lasix 20 mg daily, sleep study. Potential right heart cath in the future.    At this time, swelling is about the same. Weight is down about 10-12 lbs during the month of March - stayed stable through April. Feet and legs don't look normal but not terrible. Recent BP readings - 122/71, 127/77, 124/82. Pretty much always In the 120s. A few will go to 116. His physical stamina has declined - much more fatigued through the winter. He has been less active through the winter - more active in spring/fall/summer. Wheelchair  "most recently. Home pressures don't go above 140. SOB with activity. That has been the case for a long time - worse for the past 6-8 months. MRI of hte heart is the 10th, cards the 11th, sleep study the 21st/22nd. Cardiologist consult end of June at [place].     05/30/23: video visit. They feel the swelling is progressively getting worse day by day.last week they went to 20 mg BID of lasix per cardiology but then creatinine increased so they decreased back to 20 mg of lasix.  They have been losing weight this year with healthier dietary habits but unfortunately the scale is going up for him. Swelling in legs is significantly more uncomfortable. They did their sleep study but currently do not have a follow-up with pulmonary until July to hear the results/next stpeps.   Appt at [facility] in [place] for 2nd opinion.    Not scheduled for cardioversion at this point but they are started on amiodarone since last visit with Dr. Boyd.   Lasix 20 mg daily. When taking 20 BID, he did not see any difference in swelling improvement or urine output.  He is not on norvasc. /77. 132/78. 120s-130s.     07/11/23: video visit. Since our last visit I connected with the sleep center and he was going to be started on CPAP ASAP. I also connected with Dr. Boyd at [facility]. The prognosis of his cardiac condition is poor and he is concerned about future needs for dialysis. He has since been seen by [facility] cardiology - \"progressive LV adverse remodeling with reduced EF accompanied by right heart failure and significant tricuspid regurgitation\". They discussed optimizing medical therapy: they hope to add SGLT2 inhibitor, wanted to increase bumex to 2 mg AM And 1 mg PM. Once more euvolemic, then optimize carvedilol to a target of 25 mg BID. They also discussed replacing losartan with sacubitril/valsartan. They also felt electrical cardioversion is not unreasonable.    He has dropped about 50 lbs since our last " visit. Lasix wasn't working. Bumex has helped. They have not made med changes since being seen at [place]. He has an appt this Friday with cardiology. There is a little swelling still but much improved from our last visit - 250 lbs 1.5 months ago - just over 200 lbs now. Same eating regimen. Now on CPAP - still with apnea events but improved from previous. Was just seen by sleep physician through [facility]. Mask has been tolerable. Currently taking bumex 2 mg daily. BP has been good at 120/83. 130/85, 135/82. NO lightheaedness or dizziness.     amiodarone (PACERONE) 200 MG tablet, 200 mg oral bid for 2 weeks, then 200 mg daily  amoxicillin (AMOXIL) 500 MG capsule, Take 4 capsules 1 hour before dental work  aspirin (ASA) 81 MG chewable tablet, Take 81 mg by mouth daily  atorvastatin (LIPITOR) 40 MG tablet, Take 1 tablet (40 mg) by mouth daily Last refill. Need follow-up with Dr. Blair.  bumetanide (BUMEX) 1 MG tablet, Take 1 tablet (1 mg) by mouth daily  carvedilol (COREG) 12.5 MG tablet, TAKE 1 TABLET(12.5 MG) BY MOUTH TWICE DAILY WITH MEALS  docusate sodium (COLACE) 100 MG capsule, daily   DULoxetine (CYMBALTA) 30 MG capsule, Take 30 mg by mouth  losartan (COZAAR) 100 MG tablet, Take 1 tablet (100 mg) by mouth daily  methylcellulose (CITRUCEL) 500 MG TABS tablet,   multivitamin w/minerals (THERA-VIT-M) tablet, Take 1 tablet by mouth daily  nitroGLYcerin (NITROSTAT) 0.4 MG sublingual tablet, Place 1 tablet (0.4 mg) under the tongue every 5 minutes as needed for chest pain  psyllium (METAMUCIL/KONSYL) 0.52 g capsule, Take 1.04 g by mouth  Vitamin D, Cholecalciferol, 25 MCG (1000 UT) TABS, 2 times daily   warfarin ANTICOAGULANT (COUMADIN) 1 MG tablet, Take 2 mg on Tues/Fri or as directed by INR clinic  warfarin ANTICOAGULANT (COUMADIN) 4 MG tablet, Take 4 mg on Monday, Wed, Thurs, Friday, Sunday or as directed by INR clinic    No current facility-administered medications on file prior to visit.      Exam:  There  were no vitals taken for this visit.    Results:  Labs reviewed through [facility].     Lab were reviewed and interpreted.       Assessment/Plan:   1. CKD Stage 3: has CKD in the setting of previous ULICES as well as vascular compromise to the left kidney moiety from his vascular procedure in August 2019. Addt ULICES recently which was likely cardiorenal syndrome. With his vascular hx, need to consider he will be more sensitive to hemodynamic changes. With his heart failure changes most recently, there is a concern for worsening kidney function/potential of dialysis needs in the future. I am pleased to see the stability he has at this time - will monitor as we have been.     2. Hypertension: BP is reasonable at this time.      3. Anemia: hemoglobin 11.8 - iron saturation 24% in May.     4. Aneurysms/AVR: following with [facility]    5. CHF/Pulmonary hypertension: Following with Dr. Boyd through [facility]. REcently with 2nd opinion at [facility] and recommendations were to optimize medically. They discussed potential of cardioversion but also SGLT2 inhibitor, adjustment to entresto from losartan, also optimizing beta blocker. I recommend starting with the SGLT2 inhibitor - if started would want to lower the bumex to just 1 mg daily given diuretic effect with SGLT2 inhibitor as well. Would also want to monitor labs every week for the first few weeks to assure stability.   -  Sleep eval completed and per sleep lab, findings c/w LIVIA. Stressed the importance of ongoing sleep apena treatment. He is following with sleep specialist through . Plan is adjustment to lower apnea episodes even further.     Patient Instructions   1. Follow-up in 1-2 months.        37 minutes spent by me on the date of the encounter doing chart review, review of outside records, review of test results, interpretation of tests, patient visit, documentation, discussion with other provider(s) and discussion with family     Exact time of video visit was not  noted.   Madelin Vallecillo, DO

## 2023-07-25 ENCOUNTER — LAB (OUTPATIENT)
Dept: LAB | Facility: OTHER | Age: 68
End: 2023-07-25
Payer: MEDICARE

## 2023-07-25 DIAGNOSIS — I50.82 BIVENTRICULAR CONGESTIVE HEART FAILURE (H): ICD-10-CM

## 2023-07-25 LAB
ALBUMIN SERPL BCG-MCNC: 3.7 G/DL (ref 3.5–5.2)
ANION GAP SERPL CALCULATED.3IONS-SCNC: 8 MMOL/L (ref 7–15)
BUN SERPL-MCNC: 29.3 MG/DL (ref 8–23)
CALCIUM SERPL-MCNC: 9.5 MG/DL (ref 8.8–10.2)
CHLORIDE SERPL-SCNC: 104 MMOL/L (ref 98–107)
CREAT SERPL-MCNC: 2.04 MG/DL (ref 0.67–1.17)
DEPRECATED HCO3 PLAS-SCNC: 29 MMOL/L (ref 22–29)
GFR SERPL CREATININE-BSD FRML MDRD: 35 ML/MIN/1.73M2
GLUCOSE SERPL-MCNC: 103 MG/DL (ref 70–99)
HGB BLD-MCNC: 11.2 G/DL (ref 13.3–17.7)
PHOSPHATE SERPL-MCNC: 3.6 MG/DL (ref 2.5–4.5)
POTASSIUM SERPL-SCNC: 4.8 MMOL/L (ref 3.4–5.3)
SODIUM SERPL-SCNC: 141 MMOL/L (ref 136–145)

## 2023-07-25 PROCEDURE — 36415 COLL VENOUS BLD VENIPUNCTURE: CPT

## 2023-07-25 PROCEDURE — 85018 HEMOGLOBIN: CPT

## 2023-07-25 PROCEDURE — 80069 RENAL FUNCTION PANEL: CPT

## 2023-07-27 ENCOUNTER — MYC MEDICAL ADVICE (OUTPATIENT)
Dept: NEPHROLOGY | Facility: CLINIC | Age: 68
End: 2023-07-27
Payer: MEDICARE

## 2023-08-01 NOTE — TELEPHONE ENCOUNTER
"Returned call and spoke with pts Wife \"Rehana\". (Auth to Discuss in chart).    Assisted with scheduling 1-2mo return appt per Dr. Vallecillo's recommendation.  Video Visit 9/19/ or 9/26.  Per Rehana those dates will not work for them.  Listed other dates and times.  Rehana agreed to 10/10/23 at 9:00am Virtual Visit and she will arrange to have labs done prior.    Encouraged to MyChart or call if further questions or concerns.    Lizet Cary LPN    "

## 2023-08-01 NOTE — TELEPHONE ENCOUNTER
Printed Renal Panel and Hemoglobin dated 7/25/23. ordered by Dr. Vallecillo.  Per pts request Faxed to Dr. Alberto Boyd at [facility] [phone].  Fax [phone].  Confirmation success using Right fax.    Lizet Cary LPN

## 2023-08-22 ENCOUNTER — LAB (OUTPATIENT)
Dept: LAB | Facility: OTHER | Age: 68
End: 2023-08-22
Payer: MEDICARE

## 2023-08-22 DIAGNOSIS — I50.82 BIVENTRICULAR CONGESTIVE HEART FAILURE (H): ICD-10-CM

## 2023-08-22 LAB
ALBUMIN SERPL BCG-MCNC: 3.6 G/DL (ref 3.5–5.2)
ANION GAP SERPL CALCULATED.3IONS-SCNC: 10 MMOL/L (ref 7–15)
BUN SERPL-MCNC: 32.9 MG/DL (ref 8–23)
CALCIUM SERPL-MCNC: 9.4 MG/DL (ref 8.8–10.2)
CHLORIDE SERPL-SCNC: 105 MMOL/L (ref 98–107)
CREAT SERPL-MCNC: 1.86 MG/DL (ref 0.67–1.17)
DEPRECATED HCO3 PLAS-SCNC: 26 MMOL/L (ref 22–29)
GFR SERPL CREATININE-BSD FRML MDRD: 39 ML/MIN/1.73M2
GLUCOSE SERPL-MCNC: 98 MG/DL (ref 70–99)
HGB BLD-MCNC: 11 G/DL (ref 13.3–17.7)
PHOSPHATE SERPL-MCNC: 3.8 MG/DL (ref 2.5–4.5)
POTASSIUM SERPL-SCNC: 5.2 MMOL/L (ref 3.4–5.3)
SODIUM SERPL-SCNC: 141 MMOL/L (ref 136–145)

## 2023-08-22 PROCEDURE — 80069 RENAL FUNCTION PANEL: CPT

## 2023-08-22 PROCEDURE — 85018 HEMOGLOBIN: CPT

## 2023-08-22 PROCEDURE — 36415 COLL VENOUS BLD VENIPUNCTURE: CPT

## 2023-08-23 ENCOUNTER — MYC MEDICAL ADVICE (OUTPATIENT)
Dept: NEPHROLOGY | Facility: CLINIC | Age: 68
End: 2023-08-23
Payer: MEDICARE

## 2023-08-23 NOTE — TELEPHONE ENCOUNTER
Lab results from most recent Renal panel faxed to [phone] and attention to Dr. Alberto Boyd per patient request. Informed patient via [employer] that lab results has been faxed.    ABDOULAYE Chirinos   Neph/Pulm [facility]

## 2023-09-22 DIAGNOSIS — N18.32 STAGE 3B CHRONIC KIDNEY DISEASE (H): Primary | ICD-10-CM

## 2023-10-03 ENCOUNTER — LAB (OUTPATIENT)
Dept: LAB | Facility: CLINIC | Age: 68
End: 2023-10-03
Payer: MEDICARE

## 2023-10-03 DIAGNOSIS — N18.32 STAGE 3B CHRONIC KIDNEY DISEASE (H): ICD-10-CM

## 2023-10-03 LAB
ALBUMIN MFR UR ELPH: 8.8 MG/DL
CREAT UR-MCNC: 101 MG/DL
HGB BLD-MCNC: 11.3 G/DL (ref 13.3–17.7)
PROT/CREAT 24H UR: 0.09 MG/MG CR (ref 0–0.2)
PTH-INTACT SERPL-MCNC: 41 PG/ML (ref 15–65)

## 2023-10-03 PROCEDURE — 80069 RENAL FUNCTION PANEL: CPT

## 2023-10-03 PROCEDURE — 36415 COLL VENOUS BLD VENIPUNCTURE: CPT

## 2023-10-03 PROCEDURE — 85018 HEMOGLOBIN: CPT

## 2023-10-03 PROCEDURE — 83970 ASSAY OF PARATHORMONE: CPT

## 2023-10-03 PROCEDURE — 84156 ASSAY OF PROTEIN URINE: CPT

## 2023-10-04 LAB
ALBUMIN SERPL BCG-MCNC: 3.9 G/DL (ref 3.5–5.2)
ANION GAP SERPL CALCULATED.3IONS-SCNC: 12 MMOL/L (ref 7–15)
BUN SERPL-MCNC: 49.2 MG/DL (ref 8–23)
CALCIUM SERPL-MCNC: 9.2 MG/DL (ref 8.8–10.2)
CHLORIDE SERPL-SCNC: 104 MMOL/L (ref 98–107)
CREAT SERPL-MCNC: 2.56 MG/DL (ref 0.67–1.17)
DEPRECATED HCO3 PLAS-SCNC: 26 MMOL/L (ref 22–29)
EGFRCR SERPLBLD CKD-EPI 2021: 27 ML/MIN/1.73M2
GLUCOSE SERPL-MCNC: 112 MG/DL (ref 70–99)
PHOSPHATE SERPL-MCNC: 4.2 MG/DL (ref 2.5–4.5)
POTASSIUM SERPL-SCNC: 4.9 MMOL/L (ref 3.4–5.3)
SODIUM SERPL-SCNC: 142 MMOL/L (ref 135–145)

## 2023-10-10 ENCOUNTER — VIRTUAL VISIT (OUTPATIENT)
Dept: NEPHROLOGY | Facility: CLINIC | Age: 68
End: 2023-10-10
Payer: MEDICARE

## 2023-10-10 ENCOUNTER — TELEPHONE (OUTPATIENT)
Dept: NEPHROLOGY | Facility: CLINIC | Age: 68
End: 2023-10-10

## 2023-10-10 VITALS — WEIGHT: 195 LBS | BODY MASS INDEX: 26.45 KG/M2

## 2023-10-10 DIAGNOSIS — D64.9 ANEMIA, UNSPECIFIED TYPE: ICD-10-CM

## 2023-10-10 DIAGNOSIS — N25.81 SECONDARY RENAL HYPERPARATHYROIDISM (H): ICD-10-CM

## 2023-10-10 DIAGNOSIS — N18.32 STAGE 3B CHRONIC KIDNEY DISEASE (H): Primary | ICD-10-CM

## 2023-10-10 DIAGNOSIS — I10 HYPERTENSION, GOAL BELOW 140/90: ICD-10-CM

## 2023-10-10 PROCEDURE — 99214 OFFICE O/P EST MOD 30 MIN: CPT | Mod: VID | Performed by: INTERNAL MEDICINE

## 2023-10-10 ASSESSMENT — PAIN SCALES - GENERAL: PAINLEVEL: NO PAIN (0)

## 2023-10-10 NOTE — NURSING NOTE
Is the patient currently in the state of [place]? YES    Visit mode:VIDEO    If the visit is dropped, the patient can be reconnected by: VIDEO VISIT: Text to cell phone:   Telephone Information:   Mobile [phone]       Will anyone else be joining the visit? NO  (If patient encounters technical issues they should call [phone]    How would you like to obtain your AVS? MyChart    Are changes needed to the allergy or medication list? No    Reason for visit: Video Visit (Recheck)    Dodie PRICE

## 2023-10-10 NOTE — TELEPHONE ENCOUNTER
Patient called but spoke to Jalyn, patient's spouse to inform them of Dr Vallecillo's result note and question. Patient had a virtual appointment today with Dr Vallecillo and answered those questions.   Jalyn asked to make a 3 month follow up with Dr Vallecillo. Patient scheduled for 1/9/23 with patient's spouse.  [facility] Care Coordinator  Nephrology

## 2023-10-10 NOTE — PROGRESS NOTES
"  Originating Location (pt. Location): Home    Distant Location (provider location):  Off-site  Platform used for Video Visit: Иван    10/10/23     CC: CKD    HPI: Min Andino is a 67 year old male who presents for evaluation of CKD. I last saw Mr. Andino in 2014. To review from my previous note: Mr. Andino's past medical hx was unremarkable leading up to Dec 2013 when he noted chest tightness. He was seen at [facility] at that time and noted to have aortic dissection. He was initially monitored but later underwent aortic repair/aortic valve replacement/CABG in early April 2014. His post op course was complicated by the need for ECMO as well as pressor support. His wife reports today that he was told that he has \"normal\" creatinine levels when undergoing physicals in the past. His creatinine was 2.2 post surgery in April 2014 but improved to 1.3 at the time of discharge in April. 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 At that time his creatinine was 2.2 post op but improved to 1.3. Since that time, creatinine readings have included 1.48 on 7/17/14, 1.39 no 7/27/14, and 1.35 on 8/13/14. 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                 02/24/20: today he presents to reestablish care in our clinic. His creatinine was stable at ~ 1.1 on last check in 2014 but is now at a new baseline of ~ 1.6 since august. Mr. Andino underwent thoracoabdominal aortic repair august 2019. The placement of this stent compromised blood flow to the left kidney moiety which was noted on imaging later. His creatinine post this procedure was ~ 1.6. There have been a few episodes when the creatinine has increased, most recently a few weeks ago (to 1.9). With his most recent creatinine rise, lasix was held. He has not noted much change in his swelling or breathing with holding the lasix for the past few weeks. Today I " noted SOB during our conversation. His oxygen saturation was fine but his breathing seemed labored. He and his wife report that this is his breathing baseline over the past 5 years and is not worse than typical. They are following weights at home and he has been dropping weight - no increase since stopping the lasix. He does have difficulty with empyting his bladder - last imaging in the fall showed no hydronephrosis. Flomax did not help him in the past. He has a HHN coming once a week to the house. He is not lightheaded. He has plans to see cardiology at [place] in March, is looking to establish care in urology, is also looking to establish care with internal medicine potentially in our clinic for continuity in one location.         06/08/20:  Virtual visit. Lasix was increased last month. He reports that the swelling is there but better. He denies any change in eating. No diarrhea. Weight was 240 lbs down to 215 lbs. He had been losing 1 lb a day. He was in ED last week and  Dx with hernia. He held lasix since Friday - was on lasix 40 mg daily held over the weekend - weight was 215 lbs on Thursday and 217 lbs this AM. He denies lightheadedness/dizziness. No orthostatic sxs. He quit wearing compression stockings a week ago. He feels he has slightly more endurance; can't lift a lot, needing to take breaks. He feels his urinary retention is not as problematic - has not seen urology. He has been taking iron BID.      7/13/20: video visit. He has continued to see weight loss over time - was 215 lbs in June but now 205 lbs. He has seen a change in his clothing size by 2 over the past few months. He is hungry often - eating well. No diarrhea. He has some constipation at times. He feels that maybe his breathing is improved. He is wearing compression stockings. He is using lasix 20 mg daily.      10/20/20: video visit - started on [facility] and did exam that way - then converted to telephone given echo noted. Feeling the best he  has felt since before last winter. NO new issues. He has endovascular stent repair planned in the coming week. Activity improving. No swelling. Weight most recently 203 lbs. Over the past month - 201-203 lbs. BP has been well controlled; 115/61 at a recent physician appt. He tends to have higher readings at home; 140s at home, sometimes 130s.     01/25/21:  In the setting of COVID-19 pandemic, this visit was changed to a telephone visit. Patient reports feeling good overall. No difficulty with fluid overload/swelling/shortness of breath. His weight is up slightly but he also has a bigger appetite lately and snacking more with being at home. BP was 105/58 at a recent clinic visit but they report pressures of 130-176 at home most recently - mostly above goal. They have been working with cardiology clinic at [place] in regards to medication adjustments  And plan to reach out to them with these updates. He was hospitalized 11/11/20-11/17/20 following left internal iliac artery stenting for endoleak. Returned to operating room 11/13 for [id number] of left common femoral aneurysm and previous graft left external iliac to superficial femoral and deep femoral artery interposition graft. Creatinine was stable at 1.5-1.7 while inpatient. He has been taking iron BID but with some constipation.     9/28/21: video visit. No vascular interventions or hospitalizations since last visit. Vascular stent/leak stable on recent CT. Checking BP 4x/day with average SBP in AM 160s-180s, mid day 110s-120s, PM 140s. Amlodipine (takes in AM) increased to 10 mg daily approximately one month ago by PCP. Losartan (take at night) 100 mg daily increased march 2021. Metoprolol succinate increased to 100 mg daily (not sure when this was increased, take in AM). BP sligthly improved but not much. Denies any dizziness, CP, abdominal pain, SOB (some BROWN with moderate exertion), LE swelling or abdominal distention. Doesn't check weights regularly, last  weight 220.    01/04/22: went to [place] in first part of November - things are looking goo/stable.  NO swelling related concerns at this time. Breathing is stable. IN the past few weeks - blood pressure one time 97 in the AM - evening always 30 points higher.  Has had a consistent pattern of low in the AM and higher in evening for the past few months. Currently taking amlodipine 5 mg AM, losartan 50 mg BID, and carvedilol. 12.5 mg BID.     10/04/22: video visit. Not as hydrated as he should. No diueretics. No lower ext swelling. NO NSAIDs. Blood pressure recently - takes it every 10 days - 121/76, 129/83, 102/67, 119/75, 127/76, 119/68. We spent time discussing hydration - he admits he is not a great water drinker.     05/02/23: telephone visit -  Sara messaged me on 3/1/23 given increased swelling noted in his feet and legs - was given lasix 20 mg to be taken for 7 days. Creatinine was 1.85 in Sept 2022 in our system but then 1.43 more recently in Jan. Labs were done through [facility] in Feb and Creatinine was up to 1.94 and then last week 1.9. He was seen by his cardiologist at [facility] last week - felt to have minimally decompensated left ventricular systolic function but exacerbation of right ventricular failure perhabs due to underlying lung disease exacerbated by hypoventilation and sleep apnea. Plan is cardiac MR, lasix 20 mg daily, sleep study. Potential right heart cath in the future.    At this time, swelling is about the same. Weight is down about 10-12 lbs during the month of March - stayed stable through April. Feet and legs don't look normal but not terrible. Recent BP readings - 122/71, 127/77, 124/82. Pretty much always In the 120s. A few will go to 116. His physical stamina has declined - much more fatigued through the winter. He has been less active through the winter - more active in spring/fall/summer. Wheelchair most recently. Home pressures don't go above 140. SOB with  "activity. That has been the case for a long time - worse for the past 6-8 months. MRI of hte heart is the 10th, cards the 11th, sleep study the 21st/22nd. Cardiologist consult end of June at [place].     05/30/23: video visit. They feel the swelling is progressively getting worse day by day.last week they went to 20 mg BID of lasix per cardiology but then creatinine increased so they decreased back to 20 mg of lasix.  They have been losing weight this year with healthier dietary habits but unfortunately the scale is going up for him. Swelling in legs is significantly more uncomfortable. They did their sleep study but currently do not have a follow-up with pulmonary until July to hear the results/next stpeps.   Appt at [facility] in [place] for 2nd opinion.    Not scheduled for cardioversion at this point but they are started on amiodarone since last visit with Dr. Boyd.   Lasix 20 mg daily. When taking 20 BID, he did not see any difference in swelling improvement or urine output.  He is not on norvasc. /77. 132/78. 120s-130s.     07/11/23: video visit. Since our last visit I connected with the sleep center and he was going to be started on CPAP ASAP. I also connected with Dr. Boyd at [facility]. The prognosis of his cardiac condition is poor and he is concerned about future needs for dialysis. He has since been seen by [facility] cardiology - \"progressive LV adverse remodeling with reduced EF accompanied by right heart failure and significant tricuspid regurgitation\". They discussed optimizing medical therapy: they hope to add SGLT2 inhibitor, wanted to increase bumex to 2 mg AM And 1 mg PM. Once more euvolemic, then optimize carvedilol to a target of 25 mg BID. They also discussed replacing losartan with sacubitril/valsartan. They also felt electrical cardioversion is not unreasonable.    He has dropped about 50 lbs since our last visit. Lasix wasn't working. Bumex has helped. They have " not made med changes since being seen at [facility]. He has an appt this Friday with cardiology. There is a little swelling still but much improved from our last visit - 250 lbs 1.5 months ago - just over 200 lbs now. Same eating regimen. Now on CPAP - still with apnea events but improved from previous. Was just seen by sleep physician through [facility]. Mask has been tolerable. Currently taking bumex 2 mg daily. BP has been good at 120/83. 130/85, 135/82. NO lightheaedness or dizziness.     10/10/23: video visit.   1. Are you on jardiance at this time?  YES   2. What are your recent blood pressure readings?  94/44, 104/43, 121/58, 159/69, 114/47, 156/66   3. Please confirm your current bumex dose  1mg. in AM and .5 mg. mid-day   4. Please also update on how you are feeling in regards to volume (is your weight up/down?, short of breath?, swelling?)  No edema or swelling. Weight stable.  No shortness of breath.   He was in the ED in September with dizziness. Breathing is stable. Better than in the spring certainly.  Weight has been 195, 194 lbs - +/- 5 lbs. This is stable. No lightheadedness on standing except when he first gets up in the AM. Last episode was a couple days ago. Dry Mouth. Wearing CPAP. No significant thirst.   - Now on Jardiance X 2-2.5 weeks  - Carvedilol to 12.5 mg BID  - losartan is now 25 mg BID    amiodarone (PACERONE) 200 MG tablet, 200 mg oral bid for 2 weeks, then 200 mg daily  amoxicillin (AMOXIL) 500 MG capsule, Take 4 capsules 1 hour before dental work  aspirin (ASA) 81 MG chewable tablet, Take 81 mg by mouth daily  atorvastatin (LIPITOR) 40 MG tablet, Take 1 tablet (40 mg) by mouth daily Last refill. Need follow-up with Dr. Blair.  bumetanide (BUMEX) 1 MG tablet, Take 1 tablet (1 mg) by mouth daily  carvedilol (COREG) 12.5 MG tablet, TAKE 1 TABLET(12.5 MG) BY MOUTH TWICE DAILY WITH MEALS  docusate sodium (COLACE) 100 MG capsule, daily   DULoxetine (CYMBALTA) 30 MG capsule, Take 30 mg by  mouth  losartan (COZAAR) 100 MG tablet, Take 1 tablet (100 mg) by mouth daily  multivitamin w/minerals (THERA-VIT-M) tablet, Take 1 tablet by mouth daily  nitroGLYcerin (NITROSTAT) 0.4 MG sublingual tablet, Place 1 tablet (0.4 mg) under the tongue every 5 minutes as needed for chest pain  psyllium (METAMUCIL/KONSYL) 0.52 g capsule, Take 1.04 g by mouth  Vitamin D, Cholecalciferol, 25 MCG (1000 UT) TABS, 2 times daily   warfarin ANTICOAGULANT (COUMADIN) 1 MG tablet, Take 2 mg on Tues/Fri or as directed by INR clinic  warfarin ANTICOAGULANT (COUMADIN) 4 MG tablet, Take 4 mg on Monday, Wed, Thurs, Friday, Sunday or as directed by INR clinic  methylcellulose (CITRUCEL) 500 MG TABS tablet,     No current facility-administered medications on file prior to visit.      Exam:  Wt 88.5 kg (195 lb)   BMI 26.45 kg/m      Results:  Labs reviewed through [facility].     Lab were reviewed and interpreted.       Assessment/Plan:   1. CKD Stage 3: has CKD in the setting of previous ULICES as well as vascular compromise to the left kidney moiety from his vascular procedure in August 2019. Addt ULICES recently which was likely cardiorenal syndrome. With his vascular hx, need to consider he will be more sensitive to hemodynamic changes. With his heart failure changes most recently, there is a concern for worsening kidney function/potential of dialysis needs in the future. I am pleased to see the stability he has at this time - will monitor as we have been.     2. Hypertension: BP is reasonable at this time.      3. Anemia: hemoglobin 11.1 - iron saturation 24% in May.     4. Aneurysms/AVR: following with [facility]    5. CHF/Pulmonary hypertension: Following with Dr. Boyd through [facility]. REcently with 2nd opinion at [facility] and recommendations were to optimize medically.Since started on SGLT2 inhibitor, no evidence of edema - I question whether he is prerenal so will trial changing the bumex.   -  Sleep eval completed and per sleep lab,  findings c/w LIVIA. Stressed the importance of ongoing sleep apena treatment. He is following with sleep specialist through . Plan is adjustment to lower apnea episodes even further.     Patient Instructions   Change the bumex to 1 mg AM. (For next few days minimize to even 0.5 mg if able without signs of fluid retention).   Repeat labs next week  Follow-up in 3 months  Labs in 6 weeks     39 minutes spent by me on the date of the encounter doing chart review, review of outside records, review of test results, interpretation of tests, patient visit, documentation, discussion with other provider(s) and discussion with family     [id number] AM video visit via [facility] - offsite  Madelin Vallecillo DO

## 2023-10-10 NOTE — PATIENT INSTRUCTIONS
Change the bumex to 1 mg AM. (For next few days minimize to even 0.5 mg if able without signs of fluid retention).   Repeat labs next week  Follow-up in 3 months  Labs in 6 weeks

## 2023-10-17 ENCOUNTER — LAB (OUTPATIENT)
Dept: LAB | Facility: OTHER | Age: 68
End: 2023-10-17
Payer: MEDICARE

## 2023-10-17 ENCOUNTER — IMMUNIZATION (OUTPATIENT)
Dept: FAMILY MEDICINE | Facility: OTHER | Age: 68
End: 2023-10-17
Payer: MEDICARE

## 2023-10-17 DIAGNOSIS — I50.82 BIVENTRICULAR CONGESTIVE HEART FAILURE (H): ICD-10-CM

## 2023-10-17 DIAGNOSIS — N18.32 STAGE 3B CHRONIC KIDNEY DISEASE (H): ICD-10-CM

## 2023-10-17 LAB
ALBUMIN SERPL BCG-MCNC: 3.9 G/DL (ref 3.5–5.2)
ANION GAP SERPL CALCULATED.3IONS-SCNC: 9 MMOL/L (ref 7–15)
BUN SERPL-MCNC: 37.4 MG/DL (ref 8–23)
CALCIUM SERPL-MCNC: 9.2 MG/DL (ref 8.8–10.2)
CHLORIDE SERPL-SCNC: 104 MMOL/L (ref 98–107)
CREAT SERPL-MCNC: 2.01 MG/DL (ref 0.67–1.17)
DEPRECATED HCO3 PLAS-SCNC: 27 MMOL/L (ref 22–29)
EGFRCR SERPLBLD CKD-EPI 2021: 35 ML/MIN/1.73M2
ERYTHROCYTE [DISTWIDTH] IN BLOOD BY AUTOMATED COUNT: 15.5 % (ref 10–15)
GLUCOSE SERPL-MCNC: 124 MG/DL (ref 70–99)
HCT VFR BLD AUTO: 34.4 % (ref 40–53)
HGB BLD-MCNC: 11.1 G/DL (ref 13.3–17.7)
MCH RBC QN AUTO: 33 PG (ref 26.5–33)
MCHC RBC AUTO-ENTMCNC: 32.3 G/DL (ref 31.5–36.5)
MCV RBC AUTO: 102 FL (ref 78–100)
PHOSPHATE SERPL-MCNC: 3.6 MG/DL (ref 2.5–4.5)
PLATELET # BLD AUTO: 167 10E3/UL (ref 150–450)
POTASSIUM SERPL-SCNC: 4.6 MMOL/L (ref 3.4–5.3)
RBC # BLD AUTO: 3.36 10E6/UL (ref 4.4–5.9)
SODIUM SERPL-SCNC: 140 MMOL/L (ref 135–145)
WBC # BLD AUTO: 6.7 10E3/UL (ref 4–11)

## 2023-10-17 PROCEDURE — 85027 COMPLETE CBC AUTOMATED: CPT

## 2023-10-17 PROCEDURE — G0008 ADMIN INFLUENZA VIRUS VAC: HCPCS

## 2023-10-17 PROCEDURE — 90662 IIV NO PRSV INCREASED AG IM: CPT

## 2023-10-17 PROCEDURE — 36415 COLL VENOUS BLD VENIPUNCTURE: CPT

## 2023-10-17 PROCEDURE — 80069 RENAL FUNCTION PANEL: CPT

## 2023-10-24 ENCOUNTER — OFFICE VISIT (OUTPATIENT)
Dept: CARDIOLOGY | Facility: CLINIC | Age: 68
End: 2023-10-24
Payer: MEDICARE

## 2023-10-24 VITALS
BODY MASS INDEX: 27.43 KG/M2 | OXYGEN SATURATION: 98 % | HEIGHT: 72 IN | WEIGHT: 202.5 LBS | DIASTOLIC BLOOD PRESSURE: 60 MMHG | HEART RATE: 55 BPM | SYSTOLIC BLOOD PRESSURE: 114 MMHG

## 2023-10-24 DIAGNOSIS — I50.22 CHRONIC SYSTOLIC HEART FAILURE (H): ICD-10-CM

## 2023-10-24 DIAGNOSIS — I10 HYPERTENSION, GOAL BELOW 140/90: ICD-10-CM

## 2023-10-24 DIAGNOSIS — N18.32 STAGE 3B CHRONIC KIDNEY DISEASE (H): ICD-10-CM

## 2023-10-24 DIAGNOSIS — I48.0 PAROXYSMAL ATRIAL FIBRILLATION (H): Primary | ICD-10-CM

## 2023-10-24 DIAGNOSIS — Z95.2 H/O MECHANICAL AORTIC VALVE REPLACEMENT: ICD-10-CM

## 2023-10-24 DIAGNOSIS — I73.9 PAD (PERIPHERAL ARTERY DISEASE) (H): ICD-10-CM

## 2023-10-24 DIAGNOSIS — Z86.79 HX OF AORTIC ANEURYSM: ICD-10-CM

## 2023-10-24 PROCEDURE — 99205 OFFICE O/P NEW HI 60 MIN: CPT | Mod: 25 | Performed by: INTERNAL MEDICINE

## 2023-10-24 PROCEDURE — 93000 ELECTROCARDIOGRAM COMPLETE: CPT | Performed by: INTERNAL MEDICINE

## 2023-10-24 PROCEDURE — 99417 PROLNG OP E/M EACH 15 MIN: CPT | Performed by: INTERNAL MEDICINE

## 2023-10-24 RX ORDER — SACUBITRIL AND VALSARTAN 24; 26 MG/1; MG/1
1 TABLET, FILM COATED ORAL 2 TIMES DAILY
Qty: 180 TABLET | Refills: 3 | Status: SHIPPED | OUTPATIENT
Start: 2023-10-24 | End: 2024-01-16

## 2023-10-24 ASSESSMENT — PAIN SCALES - GENERAL: PAINLEVEL: NO PAIN (0)

## 2023-10-24 NOTE — LETTER
10/24/2023    Shima Hutchinson MD  [address]  [facility] [address]    RE: Min Andino       Dear Colleague,     I had the pleasure of seeing Min Andino in the [facility].    General Cardiology Clinic Progress Note  Min Andino MRN# [id number]   YOB: 1955 Age: 67 year old       Reason for visit: Coronary artery disease, cardiomyopathy, chronic systolic heart failure    History of presenting illness:    I had the opportunity to see Min Andino at [facility] today for consultation regarding a complex history of cardiac and vascular disease.  He originally presented with chest pain symptoms in 2014, I believe to [facility].  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  Due to the complexity of his problems, he went to the [facility] where he underwent surgery on 4/7/2014 which included a composite aortic valve conduit replacement of the aortic root, ascending aorta and aortic arch using a [employer] mechanical Top-Hat valve.  This was complicated by occlusion of the right coronary artery requiring saphenous vein graft bypass.  He then underwent extensive bypass of the abdominal aortic aneurysm with grafts to the celiac artery, superior mesenteric artery, left and right renal arteries and inferior mesenteric artery.  He underwent replacement of the left common femoral artery aneurysm with a graft and had reimplantation of 8 intercostal arteries within the lower thoracic spine on 6/27/2014.  This was complicated by ischemic a TTN requiring CVVH.  In 2019 he underwent repair of this abdominal aortic aneurysm grafting.  In 2020 he underwent revision of the left iliac stent graft due to endoleak.    His cardiomyopathy was identified in 2019 with a left ventricular ejection fraction of 40%.  In 2020 his ejection fraction was estimated at 36%.  Most recently, in April 2023, his echocardiogram  indicated an ejection fraction of 25 to 30% with severe left ventricular enlargement (left ventricular end-diastolic dimension 6.7 cm) and moderate right ventricular enlargement with severely reduced right ventricular systolic function.  Moderate mitral regurgitation and moderately severe tricuspid regurgitation were also identified.  His mechanical aortic valve was functioning normally with a mean gradient of 9.  Akinesis of the inferior and apical walls was reported with global hypokinesis elsewhere.  By June 2023, he was having significant congestive heart failure issues, including severe edema and shortness of breath with exertion.  He was started on diuretic therapy with Bumex and lost about 40 pounds of fluid.  His edema and shortness of breath resolved.    Cardiac MRI with Lexiscan stress test was performed on 5/10/2023, presumably to evaluate his left ventricular dysfunction.  No ischemia was identified on the stress portion of that study.  No areas of significant scar were identified.  Left ventricular ejection fraction was measured at 38% with global hypokinesis.  Moderate right ventricular enlargement and moderately reduced right ventricular systolic function were also noted.  Mitral and tricuspid regurgitation could not be evaluated well.    In June 2023 he was also discovered to have new onset atrial fibrillation with controlled ventricular response.  He was started on amiodarone orally and set up for cardioversion which was successful.  He has not had any evidence of recurrent atrial fibrillation since then.    He also has chronic kidney disease, now stage IIIb, with baseline creatinine of about 2.0, GFR 35, but he seems to be tolerating losartan without any worsening renal function issues or hyperkalemia.    Today he reports feeling relatively well.  He is not having recurrent edema issues.  He has no PND orthopnea.  He does get short of breath if he exerts himself too strenuously or walks too far,  but most daily activities because no symptoms of shortness of breath.  He had some lightheadedness issues recently due to low blood pressures.  This resulted in reducing his Bumex to 0.5 mg daily and reducing his losartan from 50 twice daily to 25 twice daily.  He remains on carvedilol 12.5 mg twice daily, amiodarone 200 mg daily, and empagliflozin 10 mg daily.  He has not been on spironolactone or Entresto.  He denies chest pain symptoms.    Twelve-lead ECG demonstrates sinus rhythm with narrow complex and no significant ST or T wave abnormalities.    Blood pressure today was 114/60, heart rate 55, and weight 202 pounds.  Previous weight in our record from 3/8/2022 was 245.      Summary of procedures at [facility]:    04/07/2014: Composite aortic valve conduit, aortic root, and arch replacement with elephant trunk.   06/26/2014: Open abdominal aortic aneurysm repair with 26 mm graft with aorto-celiac artery,   aorta-superior mesenteric artery, aorta-left renal artery, aorta-right renal artery, aorto inferior   mesenteric artery grafts. Replacement left common femoral artery with 12 mm interposition graft.   08/02/2019: Thoracoabdominal aortic aneurysm repair with fenestrated endograft with celiac, SMA, and   bilateral renal artery side branch stents.   12/18/2019: Abdominal aortic aneurysm, and right and left common iliac artery aneurysm endovascular   stent graft with right and left internal iliac artery side branch stents.   12/22/2019: Right superficial femoral artery pseudoaneurysm repair.   11/11/2020: Left iliac artery stent graft revision.   11/03/2020: Left external iliac to superficial femoral and deep femoral artery interposition graft               Assessment and Plan:     ASSESSMENT:    Mr. Min Andino is a 67-year-old gentleman with a history of complex thoracoabdominal vascular disease, originally discovered in 2014 when he was noted to have extensive aneurysmal disease complicated by chronic  dissection.  He underwent complex repair as described above.    As part of his repair, the aortic root and ascending aorta were replaced along with the reconstruction of the aortic arch.  This included a valved conduit with a mechanical [employer] top hat aortic valve replacement.  The surgery was complicated by right coronary artery occlusion requiring single-vessel vein graft bypass to the right coronary.    More recently, he has developed a significant cardiomyopathy, likely nonischemic based on cardiac stress MRI results in April 2023.  This demonstrated a left ventricular ejection fraction of 38% with moderate biventricular enlargement and moderate right ventricular dysfunction.  No areas of scar or ischemia were identified.    He has been treated with medical therapy, including diuresis to treat symptomatic systolic heart failure.  It seems that his volume status has been optimized.    I suggested that we stop losartan 25 mg p.o. twice daily and start Entresto 24/26 mg p.o. twice daily.  Since losartan did not adversely affect his renal function, I suspect he will tolerate Entresto from that standpoint.  I warned him that it may lower his blood pressure and cause recurrent lightheadedness.  If that happens, we may need to cut the dose in half.  I will not prescribe spironolactone.  He has had episodes of high potassium levels up to 5.2 with ARB alone and I believe the risk of hyperkalemia with spironolactone would be too high.  I will continue his current dose of Bumex 0.5 mg daily.    We briefly discussed the issues of primary prevention of sudden cardiac death in patients with an ejection fraction below 35%.  His echocardiogram suggested an ejection fraction of 25 to 30% but his cardiac MRI suggested an ejection fraction of 38%.  I suspect the cardiac MRI is probably more accurate.  We will hold off on implanting an ICD at this point.  He would not qualify for a CRT-D device since he does not have a left  bundle branch block.    I will plan to see him back again in 3 months for reevaluation.           Orders this Visit:  Orders Placed This Encounter   Procedures    EKG 12-lead complete w/read - Clinics (performed today)     Orders Placed This Encounter   Medications    empagliflozin (JARDIANCE) 10 MG TABS tablet     Sig: Take 10 mg by mouth daily     There are no discontinued medications.    Today's clinic visit entailed:    120 minutes spent by me on the date of the encounter doing chart review, history and exam, documentation and further activities per the note  Provider  Link to [facility] Help Grid     The level of medical decision making during this visit was of high complexity.           Review of Systems:     Review of Systems:  Skin:        Eyes:       ENT:       Respiratory:    shortness of breath;dyspnea on exertion;cough  Cardiovascular:  lightheadedness    Gastroenterology:      Genitourinary:       Musculoskeletal:       Neurologic:    numbness or tingling of feet  Psychiatric:       Heme/Lymph/Imm:       Endocrine:                 Physical Exam:     Vitals: /60   Pulse 55   Ht 1.829 m (6')   Wt 91.9 kg (202 lb 8 oz)   SpO2 98%   BMI 27.46 kg/m    Constitutional: Well nourished and in no apparent distress.  Eyes: Pupils equal, round. Sclerae anicteric.   HEENT: Normocephalic, atraumatic.   Neck: Supple. JVD not elevated  Respiratory: Breathing non-labored. Lungs clear to auscultation bilaterally. No crackles, wheezes, rhonchi, or rales.  Cardiovascular:  Regular rate and rhythm, normal S1 and S2. No murmur, rub, or gallop.  Skin: Warm, dry. No rashes, cyanosis, or xanthelasma.  Extremities: No edema.  Neurologic: No gross motor deficits. Alert, awake, and oriented to person, place and time.  Psychiatric: Affect appropriate.             Medications:     Current Outpatient Medications   Medication Sig Dispense Refill    amiodarone (PACERONE) 200 MG tablet 200 mg oral bid for 2 weeks, then 200 mg daily       aspirin (ASA) 81 MG chewable tablet Take 81 mg by mouth daily      atorvastatin (LIPITOR) 40 MG tablet Take 1 tablet (40 mg) by mouth daily Last refill. Need follow-up with Dr. Blair. 30 tablet 0    bumetanide (BUMEX) 1 MG tablet Take 1 tablet (1 mg) by mouth daily (Patient taking differently: Take 1 mg by mouth daily 1/2 tab once a day) 90 tablet 3    carvedilol (COREG) 12.5 MG tablet TAKE 1 TABLET(12.5 MG) BY MOUTH TWICE DAILY WITH MEALS 180 tablet 3    docusate sodium (COLACE) 100 MG capsule daily       DULoxetine (CYMBALTA) 30 MG capsule Take 30 mg by mouth      empagliflozin (JARDIANCE) 10 MG TABS tablet Take 10 mg by mouth daily      losartan (COZAAR) 100 MG tablet Take 50 mg by mouth daily Taking 1/2 tab in am and pm      multivitamin w/minerals (THERA-VIT-M) tablet Take 1 tablet by mouth daily      nitroGLYcerin (NITROSTAT) 0.4 MG sublingual tablet Place 1 tablet (0.4 mg) under the tongue every 5 minutes as needed for chest pain 10 tablet 0    psyllium (METAMUCIL/KONSYL) 0.52 g capsule Take 1.04 g by mouth      Vitamin D, Cholecalciferol, 25 MCG (1000 UT) TABS 2 times daily       warfarin ANTICOAGULANT (COUMADIN) 1 MG tablet Take 2 mg on Tues/Fri or as directed by INR clinic 55 tablet 0    warfarin ANTICOAGULANT (COUMADIN) 4 MG tablet Take 4 mg on Monday, Wed, Thurs, Friday, Sunday or as directed by INR clinic 75 tablet 1    amoxicillin (AMOXIL) 500 MG capsule Take 4 capsules 1 hour before dental work (Patient not taking: Reported on 10/24/2023) 4 capsule 5    methylcellulose (CITRUCEL) 500 MG TABS tablet          Family History   Problem Relation Age of Onset    Breast Cancer Mother 85    Cancer Father     Aneurysm Other         family hx    C.A.D. No family hx of     Diabetes No family hx of     Hypertension No family hx of        Social History     Socioeconomic History    Marital status:      Spouse name: Not on file    Number of children: 5    Years of education: Not on file    Highest  "education level: Not on file   Occupational History     Employer: [employer]     Comment: 35 years     Employer: OTHER     Comment: landscaping business   Tobacco Use    Smoking status: Never    Smokeless tobacco: Never   Vaping Use    Vaping Use: Never used   Substance and Sexual Activity    Alcohol use: Yes     Comment: reports very little etoh use.     Drug use: No    Sexual activity: Not Currently     Partners: Female     Birth control/protection: None   Other Topics Concern    Parent/sibling w/ CABG, MI or angioplasty before 65F 55M? No   Social History Narrative    Not on file     Social Determinants of Health     Financial Resource Strain: Not on file   Food Insecurity: Not on file   Transportation Needs: Not on file   Physical Activity: Not on file   Stress: Not on file   Social Connections: Not on file   Interpersonal Safety: Not on file   Housing Stability: Not on file            Past Medical History:     Past Medical History:   Diagnosis Date    Anemia of chronic renal failure, unspecified CKD stage 8/27/2021    Aortic dissection (H)     Aortic root aneurysm (H24)     Arthritis     C. difficile colitis     April 2014 following surgery    CKD (chronic kidney disease) stage 3, GFR 30-59 ml/min (H) 9/28/2014    Class 1 obesity due to excess calories with serious comorbidity and body mass index (BMI) of 34.0 to 34.9 in adult 3/8/2022    Connective tissue disease (H24)     \"probable\" per [facility] Notes    DVT (deep venous thrombosis) (H)     April 2014 following surgery    History of blood transfusion     Horseshoe kidney     Hypertension     S/P insertion of iliac artery stent 11/11/2020    Left internal iliac artery stenting for endoleak    Thoracic aortic aneurysm without rupture (H24) 3/8/2014    Urinary urgency 10/12/2020    Valvular cardiomyopathy (H)               Past Surgical History:     Past Surgical History:   Procedure Laterality Date    ABDOMEN SURGERY      AORTIC VALVE " REPLACEMENT  4/7/14    ARTHROSCOPY KNEE RT/LT  2005    left, repair meniscus    COLONOSCOPY      COLONOSCOPY N/A 6/22/2022    Procedure: COLONOSCOPY, WITH POLYPECTOMY AND BIOPSY;  Surgeon: Josh Morales MD;  Location:  GI    ESOPHAGOSCOPY, GASTROSCOPY, DUODENOSCOPY (EGD), COMBINED N/A 6/22/2022    Procedure: ESOPHAGOGASTRODUODENOSCOPY, WITH BIOPSY;  Surgeon: Josh Morales MD;  Location:  GI    REPAIR AORTIC ROOT  4/7/14    surgery followed by ECMO, vasopressor therapy    SOFT TISSUE SURGERY      THORACIC SURGERY      VASCULAR SURGERY      [facility] CABG, ARTERIAL, SINGLE  4/7/14    [facility] REPAIR CRUCIATE LIGAMENT,KNEE  1999    left              Allergies:   Patient has no known allergies.       Data:   All laboratory data reviewed:    Recent Labs   Lab Test 05/05/23  1014 06/14/22  1127 03/08/22  1237 01/21/21  1342 10/16/20  0918   LDL  --   --  96  --  59   HDL  --   --  41  --  46   NHDL  --   --  115  --  79   CHOL  --   --  156  --  125   TRIG  --   --  96  --  98   IRON 76   < >  --    < >  --       < >  --    < >  --    IRONSAT 24   < >  --    < >  --       < >  --    < >  --     < > = values in this interval not displayed.       Lab Results   Component Value Date    WBC 6.7 10/17/2023    WBC 6.2 05/26/2020    RBC 3.36 (L) 10/17/2023    RBC 3.73 (L) 05/26/2020    HGB 11.1 (L) 10/17/2023    HGB 12.6 (L) 04/14/2021    HCT 34.4 (L) 10/17/2023    HCT 35.1 (L) 05/26/2020     (H) 10/17/2023    MCV 94 05/26/2020    MCH 33.0 10/17/2023    MCH 29.5 05/26/2020    MCHC 32.3 10/17/2023    MCHC 31.3 (L) 05/26/2020    RDW 15.5 (H) 10/17/2023    RDW 17.9 (H) 05/26/2020     10/17/2023     05/26/2020       Lab Results   Component Value Date     10/17/2023     04/14/2021    POTASSIUM 4.6 10/17/2023    POTASSIUM 5.1 01/03/2023    POTASSIUM 5.0 04/14/2021    CHLORIDE 104 10/17/2023    CHLORIDE 107 01/03/2023    CHLORIDE 111 (H) 04/14/2021    CO2 27 10/17/2023    CO2 30  01/03/2023    CO2 27 04/14/2021    ANIONGAP 9 10/17/2023    ANIONGAP 5 01/03/2023    ANIONGAP 3 04/14/2021     (H) 10/17/2023     (H) 01/03/2023     (H) 04/14/2021    BUN 37.4 (H) 10/17/2023    BUN 36 (H) 01/03/2023    BUN 27 04/14/2021    CR 2.01 (H) 10/17/2023    CR 1.52 (H) 04/14/2021    GFRESTIMATED 35 (L) 10/17/2023    GFRESTIMATED 47 (L) 04/14/2021    GFRESTBLACK 54 (L) 04/14/2021    FAYE 9.2 10/17/2023    FAYE 8.6 04/14/2021      Lab Results   Component Value Date    AST 20 03/08/2022    AST 15 09/16/2015    ALT 21 03/08/2022    ALT 14 09/16/2015       Lab Results   Component Value Date    A1C 5.6 12/29/2013       Lab Results   Component Value Date    INR 1.4 (L) 04/12/2023    INR 1.4 (L) 04/04/2023    INR 2.0 07/20/2021    INR 2.3 (A) 07/08/2021         MAKENNA HAMMOND MD  [facility]      Thank you for allowing me to participate in the care of your patient.      Sincerely,     MAKENNA HAMMOND MD     [facility]  cc:   No referring provider defined for this encounter.

## 2023-10-24 NOTE — PROGRESS NOTES
General Cardiology Clinic Progress Note  Min Andino MRN# [id number]   YOB: 1955 Age: 67 year old       Reason for visit: Coronary artery disease, cardiomyopathy, chronic systolic heart failure    History of presenting illness:    I had the opportunity to see Min Andino at [facility] today for consultation regarding a complex history of cardiac and vascular disease.  He originally presented with chest pain symptoms in 2014, I believe to [facility].  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  Due to the complexity of his problems, he went to the [facility] where he underwent surgery on 4/7/2014 which included a composite aortic valve conduit replacement of the aortic root, ascending aorta and aortic arch using a [employer]bo[employer] PRESTIGE mechanical Top-Hat valve.  This was complicated by occlusion of the right coronary artery requiring saphenous vein graft bypass.  He then underwent extensive bypass of the abdominal aortic aneurysm with grafts to the celiac artery, superior mesenteric artery, left and right renal arteries and inferior mesenteric artery.  He underwent replacement of the left common femoral artery aneurysm with a graft and had reimplantation of 8 intercostal arteries within the lower thoracic spine on 6/27/2014.  This was complicated by ischemic a TTN requiring CVVH.  In 2019 he underwent repair of this abdominal aortic aneurysm grafting.  In 2020 he underwent revision of the left iliac stent graft due to endoleak.    His cardiomyopathy was identified in 2019 with a left ventricular ejection fraction of 40%.  In 2020 his ejection fraction was estimated at 36%.  Most recently, in April 2023, his echocardiogram indicated an ejection fraction of 25 to 30% with severe left ventricular enlargement (left ventricular end-diastolic dimension 6.7 cm) and moderate right ventricular enlargement with severely reduced right  ventricular systolic function.  Moderate mitral regurgitation and moderately severe tricuspid regurgitation were also identified.  His mechanical aortic valve was functioning normally with a mean gradient of 9.  Akinesis of the inferior and apical walls was reported with global hypokinesis elsewhere.  By June 2023, he was having significant congestive heart failure issues, including severe edema and shortness of breath with exertion.  He was started on diuretic therapy with Bumex and lost about 40 pounds of fluid.  His edema and shortness of breath resolved.    Cardiac MRI with Lexiscan stress test was performed on 5/10/2023, presumably to evaluate his left ventricular dysfunction.  No ischemia was identified on the stress portion of that study.  No areas of significant scar were identified.  Left ventricular ejection fraction was measured at 38% with global hypokinesis.  Moderate right ventricular enlargement and moderately reduced right ventricular systolic function were also noted.  Mitral and tricuspid regurgitation could not be evaluated well.    In June 2023 he was also discovered to have new onset atrial fibrillation with controlled ventricular response.  He was started on amiodarone orally and set up for cardioversion which was successful.  He has not had any evidence of recurrent atrial fibrillation since then.    He also has chronic kidney disease, now stage IIIb, with baseline creatinine of about 2.0, GFR 35, but he seems to be tolerating losartan without any worsening renal function issues or hyperkalemia.    Today he reports feeling relatively well.  He is not having recurrent edema issues.  He has no PND orthopnea.  He does get short of breath if he exerts himself too strenuously or walks too far, but most daily activities because no symptoms of shortness of breath.  He had some lightheadedness issues recently due to low blood pressures.  This resulted in reducing his Bumex to 0.5 mg daily and reducing  his losartan from 50 twice daily to 25 twice daily.  He remains on carvedilol 12.5 mg twice daily, amiodarone 200 mg daily, and empagliflozin 10 mg daily.  He has not been on spironolactone or Entresto.  He denies chest pain symptoms.    Twelve-lead ECG demonstrates sinus rhythm with narrow complex and no significant ST or T wave abnormalities.    Blood pressure today was 114/60, heart rate 55, and weight 202 pounds.  Previous weight in our record from 3/8/2022 was 245.      Summary of procedures at [facility]:    04/07/2014: Composite aortic valve conduit, aortic root, and arch replacement with elephant trunk.   06/26/2014: Open abdominal aortic aneurysm repair with 26 mm graft with aorto-celiac artery,   aorta-superior mesenteric artery, aorta-left renal artery, aorta-right renal artery, aorto inferior   mesenteric artery grafts. Replacement left common femoral artery with 12 mm interposition graft.   08/02/2019: Thoracoabdominal aortic aneurysm repair with fenestrated endograft with celiac, SMA, and   bilateral renal artery side branch stents.   12/18/2019: Abdominal aortic aneurysm, and right and left common iliac artery aneurysm endovascular   stent graft with right and left internal iliac artery side branch stents.   12/22/2019: Right superficial femoral artery pseudoaneurysm repair.   11/11/2020: Left iliac artery stent graft revision.   11/03/2020: Left external iliac to superficial femoral and deep femoral artery interposition graft               Assessment and Plan:     ASSESSMENT:    Mr. Min Andino is a 67-year-old gentleman with a history of complex thoracoabdominal vascular disease, originally discovered in 2014 when he was noted to have extensive aneurysmal disease complicated by chronic dissection.  He underwent complex repair as described above.    As part of his repair, the aortic root and ascending aorta were replaced along with the reconstruction of the aortic arch.  This included a valved  conduit with a mechanical [employer] top hat aortic valve replacement.  The surgery was complicated by right coronary artery occlusion requiring single-vessel vein graft bypass to the right coronary.    More recently, he has developed a significant cardiomyopathy, likely nonischemic based on cardiac stress MRI results in April 2023.  This demonstrated a left ventricular ejection fraction of 38% with moderate biventricular enlargement and moderate right ventricular dysfunction.  No areas of scar or ischemia were identified.    He has been treated with medical therapy, including diuresis to treat symptomatic systolic heart failure.  It seems that his volume status has been optimized.    I suggested that we stop losartan 25 mg p.o. twice daily and start Entresto 24/26 mg p.o. twice daily.  Since losartan did not adversely affect his renal function, I suspect he will tolerate Entresto from that standpoint.  I warned him that it may lower his blood pressure and cause recurrent lightheadedness.  If that happens, we may need to cut the dose in half.  I will not prescribe spironolactone.  He has had episodes of high potassium levels up to 5.2 with ARB alone and I believe the risk of hyperkalemia with spironolactone would be too high.  I will continue his current dose of Bumex 0.5 mg daily.    We briefly discussed the issues of primary prevention of sudden cardiac death in patients with an ejection fraction below 35%.  His echocardiogram suggested an ejection fraction of 25 to 30% but his cardiac MRI suggested an ejection fraction of 38%.  I suspect the cardiac MRI is probably more accurate.  We will hold off on implanting an ICD at this point.  He would not qualify for a CRT-D device since he does not have a left bundle branch block.    I will plan to see him back again in 3 months for reevaluation.           Orders this Visit:  Orders Placed This Encounter   Procedures    EKG 12-lead complete w/read - Clinics (performed  today)     Orders Placed This Encounter   Medications    empagliflozin (JARDIANCE) 10 MG TABS tablet     Sig: Take 10 mg by mouth daily     There are no discontinued medications.    Today's clinic visit entailed:    120 minutes spent by me on the date of the encounter doing chart review, history and exam, documentation and further activities per the note  Provider  Link to [facility] Help Grid     The level of medical decision making during this visit was of high complexity.           Review of Systems:     Review of Systems:  Skin:        Eyes:       ENT:       Respiratory:    shortness of breath;dyspnea on exertion;cough  Cardiovascular:  lightheadedness    Gastroenterology:      Genitourinary:       Musculoskeletal:       Neurologic:    numbness or tingling of feet  Psychiatric:       Heme/Lymph/Imm:       Endocrine:                 Physical Exam:     Vitals: /60   Pulse 55   Ht 1.829 m (6')   Wt 91.9 kg (202 lb 8 oz)   SpO2 98%   BMI 27.46 kg/m    Constitutional: Well nourished and in no apparent distress.  Eyes: Pupils equal, round. Sclerae anicteric.   HEENT: Normocephalic, atraumatic.   Neck: Supple. JVD not elevated  Respiratory: Breathing non-labored. Lungs clear to auscultation bilaterally. No crackles, wheezes, rhonchi, or rales.  Cardiovascular:  Regular rate and rhythm, normal S1 and S2. No murmur, rub, or gallop.  Skin: Warm, dry. No rashes, cyanosis, or xanthelasma.  Extremities: No edema.  Neurologic: No gross motor deficits. Alert, awake, and oriented to person, place and time.  Psychiatric: Affect appropriate.             Medications:     Current Outpatient Medications   Medication Sig Dispense Refill    amiodarone (PACERONE) 200 MG tablet 200 mg oral bid for 2 weeks, then 200 mg daily      aspirin (ASA) 81 MG chewable tablet Take 81 mg by mouth daily      atorvastatin (LIPITOR) 40 MG tablet Take 1 tablet (40 mg) by mouth daily Last refill. Need follow-up with Dr. Blair. 30 tablet 0     bumetanide (BUMEX) 1 MG tablet Take 1 tablet (1 mg) by mouth daily (Patient taking differently: Take 1 mg by mouth daily 1/2 tab once a day) 90 tablet 3    carvedilol (COREG) 12.5 MG tablet TAKE 1 TABLET(12.5 MG) BY MOUTH TWICE DAILY WITH MEALS 180 tablet 3    docusate sodium (COLACE) 100 MG capsule daily       DULoxetine (CYMBALTA) 30 MG capsule Take 30 mg by mouth      empagliflozin (JARDIANCE) 10 MG TABS tablet Take 10 mg by mouth daily      losartan (COZAAR) 100 MG tablet Take 50 mg by mouth daily Taking 1/2 tab in am and pm      multivitamin w/minerals (THERA-VIT-M) tablet Take 1 tablet by mouth daily      nitroGLYcerin (NITROSTAT) 0.4 MG sublingual tablet Place 1 tablet (0.4 mg) under the tongue every 5 minutes as needed for chest pain 10 tablet 0    psyllium (METAMUCIL/KONSYL) 0.52 g capsule Take 1.04 g by mouth      Vitamin D, Cholecalciferol, 25 MCG (1000 UT) TABS 2 times daily       warfarin ANTICOAGULANT (COUMADIN) 1 MG tablet Take 2 mg on Tues/Fri or as directed by INR clinic 55 tablet 0    warfarin ANTICOAGULANT (COUMADIN) 4 MG tablet Take 4 mg on Monday, Wed, Thurs, Friday, Sunday or as directed by INR clinic 75 tablet 1    amoxicillin (AMOXIL) 500 MG capsule Take 4 capsules 1 hour before dental work (Patient not taking: Reported on 10/24/2023) 4 capsule 5    methylcellulose (CITRUCEL) 500 MG TABS tablet          Family History   Problem Relation Age of Onset    Breast Cancer Mother 85    Cancer Father     Aneurysm Other         family hx    C.A.D. No family hx of     Diabetes No family hx of     Hypertension No family hx of        Social History     Socioeconomic History    Marital status:      Spouse name: Not on file    Number of children: 5    Years of education: Not on file    Highest education level: Not on file   Occupational History     Employer: [employer]     Comment: 35 years     Employer: OTHER     Comment: landscaping business   Tobacco Use    Smoking status: Never  "   Smokeless tobacco: Never   Vaping Use    Vaping Use: Never used   Substance and Sexual Activity    Alcohol use: Yes     Comment: reports very little etoh use.     Drug use: No    Sexual activity: Not Currently     Partners: Female     Birth control/protection: None   Other Topics Concern    Parent/sibling w/ CABG, MI or angioplasty before 65F 55M? No   Social History Narrative    Not on file     Social Determinants of Health     Financial Resource Strain: Not on file   Food Insecurity: Not on file   Transportation Needs: Not on file   Physical Activity: Not on file   Stress: Not on file   Social Connections: Not on file   Interpersonal Safety: Not on file   Housing Stability: Not on file            Past Medical History:     Past Medical History:   Diagnosis Date    Anemia of chronic renal failure, unspecified CKD stage 8/27/2021    Aortic dissection (H)     Aortic root aneurysm (H24)     Arthritis     C. difficile colitis     April 2014 following surgery    CKD (chronic kidney disease) stage 3, GFR 30-59 ml/min (H) 9/28/2014    Class 1 obesity due to excess calories with serious comorbidity and body mass index (BMI) of 34.0 to 34.9 in adult 3/8/2022    Connective tissue disease (H24)     \"probable\" per [facility] Notes    DVT (deep venous thrombosis) (H)     April 2014 following surgery    History of blood transfusion     Horseshoe kidney     Hypertension     S/P insertion of iliac artery stent 11/11/2020    Left internal iliac artery stenting for endoleak    Thoracic aortic aneurysm without rupture (H24) 3/8/2014    Urinary urgency 10/12/2020    Valvular cardiomyopathy (H)               Past Surgical History:     Past Surgical History:   Procedure Laterality Date    ABDOMEN SURGERY      AORTIC VALVE REPLACEMENT  4/7/14    ARTHROSCOPY KNEE RT/LT  2005    left, repair meniscus    COLONOSCOPY      COLONOSCOPY N/A 6/22/2022    Procedure: COLONOSCOPY, WITH POLYPECTOMY AND BIOPSY;  Surgeon: Josh Morales MD;  " Location: [facility]    ESOPHAGOSCOPY, GASTROSCOPY, DUODENOSCOPY (EGD), COMBINED N/A 6/22/2022    Procedure: ESOPHAGOGASTRODUODENOSCOPY, WITH BIOPSY;  Surgeon: Josh Morales MD;  Location: [facility]    REPAIR AORTIC ROOT  4/7/14    surgery followed by ECMO, vasopressor therapy    SOFT TISSUE SURGERY      THORACIC SURGERY      VASCULAR SURGERY      ZZC CABG, ARTERIAL, SINGLE  4/7/14    ZC REPAIR CRUCIATE LIGAMENT,KNEE  1999    left              Allergies:   Patient has no known allergies.       Data:   All laboratory data reviewed:    Recent Labs   Lab Test 05/05/23  1014 06/14/22  1127 03/08/22  1237 01/21/21  1342 10/16/20  0918   LDL  --   --  96  --  59   HDL  --   --  41  --  46   NHDL  --   --  115  --  79   CHOL  --   --  156  --  125   TRIG  --   --  96  --  98   IRON 76   < >  --    < >  --       < >  --    < >  --    IRONSAT 24   < >  --    < >  --       < >  --    < >  --     < > = values in this interval not displayed.       Lab Results   Component Value Date    WBC 6.7 10/17/2023    WBC 6.2 05/26/2020    RBC 3.36 (L) 10/17/2023    RBC 3.73 (L) 05/26/2020    HGB 11.1 (L) 10/17/2023    HGB 12.6 (L) 04/14/2021    HCT 34.4 (L) 10/17/2023    HCT 35.1 (L) 05/26/2020     (H) 10/17/2023    MCV 94 05/26/2020    MCH 33.0 10/17/2023    MCH 29.5 05/26/2020    MCHC 32.3 10/17/2023    MCHC 31.3 (L) 05/26/2020    RDW 15.5 (H) 10/17/2023    RDW 17.9 (H) 05/26/2020     10/17/2023     05/26/2020       Lab Results   Component Value Date     10/17/2023     04/14/2021    POTASSIUM 4.6 10/17/2023    POTASSIUM 5.1 01/03/2023    POTASSIUM 5.0 04/14/2021    CHLORIDE 104 10/17/2023    CHLORIDE 107 01/03/2023    CHLORIDE 111 (H) 04/14/2021    CO2 27 10/17/2023    CO2 30 01/03/2023    CO2 27 04/14/2021    ANIONGAP 9 10/17/2023    ANIONGAP 5 01/03/2023    ANIONGAP 3 04/14/2021     (H) 10/17/2023     (H) 01/03/2023     (H) 04/14/2021    BUN 37.4 (H) 10/17/2023    BUN  36 (H) 01/03/2023    BUN 27 04/14/2021    CR 2.01 (H) 10/17/2023    CR 1.52 (H) 04/14/2021    GFRESTIMATED 35 (L) 10/17/2023    GFRESTIMATED 47 (L) 04/14/2021    GFRESTBLACK 54 (L) 04/14/2021    FAYE 9.2 10/17/2023    FAYE 8.6 04/14/2021      Lab Results   Component Value Date    AST 20 03/08/2022    AST 15 09/16/2015    ALT 21 03/08/2022    ALT 14 09/16/2015       Lab Results   Component Value Date    A1C 5.6 12/29/2013       Lab Results   Component Value Date    INR 1.4 (L) 04/12/2023    INR 1.4 (L) 04/04/2023    INR 2.0 07/20/2021    INR 2.3 (A) 07/08/2021         MAKENNA HAMMOND MD  [facility]

## 2023-10-31 ENCOUNTER — ALLIED HEALTH/NURSE VISIT (OUTPATIENT)
Dept: FAMILY MEDICINE | Facility: OTHER | Age: 68
End: 2023-10-31
Payer: MEDICARE

## 2023-10-31 ENCOUNTER — LAB (OUTPATIENT)
Dept: LAB | Facility: OTHER | Age: 68
End: 2023-10-31
Payer: MEDICARE

## 2023-10-31 DIAGNOSIS — I50.82 BIVENTRICULAR CONGESTIVE HEART FAILURE (H): ICD-10-CM

## 2023-10-31 DIAGNOSIS — Z23 NEED FOR VACCINATION: Primary | ICD-10-CM

## 2023-10-31 LAB
ALBUMIN SERPL BCG-MCNC: 4.3 G/DL (ref 3.5–5.2)
ANION GAP SERPL CALCULATED.3IONS-SCNC: 9 MMOL/L (ref 7–15)
BUN SERPL-MCNC: 39.4 MG/DL (ref 8–23)
CALCIUM SERPL-MCNC: 9.3 MG/DL (ref 8.8–10.2)
CHLORIDE SERPL-SCNC: 104 MMOL/L (ref 98–107)
CREAT SERPL-MCNC: 2.05 MG/DL (ref 0.67–1.17)
DEPRECATED HCO3 PLAS-SCNC: 26 MMOL/L (ref 22–29)
EGFRCR SERPLBLD CKD-EPI 2021: 35 ML/MIN/1.73M2
GLUCOSE SERPL-MCNC: 99 MG/DL (ref 70–99)
PHOSPHATE SERPL-MCNC: 4.3 MG/DL (ref 2.5–4.5)
POTASSIUM SERPL-SCNC: 5 MMOL/L (ref 3.4–5.3)
SODIUM SERPL-SCNC: 139 MMOL/L (ref 135–145)

## 2023-10-31 PROCEDURE — 90480 ADMN SARSCOV2 VAC 1/ONLY CMP: CPT

## 2023-10-31 PROCEDURE — 91320 SARSCV2 VAC 30MCG TRS-SUC IM: CPT

## 2023-10-31 PROCEDURE — 80069 RENAL FUNCTION PANEL: CPT

## 2023-10-31 PROCEDURE — 36415 COLL VENOUS BLD VENIPUNCTURE: CPT

## 2023-10-31 PROCEDURE — 99207 PR NO CHARGE NURSE ONLY: CPT

## 2023-11-21 DIAGNOSIS — N18.31 STAGE 3A CHRONIC KIDNEY DISEASE (H): Primary | ICD-10-CM

## 2023-11-24 ENCOUNTER — MYC MEDICAL ADVICE (OUTPATIENT)
Dept: CARDIOLOGY | Facility: CLINIC | Age: 68
End: 2023-11-24
Payer: MEDICARE

## 2023-11-24 DIAGNOSIS — I50.22 CHRONIC SYSTOLIC HEART FAILURE (H): Primary | ICD-10-CM

## 2023-11-27 NOTE — TELEPHONE ENCOUNTER
Pt sent my chart update with his BPs.  11/12/23- 103/50, HR 62 (AM), 128/59, HR 74 (PM)  11/13/23- 121/50, HR 63 (AM)  11/14/23- 83/40, HR 70 (AM), 110/52, HR 71 pm  11/15/23- 100/51, HR 71 (AM)  11/17/23- 88/41, HR 72 (AM)    11/18/23 - 105/40, HR 63 AM  11/20/23- 76/38, HR 77 AM, 148/58, 68 PM    I sent an update to , and will pt back with his recommendations. Mg HUTCHINSON November 27, 2023, 2:40 PM

## 2023-12-05 NOTE — TELEPHONE ENCOUNTER
reviewed update on BPs.     Don Gilmore MD Sletteboe, Erin B., RN; [facility] Heart Team 7  It looks like the pm BP's are high and am BP's are low. Maybe he should split up his meds differently, such as taking a full dose of Entresto at noon and a half dose at bedtime. EE    Pt already taking 1/2 tablet of entresto twice daily. His nephrologist did instruct him to decrease bumex to every other day around one week ago. Will message pt to see if he has noticed any improvement. Pt also takes coreg 12.5 mg BID. Mg HUTCHINSON December 5, 2023, 10:25 AM

## 2023-12-06 ENCOUNTER — DOCUMENTATION ONLY (OUTPATIENT)
Dept: ANTICOAGULATION | Facility: CLINIC | Age: 68
End: 2023-12-06

## 2023-12-06 ENCOUNTER — OFFICE VISIT (OUTPATIENT)
Dept: INTERNAL MEDICINE | Facility: CLINIC | Age: 68
End: 2023-12-06
Payer: MEDICARE

## 2023-12-06 VITALS
SYSTOLIC BLOOD PRESSURE: 118 MMHG | RESPIRATION RATE: 16 BRPM | TEMPERATURE: 98.3 F | BODY MASS INDEX: 28.44 KG/M2 | DIASTOLIC BLOOD PRESSURE: 62 MMHG | HEART RATE: 52 BPM | HEIGHT: 72 IN | OXYGEN SATURATION: 97 % | WEIGHT: 210 LBS

## 2023-12-06 DIAGNOSIS — N25.81 SECONDARY RENAL HYPERPARATHYROIDISM (H): ICD-10-CM

## 2023-12-06 DIAGNOSIS — Z79.01 LONG TERM CURRENT USE OF ANTICOAGULANT THERAPY: ICD-10-CM

## 2023-12-06 DIAGNOSIS — D64.9 ANEMIA, UNSPECIFIED TYPE: ICD-10-CM

## 2023-12-06 DIAGNOSIS — D63.1 ANEMIA OF CHRONIC RENAL FAILURE, UNSPECIFIED CKD STAGE: Primary | ICD-10-CM

## 2023-12-06 DIAGNOSIS — I50.82 BIVENTRICULAR CONGESTIVE HEART FAILURE (H): ICD-10-CM

## 2023-12-06 DIAGNOSIS — N18.32 STAGE 3B CHRONIC KIDNEY DISEASE (H): ICD-10-CM

## 2023-12-06 DIAGNOSIS — N18.31 STAGE 3A CHRONIC KIDNEY DISEASE (H): ICD-10-CM

## 2023-12-06 DIAGNOSIS — Z95.2 H/O MECHANICAL AORTIC VALVE REPLACEMENT: Primary | ICD-10-CM

## 2023-12-06 DIAGNOSIS — Z95.2 H/O MECHANICAL AORTIC VALVE REPLACEMENT: ICD-10-CM

## 2023-12-06 DIAGNOSIS — F32.1 CURRENT MODERATE EPISODE OF MAJOR DEPRESSIVE DISORDER WITHOUT PRIOR EPISODE (H): ICD-10-CM

## 2023-12-06 DIAGNOSIS — N18.9 ANEMIA OF CHRONIC RENAL FAILURE, UNSPECIFIED CKD STAGE: Primary | ICD-10-CM

## 2023-12-06 LAB
ALBUMIN SERPL BCG-MCNC: 3.8 G/DL (ref 3.5–5.2)
ANION GAP SERPL CALCULATED.3IONS-SCNC: 7 MMOL/L (ref 7–15)
BUN SERPL-MCNC: 36.7 MG/DL (ref 8–23)
CALCIUM SERPL-MCNC: 8.9 MG/DL (ref 8.8–10.2)
CHLORIDE SERPL-SCNC: 106 MMOL/L (ref 98–107)
CREAT SERPL-MCNC: 1.95 MG/DL (ref 0.67–1.17)
DEPRECATED HCO3 PLAS-SCNC: 26 MMOL/L (ref 22–29)
EGFRCR SERPLBLD CKD-EPI 2021: 37 ML/MIN/1.73M2
GLUCOSE SERPL-MCNC: 95 MG/DL (ref 70–99)
HGB BLD-MCNC: 12.8 G/DL (ref 13.3–17.7)
IRON BINDING CAPACITY (ROCHE): 214 UG/DL (ref 240–430)
IRON SATN MFR SERPL: 32 % (ref 15–46)
IRON SERPL-MCNC: 69 UG/DL (ref 61–157)
PHOSPHATE SERPL-MCNC: 4.1 MG/DL (ref 2.5–4.5)
POTASSIUM SERPL-SCNC: 4.9 MMOL/L (ref 3.4–5.3)
SODIUM SERPL-SCNC: 139 MMOL/L (ref 135–145)
TSH SERPL DL<=0.005 MIU/L-ACNC: 2.19 UIU/ML (ref 0.3–4.2)

## 2023-12-06 PROCEDURE — 83550 IRON BINDING TEST: CPT | Performed by: INTERNAL MEDICINE

## 2023-12-06 PROCEDURE — 85018 HEMOGLOBIN: CPT | Performed by: INTERNAL MEDICINE

## 2023-12-06 PROCEDURE — 99214 OFFICE O/P EST MOD 30 MIN: CPT | Performed by: INTERNAL MEDICINE

## 2023-12-06 PROCEDURE — 83540 ASSAY OF IRON: CPT | Performed by: INTERNAL MEDICINE

## 2023-12-06 PROCEDURE — 80069 RENAL FUNCTION PANEL: CPT | Performed by: INTERNAL MEDICINE

## 2023-12-06 PROCEDURE — 84443 ASSAY THYROID STIM HORMONE: CPT | Performed by: INTERNAL MEDICINE

## 2023-12-06 PROCEDURE — 36415 COLL VENOUS BLD VENIPUNCTURE: CPT | Performed by: INTERNAL MEDICINE

## 2023-12-06 RX ORDER — RESPIRATORY SYNCYTIAL VIRUS VACCINE 120MCG/0.5
0.5 KIT INTRAMUSCULAR ONCE
Qty: 1 EACH | Refills: 0 | Status: CANCELLED | OUTPATIENT
Start: 2023-12-06 | End: 2023-12-06

## 2023-12-06 NOTE — PROGRESS NOTES
Assessment & Plan   Problem List Items Addressed This Visit       CKD (chronic kidney disease) stage 3, GFR 30-59 ml/min (H)    Relevant Orders    TSH WITH FREE T4 REFLEX    Congestive heart failure (H)    Relevant Orders    TSH WITH FREE T4 REFLEX    H/O mechanical aortic valve replacement    Relevant Orders    TSH WITH FREE T4 REFLEX    Anticoagulation Clinic Referral    Long term current use of anticoagulant therapy    Relevant Orders    Anticoagulation Clinic Referral    Anemia of chronic renal failure, unspecified CKD stage - Primary    Secondary renal hyperparathyroidism (H24)     Other Visit Diagnoses       Current moderate episode of major depressive disorder without prior episode (H)        Relevant Medications    FLUoxetine (PROZAC) 20 MG capsule           Patient is here establishing care.  He is , lives in [place].  He has been going to [facility].  Trying to get everything within [employer].  He does see nephrology here just saw cardiology here.    Chronic kidney disease follows with nephrology will do labs today creatinine runs about 2.  He is on Bumex and Entresto.    Heart failure with history of aortic valve replacement, aneurysms treatments, recent atrial fibrillation and cardioversion.  He is on long term Coumadin and will see the Coumadin clinic here referral is placed.  He had an ejection fraction of 30 to 35% followed by cardiology he is now on Jardiance, Bumex, switch from losartan to Entresto but it was $900 every 3 months we will see cardiology and discussed that.    Secondary renal hyperparathyroidism we will continue with the nephrology team.    Anemia we will be checking labs and followed by nephrology.    New issue today is depression he is quite depressed we will get him on fluoxetine does not want a counselor at this point.  Follow-up with me in 2 months.  Hopefully this will help his frustration hopefully can build up his endurance so he can do more  physical activities.    Chronic joint pains may address those in the future.  Immunizations are up-to-date, colonoscopy is up-to-date.           BMI:   Estimated body mass index is 28.48 kg/m  as calculated from the following:    Height as of this encounter: 1.829 m (6').    Weight as of this encounter: 95.3 kg (210 lb).           Min Fried MD  [facility] NICOLLE Copeland is a 67 year old, presenting for the following health issues:  Establish Care (Heart   BP)      12/6/2023    12:52 PM   Additional Questions   Roomed by Mica Vang     History of Present Illness       CKD: He is not using over the counter pain medicine.     Heart Failure:  He presents for follow up of heart failure. He is experiencing shortness of breath with activity only, which is same as usual. He is not experiencing any lower extremity edema.   He denies orthopenea and is not coughing at night. Patient is checking weight daily. He has recently had a weight increase.  He has no side effects from medications.  He has had no other medical visits for heart failure since the last visit.    Hypertension: He presents for follow up of hypertension.  He does check blood pressure  regularly outside of the clinic. Outside blood pressures have been over 140/90. He follows a low salt diet.     Vascular Disease:  He presents for follow up of vascular disease.      He takes daily aspirin.    He eats 2-3 servings of fruits and vegetables daily.He consumes 2 sweetened beverage(s) daily.He exercises with enough effort to increase his heart rate 9 or less minutes per day.  He exercises with enough effort to increase his heart rate 3 or less days per week.   He is taking medications regularly.     Last Echo: Echo result w/o MOPS:      Had been at [facility] for primary, nephrology with [facility] and cardioversion. Saw [facility] here for cardiology. Got on Entresto taking half a pill, Quite  expensive.   Also on Jardiance to help the heart failure.    Water weight went down with bumex and CPAP.     [facility] for aneurysm repairs, stents and aortic valve replaced back in 2014. Goes to [facility] yearly as well.     LIVIA and doing better, wearing a full face mask.      Coumadin clinic  Just saw neurology and memory better with LIVIA treated.      Shot are up to date and good    Memory and question of depression affecting him.  Frustrating can't enjoy his half-way and do what he enjoys.  Hard to fall asleep at night.  Lots of shoulder, neck, knee pains that limit him.  Doesn't do much for therapy.        Review of Systems         Objective    /62   Pulse 52   Temp 98.3  F (36.8  C) (Temporal)   Resp 16   Ht 1.829 m (6')   Wt 95.3 kg (210 lb)   SpO2 97%   BMI 28.48 kg/m    Body mass index is 28.48 kg/m .  Physical Exam   No acute distress, affect is flat and frustrated  Heart is regular, aortic valve click  Lungs are clear  Extremities with trace to no edema.

## 2023-12-06 NOTE — PROGRESS NOTES
Anticoagulation Management    Updated home monitor orders needed due to Transferring meter from outside [facility]    Current home monitor company: [employer]    [facility] referring provider: Min Fried MD    Referring provider's clinic fax number (except [employer]): [phone]    Request made by: Patient  Seen in clinic 12/06/23 by new PP to establish care  [facility] referral placed by PCP today     Samantha Ty RN

## 2023-12-07 NOTE — PROGRESS NOTES
I did speak to Min today to update him on this transfer form. He states he just took his INR yesterday and it was 2.4 so he is not due to have another one for a bit. [facility] did manage yesterday's. He is aware [facility] or [facility] will contact him once this transition has gone through. Patient verbalized understanding and agrees with plan of care. Pt had no further questions or concerns at this time.        Tony Leon RN, BSN  [facility] Anticoagulation Team

## 2023-12-11 ENCOUNTER — TELEPHONE (OUTPATIENT)
Dept: INTERNAL MEDICINE | Facility: CLINIC | Age: 68
End: 2023-12-11
Payer: MEDICARE

## 2023-12-11 NOTE — TELEPHONE ENCOUNTER
Patient Returning Call    Reason for call:  Patient has been started on FLUoxetine (PROZAC) 20 MG capsule and it has a warning on the label for patients who are on blood thinner/warfarin. Please call    Information relayed to patient:  message placed, await call back    Patient has additional questions:  No      Could we send this information to you in [place] or would you prefer to receive a phone call?:   Patient would prefer a phone call   Okay to leave a detailed message?: Yes at Other phone number:  Jalyn [phone]

## 2023-12-11 NOTE — TELEPHONE ENCOUNTER
Spoke with spouse Jalyn at Min's request and per Sydney Vang [facility]. This will not affect his INR at this time so no need to make changes. I informed them we are still waiting for his transfer form from [facility] to be completed. Patient verbalized understanding and agrees with plan of care. Pt had no further questions or concerns at this time.     Tony Leon RN, BSN  [facility] Anticoagulation Team

## 2023-12-24 LAB — INR HOME MONITORING: 2.8 (ref 2–3)

## 2023-12-26 ENCOUNTER — MYC MEDICAL ADVICE (OUTPATIENT)
Dept: ANTICOAGULATION | Facility: CLINIC | Age: 68
End: 2023-12-26
Payer: MEDICARE

## 2023-12-26 ENCOUNTER — ANTICOAGULATION THERAPY VISIT (OUTPATIENT)
Dept: ANTICOAGULATION | Facility: CLINIC | Age: 68
End: 2023-12-26
Payer: MEDICARE

## 2023-12-26 DIAGNOSIS — Z95.2 H/O MECHANICAL AORTIC VALVE REPLACEMENT: Primary | ICD-10-CM

## 2023-12-26 DIAGNOSIS — Z79.01 LONG TERM CURRENT USE OF ANTICOAGULANT THERAPY: ICD-10-CM

## 2023-12-26 NOTE — PROGRESS NOTES
ANTICOAGULATION MANAGEMENT     Min Andino 68 year old male is on warfarin with therapeutic INR result. (Goal INR 2.0-3.0)    Recent labs: (last 7 days)     12/24/23  0000   INR 2.8       ASSESSMENT     Source(s): Chart Review     Warfarin doses taken: Reviewed in chart, need to verbally confirm as this is Min's [facility] with [facility]  Diet: No new diet changes identified  Medication/supplement changes: None noted  New illness, injury, or hospitalization: No  Signs or symptoms of bleeding or clotting: No  Previous result:  previously managed by [employer]  Additional findings:  First electronic interfaced result from [employer] received today, previously was managed by [employer].        PLAN     Unable to reach Min today. As this is his first INR [facility] by [facility] would like to verbally confirm dose taken and complete an assessment. Would also like to review [facility] policy such as INR preferred to be done M-F.    LDVM and also sent a [employer] message    Follow up required to confirm warfarin dose taken and assess for changes and review [facility] workflows and policy.    Naya Abdi RN  Anticoagulation Clinic  12/26/2023

## 2023-12-27 NOTE — PROGRESS NOTES
Min  has replied to the [employer] message. Dosing confirmed. Further instructions in the [employer] message.

## 2024-01-01 LAB — INR HOME MONITORING: 3 (ref 2–3)

## 2024-01-02 ENCOUNTER — ANTICOAGULATION THERAPY VISIT (OUTPATIENT)
Dept: ANTICOAGULATION | Facility: CLINIC | Age: 69
End: 2024-01-02
Payer: MEDICARE

## 2024-01-02 DIAGNOSIS — Z95.2 H/O MECHANICAL AORTIC VALVE REPLACEMENT: Primary | ICD-10-CM

## 2024-01-02 DIAGNOSIS — Z79.01 LONG TERM CURRENT USE OF ANTICOAGULANT THERAPY: ICD-10-CM

## 2024-01-02 NOTE — PROGRESS NOTES
ANTICOAGULATION MANAGEMENT     Min Andino 68 year old male is on warfarin with therapeutic INR result. (Goal INR 2.0-3.0)    Recent labs: (last 7 days)     01/01/24  0000   INR 3       ASSESSMENT     Source(s): Chart Review and Patient/Caregiver Call     Warfarin doses taken: Warfarin taken as instructed  Diet: No new diet changes identified  Medication/supplement changes: None noted  New illness, injury, or hospitalization: No  Signs or symptoms of bleeding or clotting: No  Previous result: Therapeutic last 2(+) visits  Additional findings: None       PLAN     Recommended plan for no diet, medication or health factor changes affecting INR     Dosing Instructions: Continue your current warfarin dose with next INR in 1 week       Summary  As of 1/2/2024      Full warfarin instructions:  3 mg every day   Next INR check:  1/8/2024               Telephone call with Min who verbalizes understanding and agrees to plan    Patient to recheck with home meter    Education provided:   Please call back if any changes to your diet, medications or how you've been taking warfarin    Plan made per [facility] anticoagulation protocol    Shantal Hoover RN  Anticoagulation Clinic  1/2/2024    _______________________________________________________________________     Anticoagulation Episode Summary       Current INR goal:  2.0-3.0   TTR:  100.0% (1.1 wk)   Target end date:  Indefinite   Send INR reminders to:  ANTICOAG HOME MONITORING    Indications    H/O mechanical aortic valve replacement [Z95.2]  Long term current use of anticoagulant therapy [Z79.01]             Comments:  patient transfer from  with [employer] home meter  HP to dose until [employer] updated fax             Anticoagulation Care Providers       Provider Role Specialty Phone number    Min Fried MD Referring Internal Medicine [phone]

## 2024-01-05 ENCOUNTER — LAB (OUTPATIENT)
Dept: LAB | Facility: OTHER | Age: 69
End: 2024-01-05
Payer: MEDICARE

## 2024-01-05 DIAGNOSIS — N18.9 ANEMIA OF CHRONIC RENAL FAILURE, UNSPECIFIED CKD STAGE: ICD-10-CM

## 2024-01-05 DIAGNOSIS — D63.1 ANEMIA OF CHRONIC RENAL FAILURE, UNSPECIFIED CKD STAGE: ICD-10-CM

## 2024-01-05 DIAGNOSIS — D64.9 ANEMIA, UNSPECIFIED TYPE: ICD-10-CM

## 2024-01-05 DIAGNOSIS — N18.32 STAGE 3B CHRONIC KIDNEY DISEASE (H): ICD-10-CM

## 2024-01-05 DIAGNOSIS — Z11.59 NEED FOR HEPATITIS C SCREENING TEST: Primary | ICD-10-CM

## 2024-01-05 LAB
ALBUMIN SERPL BCG-MCNC: 3.9 G/DL (ref 3.5–5.2)
ANION GAP SERPL CALCULATED.3IONS-SCNC: 10 MMOL/L (ref 7–15)
BUN SERPL-MCNC: 41.9 MG/DL (ref 8–23)
CALCIUM SERPL-MCNC: 8.9 MG/DL (ref 8.8–10.2)
CHLORIDE SERPL-SCNC: 104 MMOL/L (ref 98–107)
CREAT SERPL-MCNC: 1.97 MG/DL (ref 0.67–1.17)
CREAT UR-MCNC: 56.4 MG/DL
DEPRECATED HCO3 PLAS-SCNC: 24 MMOL/L (ref 22–29)
EGFRCR SERPLBLD CKD-EPI 2021: 36 ML/MIN/1.73M2
FERRITIN SERPL-MCNC: 264 NG/ML (ref 31–409)
GLUCOSE SERPL-MCNC: 87 MG/DL (ref 70–99)
HGB BLD-MCNC: 13.7 G/DL (ref 13.3–17.7)
MICROALBUMIN UR-MCNC: <12 MG/L
MICROALBUMIN/CREAT UR: NORMAL MG/G{CREAT}
PHOSPHATE SERPL-MCNC: 4.2 MG/DL (ref 2.5–4.5)
POTASSIUM SERPL-SCNC: 5.5 MMOL/L (ref 3.4–5.3)
SODIUM SERPL-SCNC: 138 MMOL/L (ref 135–145)

## 2024-01-05 PROCEDURE — 85018 HEMOGLOBIN: CPT

## 2024-01-05 PROCEDURE — 82043 UR ALBUMIN QUANTITATIVE: CPT

## 2024-01-05 PROCEDURE — 86803 HEPATITIS C AB TEST: CPT

## 2024-01-05 PROCEDURE — 82570 ASSAY OF URINE CREATININE: CPT

## 2024-01-05 PROCEDURE — 82728 ASSAY OF FERRITIN: CPT

## 2024-01-05 PROCEDURE — 83970 ASSAY OF PARATHORMONE: CPT

## 2024-01-05 PROCEDURE — 80069 RENAL FUNCTION PANEL: CPT

## 2024-01-05 PROCEDURE — 36415 COLL VENOUS BLD VENIPUNCTURE: CPT

## 2024-01-06 LAB
HCV AB SERPL QL IA: NONREACTIVE
PTH-INTACT SERPL-MCNC: 53 PG/ML (ref 15–65)

## 2024-01-09 ENCOUNTER — ANTICOAGULATION THERAPY VISIT (OUTPATIENT)
Dept: ANTICOAGULATION | Facility: CLINIC | Age: 69
End: 2024-01-09

## 2024-01-09 ENCOUNTER — VIRTUAL VISIT (OUTPATIENT)
Dept: NEPHROLOGY | Facility: CLINIC | Age: 69
End: 2024-01-09
Payer: MEDICARE

## 2024-01-09 VITALS — BODY MASS INDEX: 27.8 KG/M2 | WEIGHT: 205 LBS

## 2024-01-09 DIAGNOSIS — Z95.2 H/O MECHANICAL AORTIC VALVE REPLACEMENT: Primary | ICD-10-CM

## 2024-01-09 DIAGNOSIS — N18.32 STAGE 3B CHRONIC KIDNEY DISEASE (H): ICD-10-CM

## 2024-01-09 DIAGNOSIS — N25.81 SECONDARY RENAL HYPERPARATHYROIDISM (H): ICD-10-CM

## 2024-01-09 DIAGNOSIS — Z79.01 LONG TERM CURRENT USE OF ANTICOAGULANT THERAPY: ICD-10-CM

## 2024-01-09 DIAGNOSIS — E87.5 HYPERKALEMIA: Primary | ICD-10-CM

## 2024-01-09 DIAGNOSIS — I50.82 BIVENTRICULAR CONGESTIVE HEART FAILURE (H): ICD-10-CM

## 2024-01-09 DIAGNOSIS — Z86.79 HX OF AORTIC ANEURYSM: ICD-10-CM

## 2024-01-09 DIAGNOSIS — I10 HYPERTENSION, GOAL BELOW 140/90: ICD-10-CM

## 2024-01-09 LAB — INR HOME MONITORING: 2.5 (ref 2–3)

## 2024-01-09 PROCEDURE — 99214 OFFICE O/P EST MOD 30 MIN: CPT | Mod: 95 | Performed by: INTERNAL MEDICINE

## 2024-01-09 ASSESSMENT — PAIN SCALES - GENERAL: PAINLEVEL: NO PAIN (0)

## 2024-01-09 NOTE — PATIENT INSTRUCTIONS
Labs next Tuesday when you are seeing Dr. Gilmore  Recommend low potassium diet - any food with more than 250 mg of potassium in a serving is considered high potassium.   Follow-up in 3 months

## 2024-01-09 NOTE — PROGRESS NOTES
ANTICOAGULATION MANAGEMENT     Min Andino 68 year old male is on warfarin with therapeutic INR result. (Goal INR 2.0-3.0)    Recent labs: (last 7 days)     01/09/24  0000   INR 2.5       ASSESSMENT     Source(s): Chart Review and Patient/Caregiver Call     Warfarin doses taken: Warfarin taken as instructed  Diet: No new diet changes identified  Medication/supplement changes: None noted  New illness, injury, or hospitalization: No  Signs or symptoms of bleeding or clotting: No  Previous result: Therapeutic last 2(+) visits  Additional findings: None       PLAN     Recommended plan for no diet, medication or health factor changes affecting INR     Dosing Instructions: Continue your current warfarin dose with next INR in 1 week       Summary  As of 1/9/2024      Full warfarin instructions:  3 mg every day   Next INR check:  1/16/2024               Telephone call with Min who verbalizes understanding and agrees to plan    Patient to recheck with home meter    Education provided:   Please call back if any changes to your diet, medications or how you've been taking warfarin  Symptom monitoring: monitoring for bleeding signs and symptoms and monitoring for clotting signs and symptoms  Contact [phone]  with any changes, questions or concerns.     Plan made per [facility] anticoagulation protocol    Brittni Lemus, RN  Anticoagulation Clinic  1/9/2024    _______________________________________________________________________     Anticoagulation Episode Summary       Current INR goal:  2.0-3.0   TTR:  100.0% (2.3 wk)   Target end date:  Indefinite   Send INR reminders to:  ANTICOAG HOME MONITORING    Indications    H/O mechanical aortic valve replacement [Z95.2]  Long term current use of anticoagulant therapy [Z79.01]             Comments:  patient transfer from  with [employer] home meter  HP to dose until [employer] updated fax             Anticoagulation Care Providers       Provider Role Specialty Phone number    Corky  Min CRABTREE MD [facility] Internal Medicine [phone]

## 2024-01-09 NOTE — PROGRESS NOTES
"Virtual Visit Details    Type of service:  Video Visit     Originating Location (pt. Location): Home    Distant Location (provider location):  Off-site  Platform used for Video Visit: Иван      01/09/24     CC: CKD    HPI: Min Andino is a 68 year old male who presents for evaluation of CKD. I last saw Mr. Andino in 2014. To review from my previous note: Mr. Andino's past medical hx was unremarkable leading up to Dec 2013 when he noted chest tightness. He was seen at [facility] at that time and noted to have aortic dissection. He was initially monitored but later underwent aortic repair/aortic valve replacement/CABG in early April 2014. His post op course was complicated by the need for ECMO as well as pressor support. His wife reports today that he was told that he has \"normal\" creatinine levels when undergoing physicals in the past. His creatinine was 2.2 post surgery in April 2014 but improved to 1.3 at the time of discharge in April. 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 At that time his creatinine was 2.2 post op but improved to 1.3. Since that time, creatinine readings have included 1.48 on 7/17/14, 1.39 no 7/27/14, and 1.35 on 8/13/14. 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                 02/24/20: today he presents to reestablish care in our clinic. His creatinine was stable at ~ 1.1 on last check in 2014 but is now at a new baseline of ~ 1.6 since august. Mr. Andino underwent thoracoabdominal aortic repair august 2019. The placement of this stent compromised blood flow to the left kidney moiety which was noted on imaging later. His creatinine post this procedure was ~ 1.6. There have been a few episodes when the creatinine has increased, most recently a few weeks ago (to 1.9). With his most recent creatinine rise, lasix was held. He has not noted much change in his swelling or " breathing with holding the lasix for the past few weeks. Today I noted SOB during our conversation. His oxygen saturation was fine but his breathing seemed labored. He and his wife report that this is his breathing baseline over the past 5 years and is not worse than typical. They are following weights at home and he has been dropping weight - no increase since stopping the lasix. He does have difficulty with empyting his bladder - last imaging in the fall showed no hydronephrosis. Flomax did not help him in the past. He has a HHN coming once a week to the house. He is not lightheaded. He has plans to see cardiology at [place] in March, is looking to establish care in urology, is also looking to establish care with internal medicine potentially in our clinic for continuity in one location.         06/08/20:  Virtual visit. Lasix was increased last month. He reports that the swelling is there but better. He denies any change in eating. No diarrhea. Weight was 240 lbs down to 215 lbs. He had been losing 1 lb a day. He was in ED last week and  Dx with hernia. He held lasix since Friday - was on lasix 40 mg daily held over the weekend - weight was 215 lbs on Thursday and 217 lbs this AM. He denies lightheadedness/dizziness. No orthostatic sxs. He quit wearing compression stockings a week ago. He feels he has slightly more endurance; can't lift a lot, needing to take breaks. He feels his urinary retention is not as problematic - has not seen urology. He has been taking iron BID.      7/13/20: video visit. He has continued to see weight loss over time - was 215 lbs in June but now 205 lbs. He has seen a change in his clothing size by 2 over the past few months. He is hungry often - eating well. No diarrhea. He has some constipation at times. He feels that maybe his breathing is improved. He is wearing compression stockings. He is using lasix 20 mg daily.      10/20/20: video visit - started on AMWELL and did exam that way -  then converted to telephone given echo noted. Feeling the best he has felt since before last winter. NO new issues. He has endovascular stent repair planned in the coming week. Activity improving. No swelling. Weight most recently 203 lbs. Over the past month - 201-203 lbs. BP has been well controlled; 115/61 at a recent physician appt. He tends to have higher readings at home; 140s at home, sometimes 130s.     01/25/21:  In the setting of COVID-19 pandemic, this visit was changed to a telephone visit. Patient reports feeling good overall. No difficulty with fluid overload/swelling/shortness of breath. His weight is up slightly but he also has a bigger appetite lately and snacking more with being at home. BP was 105/58 at a recent clinic visit but they report pressures of 130-176 at home most recently - mostly above goal. They have been working with cardiology clinic at [place] in regards to medication adjustments  And plan to reach out to them with these updates. He was hospitalized 11/11/20-11/17/20 following left internal iliac artery stenting for endoleak. Returned to operating room 11/13 for [id number] of left common femoral aneurysm and previous graft left external iliac to superficial femoral and deep femoral artery interposition graft. Creatinine was stable at 1.5-1.7 while inpatient. He has been taking iron BID but with some constipation.     9/28/21: video visit. No vascular interventions or hospitalizations since last visit. Vascular stent/leak stable on recent CT. Checking BP 4x/day with average SBP in AM 160s-180s, mid day 110s-120s, PM 140s. Amlodipine (takes in AM) increased to 10 mg daily approximately one month ago by PCP. Losartan (take at night) 100 mg daily increased march 2021. Metoprolol succinate increased to 100 mg daily (not sure when this was increased, take in AM). BP sligthly improved but not much. Denies any dizziness, CP, abdominal pain, SOB (some BROWN with moderate exertion), LE swelling or  abdominal distention. Doesn't check weights regularly, last weight 220.    01/04/22: went to [facility] in first part of November - things are looking goo/stable.  NO swelling related concerns at this time. Breathing is stable. IN the past few weeks - blood pressure one time 97 in the AM - evening always 30 points higher.  Has had a consistent pattern of low in the AM and higher in evening for the past few months. Currently taking amlodipine 5 mg AM, losartan 50 mg BID, and carvedilol. 12.5 mg BID.     10/04/22: video visit. Not as hydrated as he should. No diueretics. No lower ext swelling. NO NSAIDs. Blood pressure recently - takes it every 10 days - 121/76, 129/83, 102/67, 119/75, 127/76, 119/68. We spent time discussing hydration - he admits he is not a great water drinker.     05/02/23: telephone visit -  Sara messaged me on 3/1/23 given increased swelling noted in his feet and legs - was given lasix 20 mg to be taken for 7 days. Creatinine was 1.85 in Sept 2022 in our system but then 1.43 more recently in Jan. Labs were done through [facility] in Feb and Creatinine was up to 1.94 and then last week 1.9. He was seen by his cardiologist at [facility] last week - felt to have minimally decompensated left ventricular systolic function but exacerbation of right ventricular failure perhabs due to underlying lung disease exacerbated by hypoventilation and sleep apnea. Plan is cardiac MR, lasix 20 mg daily, sleep study. Potential right heart cath in the future.    At this time, swelling is about the same. Weight is down about 10-12 lbs during the month of March - stayed stable through April. Feet and legs don't look normal but not terrible. Recent BP readings - 122/71, 127/77, 124/82. Pretty much always In the 120s. A few will go to 116. His physical stamina has declined - much more fatigued through the winter. He has been less active through the winter - more active in spring/fall/summer. Wheelchair  "most recently. Home pressures don't go above 140. SOB with activity. That has been the case for a long time - worse for the past 6-8 months. MRI of hte heart is the 10th, cards the 11th, sleep study the 21st/22nd. Cardiologist consult end of June at [place].     05/30/23: video visit. They feel the swelling is progressively getting worse day by day.last week they went to 20 mg BID of lasix per cardiology but then creatinine increased so they decreased back to 20 mg of lasix.  They have been losing weight this year with healthier dietary habits but unfortunately the scale is going up for him. Swelling in legs is significantly more uncomfortable. They did their sleep study but currently do not have a follow-up with pulmonary until July to hear the results/next stpeps.   Appt at [facility] in [place] for 2nd opinion.    Not scheduled for cardioversion at this point but they are started on amiodarone since last visit with Dr. Boyd.   Lasix 20 mg daily. When taking 20 BID, he did not see any difference in swelling improvement or urine output.  He is not on norvasc. /77. 132/78. 120s-130s.     07/11/23: video visit. Since our last visit I connected with the sleep center and he was going to be started on CPAP ASAP. I also connected with Dr. Boyd at [facility]. The prognosis of his cardiac condition is poor and he is concerned about future needs for dialysis. He has since been seen by [facility] cardiology - \"progressive LV adverse remodeling with reduced EF accompanied by right heart failure and significant tricuspid regurgitation\". They discussed optimizing medical therapy: they hope to add SGLT2 inhibitor, wanted to increase bumex to 2 mg AM And 1 mg PM. Once more euvolemic, then optimize carvedilol to a target of 25 mg BID. They also discussed replacing losartan with sacubitril/valsartan. They also felt electrical cardioversion is not unreasonable.    He has dropped about 50 lbs since our last " visit. Lasix wasn't working. Bumex has helped. They have not made med changes since being seen at [facility]. He has an appt this Friday with cardiology. There is a little swelling still but much improved from our last visit - 250 lbs 1.5 months ago - just over 200 lbs now. Same eating regimen. Now on CPAP - still with apnea events but improved from previous. Was just seen by sleep physician through [facility]. Mask has been tolerable. Currently taking bumex 2 mg daily. BP has been good at 120/83. 130/85, 135/82. NO lightheaedness or dizziness.     10/10/23: video visit.   1. Are you on jardiance at this time?  YES   2. What are your recent blood pressure readings?  94/44, 104/43, 121/58, 159/69, 114/47, 156/66   3. Please confirm your current bumex dose  1mg. in AM and .5 mg. mid-day   4. Please also update on how you are feeling in regards to volume (is your weight up/down?, short of breath?, swelling?)  No edema or swelling. Weight stable.  No shortness of breath.   He was in the ED in September with dizziness. Breathing is stable. Better than in the spring certainly.  Weight has been 195, 194 lbs - +/- 5 lbs. This is stable. No lightheadedness on standing except when he first gets up in the AM. Last episode was a couple days ago. Dry Mouth. Wearing CPAP. No significant thirst.   - Now on Jardiance X 2-2.5 weeks  - Carvedilol to 12.5 mg BID  - losartan is now 25 mg BID    01/09/24: video visit. Scheduled for follow-up next week. Feeling pretty good. No volume related issues. Still on CPAP at night. Bumex is 0.5 mg every other day. No signs of dehydrated. Low BPs in the AM - 79/35, 83/41 - not every day but will occur. Other days 138/54. The pressures less than 100 occur 1-2 times per week. Rarely feels dizzy. Heart rate has been 58-69. Typically upper 50s, lower 60s.     amiodarone (PACERONE) 200 MG tablet, 200 mg oral bid for 2 weeks, then 200 mg daily  amoxicillin (AMOXIL) 500 MG capsule, Take 4 capsules 1 hour  before dental work  aspirin (ASA) 81 MG chewable tablet, Take 81 mg by mouth daily  atorvastatin (LIPITOR) 40 MG tablet, Take 1 tablet (40 mg) by mouth daily Last refill. Need follow-up with Dr. Blair.  bumetanide (BUMEX) 1 MG tablet, Take 1 tablet (1 mg) by mouth daily (Patient taking differently: Take 1 mg by mouth daily 1/2 tab once a day)  carvedilol (COREG) 12.5 MG tablet, TAKE 1 TABLET(12.5 MG) BY MOUTH TWICE DAILY WITH MEALS  docusate sodium (COLACE) 100 MG capsule, daily   empagliflozin (JARDIANCE) 10 MG TABS tablet, Take 1 tablet (10 mg) by mouth daily  FLUoxetine (PROZAC) 20 MG capsule, Take 1 capsule (20 mg) by mouth daily  multivitamin w/minerals (THERA-VIT-M) tablet, Take 1 tablet by mouth daily  nitroGLYcerin (NITROSTAT) 0.4 MG sublingual tablet, Place 1 tablet (0.4 mg) under the tongue every 5 minutes as needed for chest pain  psyllium (METAMUCIL/KONSYL) 0.52 g capsule, Take 1.04 g by mouth  sacubitril-valsartan (ENTRESTO) 24-26 MG per tablet, Take 1 tablet by mouth 2 times daily  Vitamin D, Cholecalciferol, 25 MCG (1000 UT) TABS, 2 times daily   warfarin ANTICOAGULANT (COUMADIN) 1 MG tablet, Take 2 mg on Tues/Fri or as directed by INR clinic  warfarin ANTICOAGULANT (COUMADIN) 4 MG tablet, Take 4 mg on Monday, Wed, Thurs, Friday, Sunday or as directed by INR clinic    No current facility-administered medications on file prior to visit.      Exam:  Wt 93 kg (205 lb)   BMI 27.80 kg/m      Results:  Labs reviewed through [facility].    Orders Only on 01/09/2024   Component Date Value Ref Range Status    INR HOME MONITORING 01/09/2024 2.5  2.000 - 3.000 Final       Lab were reviewed and interpreted.       Assessment/Plan:   1. CKD Stage 3: has CKD in the setting of previous ULICES as well as vascular compromise to the left kidney moiety from his vascular procedure in August 2019. Addt ULICES recently which was likely cardiorenal syndrome. With his vascular hx, need to consider he will be more sensitive to  hemodynamic changes. With his heart failure changes most recently, there is a concern for worsening kidney function/potential of dialysis needs in the future. I am pleased to see the stability he has at this time - will monitor as we have been.     2. Hypertension: BP is at goal if not slightly low at times. I will update Dr. Gilmore to determine if any changes are recommended. Dr. Gilmore sees them next week so will defer changes to that visit.     3. Aneurysms/AVR: following with [facility]    4. CHF/Pulmonary hypertension: [facility]. [place] [facility]. REcently with 2nd opinion at [facility] and recommendations were to optimize medically.Since started on SGLT2 inhibitor, bumex needs have decreased significantly.   -  Sleep eval completed and per sleep lab, findings c/w LIVIA. Stressed the importance of ongoing sleep apena treatment. He is following with sleep specialist through . Plan is adjustment to lower apnea episodes even further.   - Now following with Dr. Gilmore in cardiology.     5. Hyperkalemia: discussed low potassium foods - they have been eating quite a few high potassium foods so will monitor alone for now.     Patient Instructions   Labs next Tuesday when you are seeing Dr. Gilmore  Recommend low potassium diet - any food with more than 250 mg of potassium in a serving is considered high potassium.   Follow-up in 3 months       [id number] AM video visit via [employer] - offsite  Madelin Vallecillo DO

## 2024-01-09 NOTE — NURSING NOTE
Is the patient currently in the state of [place]? YES    Visit mode:VIDEO    If the visit is dropped, the patient can be reconnected by: VIDEO VISIT: Text to cell phone:   Telephone Information:   Mobile [phone]       Will anyone else be joining the visit? NO  (If patient encounters technical issues they should call [phone]    How would you like to obtain your AVS? MyChart    Are changes needed to the allergy or medication list? No    Reason for visit: Video Visit (Recheck)    Dodie PRICE

## 2024-01-09 NOTE — NURSING NOTE
Message sent to Speciality  to contact pt and assist with scheduling Future appts recommended by Dr. Vallecillo:   Message added to appt note with Dr. Gilmore (1/16/24) to have pt get a BMP per Dr. Vallecillo.    Labs next Tuesday when you are seeing Dr. Gilmore  Recommend low potassium diet - any food with more than 250 mg of potassium in a serving is considered high potassium.   Follow-up in 3 months    Lizet Cary LPN on 1/9/2024 at 10:48 AM

## 2024-01-10 ENCOUNTER — TELEPHONE (OUTPATIENT)
Dept: INTERNAL MEDICINE | Facility: CLINIC | Age: 69
End: 2024-01-10
Payer: MEDICARE

## 2024-01-10 DIAGNOSIS — Z95.2 H/O MECHANICAL AORTIC VALVE REPLACEMENT: Primary | ICD-10-CM

## 2024-01-10 DIAGNOSIS — Z79.01 LONG TERM CURRENT USE OF ANTICOAGULANT THERAPY: ICD-10-CM

## 2024-01-10 NOTE — TELEPHONE ENCOUNTER
Medication Question or Refill        What medication are you calling about (include dose and sig)?: Warfarin 3 mgs, 90 day supply needed. Patient said he has not been getting 90 days supply and needs this as well as the 3mgs.His med list says his last refill was for 1mgs. Patient requesting 3 mgs.    Preferred Pharmacy:   [id number] - [place], [place] - [address] [address]  [address] [address]  Phone: [phone] Fax: [phone]      Controlled Substance Agreement on file:   CSA -- Patient Level:    CSA: None found at the patient level.       Who prescribed the medication?: Primary care is listed as Dr. Min Fried in [place], but the refills look like filled by Kiah Dempsey and Dr. Blair at [place]     Do you need a refill? Yes    When did you use the medication last? Today     Patient offered an appointment? No    Do you have any questions or concerns?  No      Could we send this information to you in [facility] or would you prefer to receive a phone call?: Call [phone]

## 2024-01-11 ENCOUNTER — TELEPHONE (OUTPATIENT)
Dept: CARDIOLOGY | Facility: CLINIC | Age: 69
End: 2024-01-11
Payer: MEDICARE

## 2024-01-11 ENCOUNTER — DOCUMENTATION ONLY (OUTPATIENT)
Dept: ANTICOAGULATION | Facility: CLINIC | Age: 69
End: 2024-01-11
Payer: MEDICARE

## 2024-01-11 ENCOUNTER — MYC MEDICAL ADVICE (OUTPATIENT)
Dept: INTERNAL MEDICINE | Facility: CLINIC | Age: 69
End: 2024-01-11
Payer: MEDICARE

## 2024-01-11 DIAGNOSIS — Z79.01 LONG TERM CURRENT USE OF ANTICOAGULANT THERAPY: ICD-10-CM

## 2024-01-11 DIAGNOSIS — Z95.2 H/O MECHANICAL AORTIC VALVE REPLACEMENT: Primary | ICD-10-CM

## 2024-01-11 RX ORDER — WARFARIN SODIUM 3 MG/1
TABLET ORAL
Qty: 90 TABLET | Refills: 1 | Status: SHIPPED | OUTPATIENT
Start: 2024-01-11 | End: 2024-08-30

## 2024-01-11 NOTE — TELEPHONE ENCOUNTER
ANTICOAGULATION MANAGEMENT:  Medication Refill    Anticoagulation Summary  As of 1/9/2024      Warfarin maintenance plan:  3 mg (1 mg x 3) every day   Next INR check:  1/16/2024   Target end date:  Indefinite    Indications    H/O mechanical aortic valve replacement [Z95.2]  Long term current use of anticoagulant therapy [Z79.01]                 Anticoagulation Care Providers       Provider Role Specialty Phone number    Min Fried MD Referring Internal Medicine [phone]            Refill Criteria    Visit with referring provider/group: Meets criteria: office visit within referring provider group in the last 1 year on 12/6/23    [facility] referral signed last signed: 12/06/2023; within last year: Yes    Lab monitoring not exceeding 2 weeks overdue: Yes    Min meets all criteria for refill. Tablet size changed to 3 mg to allow for future dose per protocol adjustments; rx instructions and quantity supplied updated to match patient's current dosing plan. Patient notified: that warfarin 3 mg sent to pharmacy per request as patient currently takes 3 mg daily.. Warfarin 90 day supply with 1 refill granted per [facility] protocol     Zaina Gibbs RN  Anticoagulation Clinic

## 2024-01-11 NOTE — PROGRESS NOTES
See 1/11/24 patient currently take warfarin 3 mg daily (1 mg x 3 tabs), patient requesting warfarin 3 mg tablet size.  Sent to pharmacy as requested.  Chart update to reflect tablet size changes.      Zaina Gibbs RN on 1/11/2024 at 11:10 AM

## 2024-01-15 ENCOUNTER — LAB (OUTPATIENT)
Dept: LAB | Facility: OTHER | Age: 69
End: 2024-01-15
Payer: MEDICARE

## 2024-01-15 DIAGNOSIS — I50.22 CHRONIC SYSTOLIC HEART FAILURE (H): ICD-10-CM

## 2024-01-15 DIAGNOSIS — I50.82 BIVENTRICULAR CONGESTIVE HEART FAILURE (H): ICD-10-CM

## 2024-01-15 DIAGNOSIS — E87.5 HYPERKALEMIA: ICD-10-CM

## 2024-01-15 LAB
ALBUMIN SERPL BCG-MCNC: 4.1 G/DL (ref 3.5–5.2)
ALT SERPL W P-5'-P-CCNC: 17 U/L (ref 0–70)
ANION GAP SERPL CALCULATED.3IONS-SCNC: 8 MMOL/L (ref 7–15)
BUN SERPL-MCNC: 39.5 MG/DL (ref 8–23)
CALCIUM SERPL-MCNC: 9.3 MG/DL (ref 8.8–10.2)
CHLORIDE SERPL-SCNC: 105 MMOL/L (ref 98–107)
CHOLEST SERPL-MCNC: 143 MG/DL
CREAT SERPL-MCNC: 1.97 MG/DL (ref 0.67–1.17)
DEPRECATED HCO3 PLAS-SCNC: 27 MMOL/L (ref 22–29)
EGFRCR SERPLBLD CKD-EPI 2021: 36 ML/MIN/1.73M2
FASTING STATUS PATIENT QL REPORTED: YES
GLUCOSE SERPL-MCNC: 108 MG/DL (ref 70–99)
HDLC SERPL-MCNC: 54 MG/DL
HGB BLD-MCNC: 14 G/DL (ref 13.3–17.7)
LDLC SERPL CALC-MCNC: 78 MG/DL
NONHDLC SERPL-MCNC: 89 MG/DL
NT-PROBNP SERPL-MCNC: 943 PG/ML (ref 0–900)
PHOSPHATE SERPL-MCNC: 3.9 MG/DL (ref 2.5–4.5)
POTASSIUM SERPL-SCNC: 4.9 MMOL/L (ref 3.4–5.3)
SODIUM SERPL-SCNC: 140 MMOL/L (ref 135–145)
TRIGL SERPL-MCNC: 55 MG/DL

## 2024-01-15 PROCEDURE — 83880 ASSAY OF NATRIURETIC PEPTIDE: CPT

## 2024-01-15 PROCEDURE — 36415 COLL VENOUS BLD VENIPUNCTURE: CPT

## 2024-01-15 PROCEDURE — 84460 ALANINE AMINO (ALT) (SGPT): CPT

## 2024-01-15 PROCEDURE — 85018 HEMOGLOBIN: CPT

## 2024-01-15 PROCEDURE — 80069 RENAL FUNCTION PANEL: CPT

## 2024-01-15 PROCEDURE — 80061 LIPID PANEL: CPT

## 2024-01-16 ENCOUNTER — OFFICE VISIT (OUTPATIENT)
Dept: CARDIOLOGY | Facility: CLINIC | Age: 69
End: 2024-01-16
Payer: MEDICARE

## 2024-01-16 VITALS
HEART RATE: 58 BPM | DIASTOLIC BLOOD PRESSURE: 64 MMHG | HEIGHT: 72 IN | SYSTOLIC BLOOD PRESSURE: 112 MMHG | BODY MASS INDEX: 28.58 KG/M2 | OXYGEN SATURATION: 94 % | RESPIRATION RATE: 16 BRPM | WEIGHT: 211 LBS

## 2024-01-16 DIAGNOSIS — Z86.79 HX OF AORTIC ANEURYSM: ICD-10-CM

## 2024-01-16 DIAGNOSIS — I50.22 CHRONIC SYSTOLIC HEART FAILURE (H): ICD-10-CM

## 2024-01-16 DIAGNOSIS — I48.0 PAROXYSMAL ATRIAL FIBRILLATION (H): Primary | ICD-10-CM

## 2024-01-16 DIAGNOSIS — Z95.2 H/O MECHANICAL AORTIC VALVE REPLACEMENT: ICD-10-CM

## 2024-01-16 DIAGNOSIS — N18.32 STAGE 3B CHRONIC KIDNEY DISEASE (H): ICD-10-CM

## 2024-01-16 DIAGNOSIS — I10 HYPERTENSION, GOAL BELOW 140/90: ICD-10-CM

## 2024-01-16 PROCEDURE — 99215 OFFICE O/P EST HI 40 MIN: CPT | Performed by: INTERNAL MEDICINE

## 2024-01-16 PROCEDURE — G2211 COMPLEX E/M VISIT ADD ON: HCPCS | Performed by: INTERNAL MEDICINE

## 2024-01-16 RX ORDER — SACUBITRIL AND VALSARTAN 24; 26 MG/1; MG/1
1 TABLET, FILM COATED ORAL 2 TIMES DAILY
Qty: 180 TABLET | Refills: 3 | Status: SHIPPED | OUTPATIENT
Start: 2024-01-16

## 2024-01-16 RX ORDER — CARVEDILOL 6.25 MG/1
6.25 TABLET ORAL 2 TIMES DAILY WITH MEALS
Qty: 180 TABLET | Refills: 3 | Status: SHIPPED | OUTPATIENT
Start: 2024-01-16

## 2024-01-16 ASSESSMENT — PAIN SCALES - GENERAL: PAINLEVEL: NO PAIN (0)

## 2024-01-16 NOTE — LETTER
1/16/2024    Min Fried MD  [address] [facility] [address]    RE: Min Andino       Dear Colleague,     I had the pleasure of seeing Min Andino in the [facility].    General Cardiology Clinic Progress Note  Min Andino MRN# [id number]   YOB: 1955 Age: 68 year old       Reason for visit: Chronic systolic heart failure    History of presenting illness:    I had the opportunity to see Min Andino at [facility] today for reevaluation of chronic systolic heart failure.  His last echocardiogram in April 2023 demonstrated an ejection fraction of 25 to 30% with moderate to severe left ventricular enlargement.  See my previous note from 10/24/2023 for details of his long and complex cardiac and vascular history.  In addition to nonischemic cardiomyopathy, he has a mechanical aortic valve replacement, repair of the ascending aorta and aortic arch, and complex repair of the abdominal aortic aneurysm and lower extremity and abdominal arterial circulation.  He has history of atrial fibrillation treated with cardioversion and maintained in sinus rhythm on amiodarone.  He also has chronic kidney disease stage IIIb, with a stable creatinine of 1.97, GFR 36.  He has dyslipidemia with excellent cholesterol numbers, including an LDL of 78.  His hemoglobin is normal at 14.0.  ALT is normal at 17.    He remains on amiodarone 200 mg daily, atorvastatin 40 mg daily, carvedilol 12.5 mg twice daily, Jardiance 10 mg daily, and Entresto 12/13 mg p.o. twice daily in addition to his usual warfarin.  He also takes a small dose of Bumex 0.5 mg every other day.    He occasionally has mild lightheadedness and his low blood pressures in the morning after his medications which includes carvedilol 12.5 mg.  He takes his Entresto later in the morning.  At those times his blood pressure is sometimes in the high 70s to low 90s, systolic.  However, he has no chest  pain and his breathing seems stable and relatively comfortable.    His blood pressure here today was 112/64, heart rate 58, and weight 211 pounds.  On my exam his heart rate sounds lower.  His metallic valve closure sounds are crisp.  He has a very soft systolic murmur.            Assessment and Plan:     ASSESSMENT:    Mr. Min Sims is a 68-year-old gentleman with a history of complex cardiac and vascular disease which I detailed in my previous note from October 2023.  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  His mechanical aortic valve.  He has subsequently developed a nonischemic cardiomyopathy with an ejection fraction down to 25 to 30%.  He had atrial fibrillation earlier this year treated with cardioversion and is now on amiodarone for maintenance of sinus rhythm.    I been try to optimize his heart failure medications and add Entresto at last visit.  I had to reduce the dose because of low blood pressures and lightheadedness.  Today I will decrease the carvedilol from 12.5 twice daily to 6.25 twice daily and increase the Entresto to 24/26 mg p.o. twice daily.  He has had some high potassium levels, including a potassium of 5.5 a couple of weeks ago and for that reason I have not used an aldosterone inhibitor.  I will recheck a basic metabolic panel in 2 weeks to monitor his potassium after increasing his Entresto dose.  He understands which foods to avoid to keep his potassium under control.  He will continue his current low-dose of Bumex and we will watch his kidney function which seems to be stable.  His kidney function did not change after the addition of Entresto.    I will plan to have him follow-up with us again here in cardiology clinic in 3 months for reevaluation.    Don Gilmore MD    The longitudinal plan of care for chronic systolic heart failure and  chronic vascular disease was addressed during this visit. Due to the added complexity in care, I will continue to support Min in the subsequent management of this condition(s) and with the ongoing continuity of care of this condition(s).        Orders this Visit:  Orders Placed This Encounter   Procedures    Basic metabolic panel    Basic metabolic panel    Hemoglobin    Follow-Up with Cardiology [facility] (Self)    Echocardiogram Complete     Orders Placed This Encounter   Medications    carvedilol (COREG) 6.25 MG tablet     Sig: Take 1 tablet (6.25 mg) by mouth 2 times daily (with meals)     Dispense:  180 tablet     Refill:  3    sacubitril-valsartan (ENTRESTO) 24-26 MG per tablet     Sig: Take 1 tablet by mouth 2 times daily     Dispense:  180 tablet     Refill:  3     Medications Discontinued During This Encounter   Medication Reason    carvedilol (COREG) 12.5 MG tablet     sacubitril-valsartan (ENTRESTO) 24-26 MG per tablet        Today's clinic visit entailed:    50 minutes spent by me on the date of the encounter doing chart review, history and exam, documentation and further activities per the note  Provider  Link to [facility] Help Grid     The level of medical decision making during this visit was of high complexity.           Review of Systems:     Review of Systems:  Skin:        Eyes:       ENT:       Respiratory:       Cardiovascular:       Gastroenterology:      Genitourinary:       Musculoskeletal:       Neurologic:       Psychiatric:       Heme/Lymph/Imm:       Endocrine:                 Physical Exam:     Vitals: /64 (BP Location: Right arm, Patient Position: Sitting, Cuff Size: Adult Regular)   Pulse 58   Resp 16   Ht 1.829 m (6')   Wt 95.7 kg (211 lb)   SpO2 94%   BMI 28.62 kg/m    Constitutional: Well nourished and in no apparent distress.  Eyes: Pupils equal, round. Sclerae anicteric.   HEENT: Normocephalic, atraumatic.   Neck: Supple. JVD   Respiratory: Breathing non-labored. Lungs clear to  auscultation bilaterally. No crackles, wheezes, rhonchi, or rales.  Cardiovascular:  Regular rate and rhythm, normal S1 and S2. No murmur, rub, or gallop.  Skin: Warm, dry. No rashes, cyanosis, or xanthelasma.  Extremities: No edema.  Neurologic: No gross motor deficits. Alert, awake, and oriented to person, place and time.  Psychiatric: Affect appropriate.             Medications:     Current Outpatient Medications   Medication Sig Dispense Refill    amiodarone (PACERONE) 200 MG tablet 200 mg oral bid for 2 weeks, then 200 mg daily      aspirin (ASA) 81 MG chewable tablet Take 81 mg by mouth daily      atorvastatin (LIPITOR) 40 MG tablet Take 1 tablet (40 mg) by mouth daily Last refill. Need follow-up with Dr. Blair. 30 tablet 0    bumetanide (BUMEX) 1 MG tablet Take 1 tablet (1 mg) by mouth daily (Patient taking differently: Take 1 mg by mouth daily 1/2 tab every other day) 90 tablet 3    carvedilol (COREG) 6.25 MG tablet Take 1 tablet (6.25 mg) by mouth 2 times daily (with meals) 180 tablet 3    docusate sodium (COLACE) 100 MG capsule daily       empagliflozin (JARDIANCE) 10 MG TABS tablet Take 1 tablet (10 mg) by mouth daily 90 tablet 2    FLUoxetine (PROZAC) 20 MG capsule Take 1 capsule (20 mg) by mouth daily 90 capsule 3    multivitamin w/minerals (THERA-VIT-M) tablet Take 1 tablet by mouth daily      psyllium (METAMUCIL/KONSYL) 0.52 g capsule Take 1.04 g by mouth      sacubitril-valsartan (ENTRESTO) 24-26 MG per tablet Take 1 tablet by mouth 2 times daily 180 tablet 3    Vitamin D, Cholecalciferol, 25 MCG (1000 UT) TABS 2 times daily       warfarin ANTICOAGULANT (COUMADIN) 1 MG tablet Take 2 mg on Tues/Fri or as directed by INR clinic 55 tablet 0    warfarin ANTICOAGULANT (COUMADIN) 3 MG tablet Take by mouth 3 mg (3 mg x 1 tablet) every day or as directed by INR clinic 90 tablet 1    warfarin ANTICOAGULANT (COUMADIN) 4 MG tablet Take 4 mg on Monday, Wed, Thurs, Friday, Sunday or as directed by INR clinic 75  tablet 1    amoxicillin (AMOXIL) 500 MG capsule Take 4 capsules 1 hour before dental work (Patient not taking: Reported on 1/16/2024) 4 capsule 5    nitroGLYcerin (NITROSTAT) 0.4 MG sublingual tablet Place 1 tablet (0.4 mg) under the tongue every 5 minutes as needed for chest pain (Patient not taking: Reported on 1/16/2024) 10 tablet 0       Family History   Problem Relation Age of Onset    Breast Cancer Mother 85    Cancer Father     Aneurysm Other         family hx    C.A.D. No family hx of     Diabetes No family hx of     Hypertension No family hx of        Social History     Socioeconomic History    Marital status:      Spouse name: Not on file    Number of children: 5    Years of education: Not on file    Highest education level: Not on file   Occupational History     Employer: [employer]     Comment: 35 years     Employer: OTHER     Comment: landscaping business   Tobacco Use    Smoking status: Never    Smokeless tobacco: Never   Vaping Use    Vaping Use: Never used   Substance and Sexual Activity    Alcohol use: Yes     Comment: reports very little etoh use.     Drug use: No    Sexual activity: Not Currently     Partners: Female     Birth control/protection: None   Other Topics Concern    Parent/sibling w/ CABG, MI or angioplasty before 65F 55M? No   Social History Narrative    Not on file     Social Determinants of Health     Financial Resource Strain: Low Risk  (12/4/2023)    Financial Resource Strain     Within the past 12 months, have you or your family members you live with been unable to get utilities (heat, electricity) when it was really needed?: No   Food Insecurity: Low Risk  (12/4/2023)    Food Insecurity     Within the past 12 months, did you worry that your food would run out before you got money to buy more?: No     Within the past 12 months, did the food you bought just not last and you didn t have money to get more?: No   Transportation Needs: Low Risk  (12/4/2023)     "Transportation Needs     Within the past 12 months, has lack of transportation kept you from medical appointments, getting your medicines, non-medical meetings or appointments, work, or from getting things that you need?: No   Physical Activity: Not on file   Stress: Not on file   Social Connections: Not on file   Interpersonal Safety: Low Risk  (12/6/2023)    Interpersonal Safety     Do you feel physically and emotionally safe where you currently live?: Yes     Within the past 12 months, have you been hit, slapped, kicked or otherwise physically hurt by someone?: Not on file     Within the past 12 months, have you been humiliated or emotionally abused in other ways by your partner or ex-partner?: Not on file   Housing Stability: Low Risk  (12/4/2023)    Housing Stability     Do you have housing? : Yes     Are you worried about losing your housing?: No            Past Medical History:     Past Medical History:   Diagnosis Date    Anemia of chronic renal failure, unspecified CKD stage 8/27/2021    Aortic dissection (H)     Aortic root aneurysm (H24)     Arthritis     C. difficile colitis     April 2014 following surgery    CKD (chronic kidney disease) stage 3, GFR 30-59 ml/min (H) 9/28/2014    Class 1 obesity due to excess calories with serious comorbidity and body mass index (BMI) of 34.0 to 34.9 in adult 3/8/2022    Connective tissue disease (H24)     \"probable\" per [facility] Notes    DVT (deep venous thrombosis) (H)     April 2014 following surgery    History of blood transfusion     Horseshoe kidney     Hypertension     S/P insertion of iliac artery stent 11/11/2020    Left internal iliac artery stenting for endoleak    Thoracic aortic aneurysm without rupture (H24) 3/8/2014    Urinary urgency 10/12/2020    Valvular cardiomyopathy (H)               Past Surgical History:     Past Surgical History:   Procedure Laterality Date    ABDOMEN SURGERY      AORTIC VALVE REPLACEMENT  4/7/14    ARTHROSCOPY KNEE RT/LT  " 2005    left, repair meniscus    COLONOSCOPY      COLONOSCOPY N/A 6/22/2022    Procedure: COLONOSCOPY, WITH POLYPECTOMY AND BIOPSY;  Surgeon: Josh Morales MD;  Location: [facility]    ESOPHAGOSCOPY, GASTROSCOPY, DUODENOSCOPY (EGD), COMBINED N/A 6/22/2022    Procedure: ESOPHAGOGASTRODUODENOSCOPY, WITH BIOPSY;  Surgeon: Josh Morales MD;  Location: [facility]    REPAIR AORTIC ROOT  4/7/14    surgery followed by ECMO, vasopressor therapy    SOFT TISSUE SURGERY      THORACIC SURGERY      VASCULAR SURGERY      [facility] CABG, ARTERIAL, SINGLE  4/7/14    [facility] REPAIR CRUCIATE LIGAMENT,KNEE  1999    left              Allergies:   Patient has no known allergies.       Data:   All laboratory data reviewed:    Recent Labs   Lab Test 01/15/24  0842 01/05/24  1319 12/06/23  1418 06/14/22  1127 03/08/22  1237 01/21/21  1342 10/16/20  0918   LDL 78  --   --   --  96  --  59   HDL 54  --   --   --  41  --  46   NHDL 89  --   --   --  115  --  79   CHOL 143  --   --   --  156  --  125   TRIG 55  --   --   --  96  --  98   TSH  --   --  2.19  --   --   --   --    NTBNP 943*  --   --   --   --   --   --    IRON  --   --  69   < >  --    < >  --    FEB  --   --  214*   < >  --    < >  --    IRONSAT  --   --  32   < >  --    < >  --    RON  --  264  --    < >  --    < >  --     < > = values in this interval not displayed.       Lab Results   Component Value Date    WBC 6.7 10/17/2023    WBC 6.2 05/26/2020    RBC 3.36 (L) 10/17/2023    RBC 3.73 (L) 05/26/2020    HGB 14.0 01/15/2024    HGB 12.6 (L) 04/14/2021    HCT 34.4 (L) 10/17/2023    HCT 35.1 (L) 05/26/2020     (H) 10/17/2023    MCV 94 05/26/2020    MCH 33.0 10/17/2023    MCH 29.5 05/26/2020    MCHC 32.3 10/17/2023    MCHC 31.3 (L) 05/26/2020    RDW 15.5 (H) 10/17/2023    RDW 17.9 (H) 05/26/2020     10/17/2023     05/26/2020       Lab Results   Component Value Date     01/15/2024     04/14/2021    POTASSIUM 4.9 01/15/2024    POTASSIUM 5.1 01/03/2023     POTASSIUM 5.0 04/14/2021    CHLORIDE 105 01/15/2024    CHLORIDE 107 01/03/2023    CHLORIDE 111 (H) 04/14/2021    CO2 27 01/15/2024    CO2 30 01/03/2023    CO2 27 04/14/2021    ANIONGAP 8 01/15/2024    ANIONGAP 5 01/03/2023    ANIONGAP 3 04/14/2021     (H) 01/15/2024     (H) 01/03/2023     (H) 04/14/2021    BUN 39.5 (H) 01/15/2024    BUN 36 (H) 01/03/2023    BUN 27 04/14/2021    CR 1.97 (H) 01/15/2024    CR 1.52 (H) 04/14/2021    GFRESTIMATED 36 (L) 01/15/2024    GFRESTIMATED 47 (L) 04/14/2021    GFRESTBLACK 54 (L) 04/14/2021    FAYE 9.3 01/15/2024    FAYE 8.6 04/14/2021      Lab Results   Component Value Date    AST 20 03/08/2022    AST 15 09/16/2015    ALT 17 01/15/2024    ALT 14 09/16/2015       Lab Results   Component Value Date    A1C 5.6 12/29/2013       Lab Results   Component Value Date    INR 2.5 01/09/2024    INR 3 01/01/2024    INR 2.0 07/20/2021    INR 2.3 (A) 07/08/2021         DON GILMORE MD  [facility]      Thank you for allowing me to participate in the care of your patient.      Sincerely,     DON GILMORE MD     [facility]  cc:   Don Gilmore MD  [address]  [place] [address]

## 2024-01-16 NOTE — PROGRESS NOTES
General Cardiology Clinic Progress Note  Min Andino MRN# [id number]   YOB: 1955 Age: 68 year old       Reason for visit: Chronic systolic heart failure    History of presenting illness:    I had the opportunity to see Min Andino at [facility] today for reevaluation of chronic systolic heart failure.  His last echocardiogram in April 2023 demonstrated an ejection fraction of 25 to 30% with moderate to severe left ventricular enlargement.  See my previous note from 10/24/2023 for details of his long and complex cardiac and vascular history.  In addition to nonischemic cardiomyopathy, he has a mechanical aortic valve replacement, repair of the ascending aorta and aortic arch, and complex repair of the abdominal aortic aneurysm and lower extremity and abdominal arterial circulation.  He has history of atrial fibrillation treated with cardioversion and maintained in sinus rhythm on amiodarone.  He also has chronic kidney disease stage IIIb, with a stable creatinine of 1.97, GFR 36.  He has dyslipidemia with excellent cholesterol numbers, including an LDL of 78.  His hemoglobin is normal at 14.0.  ALT is normal at 17.    He remains on amiodarone 200 mg daily, atorvastatin 40 mg daily, carvedilol 12.5 mg twice daily, Jardiance 10 mg daily, and Entresto 12/13 mg p.o. twice daily in addition to his usual warfarin.  He also takes a small dose of Bumex 0.5 mg every other day.    He occasionally has mild lightheadedness and his low blood pressures in the morning after his medications which includes carvedilol 12.5 mg.  He takes his Entresto later in the morning.  At those times his blood pressure is sometimes in the high 70s to low 90s, systolic.  However, he has no chest pain and his breathing seems stable and relatively comfortable.    His blood pressure here today was 112/64, heart rate 58, and weight 211 pounds.  On my exam his heart rate sounds lower.  His metallic valve closure  sounds are crisp.  He has a very soft systolic murmur.            Assessment and Plan:     ASSESSMENT:    Mr. Min Sims is a 68-year-old gentleman with a history of complex cardiac and vascular disease which I detailed in my previous note from October 2023.  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  His mechanical aortic valve.  He has subsequently developed a nonischemic cardiomyopathy with an ejection fraction down to 25 to 30%.  He had atrial fibrillation earlier this year treated with cardioversion and is now on amiodarone for maintenance of sinus rhythm.    I been try to optimize his heart failure medications and add Entresto at last visit.  I had to reduce the dose because of low blood pressures and lightheadedness.  Today I will decrease the carvedilol from 12.5 twice daily to 6.25 twice daily and increase the Entresto to 24/26 mg p.o. twice daily.  He has had some high potassium levels, including a potassium of 5.5 a couple of weeks ago and for that reason I have not used an aldosterone inhibitor.  I will recheck a basic metabolic panel in 2 weeks to monitor his potassium after increasing his Entresto dose.  He understands which foods to avoid to keep his potassium under control.  He will continue his current low-dose of Bumex and we will watch his kidney function which seems to be stable.  His kidney function did not change after the addition of Entresto.    I will plan to have him follow-up with us again here in cardiology clinic in 3 months for reevaluation.    Don Gilmore MD    The longitudinal plan of care for chronic systolic heart failure and chronic vascular disease was addressed during this visit. Due to the added complexity in care, I will continue to support Min in the subsequent management of this condition(s) and with the ongoing continuity of care  of this condition(s).        Orders this Visit:  Orders Placed This Encounter   Procedures    Basic metabolic panel    Basic metabolic panel    Hemoglobin    Follow-Up with Cardiology [facility] (Self)    Echocardiogram Complete     Orders Placed This Encounter   Medications    carvedilol (COREG) 6.25 MG tablet     Sig: Take 1 tablet (6.25 mg) by mouth 2 times daily (with meals)     Dispense:  180 tablet     Refill:  3    sacubitril-valsartan (ENTRESTO) 24-26 MG per tablet     Sig: Take 1 tablet by mouth 2 times daily     Dispense:  180 tablet     Refill:  3     Medications Discontinued During This Encounter   Medication Reason    carvedilol (COREG) 12.5 MG tablet     sacubitril-valsartan (ENTRESTO) 24-26 MG per tablet        Today's clinic visit entailed:    50 minutes spent by me on the date of the encounter doing chart review, history and exam, documentation and further activities per the note  Provider  Link to [facility] Help Grid     The level of medical decision making during this visit was of high complexity.           Review of Systems:     Review of Systems:  Skin:        Eyes:       ENT:       Respiratory:       Cardiovascular:       Gastroenterology:      Genitourinary:       Musculoskeletal:       Neurologic:       Psychiatric:       Heme/Lymph/Imm:       Endocrine:                 Physical Exam:     Vitals: /64 (BP Location: Right arm, Patient Position: Sitting, Cuff Size: Adult Regular)   Pulse 58   Resp 16   Ht 1.829 m (6')   Wt 95.7 kg (211 lb)   SpO2 94%   BMI 28.62 kg/m    Constitutional: Well nourished and in no apparent distress.  Eyes: Pupils equal, round. Sclerae anicteric.   HEENT: Normocephalic, atraumatic.   Neck: Supple. JVD   Respiratory: Breathing non-labored. Lungs clear to auscultation bilaterally. No crackles, wheezes, rhonchi, or rales.  Cardiovascular:  Regular rate and rhythm, normal S1 and S2. No murmur, rub, or gallop.  Skin: Warm, dry. No rashes, cyanosis, or  xanthelasma.  Extremities: No edema.  Neurologic: No gross motor deficits. Alert, awake, and oriented to person, place and time.  Psychiatric: Affect appropriate.             Medications:     Current Outpatient Medications   Medication Sig Dispense Refill    amiodarone (PACERONE) 200 MG tablet 200 mg oral bid for 2 weeks, then 200 mg daily      aspirin (ASA) 81 MG chewable tablet Take 81 mg by mouth daily      atorvastatin (LIPITOR) 40 MG tablet Take 1 tablet (40 mg) by mouth daily Last refill. Need follow-up with Dr. Blair. 30 tablet 0    bumetanide (BUMEX) 1 MG tablet Take 1 tablet (1 mg) by mouth daily (Patient taking differently: Take 1 mg by mouth daily 1/2 tab every other day) 90 tablet 3    carvedilol (COREG) 6.25 MG tablet Take 1 tablet (6.25 mg) by mouth 2 times daily (with meals) 180 tablet 3    docusate sodium (COLACE) 100 MG capsule daily       empagliflozin (JARDIANCE) 10 MG TABS tablet Take 1 tablet (10 mg) by mouth daily 90 tablet 2    FLUoxetine (PROZAC) 20 MG capsule Take 1 capsule (20 mg) by mouth daily 90 capsule 3    multivitamin w/minerals (THERA-VIT-M) tablet Take 1 tablet by mouth daily      psyllium (METAMUCIL/KONSYL) 0.52 g capsule Take 1.04 g by mouth      sacubitril-valsartan (ENTRESTO) 24-26 MG per tablet Take 1 tablet by mouth 2 times daily 180 tablet 3    Vitamin D, Cholecalciferol, 25 MCG (1000 UT) TABS 2 times daily       warfarin ANTICOAGULANT (COUMADIN) 1 MG tablet Take 2 mg on Tues/Fri or as directed by INR clinic 55 tablet 0    warfarin ANTICOAGULANT (COUMADIN) 3 MG tablet Take by mouth 3 mg (3 mg x 1 tablet) every day or as directed by INR clinic 90 tablet 1    warfarin ANTICOAGULANT (COUMADIN) 4 MG tablet Take 4 mg on Monday, Wed, Thurs, Friday, Sunday or as directed by INR clinic 75 tablet 1    amoxicillin (AMOXIL) 500 MG capsule Take 4 capsules 1 hour before dental work (Patient not taking: Reported on 1/16/2024) 4 capsule 5    nitroGLYcerin (NITROSTAT) 0.4 MG sublingual  tablet Place 1 tablet (0.4 mg) under the tongue every 5 minutes as needed for chest pain (Patient not taking: Reported on 1/16/2024) 10 tablet 0       Family History   Problem Relation Age of Onset    Breast Cancer Mother 85    Cancer Father     Aneurysm Other         family hx    C.A.D. No family hx of     Diabetes No family hx of     Hypertension No family hx of        Social History     Socioeconomic History    Marital status:      Spouse name: Not on file    Number of children: 5    Years of education: Not on file    Highest education level: Not on file   Occupational History     Employer: [employer]     Comment: 35 years     Employer: OTHER     Comment: landscaping business   Tobacco Use    Smoking status: Never    Smokeless tobacco: Never   Vaping Use    Vaping Use: Never used   Substance and Sexual Activity    Alcohol use: Yes     Comment: reports very little etoh use.     Drug use: No    Sexual activity: Not Currently     Partners: Female     Birth control/protection: None   Other Topics Concern    Parent/sibling w/ CABG, MI or angioplasty before 65F 55M? No   Social History Narrative    Not on file     Social Determinants of Health     Financial Resource Strain: Low Risk  (12/4/2023)    Financial Resource Strain     Within the past 12 months, have you or your family members you live with been unable to get utilities (heat, electricity) when it was really needed?: No   Food Insecurity: Low Risk  (12/4/2023)    Food Insecurity     Within the past 12 months, did you worry that your food would run out before you got money to buy more?: No     Within the past 12 months, did the food you bought just not last and you didn t have money to get more?: No   Transportation Needs: Low Risk  (12/4/2023)    Transportation Needs     Within the past 12 months, has lack of transportation kept you from medical appointments, getting your medicines, non-medical meetings or appointments, work, or from  "getting things that you need?: No   Physical Activity: Not on file   Stress: Not on file   Social Connections: Not on file   Interpersonal Safety: Low Risk  (12/6/2023)    Interpersonal Safety     Do you feel physically and emotionally safe where you currently live?: Yes     Within the past 12 months, have you been hit, slapped, kicked or otherwise physically hurt by someone?: Not on file     Within the past 12 months, have you been humiliated or emotionally abused in other ways by your partner or ex-partner?: Not on file   Housing Stability: Low Risk  (12/4/2023)    Housing Stability     Do you have housing? : Yes     Are you worried about losing your housing?: No            Past Medical History:     Past Medical History:   Diagnosis Date    Anemia of chronic renal failure, unspecified CKD stage 8/27/2021    Aortic dissection (H)     Aortic root aneurysm (H24)     Arthritis     C. difficile colitis     April 2014 following surgery    CKD (chronic kidney disease) stage 3, GFR 30-59 ml/min (H) 9/28/2014    Class 1 obesity due to excess calories with serious comorbidity and body mass index (BMI) of 34.0 to 34.9 in adult 3/8/2022    Connective tissue disease (H24)     \"probable\" per [facility] Notes    DVT (deep venous thrombosis) (H)     April 2014 following surgery    History of blood transfusion     Horseshoe kidney     Hypertension     S/P insertion of iliac artery stent 11/11/2020    Left internal iliac artery stenting for endoleak    Thoracic aortic aneurysm without rupture (H24) 3/8/2014    Urinary urgency 10/12/2020    Valvular cardiomyopathy (H)               Past Surgical History:     Past Surgical History:   Procedure Laterality Date    ABDOMEN SURGERY      AORTIC VALVE REPLACEMENT  4/7/14    ARTHROSCOPY KNEE RT/LT  2005    left, repair meniscus    COLONOSCOPY      COLONOSCOPY N/A 6/22/2022    Procedure: COLONOSCOPY, WITH POLYPECTOMY AND BIOPSY;  Surgeon: Josh Morales MD;  Location: [facility]    " ESOPHAGOSCOPY, GASTROSCOPY, DUODENOSCOPY (EGD), COMBINED N/A 6/22/2022    Procedure: ESOPHAGOGASTRODUODENOSCOPY, WITH BIOPSY;  Surgeon: Josh Morales MD;  Location: [facility]    REPAIR AORTIC ROOT  4/7/14    surgery followed by ECMO, vasopressor therapy    SOFT TISSUE SURGERY      THORACIC SURGERY      VASCULAR SURGERY      ZC CABG, ARTERIAL, SINGLE  4/7/14    Z REPAIR CRUCIATE LIGAMENT,KNEE  1999    left              Allergies:   Patient has no known allergies.       Data:   All laboratory data reviewed:    Recent Labs   Lab Test 01/15/24  0842 01/05/24  1319 12/06/23  1418 06/14/22  1127 03/08/22  1237 01/21/21  1342 10/16/20  0918   LDL 78  --   --   --  96  --  59   HDL 54  --   --   --  41  --  46   NHDL 89  --   --   --  115  --  79   CHOL 143  --   --   --  156  --  125   TRIG 55  --   --   --  96  --  98   TSH  --   --  2.19  --   --   --   --    NTBNP 943*  --   --   --   --   --   --    IRON  --   --  69   < >  --    < >  --    FEB  --   --  214*   < >  --    < >  --    IRONSAT  --   --  32   < >  --    < >  --    RON  --  264  --    < >  --    < >  --     < > = values in this interval not displayed.       Lab Results   Component Value Date    WBC 6.7 10/17/2023    WBC 6.2 05/26/2020    RBC 3.36 (L) 10/17/2023    RBC 3.73 (L) 05/26/2020    HGB 14.0 01/15/2024    HGB 12.6 (L) 04/14/2021    HCT 34.4 (L) 10/17/2023    HCT 35.1 (L) 05/26/2020     (H) 10/17/2023    MCV 94 05/26/2020    MCH 33.0 10/17/2023    MCH 29.5 05/26/2020    MCHC 32.3 10/17/2023    MCHC 31.3 (L) 05/26/2020    RDW 15.5 (H) 10/17/2023    RDW 17.9 (H) 05/26/2020     10/17/2023     05/26/2020       Lab Results   Component Value Date     01/15/2024     04/14/2021    POTASSIUM 4.9 01/15/2024    POTASSIUM 5.1 01/03/2023    POTASSIUM 5.0 04/14/2021    CHLORIDE 105 01/15/2024    CHLORIDE 107 01/03/2023    CHLORIDE 111 (H) 04/14/2021    CO2 27 01/15/2024    CO2 30 01/03/2023    CO2 27 04/14/2021    ANIONGAP 8  01/15/2024    ANIONGAP 5 01/03/2023    ANIONGAP 3 04/14/2021     (H) 01/15/2024     (H) 01/03/2023     (H) 04/14/2021    BUN 39.5 (H) 01/15/2024    BUN 36 (H) 01/03/2023    BUN 27 04/14/2021    CR 1.97 (H) 01/15/2024    CR 1.52 (H) 04/14/2021    GFRESTIMATED 36 (L) 01/15/2024    GFRESTIMATED 47 (L) 04/14/2021    GFRESTBLACK 54 (L) 04/14/2021    FAYE 9.3 01/15/2024    FAYE 8.6 04/14/2021      Lab Results   Component Value Date    AST 20 03/08/2022    AST 15 09/16/2015    ALT 17 01/15/2024    ALT 14 09/16/2015       Lab Results   Component Value Date    A1C 5.6 12/29/2013       Lab Results   Component Value Date    INR 2.5 01/09/2024    INR 3 01/01/2024    INR 2.0 07/20/2021    INR 2.3 (A) 07/08/2021         MAKENNA HAMMOND MD  [facility]

## 2024-01-18 ENCOUNTER — ANTICOAGULATION THERAPY VISIT (OUTPATIENT)
Dept: ANTICOAGULATION | Facility: CLINIC | Age: 69
End: 2024-01-18
Payer: MEDICARE

## 2024-01-18 DIAGNOSIS — Z79.01 LONG TERM CURRENT USE OF ANTICOAGULANT THERAPY: ICD-10-CM

## 2024-01-18 DIAGNOSIS — Z95.2 H/O MECHANICAL AORTIC VALVE REPLACEMENT: Primary | ICD-10-CM

## 2024-01-18 LAB — INR HOME MONITORING: 2.3 (ref 2–3)

## 2024-01-18 NOTE — PROGRESS NOTES
ANTICOAGULATION MANAGEMENT     Min Andino 68 year old male is on warfarin with therapeutic INR result. (Goal INR 2.0-3.0)    Recent labs: (last 7 days)     01/18/24  0000   INR 2.3       ASSESSMENT     Source(s): Chart Review and Patient/Caregiver Call     Warfarin doses taken: Warfarin taken as instructed  Diet: No new diet changes identified  Medication/supplement changes: None noted  New illness, injury, or hospitalization: No  Signs or symptoms of bleeding or clotting: No  Previous result: Therapeutic last 2(+) visits  Additional findings: None       PLAN     Recommended plan for no diet, medication or health factor changes affecting INR     Dosing Instructions: Continue your current warfarin dose with next INR in 1 week       Summary  As of 1/18/2024      Full warfarin instructions:  3 mg every day   Next INR check:  1/25/2024               Sent [employer] message with dosing and follow up instructions    Patient to recheck with home meter    Education provided:   Please call back if any changes to your diet, medications or how you've been taking warfarin    Plan made per [facility] anticoagulation protocol    Vonda Izaguirre, RN  Anticoagulation Clinic  1/18/2024    _______________________________________________________________________     Anticoagulation Episode Summary       Current INR goal:  2.0-3.0   TTR:  100.0% (3.6 wk)   Target end date:  Indefinite   Send INR reminders to:  [facility]    Indications    H/O mechanical aortic valve replacement [Z95.2]  Long term current use of anticoagulant therapy [Z79.01]             Comments:  patient transfer from  with [employer] home meter  HP to dose until [employer] updated fax             Anticoagulation Care Providers       Provider Role Specialty Phone number    Min Fried MD Referring Internal Medicine [phone]

## 2024-01-30 ENCOUNTER — LAB (OUTPATIENT)
Dept: LAB | Facility: OTHER | Age: 69
End: 2024-01-30
Payer: MEDICARE

## 2024-01-30 DIAGNOSIS — I50.22 CHRONIC SYSTOLIC HEART FAILURE (H): ICD-10-CM

## 2024-01-30 DIAGNOSIS — I50.82 BIVENTRICULAR CONGESTIVE HEART FAILURE (H): ICD-10-CM

## 2024-01-30 LAB
ALBUMIN SERPL BCG-MCNC: 4 G/DL (ref 3.5–5.2)
ANION GAP SERPL CALCULATED.3IONS-SCNC: 9 MMOL/L (ref 7–15)
BUN SERPL-MCNC: 36.6 MG/DL (ref 8–23)
CALCIUM SERPL-MCNC: 9.1 MG/DL (ref 8.8–10.2)
CHLORIDE SERPL-SCNC: 109 MMOL/L (ref 98–107)
CREAT SERPL-MCNC: 1.84 MG/DL (ref 0.67–1.17)
DEPRECATED HCO3 PLAS-SCNC: 26 MMOL/L (ref 22–29)
EGFRCR SERPLBLD CKD-EPI 2021: 39 ML/MIN/1.73M2
GLUCOSE SERPL-MCNC: 103 MG/DL (ref 70–99)
PHOSPHATE SERPL-MCNC: 3.7 MG/DL (ref 2.5–4.5)
POTASSIUM SERPL-SCNC: 5 MMOL/L (ref 3.4–5.3)
SODIUM SERPL-SCNC: 144 MMOL/L (ref 135–145)

## 2024-01-30 PROCEDURE — 80069 RENAL FUNCTION PANEL: CPT

## 2024-01-30 PROCEDURE — 36415 COLL VENOUS BLD VENIPUNCTURE: CPT

## 2024-02-04 LAB — INR HOME MONITORING: 3 (ref 2–3)

## 2024-02-05 ENCOUNTER — ANTICOAGULATION THERAPY VISIT (OUTPATIENT)
Dept: ANTICOAGULATION | Facility: CLINIC | Age: 69
End: 2024-02-05
Payer: MEDICARE

## 2024-02-05 NOTE — PROGRESS NOTES
ANTICOAGULATION MANAGEMENT     Min Andino 68 year old male is on warfarin with therapeutic INR result. (Goal INR 2.0-3.0)    Recent labs: (last 7 days)     01/30/24  0000   INR 3.0       ASSESSMENT     Source(s): Chart Review and Patient/Caregiver Call     Warfarin doses taken: Reviewed in chart  Diet: No new diet changes identified  Medication/supplement changes: None noted  New illness, injury, or hospitalization: No  Signs or symptoms of bleeding or clotting: No  Previous result: Therapeutic last 2(+) visits  Additional findings: Pt reports that for the last month he has been taking 2 mg on Wednesdays and 3 mg all other days. [facility] calendar updated to reflect this.   Pt reports that his wife was out of town last week. Apparently, she is the one that notifies the home meter company. She was not able to send in the result until this weekend. INR result dated 1/30/24.         PLAN     Dosing Instructions:  2 mg on Wednesdays and 3 mg all other days  with next INR in 1 week       Summary  As of 2/5/2024      Full warfarin instructions:  2 mg every Wed; 3 mg all other days   Next INR check:  2/13/2024               Telephone call with Min who agrees to plan and repeated back plan correctly    Patient to recheck with home meter    Education provided:   Please call back if any changes to your diet, medications or how you've been taking warfarin  Contact [phone]  with any changes, questions or concerns.     Plan made per [facility] anticoagulation protocol    Lesli Huang RN  Anticoagulation Clinic  2/5/2024    _______________________________________________________________________     Anticoagulation Episode Summary       Current INR goal:  2.0-3.0   TTR:  100.0% (1.2 mo)   Target end date:  Indefinite   Send INR reminders to:  [facility]    Indications    H/O mechanical aortic valve replacement [Z95.2]  Long term current use of anticoagulant therapy [Z79.01]             Comments:  [employer] home  meter             Anticoagulation Care Providers       Provider Role Specialty Phone number    Min Fried MD Referring Internal Medicine [phone]

## 2024-02-06 ASSESSMENT — PATIENT HEALTH QUESTIONNAIRE - PHQ9
10. IF YOU CHECKED OFF ANY PROBLEMS, HOW DIFFICULT HAVE THESE PROBLEMS MADE IT FOR YOU TO DO YOUR WORK, TAKE CARE OF THINGS AT HOME, OR GET ALONG WITH OTHER PEOPLE: SOMEWHAT DIFFICULT
SUM OF ALL RESPONSES TO PHQ QUESTIONS 1-9: 5
SUM OF ALL RESPONSES TO PHQ QUESTIONS 1-9: 5

## 2024-02-07 ENCOUNTER — OFFICE VISIT (OUTPATIENT)
Dept: INTERNAL MEDICINE | Facility: CLINIC | Age: 69
End: 2024-02-07
Payer: MEDICARE

## 2024-02-07 VITALS
HEART RATE: 55 BPM | HEIGHT: 72 IN | BODY MASS INDEX: 28.44 KG/M2 | TEMPERATURE: 98 F | DIASTOLIC BLOOD PRESSURE: 66 MMHG | RESPIRATION RATE: 16 BRPM | OXYGEN SATURATION: 96 % | WEIGHT: 210 LBS | SYSTOLIC BLOOD PRESSURE: 128 MMHG

## 2024-02-07 DIAGNOSIS — H25.013 CORTICAL AGE-RELATED CATARACT OF BOTH EYES: ICD-10-CM

## 2024-02-07 DIAGNOSIS — Z95.2 H/O MECHANICAL AORTIC VALVE REPLACEMENT: ICD-10-CM

## 2024-02-07 DIAGNOSIS — F32.1 CURRENT MODERATE EPISODE OF MAJOR DEPRESSIVE DISORDER WITHOUT PRIOR EPISODE (H): ICD-10-CM

## 2024-02-07 DIAGNOSIS — Z79.01 LONG TERM CURRENT USE OF ANTICOAGULANT THERAPY: ICD-10-CM

## 2024-02-07 DIAGNOSIS — Z01.818 PRE-OP EXAM: Primary | ICD-10-CM

## 2024-02-07 DIAGNOSIS — N18.32 STAGE 3B CHRONIC KIDNEY DISEASE (H): ICD-10-CM

## 2024-02-07 PROCEDURE — 99214 OFFICE O/P EST MOD 30 MIN: CPT | Performed by: INTERNAL MEDICINE

## 2024-02-07 NOTE — PROGRESS NOTES
Preoperative Evaluation  [address] [address]  Phone: [phone]  Primary Provider: Min Fried  Pre-op Performing Provider: MIN FRIED  Feb 7, 2024   :[id number]}    Min is a 68 year old, presenting for the following:  Pre-Op Exam and Recheck Medication        2/7/2024     9:57 AM   Additional Questions   Roomed by Mica     Surgical Information  Surgery/Procedure: Cataracts  Surgery Location: [facility]  Surgeon: Can't remember  Surgery Date: 2/12/24  Time of Surgery: 12:15 pm  Where patient plans to recover: At home with family  Fax number for surgical facility: [phone]    Assessment & Plan     The proposed surgical procedure is considered LOW risk.    Problem List Items Addressed This Visit       CKD (chronic kidney disease) stage 3, GFR 30-59 ml/min (H)    H/O mechanical aortic valve replacement    Long term current use of anticoagulant therapy     Other Visit Diagnoses       Pre-op exam    -  Primary    Cortical age-related cataract of both eyes        Current moderate episode of major depressive disorder without prior episode (H)        Relevant Medications    FLUoxetine (PROZAC) 20 MG capsule          Patient is very complex with a history of valvular disease and replacement, chronic kidney disease, long-term anticoagulation.  He needs cataract surgery on both eyes which is a low risk procedure.  He should tolerate the procedure well.  He has a small upper respiratory infection but no cough and his lungs are clear he can proceed with the procedure.    He will hold his Jardiance for 3 days and hold his diuretic on the day of the procedure.    Major depression he is on fluoxetine doing better but we will increase this from 20-40 to get maximum treatment.    Possible Sleep Apnea: known conrado on cpap       Implanted Device   - Type of device: valve Patient advised to bring device information on day of  surgery.      Risks and Recommendations  The patient has the following additional risks and recommendations for perioperative complications:  Cardiovascular:   - stable will take amoxicillin before procedure   Obstructive Sleep Apnea:   Monitor for apnea.     Antiplatelet or Anticoagulation Medication Instructions   - Bleeding risk is low for this procedure (e.g. dental, skin, cataract).    Additional Medication Instructions   - Diuretics: HOLD on the day of surgery.   - SGLT2 Inhibitor (canagliflozin, dapagliflozin, or empagliflozin): HOLD 3 days before surgery.     Recommendation  APPROVAL GIVEN to proceed with proposed procedure, without further diagnostic evaluation.          Subjective     HPI related to upcoming procedure: needs to have cataract surgery on both eyes.         2/6/2024    11:42 AM   Preop Questions   1. Have you ever had a heart attack or stroke? No   2. Have you ever had surgery on your heart or blood vessels, such as a stent placement, a coronary artery bypass, or surgery on an artery in your head, neck, heart, or legs? YES - aortic valve, stents, aneurysms    3. Do you have chest pain with activity? No   4. Do you have a history of  heart failure? YES -    5. Do you currently have a cold, bronchitis or symptoms of other infection? No   6. Do you have a cough, shortness of breath, or wheezing? No   7. Do you or anyone in your family have previous history of blood clots? No   8. Do you or does anyone in your family have a serious bleeding problem such as prolonged bleeding following surgeries or cuts? No   9. Have you ever had problems with anemia or been told to take iron pills? YES - stable now,    10. Have you had any abnormal blood loss such as black, tarry or bloody stools? No   11. Have you ever had a blood transfusion? YES -    11a. Have you ever had a transfusion reaction? No   12. Are you willing to have a blood transfusion if it is medically needed before, during, or after your  surgery? Yes   13. Have you or any of your relatives ever had problems with anesthesia? No   14. Do you have sleep apnea, excessive snoring or daytime drowsiness? YES -    14a. Do you have a CPAP machine? Yes   15. Do you have any artifical heart valves or other implanted medical devices like a pacemaker, defibrillator, or continuous glucose monitor? YES -    15a. What type of device do you have? artificial heart valve   15b. Name of the clinic that manages your device:  [facility]   16. Do you have artificial joints? No   17. Are you allergic to latex? No       Health Care Directive  Patient does not have a Health Care Directive or Living Will: Patient states has Advance Directive and will bring in a copy to clinic.    Preoperative Review of    reviewed - no record of controlled substances prescribed.      Status of Chronic Conditions:  CAD - Patient has a longstanding history of moderate-severe CAD. Patient denies recent chest pain or NTG use, denies exercise induced dyspnea or PND. Last Stress test , EKG .     CHF - Patient has a longstanding history of moderate-severe CHF. Exacerbating conditions include ischemic heart disease. Currently the patient's condition is same. Current treatment regimen includes Angiotensin 2 receptor blocker, Coreg, and diuretic. The patient denies chest pain, edema, orthopnea, SOB or recent weight gain. Last Echocardiogram , EKG   stable with cardiology visit in January 2024.     HYPERTENSION - Patient has longstanding history of HTN , currently denies any symptoms referable to elevated blood pressure. Specifically denies chest pain, palpitations, dyspnea, orthopnea, PND or peripheral edema. Blood pressure readings have been in normal range. Current medication regimen is as listed below. Patient denies any side effects of medication.     RENAL INSUFFICIENCY - Patient has a longstanding history of moderate-severe chronic renal insufficiency. Last Cr 1.97.     SLEEP PROBLEM -  Patient has a longstanding history of conrado. Patient has tried OTC medications with limited success.     Patient Active Problem List    Diagnosis Date Noted    Secondary renal hyperparathyroidism (H24) 07/12/2022     Priority: Medium    Class 1 obesity due to excess calories with serious comorbidity and body mass index (BMI) of 34.0 to 34.9 in adult 03/08/2022     Priority: Medium    Anemia of chronic renal failure, unspecified CKD stage 08/27/2021     Priority: Medium    H/O mechanical aortic valve replacement 11/27/2020     Priority: Medium    Long term current use of anticoagulant therapy 11/27/2020     Priority: Medium    S/P insertion of iliac artery stent 11/11/2020     Priority: Medium     Left internal iliac artery stenting for endoleak      Urinary urgency 10/12/2020     Priority: Medium    Congestive heart failure (H) 01/27/2020     Priority: Medium    Presence of aortocoronary bypass graft 12/22/2019     Priority: Medium    Pseudoaneurysm of femoral artery (H24) 12/22/2019     Priority: Medium     Added automatically from request for surgery [id number]      History of endovascular stent graft for abdominal aortic aneurysm (AAA) 08/12/2019     Priority: Medium    Gastroesophageal reflux disease without esophagitis 08/01/2019     Priority: Medium    Hyperlipidemia 08/01/2019     Priority: Medium    Callus 06/22/2016     Priority: Medium    Tailors bunion 06/22/2016     Priority: Medium    Hypertension, goal below 140/90 09/28/2014     Priority: Medium    CKD (chronic kidney disease) stage 3, GFR 30-59 ml/min (H) 09/28/2014     Priority: Medium    Hx of aortic aneurysm 09/28/2014     Priority: Medium    Coronary atherosclerosis 05/08/2014     Priority: Medium     CAD (coronary artery disease): CABGSx / Aortic dissection Sx / Aotic mechanical valve surgery 4/2014      Aneurysm of iliac artery (H24) 03/25/2014     Priority: Medium    Thoracic aortic aneurysm without rupture (H24) 03/08/2014     Priority: Medium  "   Aortic regurgitation 12/24/2013     Priority: Medium    Delirium, acute 12/24/2013     Priority: Medium    Vitamin D deficiency 11/17/2011     Priority: Medium    CARDIOVASCULAR SCREENING; LDL GOAL LESS THAN 160 10/31/2010     Priority: Medium      Past Medical History:   Diagnosis Date    Anemia of chronic renal failure, unspecified CKD stage 8/27/2021    Aortic dissection (H)     Aortic root aneurysm (H24)     Arthritis     C. difficile colitis     April 2014 following surgery    CKD (chronic kidney disease) stage 3, GFR 30-59 ml/min (H) 9/28/2014    Class 1 obesity due to excess calories with serious comorbidity and body mass index (BMI) of 34.0 to 34.9 in adult 3/8/2022    Connective tissue disease (H24)     \"probable\" per [facility] Notes    DVT (deep venous thrombosis) (H)     April 2014 following surgery    History of blood transfusion     Horseshoe kidney     Hypertension     S/P insertion of iliac artery stent 11/11/2020    Left internal iliac artery stenting for endoleak    Thoracic aortic aneurysm without rupture (H24) 3/8/2014    Urinary urgency 10/12/2020    Valvular cardiomyopathy (H)      Past Surgical History:   Procedure Laterality Date    ABDOMEN SURGERY      AORTIC VALVE REPLACEMENT  4/7/14    ARTHROSCOPY KNEE RT/LT  2005    left, repair meniscus    COLONOSCOPY      COLONOSCOPY N/A 6/22/2022    Procedure: COLONOSCOPY, WITH POLYPECTOMY AND BIOPSY;  Surgeon: Josh Morales MD;  Location:  GI    ESOPHAGOSCOPY, GASTROSCOPY, DUODENOSCOPY (EGD), COMBINED N/A 6/22/2022    Procedure: ESOPHAGOGASTRODUODENOSCOPY, WITH BIOPSY;  Surgeon: Josh Morales MD;  Location:  GI    REPAIR AORTIC ROOT  4/7/14    surgery followed by ECMO, vasopressor therapy    SOFT TISSUE SURGERY      THORACIC SURGERY      VASCULAR SURGERY      [facility] CABG, ARTERIAL, SINGLE  4/7/14    [facility] REPAIR CRUCIATE LIGAMENT,KNEE  1999    left     Current Outpatient Medications   Medication Sig Dispense Refill    amiodarone " (PACERONE) 200 MG tablet 200 mg oral bid for 2 weeks, then 200 mg daily      amoxicillin (AMOXIL) 500 MG capsule Take 4 capsules 1 hour before dental work 4 capsule 5    aspirin (ASA) 81 MG chewable tablet Take 81 mg by mouth daily      atorvastatin (LIPITOR) 40 MG tablet Take 1 tablet (40 mg) by mouth daily Last refill. Need follow-up with Dr. Blair. 30 tablet 0    bumetanide (BUMEX) 1 MG tablet Take 1 tablet (1 mg) by mouth daily (Patient taking differently: Take 1 mg by mouth daily 1/2 tab every other day) 90 tablet 3    carvedilol (COREG) 6.25 MG tablet Take 1 tablet (6.25 mg) by mouth 2 times daily (with meals) 180 tablet 3    docusate sodium (COLACE) 100 MG capsule daily       empagliflozin (JARDIANCE) 10 MG TABS tablet Take 1 tablet (10 mg) by mouth daily 90 tablet 2    FLUoxetine (PROZAC) 20 MG capsule Take 1 capsule (20 mg) by mouth daily 90 capsule 3    multivitamin w/minerals (THERA-VIT-M) tablet Take 1 tablet by mouth daily      nitroGLYcerin (NITROSTAT) 0.4 MG sublingual tablet Place 1 tablet (0.4 mg) under the tongue every 5 minutes as needed for chest pain 10 tablet 0    psyllium (METAMUCIL/KONSYL) 0.52 g capsule Take 1.04 g by mouth      sacubitril-valsartan (ENTRESTO) 24-26 MG per tablet Take 1 tablet by mouth 2 times daily 180 tablet 3    Vitamin D, Cholecalciferol, 25 MCG (1000 UT) TABS 2 times daily       warfarin ANTICOAGULANT (COUMADIN) 1 MG tablet Take 2 mg on Tues/Fri or as directed by INR clinic 55 tablet 0    warfarin ANTICOAGULANT (COUMADIN) 3 MG tablet Take by mouth 3 mg (3 mg x 1 tablet) every day or as directed by INR clinic 90 tablet 1    warfarin ANTICOAGULANT (COUMADIN) 4 MG tablet Take 4 mg on Monday, Wed, Thurs, Friday, Sunday or as directed by INR clinic 75 tablet 1       No Known Allergies     Social History     Tobacco Use    Smoking status: Never    Smokeless tobacco: Never   Substance Use Topics    Alcohol use: Yes     Comment: reports very little etoh use.      History   Drug  Use No         Review of Systems    Review of Systems  CONSTITUTIONAL: NEGATIVE for fever, chills, change in weight  ENT/MOUTH: NEGATIVE for ear, mouth and throat problems  RESP:chronic sob.   CV: NEGATIVE for chest pain, palpitations or peripheral edema  Sore throat and right ear pain.     Objective    /66   Pulse 55   Temp 98  F (36.7  C) (Temporal)   Resp 16   Ht 1.829 m (6')   Wt 95.3 kg (210 lb)   SpO2 96%   BMI 28.48 kg/m     Estimated body mass index is 28.48 kg/m  as calculated from the following:    Height as of this encounter: 1.829 m (6').    Weight as of this encounter: 95.3 kg (210 lb).  Physical Exam  GENERAL: alert and no distress  NECK: no adenopathy, no asymmetry, masses, or scars  RESP: lungs clear to auscultation - no rales, rhonchi or wheezes  CV: aortic valve click  ABDOMEN: soft, nontender, no hepatosplenomegaly, no masses and bowel sounds normal  MS: no gross musculoskeletal defects noted, no edema  Ears are clear throat clear     Recent Labs   Lab Test 01/30/24  1055 01/30/24  0000 01/18/24  0000 01/15/24  0842 01/09/24  0000 01/05/24  1319 10/31/23  1140 10/17/23  1355 06/29/22  0000 06/24/22  1112   HGB  --   --   --  14.0  --  13.7   < > 11.1*   < > 12.1*   PLT  --   --   --   --   --   --   --  167  --  109*   INR  --  3.0 2.3  --    < >  --    < >  --    < > 1.37*     --   --  140  --  138   < > 140   < > 139   POTASSIUM 5.0  --   --  4.9  --  5.5*   < > 4.6   < > 4.3   CR 1.84*  --   --  1.97*  --  1.97*   < > 2.01*   < > 1.50*    < > = values in this interval not displayed.        Diagnostics  No labs were ordered during this visit.   No EKG required for low risk surgery (cataract, skin procedure, breast biopsy, etc).    Revised Cardiac Risk Index (RCRI)  The patient has the following serious cardiovascular risks for perioperative complications:   - No serious cardiac risks = 0 points     RCRI Interpretation: 1 point: Class II (low risk - 0.9% complication rate)          Signed Electronically by: Min Fried MD  Copy of this evaluation report is provided to requesting physician.

## 2024-02-14 ENCOUNTER — DOCUMENTATION ONLY (OUTPATIENT)
Dept: ANTICOAGULATION | Facility: CLINIC | Age: 69
End: 2024-02-14
Payer: MEDICARE

## 2024-02-14 ENCOUNTER — ANTICOAGULATION THERAPY VISIT (OUTPATIENT)
Dept: ANTICOAGULATION | Facility: CLINIC | Age: 69
End: 2024-02-14
Payer: MEDICARE

## 2024-02-14 DIAGNOSIS — Z95.2 H/O MECHANICAL AORTIC VALVE REPLACEMENT: Primary | ICD-10-CM

## 2024-02-14 DIAGNOSIS — Z79.01 LONG TERM CURRENT USE OF ANTICOAGULANT THERAPY: ICD-10-CM

## 2024-02-14 LAB — INR HOME MONITORING: 2.5 (ref 2–3)

## 2024-02-14 NOTE — PROGRESS NOTES
ANTICOAGULATION MANAGEMENT     Min Andino 68 year old male is on warfarin with therapeutic INR result. (Goal INR 2.0-3.0)    Recent labs: (last 7 days)     02/14/24  0000   INR 2.5       ASSESSMENT     Source(s): Chart Review and Patient/Caregiver Call     Warfarin doses taken: Warfarin taken as instructed  Diet: No new diet changes identified  Medication/supplement changes: None noted  New illness, injury, or hospitalization: No  Signs or symptoms of bleeding or clotting: No  Previous result: Therapeutic last 2(+) visits  Additional findings: None       PLAN     Recommended plan for no diet, medication or health factor changes affecting INR     Dosing Instructions: Continue your current warfarin dose with next INR in 2 weeks       Summary  As of 2/14/2024      Full warfarin instructions:  2 mg every Wed; 3 mg all other days   Next INR check:  2/28/2024               Detailed voice message left for Min with dosing instructions and follow up date.   Sent [employer] message with dosing and follow up instructions    Patient to recheck with home meter    Education provided:   Please call back if any changes to your diet, medications or how you've been taking warfarin    Plan made per [facility] anticoagulation protocol    Vonda Izaguirre, RN  Anticoagulation Clinic  2/14/2024    _______________________________________________________________________     Anticoagulation Episode Summary       Current INR goal:  2.0-3.0   TTR:  100.0% (1.7 mo)   Target end date:  Indefinite   Send INR reminders to:  [employer] [facility]    Indications    H/O mechanical aortic valve replacement [Z95.2]  Long term current use of anticoagulant therapy [Z79.01]             Comments:  [employer] home meter             Anticoagulation Care Providers       Provider Role Specialty Phone number    Min Fried MD Referring Internal Medicine [phone]

## 2024-02-26 ENCOUNTER — ANTICOAGULATION THERAPY VISIT (OUTPATIENT)
Dept: ANTICOAGULATION | Facility: CLINIC | Age: 69
End: 2024-02-26
Payer: MEDICARE

## 2024-02-26 DIAGNOSIS — Z95.2 H/O MECHANICAL AORTIC VALVE REPLACEMENT: Primary | ICD-10-CM

## 2024-02-26 DIAGNOSIS — Z79.01 LONG TERM CURRENT USE OF ANTICOAGULANT THERAPY: ICD-10-CM

## 2024-02-26 LAB — INR HOME MONITORING: 2.5 (ref 2–3)

## 2024-02-26 NOTE — PROGRESS NOTES
ANTICOAGULATION MANAGEMENT     Min Andino 68 year old male is on warfarin with therapeutic INR result. (Goal INR 2.0-3.0)    Recent labs: (last 7 days)     02/26/24  0000   INR 2.5       ASSESSMENT     Source(s): Chart Review  Previous INR was Therapeutic last 2(+) visits  Medication, diet, health changes since last INR chart reviewed; none identified         PLAN     Recommended plan for no diet, medication or health factor changes affecting INR     Dosing Instructions: Continue your current warfarin dose with next INR in 2 weeks       Summary  As of 2/26/2024      Full warfarin instructions:  2 mg every Wed; 3 mg all other days   Next INR check:  3/11/2024               Detailed voice message left for Min with dosing instructions and follow up date.     Patient to recheck with home meter    Education provided:   Please call back if any changes to your diet, medications or how you've been taking warfarin    Plan made per [facility] anticoagulation protocol    Shantal Hoover RN  Anticoagulation Clinic  2/26/2024    _______________________________________________________________________     Anticoagulation Episode Summary       Current INR goal:  2.0-3.0   TTR:  100.0% (2.1 mo)   Target end date:  Indefinite   Send INR reminders to:  [facility]    Indications    H/O mechanical aortic valve replacement [Z95.2]  Long term current use of anticoagulant therapy [Z79.01]             Comments:  [employer] home meter             Anticoagulation Care Providers       Provider Role Specialty Phone number    Min Fried MD Referring Internal Medicine [phone]

## 2024-03-06 ENCOUNTER — LAB (OUTPATIENT)
Dept: LAB | Facility: OTHER | Age: 69
End: 2024-03-06
Payer: MEDICARE

## 2024-03-06 DIAGNOSIS — I50.82 BIVENTRICULAR CONGESTIVE HEART FAILURE (H): ICD-10-CM

## 2024-03-06 LAB
ALBUMIN SERPL BCG-MCNC: 4.1 G/DL (ref 3.5–5.2)
ANION GAP SERPL CALCULATED.3IONS-SCNC: 8 MMOL/L (ref 7–15)
BUN SERPL-MCNC: 36.4 MG/DL (ref 8–23)
CALCIUM SERPL-MCNC: 9.1 MG/DL (ref 8.8–10.2)
CHLORIDE SERPL-SCNC: 105 MMOL/L (ref 98–107)
CREAT SERPL-MCNC: 1.97 MG/DL (ref 0.67–1.17)
DEPRECATED HCO3 PLAS-SCNC: 27 MMOL/L (ref 22–29)
EGFRCR SERPLBLD CKD-EPI 2021: 36 ML/MIN/1.73M2
GLUCOSE SERPL-MCNC: 106 MG/DL (ref 70–99)
PHOSPHATE SERPL-MCNC: 3.6 MG/DL (ref 2.5–4.5)
POTASSIUM SERPL-SCNC: 5 MMOL/L (ref 3.4–5.3)
SODIUM SERPL-SCNC: 140 MMOL/L (ref 135–145)

## 2024-03-06 PROCEDURE — 36415 COLL VENOUS BLD VENIPUNCTURE: CPT

## 2024-03-06 PROCEDURE — 80069 RENAL FUNCTION PANEL: CPT

## 2024-03-16 LAB — INR HOME MONITORING: 2.6 (ref 2–3)

## 2024-03-18 ENCOUNTER — ANTICOAGULATION THERAPY VISIT (OUTPATIENT)
Dept: ANTICOAGULATION | Facility: CLINIC | Age: 69
End: 2024-03-18
Payer: MEDICARE

## 2024-03-18 DIAGNOSIS — Z79.01 LONG TERM CURRENT USE OF ANTICOAGULANT THERAPY: ICD-10-CM

## 2024-03-18 DIAGNOSIS — Z95.2 H/O MECHANICAL AORTIC VALVE REPLACEMENT: Primary | ICD-10-CM

## 2024-03-18 NOTE — PROGRESS NOTES
ANTICOAGULATION MANAGEMENT     Min Andino 68 year old male is on warfarin with therapeutic INR result. (Goal INR 2.0-3.0)    Recent labs: (last 7 days)     03/14/24  0000   INR 2.6     Result received 3/18/24-interfaced by [employer]  ASSESSMENT     Source(s): Chart Review  Previous INR was Therapeutic last 2(+) visits  Medication, diet, health changes since last INR chart reviewed; none identified         PLAN     Recommended plan for no diet, medication or health factor changes affecting INR     Dosing Instructions: Continue your current warfarin dose with next INR in 2 weeks       Summary  As of 3/18/2024      Full warfarin instructions:  2 mg every Wed; 3 mg all other days   Next INR check:  3/28/2024               Detailed voice message left for Min with dosing instructions and follow up date.   Sent [employer] message with dosing and follow up instructions    Patient to recheck with home meter    Education provided:   Please call back if any changes to your diet, medications or how you've been taking warfarin  Contact [phone]  with any changes, questions or concerns.     Plan made per [facility] anticoagulation protocol    Che Cuadra, RN  Anticoagulation Clinic  3/18/2024    _______________________________________________________________________     Anticoagulation Episode Summary       Current INR goal:  2.0-3.0   TTR:  100.0% (2.7 mo)   Target end date:  Indefinite   Send INR reminders to:  [facility]    Indications    H/O mechanical aortic valve replacement [Z95.2]  Long term current use of anticoagulant therapy [Z79.01]             Comments:  [employer] home meter             Anticoagulation Care Providers       Provider Role Specialty Phone number    Min Fried MD Referring Internal Medicine [phone]

## 2024-03-29 LAB — INR HOME MONITORING: 2.4 (ref 2–3)

## 2024-04-03 DIAGNOSIS — N18.30 CKD (CHRONIC KIDNEY DISEASE) STAGE 3, GFR 30-59 ML/MIN (H): Primary | ICD-10-CM

## 2024-04-05 ENCOUNTER — LAB (OUTPATIENT)
Dept: LAB | Facility: CLINIC | Age: 69
End: 2024-04-05
Payer: MEDICARE

## 2024-04-05 ENCOUNTER — HOSPITAL ENCOUNTER (OUTPATIENT)
Dept: CARDIOLOGY | Facility: CLINIC | Age: 69
Discharge: HOME | End: 2024-04-05
Attending: INTERNAL MEDICINE | Admitting: INTERNAL MEDICINE
Payer: MEDICARE

## 2024-04-05 DIAGNOSIS — I50.22 CHRONIC SYSTOLIC HEART FAILURE (H): ICD-10-CM

## 2024-04-05 DIAGNOSIS — N18.30 CKD (CHRONIC KIDNEY DISEASE) STAGE 3, GFR 30-59 ML/MIN (H): ICD-10-CM

## 2024-04-05 DIAGNOSIS — I50.82 BIVENTRICULAR CONGESTIVE HEART FAILURE (H): ICD-10-CM

## 2024-04-05 LAB
ALBUMIN MFR UR ELPH: <6 MG/DL
ALBUMIN SERPL BCG-MCNC: 4.1 G/DL (ref 3.5–5.2)
ALBUMIN UR-MCNC: NEGATIVE MG/DL
ANION GAP SERPL CALCULATED.3IONS-SCNC: 10 MMOL/L (ref 7–15)
APPEARANCE UR: CLEAR
BILIRUB UR QL STRIP: NEGATIVE
BUN SERPL-MCNC: 40.3 MG/DL (ref 8–23)
CALCIUM SERPL-MCNC: 9.2 MG/DL (ref 8.8–10.2)
CHLORIDE SERPL-SCNC: 106 MMOL/L (ref 98–107)
COLOR UR AUTO: YELLOW
CREAT SERPL-MCNC: 2.08 MG/DL (ref 0.67–1.17)
CREAT UR-MCNC: 48.2 MG/DL
DEPRECATED HCO3 PLAS-SCNC: 25 MMOL/L (ref 22–29)
EGFRCR SERPLBLD CKD-EPI 2021: 34 ML/MIN/1.73M2
GLUCOSE SERPL-MCNC: 109 MG/DL (ref 70–99)
GLUCOSE UR STRIP-MCNC: 150 MG/DL
HGB BLD-MCNC: 13.6 G/DL (ref 13.3–17.7)
HGB UR QL STRIP: NEGATIVE
KETONES UR STRIP-MCNC: NEGATIVE MG/DL
LEUKOCYTE ESTERASE UR QL STRIP: NEGATIVE
LVEF ECHO: NORMAL
MUCOUS THREADS #/AREA URNS LPF: PRESENT /LPF
NITRATE UR QL: NEGATIVE
PH UR STRIP: 5 [PH] (ref 5–7)
PHOSPHATE SERPL-MCNC: 3.9 MG/DL (ref 2.5–4.5)
POTASSIUM SERPL-SCNC: 4.9 MMOL/L (ref 3.4–5.3)
PROT/CREAT 24H UR: NORMAL MG/G{CREAT}
RBC URINE: <1 /HPF
SODIUM SERPL-SCNC: 141 MMOL/L (ref 135–145)
SP GR UR STRIP: 1.01 (ref 1–1.03)
UROBILINOGEN UR STRIP-MCNC: NORMAL MG/DL
WBC URINE: <1 /HPF

## 2024-04-05 PROCEDURE — 81001 URINALYSIS AUTO W/SCOPE: CPT

## 2024-04-05 PROCEDURE — 84156 ASSAY OF PROTEIN URINE: CPT

## 2024-04-05 PROCEDURE — 80069 RENAL FUNCTION PANEL: CPT

## 2024-04-05 PROCEDURE — 36415 COLL VENOUS BLD VENIPUNCTURE: CPT

## 2024-04-05 PROCEDURE — 85018 HEMOGLOBIN: CPT

## 2024-04-05 PROCEDURE — 93306 TTE W/DOPPLER COMPLETE: CPT

## 2024-04-05 PROCEDURE — 93306 TTE W/DOPPLER COMPLETE: CPT | Mod: 26 | Performed by: INTERNAL MEDICINE

## 2024-04-15 ENCOUNTER — TELEPHONE (OUTPATIENT)
Dept: ANTICOAGULATION | Facility: CLINIC | Age: 69
End: 2024-04-15

## 2024-04-15 ENCOUNTER — ANTICOAGULATION THERAPY VISIT (OUTPATIENT)
Dept: ANTICOAGULATION | Facility: CLINIC | Age: 69
End: 2024-04-15

## 2024-04-15 ENCOUNTER — OFFICE VISIT (OUTPATIENT)
Dept: CARDIOLOGY | Facility: CLINIC | Age: 69
End: 2024-04-15
Attending: INTERNAL MEDICINE
Payer: MEDICARE

## 2024-04-15 VITALS
WEIGHT: 209 LBS | SYSTOLIC BLOOD PRESSURE: 114 MMHG | DIASTOLIC BLOOD PRESSURE: 62 MMHG | OXYGEN SATURATION: 96 % | BODY MASS INDEX: 28.31 KG/M2 | RESPIRATION RATE: 18 BRPM | HEART RATE: 52 BPM | HEIGHT: 72 IN

## 2024-04-15 DIAGNOSIS — Z95.2 H/O MECHANICAL AORTIC VALVE REPLACEMENT: ICD-10-CM

## 2024-04-15 DIAGNOSIS — Z79.01 LONG TERM CURRENT USE OF ANTICOAGULANT THERAPY: ICD-10-CM

## 2024-04-15 DIAGNOSIS — I10 HYPERTENSION, GOAL BELOW 140/90: ICD-10-CM

## 2024-04-15 DIAGNOSIS — I48.0 PAROXYSMAL ATRIAL FIBRILLATION (H): ICD-10-CM

## 2024-04-15 DIAGNOSIS — Z95.2 H/O MECHANICAL AORTIC VALVE REPLACEMENT: Primary | ICD-10-CM

## 2024-04-15 DIAGNOSIS — I50.22 CHRONIC SYSTOLIC HEART FAILURE (H): Primary | ICD-10-CM

## 2024-04-15 DIAGNOSIS — Z86.79 HX OF AORTIC ANEURYSM: ICD-10-CM

## 2024-04-15 DIAGNOSIS — I73.9 PAD (PERIPHERAL ARTERY DISEASE) (H): ICD-10-CM

## 2024-04-15 DIAGNOSIS — N18.32 STAGE 3B CHRONIC KIDNEY DISEASE (H): ICD-10-CM

## 2024-04-15 LAB
INR HOME MONITORING: 2.4 (ref 2–3)
INR HOME MONITORING: 2.7 (ref 2–3)

## 2024-04-15 PROCEDURE — 99215 OFFICE O/P EST HI 40 MIN: CPT | Performed by: INTERNAL MEDICINE

## 2024-04-15 PROCEDURE — G2211 COMPLEX E/M VISIT ADD ON: HCPCS | Performed by: INTERNAL MEDICINE

## 2024-04-15 ASSESSMENT — PAIN SCALES - GENERAL: PAINLEVEL: NO PAIN (0)

## 2024-04-15 NOTE — TELEPHONE ENCOUNTER
ANTICOAGULATION     Min Andino is overdue for an INR check.     Spoke with patient's spouse Jalyn and she stated that the patient checked INR on 3/28 and result was 2.6 but did not report it to the home monitoring company. Advised to report it. Also reminded that patient is still overdue for inr check since he is scheduled for every 2 weeks.    Per Jalyn she will remind patient to check it with home monitor.    Brittni Riley RN

## 2024-04-15 NOTE — PROGRESS NOTES
ANTICOAGULATION MANAGEMENT     Min Hill Andino 68 year old male is on warfarin with therapeutic INR result. (Goal INR 2.0-3.0)    Recent labs: (last 7 days)     04/15/24  0000   INR 2.7       ASSESSMENT     Source(s): Chart Review and Patient/Caregiver Call     Warfarin doses taken: Warfarin taken as instructed  Diet: No new diet changes identified  Medication/supplement changes: None noted  New illness, injury, or hospitalization: No  Signs or symptoms of bleeding or clotting: No  Previous result: Therapeutic last 2(+) visits  Additional findings: None       PLAN     Recommended plan for no diet, medication or health factor changes affecting INR     Dosing Instructions: Continue your current warfarin dose with next INR in 2 weeks       Summary  As of 4/15/2024      Full warfarin instructions:  2 mg every Tue; 3 mg all other days   Next INR check:  4/29/2024               Telephone call with Min who verbalizes understanding and agrees to plan    Patient to recheck with home meter    Education provided:   None required    Plan made per [facility] anticoagulation protocol    Mica Browning RN  Anticoagulation Clinic  4/15/2024    _______________________________________________________________________     Anticoagulation Episode Summary       Current INR goal:  2.0-3.0   TTR:  100.0% (3.8 mo)   Target end date:  Indefinite   Send INR reminders to:  [facility]    Indications    H/O mechanical aortic valve replacement [Z95.2]  Long term current use of anticoagulant therapy [Z79.01]             Comments:  [employer] home meter             Anticoagulation Care Providers       Provider Role Specialty Phone number    Min Fried MD Referring Internal Medicine [phone]

## 2024-04-15 NOTE — PROGRESS NOTES
General Cardiology Clinic Progress Note  Min Andino MRN# [id number]   YOB: 1955 Age: 68 year old       Reason for visit: Chronic systolic heart failure    History of presenting illness:    I had the opportunity to see Min Andino at [facility] today for reevaluation of chronic systolic heart failure due to nonischemic cardiomyopathy.  He has a complex cardiac and vascular history which I detailed in my original consult note on 10/24/2023.  He has been seen at [facility] and [facility] in [place].  In addition to nonischemic cardiomyopathy, he has a mechanical aortic valve replacement, repair of the ascending aorta and aortic arch for management of chronic aortic dissection, and complex repair of an abdominal aortic aneurysm and lower extremity and abdominal arterial disease.    He has paroxysmal atrial fibrillation and is maintained on amiodarone 200 mg daily.  He has hypercholesterolemia on atorvastatin, well-controlled at 40 mg daily.  His echocardiogram a year ago at [facility] demonstrated an ejection fraction of 25 to 30% and he was having a lot of problems with congestive heart failure at that time.  Since then, he has been on carvedilol, initially 12.5 mg p.o. twice daily which I reduced at last visit to 6.25 mg p.o. twice daily so that we could increase Entresto to 24/26 mg p.o. twice daily.  He was having issues with low heart rates and low blood pressures on the higher dose of carvedilol but seems to be tolerating that change quite well now.  He is on Jardiance 10 mg daily.  He is not on spironolactone due to history of hyperkalemia.  He had been on Bumex 1 mg p.o. daily but now is on 0.5 mg every other day.    He has chronic kidney disease with a stable creatinine of about 1.8-2.0.  His potassium levels have remained stable in the normal range after increasing his Entresto, typically 4.9-5.0.    He is doing more walking now and having less shortness  of breath.  A year ago he was stopping every 30 feet catch his breath, now he can continue walking for far longer and is not bothered by shortness of breath.  Has no chest discomfort.  He has far less lightheadedness and has not had syncope.    His echocardiogram shows significant improvement.  His left ventricular chamber size has decreased from 6.7 to 6.0 cm at end diastole.  His ejection fraction has improved from 25 to 30% up to 45 to 50% now.  His mechanical aortic valve is functioning normally with mean gradient of 12 mmHg.  He has no other significant valvular regurgitation.    He has not felt any episodes of atrial fibrillation nor has he noticed any while checking his rhythm on his Vator mobile device.  However, previously he did not feel the atrial fibrillation and might not be aware of it.  He obviously remains on warfarin for his mechanical valve which also serves as stroke prevention for atrial fibrillation.    On examination today his blood pressure is 114/62, heart rate 52, weight stable at 209 pounds.  His lungs are clear.  Heart rhythm is regular with crisp valve valve closure sounds and no significant cardiac murmur.  Is no lower extremity edema.          Assessment and Plan:     ASSESSMENT:    Mr. Min Andino is a 68-year-old gentleman with a complex cardiac history including chronic aortic dissection requiring repair of his ascending aorta and aortic arch as well as replacement of his aortic valve with a mechanical [employer] valve.  He also had an abdominal aortic aneurysm and underwent complex repair of that with multiple abdominal arterial bypasses.    He was discovered to have a nonischemic cardiomyopathy and his ejection fraction was as low as 25 to 30% a year ago.  With medical therapy, his ejection fraction is now up to 45 to 50% with a significant decrease in left ventricular chamber size.  He is having minimal heart failure symptoms as far as I can tell and seems to be tolerating his  current medications quite well.    He has stage IIIb chronic kidney disease which seems stable.  He has paroxysmal atrial fibrillation on amiodarone without evidence of recurrence.    I will not make any medication changes today.  I will plan to see him back again in 6 months.  I will repeat some amiodarone lab testing including LFTs and thyroid function as well as a basic metabolic panel and CBC.    Don Gilmore MD    The longitudinal plan of care for the diagnosis(es)/condition(s) as documented were addressed during this visit. Due to the added complexity in care, I will continue to support Min in the subsequent management and with ongoing continuity of care.            Orders this Visit:  Orders Placed This Encounter   Procedures    Lipid Profile    ALT    Basic metabolic panel    CBC with platelets    Hepatic panel    TSH with free T4 reflex    Follow-Up with Cardiology     No orders of the defined types were placed in this encounter.    There are no discontinued medications.    Today's clinic visit entailed:    45 minutes spent by me on the date of the encounter doing chart review, history and exam, documentation and further activities per the note  Provider  Link to [facility] Help Grid     The level of medical decision making during this visit was of high complexity.           Review of Systems:     Review of Systems:  Skin:        Eyes:  Positive for glasses  ENT:       Respiratory:  Negative shortness of breath;cough;wheezing  Cardiovascular:  Negative;palpitations;chest pain;edema;lightheadedness;dizziness;syncope or near-syncope    Gastroenterology:      Genitourinary:       Musculoskeletal:       Neurologic:  Positive for numbness or tingling of feet  Psychiatric:       Heme/Lymph/Imm:       Endocrine:                 Physical Exam:     Vitals: /62 (BP Location: Right arm, Patient Position: Sitting, Cuff Size: Adult Regular)   Pulse 52   Resp 18   Ht 1.829 m (6')   Wt 94.8 kg (209 lb)   SpO2 96%    BMI 28.35 kg/m    Constitutional: Well nourished and in no apparent distress.  Eyes: Pupils equal, round. Sclerae anicteric.   HEENT: Normocephalic, atraumatic.   Neck: Supple. JVD   Respiratory: Breathing non-labored. Lungs clear to auscultation bilaterally. No crackles, wheezes, rhonchi, or rales.  Cardiovascular:  Regular rate and rhythm, normal S1 and S2. No murmur, rub, or gallop.  Skin: Warm, dry. No rashes, cyanosis, or xanthelasma.  Extremities: No edema.  Neurologic: No gross motor deficits. Alert, awake, and oriented to person, place and time.  Psychiatric: Affect appropriate.             Medications:     Current Outpatient Medications   Medication Sig Dispense Refill    amiodarone (PACERONE) 200 MG tablet 200 mg oral bid for 2 weeks, then 200 mg daily      aspirin (ASA) 81 MG chewable tablet Take 81 mg by mouth daily      atorvastatin (LIPITOR) 40 MG tablet Take 1 tablet (40 mg) by mouth daily Last refill. Need follow-up with Dr. Blair. 30 tablet 0    bumetanide (BUMEX) 1 MG tablet Take 1 tablet (1 mg) by mouth daily (Patient taking differently: Take 1 mg by mouth daily 1/2 tab every other day) 90 tablet 3    carvedilol (COREG) 6.25 MG tablet Take 1 tablet (6.25 mg) by mouth 2 times daily (with meals) 180 tablet 3    docusate sodium (COLACE) 100 MG capsule daily       empagliflozin (JARDIANCE) 10 MG TABS tablet Take 1 tablet (10 mg) by mouth daily 90 tablet 2    FLUoxetine (PROZAC) 20 MG capsule Take 2 capsules (40 mg) by mouth daily 180 capsule 3    multivitamin w/minerals (THERA-VIT-M) tablet Take 1 tablet by mouth daily      psyllium (METAMUCIL/KONSYL) 0.52 g capsule Take 1.04 g by mouth      sacubitril-valsartan (ENTRESTO) 24-26 MG per tablet Take 1 tablet by mouth 2 times daily 180 tablet 3    Vitamin D, Cholecalciferol, 25 MCG (1000 UT) TABS 2 times daily       warfarin ANTICOAGULANT (COUMADIN) 1 MG tablet Take 2 mg on Tues/Fri or as directed by INR clinic 55 tablet 0    warfarin ANTICOAGULANT  (COUMADIN) 3 MG tablet Take by mouth 3 mg (3 mg x 1 tablet) every day or as directed by INR clinic 90 tablet 1    warfarin ANTICOAGULANT (COUMADIN) 4 MG tablet Take 4 mg on Monday, Wed, Thurs, Friday, Sunday or as directed by INR clinic 75 tablet 1    amoxicillin (AMOXIL) 500 MG capsule Take 4 capsules 1 hour before dental work (Patient not taking: Reported on 4/15/2024) 4 capsule 5    nitroGLYcerin (NITROSTAT) 0.4 MG sublingual tablet Place 1 tablet (0.4 mg) under the tongue every 5 minutes as needed for chest pain (Patient not taking: Reported on 4/15/2024) 10 tablet 0       Family History   Problem Relation Age of Onset    Breast Cancer Mother 85    Cancer Father     Aneurysm Other         family hx    C.A.D. No family hx of     Diabetes No family hx of     Hypertension No family hx of        Social History     Socioeconomic History    Marital status:      Spouse name: Not on file    Number of children: 5    Years of education: Not on file    Highest education level: Not on file   Occupational History     Employer: [employer]     Comment: 35 years     Employer: OTHER     Comment: landscaping business   Tobacco Use    Smoking status: Never    Smokeless tobacco: Never   Vaping Use    Vaping status: Never Used   Substance and Sexual Activity    Alcohol use: Yes     Comment: reports very little etoh use.     Drug use: No    Sexual activity: Not Currently     Partners: Female     Birth control/protection: None   Other Topics Concern    Parent/sibling w/ CABG, MI or angioplasty before 65F 55M? No   Social History Narrative    Not on file     Social Determinants of Health     Financial Resource Strain: Low Risk  (2/7/2024)    Financial Resource Strain     Within the past 12 months, have you or your family members you live with been unable to get utilities (heat, electricity) when it was really needed?: No   Food Insecurity: Low Risk  (2/7/2024)    Food Insecurity     Within the past 12 months,  did you worry that your food would run out before you got money to buy more?: No     Within the past 12 months, did the food you bought just not last and you didn t have money to get more?: No   Transportation Needs: Low Risk  (2/7/2024)    Transportation Needs     Within the past 12 months, has lack of transportation kept you from medical appointments, getting your medicines, non-medical meetings or appointments, work, or from getting things that you need?: No   Physical Activity: Inactive (9/15/2022)    [facility]    Exercise Vital Sign     Days of Exercise per Week: 0 days     Minutes of Exercise per Session: 0 min   Stress: No Stress Concern Present (9/15/2022)    Received from [facility], [facility]    [employer] [place] [employer] - Occupational Stress Questionnaire     Feeling of Stress : Not at all   Social Connections: Unknown (4/26/2023)    Received from [facility]    Social Connections     Frequency of Communication with Friends and Family: Not on file   Interpersonal Safety: Low Risk  (12/6/2023)    Interpersonal Safety     Do you feel physically and emotionally safe where you currently live?: Yes     Within the past 12 months, have you been hit, slapped, kicked or otherwise physically hurt by someone?: Not on file     Within the past 12 months, have you been humiliated or emotionally abused in other ways by your partner or ex-partner?: Not on file   Housing Stability: Low Risk  (2/7/2024)    Housing Stability     Do you have housing? : Yes     Are you worried about losing your housing?: No            Past Medical History:     Past Medical History:   Diagnosis Date    Anemia of chronic renal failure, unspecified CKD stage 8/27/2021    Aortic dissection (H)     Aortic root aneurysm (H24)     Arthritis     C. difficile colitis     April 2014 following surgery    CKD (chronic kidney disease) stage 3, GFR 30-59 ml/min (H) 9/28/2014     "Class 1 obesity due to excess calories with serious comorbidity and body mass index (BMI) of 34.0 to 34.9 in adult 3/8/2022    Connective tissue disease (H24)     \"probable\" per [facility] Notes    DVT (deep venous thrombosis) (H)     April 2014 following surgery    History of blood transfusion     Horseshoe kidney     Hypertension     S/P insertion of iliac artery stent 11/11/2020    Left internal iliac artery stenting for endoleak    Thoracic aortic aneurysm without rupture (H24) 3/8/2014    Urinary urgency 10/12/2020    Valvular cardiomyopathy (H)               Past Surgical History:     Past Surgical History:   Procedure Laterality Date    ABDOMEN SURGERY      AORTIC VALVE REPLACEMENT  4/7/14    ARTHROSCOPY KNEE RT/LT  2005    left, repair meniscus    COLONOSCOPY      COLONOSCOPY N/A 6/22/2022    Procedure: COLONOSCOPY, WITH POLYPECTOMY AND BIOPSY;  Surgeon: Josh Morales MD;  Location: [facility]    ESOPHAGOSCOPY, GASTROSCOPY, DUODENOSCOPY (EGD), COMBINED N/A 6/22/2022    Procedure: ESOPHAGOGASTRODUODENOSCOPY, WITH BIOPSY;  Surgeon: Josh Morales MD;  Location: [facility]    REPAIR AORTIC ROOT  4/7/14    surgery followed by ECMO, vasopressor therapy    SOFT TISSUE SURGERY      THORACIC SURGERY      VASCULAR SURGERY      [facility] CABG, ARTERIAL, SINGLE  4/7/14    [facility] REPAIR CRUCIATE LIGAMENT,KNEE  1999    left              Allergies:   Patient has no known allergies.       Data:   All laboratory data reviewed:    Recent Labs   Lab Test 01/15/24  0842 01/05/24  1319 12/06/23  1418 06/14/22  1127 03/08/22  1237 01/21/21  1342 10/16/20  0918   LDL 78  --   --   --  96  --  59   HDL 54  --   --   --  41  --  46   NHDL 89  --   --   --  115  --  79   CHOL 143  --   --   --  156  --  125   TRIG 55  --   --   --  96  --  98   TSH  --   --  2.19  --   --   --   --    NTBNP 943*  --   --   --   --   --   --    IRON  --   --  69   < >  --    < >  --    FEB  --   --  214*   < >  --    < >  --    IRONSAT  --   --  32   < " >  --    < >  --    RON  --  264  --    < >  --    < >  --     < > = values in this interval not displayed.       Lab Results   Component Value Date    WBC 6.7 10/17/2023    WBC 6.2 05/26/2020    RBC 3.36 (L) 10/17/2023    RBC 3.73 (L) 05/26/2020    HGB 13.6 04/05/2024    HGB 12.6 (L) 04/14/2021    HCT 34.4 (L) 10/17/2023    HCT 35.1 (L) 05/26/2020     (H) 10/17/2023    MCV 94 05/26/2020    MCH 33.0 10/17/2023    MCH 29.5 05/26/2020    MCHC 32.3 10/17/2023    MCHC 31.3 (L) 05/26/2020    RDW 15.5 (H) 10/17/2023    RDW 17.9 (H) 05/26/2020     10/17/2023     05/26/2020       Lab Results   Component Value Date     04/05/2024     04/14/2021    POTASSIUM 4.9 04/05/2024    POTASSIUM 5.1 01/03/2023    POTASSIUM 5.0 04/14/2021    CHLORIDE 106 04/05/2024    CHLORIDE 107 01/03/2023    CHLORIDE 111 (H) 04/14/2021    CO2 25 04/05/2024    CO2 30 01/03/2023    CO2 27 04/14/2021    ANIONGAP 10 04/05/2024    ANIONGAP 5 01/03/2023    ANIONGAP 3 04/14/2021     (H) 04/05/2024     (H) 01/03/2023     (H) 04/14/2021    BUN 40.3 (H) 04/05/2024    BUN 36 (H) 01/03/2023    BUN 27 04/14/2021    CR 2.08 (H) 04/05/2024    CR 1.52 (H) 04/14/2021    GFRESTIMATED 34 (L) 04/05/2024    GFRESTIMATED 47 (L) 04/14/2021    GFRESTBLACK 54 (L) 04/14/2021    FAYE 9.2 04/05/2024    FAYE 8.6 04/14/2021      Lab Results   Component Value Date    AST 20 03/08/2022    AST 15 09/16/2015    ALT 17 01/15/2024    ALT 14 09/16/2015       Lab Results   Component Value Date    A1C 5.6 12/29/2013       Lab Results   Component Value Date    INR 2.6 03/14/2024    INR 2.4 02/28/2024    INR 2.0 07/20/2021    INR 2.3 (A) 07/08/2021         MAKENNA HAMMOND MD  [facility]

## 2024-04-15 NOTE — LETTER
4/15/2024    Min Fried MD  [address] [facility] [address]    RE: Min Andino       Dear Colleague,     I had the pleasure of seeing Min Andino in the [facility].    General Cardiology Clinic Progress Note  Min Andino MRN# [id number]   YOB: 1955 Age: 68 year old       Reason for visit: Chronic systolic heart failure    History of presenting illness:    I had the opportunity to see Min Andino at [facility] today for reevaluation of chronic systolic heart failure due to nonischemic cardiomyopathy.  He has a complex cardiac and vascular history which I detailed in my original consult note on 10/24/2023.  He has been seen at [facility] and [facility] in [place].  In addition to nonischemic cardiomyopathy, he has a mechanical aortic valve replacement, repair of the ascending aorta and aortic arch for management of chronic aortic dissection, and complex repair of an abdominal aortic aneurysm and lower extremity and abdominal arterial disease.    He has paroxysmal atrial fibrillation and is maintained on amiodarone 200 mg daily.  He has hypercholesterolemia on atorvastatin, well-controlled at 40 mg daily.  His echocardiogram a year ago at [facility] demonstrated an ejection fraction of 25 to 30% and he was having a lot of problems with congestive heart failure at that time.  Since then, he has been on carvedilol, initially 12.5 mg p.o. twice daily which I reduced at last visit to 6.25 mg p.o. twice daily so that we could increase Entresto to 24/26 mg p.o. twice daily.  He was having issues with low heart rates and low blood pressures on the higher dose of carvedilol but seems to be tolerating that change quite well now.  He is on Jardiance 10 mg daily.  He is not on spironolactone due to history of hyperkalemia.  He had been on Bumex 1 mg p.o. daily but now is on 0.5 mg every other day.    He has chronic kidney disease  with a stable creatinine of about 1.8-2.0.  His potassium levels have remained stable in the normal range after increasing his Entresto, typically 4.9-5.0.    He is doing more walking now and having less shortness of breath.  A year ago he was stopping every 30 feet catch his breath, now he can continue walking for far longer and is not bothered by shortness of breath.  Has no chest discomfort.  He has far less lightheadedness and has not had syncope.    His echocardiogram shows significant improvement.  His left ventricular chamber size has decreased from 6.7 to 6.0 cm at end diastole.  His ejection fraction has improved from 25 to 30% up to 45 to 50% now.  His mechanical aortic valve is functioning normally with mean gradient of 12 mmHg.  He has no other significant valvular regurgitation.    He has not felt any episodes of atrial fibrillation nor has he noticed any while checking his rhythm on his [employer] mobile device.  However, previously he did not feel the atrial fibrillation and might not be aware of it.  He obviously remains on warfarin for his mechanical valve which also serves as stroke prevention for atrial fibrillation.    On examination today his blood pressure is 114/62, heart rate 52, weight stable at 209 pounds.  His lungs are clear.  Heart rhythm is regular with crisp valve valve closure sounds and no significant cardiac murmur.  Is no lower extremity edema.          Assessment and Plan:     ASSESSMENT:    Mr. Min Andino is a 68-year-old gentleman with a complex cardiac history including chronic aortic dissection requiring repair of his ascending aorta and aortic arch as well as replacement of his aortic valve with a mechanical [employer] valve.  He also had an abdominal aortic aneurysm and underwent complex repair of that with multiple abdominal arterial bypasses.    He was discovered to have a nonischemic cardiomyopathy and his ejection fraction was as low as 25 to 30% a year ago.  With medical  therapy, his ejection fraction is now up to 45 to 50% with a significant decrease in left ventricular chamber size.  He is having minimal heart failure symptoms as far as I can tell and seems to be tolerating his current medications quite well.    He has stage IIIb chronic kidney disease which seems stable.  He has paroxysmal atrial fibrillation on amiodarone without evidence of recurrence.    I will not make any medication changes today.  I will plan to see him back again in 6 months.  I will repeat some amiodarone lab testing including LFTs and thyroid function as well as a basic metabolic panel and CBC.    Don Gilmore MD    The longitudinal plan of care for the diagnosis(es)/condition(s) as documented were addressed during this visit. Due to the added complexity in care, I will continue to support Min in the subsequent management and with ongoing continuity of care.            Orders this Visit:  Orders Placed This Encounter   Procedures    Lipid Profile    ALT    Basic metabolic panel    CBC with platelets    Hepatic panel    TSH with free T4 reflex    Follow-Up with Cardiology     No orders of the defined types were placed in this encounter.    There are no discontinued medications.    Today's clinic visit entailed:    45 minutes spent by me on the date of the encounter doing chart review, history and exam, documentation and further activities per the note  Provider  Link to [facility] Help Grid     The level of medical decision making during this visit was of high complexity.           Review of Systems:     Review of Systems:  Skin:        Eyes:  Positive for glasses  ENT:       Respiratory:  Negative shortness of breath;cough;wheezing  Cardiovascular:  Negative;palpitations;chest pain;edema;lightheadedness;dizziness;syncope or near-syncope    Gastroenterology:      Genitourinary:       Musculoskeletal:       Neurologic:  Positive for numbness or tingling of feet  Psychiatric:       Heme/Lymph/Imm:        Endocrine:                 Physical Exam:     Vitals: /62 (BP Location: Right arm, Patient Position: Sitting, Cuff Size: Adult Regular)   Pulse 52   Resp 18   Ht 1.829 m (6')   Wt 94.8 kg (209 lb)   SpO2 96%   BMI 28.35 kg/m    Constitutional: Well nourished and in no apparent distress.  Eyes: Pupils equal, round. Sclerae anicteric.   HEENT: Normocephalic, atraumatic.   Neck: Supple. JVD   Respiratory: Breathing non-labored. Lungs clear to auscultation bilaterally. No crackles, wheezes, rhonchi, or rales.  Cardiovascular:  Regular rate and rhythm, normal S1 and S2. No murmur, rub, or gallop.  Skin: Warm, dry. No rashes, cyanosis, or xanthelasma.  Extremities: No edema.  Neurologic: No gross motor deficits. Alert, awake, and oriented to person, place and time.  Psychiatric: Affect appropriate.             Medications:     Current Outpatient Medications   Medication Sig Dispense Refill    amiodarone (PACERONE) 200 MG tablet 200 mg oral bid for 2 weeks, then 200 mg daily      aspirin (ASA) 81 MG chewable tablet Take 81 mg by mouth daily      atorvastatin (LIPITOR) 40 MG tablet Take 1 tablet (40 mg) by mouth daily Last refill. Need follow-up with Dr. Blair. 30 tablet 0    bumetanide (BUMEX) 1 MG tablet Take 1 tablet (1 mg) by mouth daily (Patient taking differently: Take 1 mg by mouth daily 1/2 tab every other day) 90 tablet 3    carvedilol (COREG) 6.25 MG tablet Take 1 tablet (6.25 mg) by mouth 2 times daily (with meals) 180 tablet 3    docusate sodium (COLACE) 100 MG capsule daily       empagliflozin (JARDIANCE) 10 MG TABS tablet Take 1 tablet (10 mg) by mouth daily 90 tablet 2    FLUoxetine (PROZAC) 20 MG capsule Take 2 capsules (40 mg) by mouth daily 180 capsule 3    multivitamin w/minerals (THERA-VIT-M) tablet Take 1 tablet by mouth daily      psyllium (METAMUCIL/KONSYL) 0.52 g capsule Take 1.04 g by mouth      sacubitril-valsartan (ENTRESTO) 24-26 MG per tablet Take 1 tablet by mouth 2 times daily  180 tablet 3    Vitamin D, Cholecalciferol, 25 MCG (1000 UT) TABS 2 times daily       warfarin ANTICOAGULANT (COUMADIN) 1 MG tablet Take 2 mg on Tues/Fri or as directed by INR clinic 55 tablet 0    warfarin ANTICOAGULANT (COUMADIN) 3 MG tablet Take by mouth 3 mg (3 mg x 1 tablet) every day or as directed by INR clinic 90 tablet 1    warfarin ANTICOAGULANT (COUMADIN) 4 MG tablet Take 4 mg on Monday, Wed, Thurs, Friday, Sunday or as directed by INR clinic 75 tablet 1    amoxicillin (AMOXIL) 500 MG capsule Take 4 capsules 1 hour before dental work (Patient not taking: Reported on 4/15/2024) 4 capsule 5    nitroGLYcerin (NITROSTAT) 0.4 MG sublingual tablet Place 1 tablet (0.4 mg) under the tongue every 5 minutes as needed for chest pain (Patient not taking: Reported on 4/15/2024) 10 tablet 0       Family History   Problem Relation Age of Onset    Breast Cancer Mother 85    Cancer Father     Aneurysm Other         family hx    C.A.D. No family hx of     Diabetes No family hx of     Hypertension No family hx of        Social History     Socioeconomic History    Marital status:      Spouse name: Not on file    Number of children: 5    Years of education: Not on file    Highest education level: Not on file   Occupational History     Employer: [employer]     Comment: 35 years     Employer: OTHER     Comment: landscaping business   Tobacco Use    Smoking status: Never    Smokeless tobacco: Never   Vaping Use    Vaping status: Never Used   Substance and Sexual Activity    Alcohol use: Yes     Comment: reports very little etoh use.     Drug use: No    Sexual activity: Not Currently     Partners: Female     Birth control/protection: None   Other Topics Concern    Parent/sibling w/ CABG, MI or angioplasty before 65F 55M? No   Social History Narrative    Not on file     Social Determinants of Health     Financial Resource Strain: Low Risk  (2/7/2024)    Financial Resource Strain     Within the past 12  months, have you or your family members you live with been unable to get utilities (heat, electricity) when it was really needed?: No   Food Insecurity: Low Risk  (2/7/2024)    Food Insecurity     Within the past 12 months, did you worry that your food would run out before you got money to buy more?: No     Within the past 12 months, did the food you bought just not last and you didn t have money to get more?: No   Transportation Needs: Low Risk  (2/7/2024)    Transportation Needs     Within the past 12 months, has lack of transportation kept you from medical appointments, getting your medicines, non-medical meetings or appointments, work, or from getting things that you need?: No   Physical Activity: Inactive (9/15/2022)    [facility]    Exercise Vital Sign     Days of Exercise per Week: 0 days     Minutes of Exercise per Session: 0 min   Stress: No Stress Concern Present (9/15/2022)    Received from [facility], [facility]    [employer] - Occupational Stress Questionnaire     Feeling of Stress : Not at all   Social Connections: Unknown (4/26/2023)    Received from [facility]    Social Connections     Frequency of Communication with Friends and Family: Not on file   Interpersonal Safety: Low Risk  (12/6/2023)    Interpersonal Safety     Do you feel physically and emotionally safe where you currently live?: Yes     Within the past 12 months, have you been hit, slapped, kicked or otherwise physically hurt by someone?: Not on file     Within the past 12 months, have you been humiliated or emotionally abused in other ways by your partner or ex-partner?: Not on file   Housing Stability: Low Risk  (2/7/2024)    Housing Stability     Do you have housing? : Yes     Are you worried about losing your housing?: No            Past Medical History:     Past Medical History:   Diagnosis Date    Anemia of chronic renal failure,  "unspecified CKD stage 8/27/2021    Aortic dissection (H)     Aortic root aneurysm (H24)     Arthritis     C. difficile colitis     April 2014 following surgery    CKD (chronic kidney disease) stage 3, GFR 30-59 ml/min (H) 9/28/2014    Class 1 obesity due to excess calories with serious comorbidity and body mass index (BMI) of 34.0 to 34.9 in adult 3/8/2022    Connective tissue disease (H24)     \"probable\" per [facility] Notes    DVT (deep venous thrombosis) (H)     April 2014 following surgery    History of blood transfusion     Horseshoe kidney     Hypertension     S/P insertion of iliac artery stent 11/11/2020    Left internal iliac artery stenting for endoleak    Thoracic aortic aneurysm without rupture (H24) 3/8/2014    Urinary urgency 10/12/2020    Valvular cardiomyopathy (H)               Past Surgical History:     Past Surgical History:   Procedure Laterality Date    ABDOMEN SURGERY      AORTIC VALVE REPLACEMENT  4/7/14    ARTHROSCOPY KNEE RT/LT  2005    left, repair meniscus    COLONOSCOPY      COLONOSCOPY N/A 6/22/2022    Procedure: COLONOSCOPY, WITH POLYPECTOMY AND BIOPSY;  Surgeon: Josh Morales MD;  Location:  GI    ESOPHAGOSCOPY, GASTROSCOPY, DUODENOSCOPY (EGD), COMBINED N/A 6/22/2022    Procedure: ESOPHAGOGASTRODUODENOSCOPY, WITH BIOPSY;  Surgeon: Josh Morales MD;  Location:  GI    REPAIR AORTIC ROOT  4/7/14    surgery followed by ECMO, vasopressor therapy    SOFT TISSUE SURGERY      THORACIC SURGERY      VASCULAR SURGERY      [facility] CABG, ARTERIAL, SINGLE  4/7/14    [facility] REPAIR CRUCIATE LIGAMENT,KNEE  1999    left              Allergies:   Patient has no known allergies.       Data:   All laboratory data reviewed:    Recent Labs   Lab Test 01/15/24  0842 01/05/24  1319 12/06/23  1418 06/14/22  1127 03/08/22  1237 01/21/21  1342 10/16/20  0918   LDL 78  --   --   --  96  --  59   HDL 54  --   --   --  41  --  46   NHDL 89  --   --   --  115  --  79   CHOL 143  --   --   --  156  --  " 125   TRIG 55  --   --   --  96  --  98   TSH  --   --  2.19  --   --   --   --    NTBNP 943*  --   --   --   --   --   --    IRON  --   --  69   < >  --    < >  --    FEB  --   --  214*   < >  --    < >  --    IRONSAT  --   --  32   < >  --    < >  --    RON  --  264  --    < >  --    < >  --     < > = values in this interval not displayed.       Lab Results   Component Value Date    WBC 6.7 10/17/2023    WBC 6.2 05/26/2020    RBC 3.36 (L) 10/17/2023    RBC 3.73 (L) 05/26/2020    HGB 13.6 04/05/2024    HGB 12.6 (L) 04/14/2021    HCT 34.4 (L) 10/17/2023    HCT 35.1 (L) 05/26/2020     (H) 10/17/2023    MCV 94 05/26/2020    MCH 33.0 10/17/2023    MCH 29.5 05/26/2020    MCHC 32.3 10/17/2023    MCHC 31.3 (L) 05/26/2020    RDW 15.5 (H) 10/17/2023    RDW 17.9 (H) 05/26/2020     10/17/2023     05/26/2020       Lab Results   Component Value Date     04/05/2024     04/14/2021    POTASSIUM 4.9 04/05/2024    POTASSIUM 5.1 01/03/2023    POTASSIUM 5.0 04/14/2021    CHLORIDE 106 04/05/2024    CHLORIDE 107 01/03/2023    CHLORIDE 111 (H) 04/14/2021    CO2 25 04/05/2024    CO2 30 01/03/2023    CO2 27 04/14/2021    ANIONGAP 10 04/05/2024    ANIONGAP 5 01/03/2023    ANIONGAP 3 04/14/2021     (H) 04/05/2024     (H) 01/03/2023     (H) 04/14/2021    BUN 40.3 (H) 04/05/2024    BUN 36 (H) 01/03/2023    BUN 27 04/14/2021    CR 2.08 (H) 04/05/2024    CR 1.52 (H) 04/14/2021    GFRESTIMATED 34 (L) 04/05/2024    GFRESTIMATED 47 (L) 04/14/2021    GFRESTBLACK 54 (L) 04/14/2021    FAYE 9.2 04/05/2024    FAYE 8.6 04/14/2021      Lab Results   Component Value Date    AST 20 03/08/2022    AST 15 09/16/2015    ALT 17 01/15/2024    ALT 14 09/16/2015       Lab Results   Component Value Date    A1C 5.6 12/29/2013       Lab Results   Component Value Date    INR 2.6 03/14/2024    INR 2.4 02/28/2024    INR 2.0 07/20/2021    INR 2.3 (A) 07/08/2021         MAKENNA HAMMOND MD  [facility]      Thank you  for allowing me to participate in the care of your patient.      Sincerely,     DON GILMORE MD     [facility]  cc:   Don Gilmore MD  [address]  [place] [address]

## 2024-04-16 ENCOUNTER — VIRTUAL VISIT (OUTPATIENT)
Dept: NEPHROLOGY | Facility: CLINIC | Age: 69
End: 2024-04-16
Payer: MEDICARE

## 2024-04-16 DIAGNOSIS — I10 HYPERTENSION, GOAL BELOW 140/90: ICD-10-CM

## 2024-04-16 DIAGNOSIS — N18.32 STAGE 3B CHRONIC KIDNEY DISEASE (H): Primary | ICD-10-CM

## 2024-04-16 DIAGNOSIS — N25.81 SECONDARY RENAL HYPERPARATHYROIDISM (H): ICD-10-CM

## 2024-04-16 DIAGNOSIS — I50.82 BIVENTRICULAR CONGESTIVE HEART FAILURE (H): ICD-10-CM

## 2024-04-16 PROCEDURE — 99214 OFFICE O/P EST MOD 30 MIN: CPT | Mod: 95 | Performed by: INTERNAL MEDICINE

## 2024-04-16 NOTE — PATIENT INSTRUCTIONS
Ok to change the bumex to just as needed.   Monitor weights daily  Labs in 3 months  Follow-up in 6 months

## 2024-04-16 NOTE — Clinical Note
Hi Dr. Mando Copeland is just on bumex 0.5 mg taken every other day - no volume related concerns - not a great water drinker - ok if I just change the bumex to be taken as needed? They are monitoring weights/swelling. Know you saw them yesterday so wanted to check with you as well. Thanks, Madelin  Nephrology

## 2024-04-16 NOTE — NURSING NOTE
Is the patient currently in the state of [place]? YES    Visit mode:VIDEO    If the visit is dropped, the patient can be reconnected by: VIDEO VISIT: Send to e-mail at: [email or web address]    Will anyone else be joining the visit? YES: How would they like to receive their invitation? Send to e-mail: PT's wife will be joining  (If patient encounters technical issues they should call [phone]    How would you like to obtain your AVS? MyChart    Are changes needed to the allergy or medication list? No    Are refills needed on medications prescribed by this physician?     Reason for visit: RECHJOSEPH DEANF

## 2024-04-16 NOTE — PROGRESS NOTES
"Virtual Visit Details    Type of service:  Video Visit     Originating Location (pt. Location): Home    Distant Location (provider location):  Off-site  Platform used for Video Visit: Иван      04/16/24     CC: CKD    HPI: Min Andino is a 68 year old male who presents for evaluation of CKD. I last saw Mr. Andino in 2014. To review from my previous note: Mr. Andino's past medical hx was unremarkable leading up to Dec 2013 when he noted chest tightness. He was seen at [facility] at that time and noted to have aortic dissection. He was initially monitored but later underwent aortic repair/aortic valve replacement/CABG in early April 2014. His post op course was complicated by the need for ECMO as well as pressor support. His wife reports today that he was told that he has \"normal\" creatinine levels when undergoing physicals in the past. His creatinine was 2.2 post surgery in April 2014 but improved to 1.3 at the time of discharge in April. 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 At that time his creatinine was 2.2 post op but improved to 1.3. Since that time, creatinine readings have included 1.48 on 7/17/14, 1.39 no 7/27/14, and 1.35 on 8/13/14. 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                 02/24/20: today he presents to reestablish care in our clinic. His creatinine was stable at ~ 1.1 on last check in 2014 but is now at a new baseline of ~ 1.6 since august. Mr. Andino underwent thoracoabdominal aortic repair august 2019. The placement of this stent compromised blood flow to the left kidney moiety which was noted on imaging later. His creatinine post this procedure was ~ 1.6. There have been a few episodes when the creatinine has increased, most recently a few weeks ago (to 1.9). With his most recent creatinine rise, lasix was held. He has not noted much change in his swelling or " breathing with holding the lasix for the past few weeks. Today I noted SOB during our conversation. His oxygen saturation was fine but his breathing seemed labored. He and his wife report that this is his breathing baseline over the past 5 years and is not worse than typical. They are following weights at home and he has been dropping weight - no increase since stopping the lasix. He does have difficulty with empyting his bladder - last imaging in the fall showed no hydronephrosis. Flomax did not help him in the past. He has a HHN coming once a week to the house. He is not lightheaded. He has plans to see cardiology at [facility] in March, is looking to establish care in urology, is also looking to establish care with internal medicine potentially in our clinic for continuity in one location.         06/08/20:  Virtual visit. Lasix was increased last month. He reports that the swelling is there but better. He denies any change in eating. No diarrhea. Weight was 240 lbs down to 215 lbs. He had been losing 1 lb a day. He was in ED last week and  Dx with hernia. He held lasix since Friday - was on lasix 40 mg daily held over the weekend - weight was 215 lbs on Thursday and 217 lbs this AM. He denies lightheadedness/dizziness. No orthostatic sxs. He quit wearing compression stockings a week ago. He feels he has slightly more endurance; can't lift a lot, needing to take breaks. He feels his urinary retention is not as problematic - has not seen urology. He has been taking iron BID.      7/13/20: video visit. He has continued to see weight loss over time - was 215 lbs in June but now 205 lbs. He has seen a change in his clothing size by 2 over the past few months. He is hungry often - eating well. No diarrhea. He has some constipation at times. He feels that maybe his breathing is improved. He is wearing compression stockings. He is using lasix 20 mg daily.      10/20/20: video visit - started on [facility]WELL and did exam that way -  then converted to telephone given echo noted. Feeling the best he has felt since before last winter. NO new issues. He has endovascular stent repair planned in the coming week. Activity improving. No swelling. Weight most recently 203 lbs. Over the past month - 201-203 lbs. BP has been well controlled; 115/61 at a recent physician appt. He tends to have higher readings at home; 140s at home, sometimes 130s.     01/25/21:  In the setting of COVID-19 pandemic, this visit was changed to a telephone visit. Patient reports feeling good overall. No difficulty with fluid overload/swelling/shortness of breath. His weight is up slightly but he also has a bigger appetite lately and snacking more with being at home. BP was 105/58 at a recent clinic visit but they report pressures of 130-176 at home most recently - mostly above goal. They have been working with cardiology clinic at [place] in regards to medication adjustments  And plan to reach out to them with these updates. He was hospitalized 11/11/20-11/17/20 following left internal iliac artery stenting for endoleak. Returned to operating room 11/13 for [id number] of left common femoral aneurysm and previous graft left external iliac to superficial femoral and deep femoral artery interposition graft. Creatinine was stable at 1.5-1.7 while inpatient. He has been taking iron BID but with some constipation.     9/28/21: video visit. No vascular interventions or hospitalizations since last visit. Vascular stent/leak stable on recent CT. Checking BP 4x/day with average SBP in AM 160s-180s, mid day 110s-120s, PM 140s. Amlodipine (takes in AM) increased to 10 mg daily approximately one month ago by PCP. Losartan (take at night) 100 mg daily increased march 2021. Metoprolol succinate increased to 100 mg daily (not sure when this was increased, take in AM). BP sligthly improved but not much. Denies any dizziness, CP, abdominal pain, SOB (some BROWN with moderate exertion), LE swelling or  abdominal distention. Doesn't check weights regularly, last weight 220.    01/04/22: went to [place] in first part of November - things are looking goo/stable.  NO swelling related concerns at this time. Breathing is stable. IN the past few weeks - blood pressure one time 97 in the AM - evening always 30 points higher.  Has had a consistent pattern of low in the AM and higher in evening for the past few months. Currently taking amlodipine 5 mg AM, losartan 50 mg BID, and carvedilol. 12.5 mg BID.     10/04/22: video visit. Not as hydrated as he should. No diueretics. No lower ext swelling. NO NSAIDs. Blood pressure recently - takes it every 10 days - 121/76, 129/83, 102/67, 119/75, 127/76, 119/68. We spent time discussing hydration - he admits he is not a great water drinker.     05/02/23: telephone visit -  Sara messaged me on 3/1/23 given increased swelling noted in his feet and legs - was given lasix 20 mg to be taken for 7 days. Creatinine was 1.85 in Sept 2022 in our system but then 1.43 more recently in Jan. Labs were done through [facility] in Feb and Creatinine was up to 1.94 and then last week 1.9. He was seen by his cardiologist at [facility] last week - felt to have minimally decompensated left ventricular systolic function but exacerbation of right ventricular failure perhabs due to underlying lung disease exacerbated by hypoventilation and sleep apnea. Plan is cardiac MR, lasix 20 mg daily, sleep study. Potential right heart cath in the future.    At this time, swelling is about the same. Weight is down about 10-12 lbs during the month of March - stayed stable through April. Feet and legs don't look normal but not terrible. Recent BP readings - 122/71, 127/77, 124/82. Pretty much always In the 120s. A few will go to 116. His physical stamina has declined - much more fatigued through the winter. He has been less active through the winter - more active in spring/fall/summer. Wheelchair  "most recently. Home pressures don't go above 140. SOB with activity. That has been the case for a long time - worse for the past 6-8 months. MRI of hte heart is the 10th, cards the 11th, sleep study the 21st/22nd. Cardiologist consult end of June at [place].     05/30/23: video visit. They feel the swelling is progressively getting worse day by day.last week they went to 20 mg BID of lasix per cardiology but then creatinine increased so they decreased back to 20 mg of lasix.  They have been losing weight this year with healthier dietary habits but unfortunately the scale is going up for him. Swelling in legs is significantly more uncomfortable. They did their sleep study but currently do not have a follow-up with pulmonary until July to hear the results/next stpeps.   Appt at [facility] in [place] for 2nd opinion.    Not scheduled for cardioversion at this point but they are started on amiodarone since last visit with Dr. Boyd.   Lasix 20 mg daily. When taking 20 BID, he did not see any difference in swelling improvement or urine output.  He is not on norvasc. /77. 132/78. 120s-130s.     07/11/23: video visit. Since our last visit I connected with the sleep center and he was going to be started on CPAP ASAP. I also connected with Dr. Boyd at [facility]. The prognosis of his cardiac condition is poor and he is concerned about future needs for dialysis. He has since been seen by [facility] cardiology - \"progressive LV adverse remodeling with reduced EF accompanied by right heart failure and significant tricuspid regurgitation\". They discussed optimizing medical therapy: they hope to add SGLT2 inhibitor, wanted to increase bumex to 2 mg AM And 1 mg PM. Once more euvolemic, then optimize carvedilol to a target of 25 mg BID. They also discussed replacing losartan with sacubitril/valsartan. They also felt electrical cardioversion is not unreasonable.    He has dropped about 50 lbs since our last " visit. Lasix wasn't working. Bumex has helped. They have not made med changes since being seen at [place]. He has an appt this Friday with cardiology. There is a little swelling still but much improved from our last visit - 250 lbs 1.5 months ago - just over 200 lbs now. Same eating regimen. Now on CPAP - still with apnea events but improved from previous. Was just seen by sleep physician through [facility]. Mask has been tolerable. Currently taking bumex 2 mg daily. BP has been good at 120/83. 130/85, 135/82. NO lightheaedness or dizziness.     10/10/23: video visit.   1. Are you on jardiance at this time?  YES   2. What are your recent blood pressure readings?  94/44, 104/43, 121/58, 159/69, 114/47, 156/66   3. Please confirm your current bumex dose  1mg. in AM and .5 mg. mid-day   4. Please also update on how you are feeling in regards to volume (is your weight up/down?, short of breath?, swelling?)  No edema or swelling. Weight stable.  No shortness of breath.   He was in the ED in September with dizziness. Breathing is stable. Better than in the spring certainly.  Weight has been 195, 194 lbs - +/- 5 lbs. This is stable. No lightheadedness on standing except when he first gets up in the AM. Last episode was a couple days ago. Dry Mouth. Wearing CPAP. No significant thirst.   - Now on Jardiance X 2-2.5 weeks  - Carvedilol to 12.5 mg BID  - losartan is now 25 mg BID    01/09/24: video visit. Scheduled for follow-up next week. Feeling pretty good. No volume related issues. Still on CPAP at night. Bumex is 0.5 mg every other day. No signs of dehydrated. Low BPs in the AM - 79/35, 83/41 - not every day but will occur. Other days 138/54. The pressures less than 100 occur 1-2 times per week. Rarely feels dizzy. Heart rate has been 58-69. Typically upper 50s, lower 60s.     04/16/24: video visit. Feeling pretty good. No swelling. Breathing is comfortable. He feels he is hydrated. Drinks when he is thirsty. On bumex  0.5 mg every other day. Weighs himself almost every day - gained a few lbs the past few months but much fluctuating. BP has been less than 140 at home. Using CPAP at night.     Current Outpatient Medications   Medication Sig Dispense Refill    amiodarone (PACERONE) 200 MG tablet 200 mg oral bid for 2 weeks, then 200 mg daily      amoxicillin (AMOXIL) 500 MG capsule Take 4 capsules 1 hour before dental work (Patient not taking: Reported on 4/15/2024) 4 capsule 5    aspirin (ASA) 81 MG chewable tablet Take 81 mg by mouth daily      atorvastatin (LIPITOR) 40 MG tablet Take 1 tablet (40 mg) by mouth daily Last refill. Need follow-up with Dr. Blair. 30 tablet 0    bumetanide (BUMEX) 1 MG tablet Take 1 tablet (1 mg) by mouth daily (Patient taking differently: Take 1 mg by mouth daily 1/2 tab every other day) 90 tablet 3    carvedilol (COREG) 6.25 MG tablet Take 1 tablet (6.25 mg) by mouth 2 times daily (with meals) 180 tablet 3    docusate sodium (COLACE) 100 MG capsule daily       empagliflozin (JARDIANCE) 10 MG TABS tablet Take 1 tablet (10 mg) by mouth daily 90 tablet 2    FLUoxetine (PROZAC) 20 MG capsule Take 2 capsules (40 mg) by mouth daily 180 capsule 3    multivitamin w/minerals (THERA-VIT-M) tablet Take 1 tablet by mouth daily      nitroGLYcerin (NITROSTAT) 0.4 MG sublingual tablet Place 1 tablet (0.4 mg) under the tongue every 5 minutes as needed for chest pain (Patient not taking: Reported on 4/15/2024) 10 tablet 0    psyllium (METAMUCIL/KONSYL) 0.52 g capsule Take 1.04 g by mouth      sacubitril-valsartan (ENTRESTO) 24-26 MG per tablet Take 1 tablet by mouth 2 times daily 180 tablet 3    Vitamin D, Cholecalciferol, 25 MCG (1000 UT) TABS 2 times daily       warfarin ANTICOAGULANT (COUMADIN) 1 MG tablet Take 2 mg on Tues/Fri or as directed by INR clinic 55 tablet 0    warfarin ANTICOAGULANT (COUMADIN) 3 MG tablet Take by mouth 3 mg (3 mg x 1 tablet) every day or as directed by INR clinic 90 tablet 1    warfarin  ANTICOAGULANT (COUMADIN) 4 MG tablet Take 4 mg on Monday, Wed, Thurs, Friday, Sunday or as directed by INR clinic 75 tablet 1     No current facility-administered medications for this visit.       Exam:  There were no vitals taken for this visit.    Results:  Labs reviewed through [facility].    Orders Only on 01/09/2024   Component Date Value Ref Range Status    INR HOME MONITORING 01/09/2024 2.5  2.000 - 3.000 Final       Lab were reviewed and interpreted.       Assessment/Plan:   1. CKD Stage 3: has CKD in the setting of previous ULICES as well as vascular compromise to the left kidney moiety from his vascular procedure in August 2019. Addt ULICES recently which was likely cardiorenal syndrome. With his vascular hx, need to consider he will be more sensitive to hemodynamic changes. With his heart failure changes most recently, there is a concern for worsening kidney function/potential of dialysis needs in the future. I am pleased to see the stability he has at this time - will monitor as we have been.     2. Hypertension: BP is at goal    3. Aneurysms/AVR: following with [facility]    4. CHF/Pulmonary hypertension: Most recently followed by Dr. Gilmore.   -  Sleep eval completed and per sleep lab, findings c/w LIVIA. Stressed the importance of ongoing sleep apena treatment. He is following with sleep specialist through . Plan is adjustment to lower apnea episodes even further.   - has not had volume related concerns in many months - only on bumex every other day very low dose - will change this to just as needed for now. I feel comfortable doing this as they follow a good diet at home and monitor their sodium intake. Educated on importance of taking bumex if weight gain/signs of volume overload.     5. Hyperkalemia:resolved    Patient Instructions   Ok to change the bumex to just as needed.   Monitor weights daily  Labs in 3 months  Follow-up in 6 months       [id number] AM video visit via [employer] - offsite  Madelin Vallecillo  DO

## 2024-04-19 RX ORDER — BUMETANIDE 0.5 MG/1
0.5 TABLET ORAL DAILY PRN
Qty: 90 TABLET | Refills: 3 | Status: SHIPPED | OUTPATIENT
Start: 2024-04-19

## 2024-04-29 ENCOUNTER — ANTICOAGULATION THERAPY VISIT (OUTPATIENT)
Dept: ANTICOAGULATION | Facility: CLINIC | Age: 69
End: 2024-04-29
Payer: MEDICARE

## 2024-04-29 DIAGNOSIS — Z95.2 H/O MECHANICAL AORTIC VALVE REPLACEMENT: Primary | ICD-10-CM

## 2024-04-29 DIAGNOSIS — Z79.01 LONG TERM CURRENT USE OF ANTICOAGULANT THERAPY: ICD-10-CM

## 2024-04-29 LAB — INR HOME MONITORING: 2.1 (ref 2–3)

## 2024-04-29 NOTE — PROGRESS NOTES
ANTICOAGULATION MANAGEMENT     Min Andino 68 year old male is on warfarin with therapeutic INR result. (Goal INR 2.0-3.0)    Recent labs: (last 7 days)     04/29/24  0000   INR 2.1       ASSESSMENT     Source(s): Chart Review  Previous INR was Therapeutic last 2(+) visits  Medication, diet, health changes since last INR chart reviewed; none identified         PLAN     Recommended plan for no diet, medication or health factor changes affecting INR     Dosing Instructions: Continue your current warfarin dose with next INR in 2 weeks       Summary  As of 4/29/2024      Full warfarin instructions:  2 mg every Tue; 3 mg all other days   Next INR check:  5/13/2024               Detailed voice message left for Min with dosing instructions and follow up date.     Patient to recheck with home meter    Education provided:   Contact [phone]  with any changes, questions or concerns.     Plan made per [facility] anticoagulation protocol    Susannah Cole RN  Anticoagulation Clinic  4/29/2024    _______________________________________________________________________     Anticoagulation Episode Summary       Current INR goal:  2.0-3.0   TTR:  100.0% (4.2 mo)   Target end date:  Indefinite   Send INR reminders to:  [facility]    Indications    H/O mechanical aortic valve replacement [Z95.2]  Long term current use of anticoagulant therapy [Z79.01]             Comments:  [facility] home meter             Anticoagulation Care Providers       Provider Role Specialty Phone number    Min Fried MD Referring Internal Medicine [phone]

## 2024-05-06 ASSESSMENT — SOCIAL DETERMINANTS OF HEALTH (SDOH): HOW OFTEN DO YOU GET TOGETHER WITH FRIENDS OR RELATIVES?: TWICE A WEEK

## 2024-05-08 ENCOUNTER — OFFICE VISIT (OUTPATIENT)
Dept: INTERNAL MEDICINE | Facility: CLINIC | Age: 69
End: 2024-05-08
Payer: MEDICARE

## 2024-05-08 VITALS
OXYGEN SATURATION: 98 % | DIASTOLIC BLOOD PRESSURE: 62 MMHG | HEIGHT: 72 IN | TEMPERATURE: 97.8 F | RESPIRATION RATE: 16 BRPM | WEIGHT: 210 LBS | BODY MASS INDEX: 28.44 KG/M2 | SYSTOLIC BLOOD PRESSURE: 112 MMHG | HEART RATE: 54 BPM

## 2024-05-08 DIAGNOSIS — N40.1 BENIGN PROSTATIC HYPERPLASIA WITH URINARY FREQUENCY: ICD-10-CM

## 2024-05-08 DIAGNOSIS — N25.81 SECONDARY RENAL HYPERPARATHYROIDISM (H): ICD-10-CM

## 2024-05-08 DIAGNOSIS — I10 HYPERTENSION, GOAL BELOW 140/90: ICD-10-CM

## 2024-05-08 DIAGNOSIS — N18.32 STAGE 3B CHRONIC KIDNEY DISEASE (H): ICD-10-CM

## 2024-05-08 DIAGNOSIS — I25.10 ATHEROSCLEROSIS OF NATIVE CORONARY ARTERY OF NATIVE HEART WITHOUT ANGINA PECTORIS: ICD-10-CM

## 2024-05-08 DIAGNOSIS — F32.1 CURRENT MODERATE EPISODE OF MAJOR DEPRESSIVE DISORDER WITHOUT PRIOR EPISODE (H): ICD-10-CM

## 2024-05-08 DIAGNOSIS — I25.10 ATHEROSCLEROSIS OF CORONARY ARTERY OF NATIVE HEART WITHOUT ANGINA PECTORIS, UNSPECIFIED VESSEL OR LESION TYPE: ICD-10-CM

## 2024-05-08 DIAGNOSIS — E78.00 PURE HYPERCHOLESTEROLEMIA: ICD-10-CM

## 2024-05-08 DIAGNOSIS — Z95.2 H/O MECHANICAL AORTIC VALVE REPLACEMENT: ICD-10-CM

## 2024-05-08 DIAGNOSIS — R35.0 BENIGN PROSTATIC HYPERPLASIA WITH URINARY FREQUENCY: ICD-10-CM

## 2024-05-08 DIAGNOSIS — Z00.00 MEDICARE ANNUAL WELLNESS VISIT, SUBSEQUENT: Primary | ICD-10-CM

## 2024-05-08 DIAGNOSIS — Z79.01 LONG TERM CURRENT USE OF ANTICOAGULANT THERAPY: ICD-10-CM

## 2024-05-08 DIAGNOSIS — R73.09 ELEVATED GLUCOSE: ICD-10-CM

## 2024-05-08 DIAGNOSIS — Z12.5 SCREENING FOR PROSTATE CANCER: ICD-10-CM

## 2024-05-08 LAB
ALBUMIN MFR UR ELPH: 8.9 MG/DL
ALBUMIN SERPL BCG-MCNC: 3.9 G/DL (ref 3.5–5.2)
ALP SERPL-CCNC: 110 U/L (ref 40–150)
ALT SERPL W P-5'-P-CCNC: 32 U/L (ref 0–70)
ANION GAP SERPL CALCULATED.3IONS-SCNC: 9 MMOL/L (ref 7–15)
AST SERPL W P-5'-P-CCNC: 33 U/L (ref 0–45)
BILIRUB SERPL-MCNC: 0.7 MG/DL
BUN SERPL-MCNC: 36.3 MG/DL (ref 8–23)
CALCIUM SERPL-MCNC: 8.6 MG/DL (ref 8.8–10.2)
CHLORIDE SERPL-SCNC: 105 MMOL/L (ref 98–107)
CHOLEST SERPL-MCNC: 107 MG/DL
CREAT SERPL-MCNC: 1.75 MG/DL (ref 0.67–1.17)
CREAT UR-MCNC: 92.3 MG/DL
DEPRECATED HCO3 PLAS-SCNC: 24 MMOL/L (ref 22–29)
EGFRCR SERPLBLD CKD-EPI 2021: 42 ML/MIN/1.73M2
FASTING STATUS PATIENT QL REPORTED: YES
FASTING STATUS PATIENT QL REPORTED: YES
GLUCOSE SERPL-MCNC: 93 MG/DL (ref 70–99)
HBA1C MFR BLD: 5 %
HDLC SERPL-MCNC: 32 MG/DL
HGB BLD-MCNC: 13.6 G/DL (ref 13.3–17.7)
LDLC SERPL CALC-MCNC: 56 MG/DL
NONHDLC SERPL-MCNC: 75 MG/DL
PHOSPHATE SERPL-MCNC: 3 MG/DL (ref 2.5–4.5)
POTASSIUM SERPL-SCNC: 4.6 MMOL/L (ref 3.4–5.3)
PROT SERPL-MCNC: 6.8 G/DL (ref 6.4–8.3)
PROT/CREAT 24H UR: 0.1 MG/MG CR (ref 0–0.2)
PSA SERPL DL<=0.01 NG/ML-MCNC: 0.68 NG/ML (ref 0–4.5)
SODIUM SERPL-SCNC: 138 MMOL/L (ref 135–145)
TRIGL SERPL-MCNC: 94 MG/DL

## 2024-05-08 PROCEDURE — 83036 HEMOGLOBIN GLYCOSYLATED A1C: CPT | Performed by: INTERNAL MEDICINE

## 2024-05-08 PROCEDURE — G0103 PSA SCREENING: HCPCS | Performed by: INTERNAL MEDICINE

## 2024-05-08 PROCEDURE — 83970 ASSAY OF PARATHORMONE: CPT | Performed by: INTERNAL MEDICINE

## 2024-05-08 PROCEDURE — 80053 COMPREHEN METABOLIC PANEL: CPT | Performed by: INTERNAL MEDICINE

## 2024-05-08 PROCEDURE — 36415 COLL VENOUS BLD VENIPUNCTURE: CPT | Performed by: INTERNAL MEDICINE

## 2024-05-08 PROCEDURE — 85018 HEMOGLOBIN: CPT | Performed by: INTERNAL MEDICINE

## 2024-05-08 PROCEDURE — 84100 ASSAY OF PHOSPHORUS: CPT | Performed by: INTERNAL MEDICINE

## 2024-05-08 PROCEDURE — 84156 ASSAY OF PROTEIN URINE: CPT | Performed by: INTERNAL MEDICINE

## 2024-05-08 PROCEDURE — 99214 OFFICE O/P EST MOD 30 MIN: CPT | Mod: 25 | Performed by: INTERNAL MEDICINE

## 2024-05-08 PROCEDURE — G0439 PPPS, SUBSEQ VISIT: HCPCS | Performed by: INTERNAL MEDICINE

## 2024-05-08 PROCEDURE — 80061 LIPID PANEL: CPT | Performed by: INTERNAL MEDICINE

## 2024-05-08 RX ORDER — ATORVASTATIN CALCIUM 40 MG/1
40 TABLET, FILM COATED ORAL DAILY
Qty: 90 TABLET | Refills: 3 | Status: SHIPPED | OUTPATIENT
Start: 2024-05-08

## 2024-05-08 RX ORDER — RESPIRATORY SYNCYTIAL VIRUS VACCINE 120MCG/0.5
0.5 KIT INTRAMUSCULAR ONCE
Qty: 1 EACH | Refills: 0 | Status: CANCELLED | OUTPATIENT
Start: 2024-05-08 | End: 2024-05-08

## 2024-05-08 RX ORDER — TAMSULOSIN HYDROCHLORIDE 0.4 MG/1
0.4 CAPSULE ORAL DAILY
Qty: 30 CAPSULE | Refills: 3 | Status: SHIPPED | OUTPATIENT
Start: 2024-05-08 | End: 2024-06-10

## 2024-05-08 RX ORDER — CITALOPRAM HYDROBROMIDE 10 MG/1
10 TABLET ORAL DAILY
Qty: 90 TABLET | Refills: 3 | Status: SHIPPED | OUTPATIENT
Start: 2024-05-08 | End: 2024-08-12

## 2024-05-08 NOTE — PROGRESS NOTES
Preventive Care Visit  [facility]  Min Fried MD, Internal Medicine  May 8, 2024      Assessment & Plan   Problem List Items Addressed This Visit       Hypertension, goal below 140/90    Relevant Orders    Comprehensive metabolic panel (BMP + Alb, Alk Phos, ALT, AST, Total. Bili, TP)    CKD (chronic kidney disease) stage 3, GFR 30-59 ml/min (H)    Coronary atherosclerosis    Relevant Medications    atorvastatin (LIPITOR) 40 MG tablet    Other Relevant Orders    Comprehensive metabolic panel (BMP + Alb, Alk Phos, ALT, AST, Total. Bili, TP)    Lipid panel reflex to direct LDL Fasting    Hyperlipidemia    Relevant Medications    atorvastatin (LIPITOR) 40 MG tablet    H/O mechanical aortic valve replacement    Relevant Orders    Comprehensive metabolic panel (BMP + Alb, Alk Phos, ALT, AST, Total. Bili, TP)    Lipid panel reflex to direct LDL Fasting    Long term current use of anticoagulant therapy    Secondary renal hyperparathyroidism (H24)     Other Visit Diagnoses       Medicare annual wellness visit, subsequent    -  Primary    Current moderate episode of major depressive disorder without prior episode (H)        Relevant Medications    citalopram (CELEXA) 10 MG tablet    Elevated glucose        Relevant Orders    Hemoglobin A1c    Screening for prostate cancer        Relevant Orders    PSA, screen    Benign prostatic hyperplasia with urinary frequency        Relevant Medications    tamsulosin (FLOMAX) 0.4 MG capsule           Patient here for Medicare wellness exam, he is accompanied by his wife.  Overall he is doing better.  Colonoscopy is up-to-date  Immunizations are up-to-date  Memory is okay    Other issues addressed he is got his aortic valve replaced follows with cardiology he is on anticoagulation.  Chronic kidney disease stage IIIb will check his renal function follows with nephrology.  He did stop his Bumex and weight has been stable so we will check kidney  "function.    Depression he is not doing well on fluoxetine we will stop that wean it down and instead try him on citalopram 10 mg a day.  Hyperlipidemia he is on a statin therapy and we will check him with fasting lipids today.    BPH symptoms, he is concerned about prostate cancer we will get a PSA and had a long discussion about this.  Will also start him on some Flomax to see if this will help his symptoms he is warned about possible side effects including orthostatic hypotension.          Patient has been advised of split billing requirements and indicates understanding: Yes  Ordering of each unique test  Prescription drug management       BMI  Estimated body mass index is 28.88 kg/m  as calculated from the following:    Height as of this encounter: 1.816 m (5' 11.5\").    Weight as of this encounter: 95.3 kg (210 lb).   Weight management plan: Discussed healthy diet and exercise guidelines    Counseling  Appropriate preventive services were discussed with this patient, including applicable screening as appropriate for fall prevention, nutrition, physical activity, Tobacco-use cessation, weight loss and cognition.  Checklist reviewing preventive services available has been given to the patient.  Reviewed patient's diet, addressing concerns and/or questions.     FUTURE APPOINTMENTS:       - Follow-up for annual visit or as needed      Dylon Copeland is a 68 year old, presenting for the following:  Physical        5/8/2024    11:10 AM   Additional Questions   Roomed by [facility] Directive  Patient does not have a Health Care Directive or Living Will: Patient states has Advance Directive and will bring in a copy to clinic.    HPI    Doing ok    Cardiomyopathy is improving on Entresto and Jardiance and diuretics. Follows with cardiology     Mood not noticing a difference on fluoxetine. Up to 40mg.     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 Active but all is effort " for him, not fun right now.     Hernias on the right side groin.     Shots are good.           5/6/2024   General Health   How would you rate your overall physical health? Good   Feel stress (tense, anxious, or unable to sleep) To some extent   (!) STRESS CONCERN      5/6/2024   Nutrition   Diet: Low salt         3/8/2022   Exercise   Frequency of exercise: 4-5 days/week         5/6/2024   Social Factors   Frequency of gathering with friends or relatives Twice a week   Worry food won't last until get money to buy more No   Food not last or not have enough money for food? No   Do you have housing?  Yes   Are you worried about losing your housing? No   Lack of transportation? No   Unable to get utilities (heat,electricity)? No         5/8/2024   Fall Risk   Gait Speed Test Interpretation Less than or equal to 5.00 seconds - PASS           5/6/2024   Activities of Daily Living- Home Safety   Needs help with the following daily activites None of the above   Safety concerns in the home None of the above         5/6/2024   Dental   Dentist two times every year? Yes         5/6/2024   Hearing Screening   Hearing concerns? None of the above         5/6/2024   Driving Risk Screening   Patient/family members have concerns about driving No         5/6/2024   General Alertness/Fatigue Screening   Have you been more tired than usual lately? No         5/6/2024   Urinary Incontinence Screening   Bothered by leaking urine in past 6 months No         5/6/2024   TB Screening   Were you born outside of the [place]? No     Today's PHQ-9 Score:       2/6/2024    11:41 AM   PHQ-9 SCORE   PHQ-9 Total Score MyChart 5 (Mild depression)   PHQ-9 Total Score 5           5/6/2024   Substance Use   Alcohol more than 3/day or more than 7/wk No   Do you have a current opioid prescription? No   How severe/bad is pain from 1 to 10? 2/10   Do you use any other substances recreationally? No     Social History     Tobacco Use    Smoking status: Never     Smokeless tobacco: Never   Vaping Use    Vaping status: Never Used   Substance Use Topics    Alcohol use: Yes     Comment: reports very little etoh use.     Drug use: No         5/6/2024   AAA Screening   Family history of Abdominal Aortic Aneurysm (AAA)? No   Last PSA:   Prostate Specific Antigen Screen   Date Value Ref Range Status   03/08/2022 0.96 0.00 - 4.00 ug/L Final     ASCVD Risk   The 10-year ASCVD risk score (Imelda CORONA, et al., 2019) is: 12.7%    Values used to calculate the score:      Age: 68 years      Sex: Male      Is Non- : No      Diabetic: No      Tobacco smoker: No      Systolic Blood Pressure: 112 mmHg      Is BP treated: Yes      HDL Cholesterol: 54 mg/dL      Total Cholesterol: 143 mg/dL      Reviewed and updated as needed this visit by Provider                    Lab work is in process  Current providers sharing in care for this patient include:  Patient Care Team:  Min Fried MD as PCP - General (Internal Medicine)  Madelin Vallecillo DO as Assigned Nephrology Provider  Celio May DO as MD (Gastroenterology)  Monserrat Brennan MD as MD (Otolaryngology)  Don Gilmore MD as Assigned Heart and Vascular Provider  Min Fried MD as Assigned PCP    The following health maintenance items are reviewed in Epic and correct as of today:  Health Maintenance   Topic Date Due    HF ACTION PLAN  Never done    DEPRESSION ACTION PLAN  Never done    RSV VACCINE (Pregnancy & 60+) (1 - 1-dose 60+ series) Never done    COVID-19 Vaccine (6 - 2023-24 season) 02/29/2024    MEDICARE ANNUAL WELLNESS VISIT  04/10/2024    PHQ-9  08/07/2024    BMP  10/05/2024    CBC  10/17/2024    ANNUAL REVIEW OF HM ORDERS  12/06/2024    MICROALBUMIN  01/05/2025    ALT  01/15/2025    LIPID  01/15/2025    HEMOGLOBIN  04/05/2025    FALL RISK ASSESSMENT  05/08/2025    GLUCOSE  04/05/2027    ADVANCE CARE PLANNING  02/07/2029    DTAP/TDAP/TD IMMUNIZATION (4 - Td or Tdap) 03/04/2029     "COLORECTAL CANCER SCREENING  06/22/2029    TSH W/FREE T4 REFLEX  Completed    HEPATITIS C SCREENING  Completed    INFLUENZA VACCINE  Completed    Pneumococcal Vaccine: 65+ Years  Completed    URINALYSIS  Completed    ZOSTER IMMUNIZATION  Completed    IPV IMMUNIZATION  Aged Out    HPV IMMUNIZATION  Aged Out    MENINGITIS IMMUNIZATION  Aged Out    RSV MONOCLONAL ANTIBODY  Aged Out       Review of Systems  CONSTITUTIONAL: NEGATIVE for fever, chills, change in weight  INTEGUMENTARY/SKIN: NEGATIVE for worrisome rashes, moles or lesions  EYES: NEGATIVE for vision changes or irritation  ENT/MOUTH: NEGATIVE for ear, mouth and throat problems  RESP: NEGATIVE for significant cough or SOB  CV: NEGATIVE for chest pain, palpitations or peripheral edema  GI: NEGATIVE for nausea, abdominal pain, heartburn, or change in bowel habits   male :decreased urinary stream and frequency  MUSCULOSKELETAL: NEGATIVE for significant arthralgias or myalgia  NEURO: NEGATIVE for weakness, dizziness or paresthesias  ENDOCRINE: NEGATIVE for temperature intolerance, skin/hair changes  HEME: NEGATIVE for bleeding problems  PSYCHIATRIC: depressed mood     Objective    Exam  /62   Pulse 54   Temp 97.8  F (36.6  C) (Temporal)   Resp 16   Ht 1.816 m (5' 11.5\")   Wt 95.3 kg (210 lb)   SpO2 98%   BMI 28.88 kg/m     Estimated body mass index is 28.88 kg/m  as calculated from the following:    Height as of this encounter: 1.816 m (5' 11.5\").    Weight as of this encounter: 95.3 kg (210 lb).    Physical Exam  GENERAL: alert and no distress  EYES: Eyes grossly normal to inspection, PERRL and conjunctivae and sclerae normal  HENT: ear canals and TM's normal, nose and mouth without ulcers or lesions  NECK: no adenopathy, no asymmetry, masses, or scars  RESP: lungs clear to auscultation - no rales, rhonchi or wheezes  CV: regular rates and rhythm, peripheral pulses strong, no peripheral edema, and valvular click   ABDOMEN: soft, nontender, no " hepatosplenomegaly, no masses and bowel sounds normal  MS: no gross musculoskeletal defects noted, no edema  SKIN: no suspicious lesions or rashes  NEURO: Normal strength and tone, mentation intact and speech normal  PSYCH: mentation appears normal, affect normal/bright        5/8/2024   Mini Cog   Clock Draw Score 2 Normal   3 Item Recall 1 object recalled   Mini Cog Total Score 3     Signed Electronically by: Min Fried MD

## 2024-05-09 LAB — PTH-INTACT SERPL-MCNC: 42 PG/ML (ref 15–65)

## 2024-05-15 ENCOUNTER — ANTICOAGULATION THERAPY VISIT (OUTPATIENT)
Dept: ANTICOAGULATION | Facility: CLINIC | Age: 69
End: 2024-05-15
Payer: MEDICARE

## 2024-05-15 DIAGNOSIS — Z95.2 H/O MECHANICAL AORTIC VALVE REPLACEMENT: Primary | ICD-10-CM

## 2024-05-15 DIAGNOSIS — Z79.01 LONG TERM CURRENT USE OF ANTICOAGULANT THERAPY: ICD-10-CM

## 2024-05-15 LAB — INR HOME MONITORING: 4 (ref 2–3)

## 2024-05-15 NOTE — PROGRESS NOTES
ANTICOAGULATION MANAGEMENT     Min Hill Andino 68 year old male is on warfarin with supratherapeutic INR result. (Goal INR 2.0-3.0)    Recent labs: (last 7 days)     05/15/24  0000   INR 4.0*       ASSESSMENT     Source(s): Chart Review and Patient/Caregiver Call     Warfarin doses taken: More warfarin taken than planned which may be affecting INR Patient reports he increased his warfarin dose to 3 mg daily since he last checked his INR 4/29/24 and his INR was at 2.1  Diet: No new diet changes identified  Medication/supplement changes: fluoxetine dose decreased on 5/8/24 which could decrease INR, patient is being weaned off will complete fluoxetine this week  Citalopram, patient patient will begin when he is finished weaning down from fluoxetine. Citalopram can increase INR per UpToDate.  New illness, injury, or hospitalization: Yes: patient reports he pulled a groin muscle 5/11/24, continues to have pain, painful to move leg, patient will consider seeing provider if not better in a couple days  Signs or symptoms of bleeding or clotting: No  Previous result: Therapeutic last 2(+) visits  Additional findings: None       PLAN     Recommended plan for temporary change(s) affecting INR     Dosing Instructions: hold dose then continue your current warfarin dose with next INR in 7-10 days       Summary  As of 5/15/2024      Full warfarin instructions:  5/15: Hold; Otherwise 2 mg every Tue; 3 mg all other days   Next INR check:  5/23/2024               Telephone call with Min who agrees to plan and repeated back plan correctly    Patient to recheck with home meter    Education provided:   Please call back if any changes to your diet, medications or how you've been taking warfarin  Taking warfarin: Importance of taking warfarin as instructed  Importance of notifying anticoagulation clinic for: changes in medications; a sooner lab recheck maybe needed  Contact [phone]  with any changes, questions or concerns.      Plan made per [facility] anticoagulation protocol    Bela Vang RN  Anticoagulation Clinic  5/15/2024    _______________________________________________________________________     Anticoagulation Episode Summary       Current INR goal:  2.0-3.0   TTR:  94.1% (4.8 mo)   Target end date:  Indefinite   Send INR reminders to:  [facility]    Indications    H/O mechanical aortic valve replacement [Z95.2]  Long term current use of anticoagulant therapy [Z79.01]             Comments:  [employer] home meter             Anticoagulation Care Providers       Provider Role Specialty Phone number    Min Fried MD Referring Internal Medicine [phone]

## 2024-05-23 ENCOUNTER — ANTICOAGULATION THERAPY VISIT (OUTPATIENT)
Dept: ANTICOAGULATION | Facility: CLINIC | Age: 69
End: 2024-05-23
Payer: MEDICARE

## 2024-05-23 DIAGNOSIS — Z79.01 LONG TERM CURRENT USE OF ANTICOAGULANT THERAPY: ICD-10-CM

## 2024-05-23 DIAGNOSIS — Z95.2 H/O MECHANICAL AORTIC VALVE REPLACEMENT: Primary | ICD-10-CM

## 2024-05-23 LAB — INR HOME MONITORING: 2.7 (ref 2–3)

## 2024-05-23 NOTE — PROGRESS NOTES
ANTICOAGULATION MANAGEMENT     Min Andino 68 year old male is on warfarin with therapeutic INR result. (Goal INR 2.0-3.0)    Recent labs: (last 7 days)     05/23/24  0000   INR 2.7       ASSESSMENT     Source(s): Chart Review  Previous INR was Supratherapeutic  Medication, diet, health changes since last INR chart reviewed; none identified         PLAN     Recommended plan for no diet, medication or health factor changes affecting INR     Dosing Instructions: Continue your current warfarin dose with next INR in 2 weeks       Summary  As of 5/23/2024      Full warfarin instructions:  2 mg every Tue; 3 mg all other days   Next INR check:  6/6/2024               Detailed voice message left for Min with dosing instructions and follow up date.   Sent [employer] message with dosing and follow up instructions    Contact [phone]  to schedule and with any changes, questions or concerns.     Education provided:   Please call back if any changes to your diet, medications or how you've been taking warfarin  Contact [phone]  with any changes, questions or concerns.     Plan made per [facility] anticoagulation protocol    Naya Abdi RN  Anticoagulation Clinic  5/23/2024    _______________________________________________________________________     Anticoagulation Episode Summary       Current INR goal:  2.0-3.0   TTR:  90.3% (5 mo)   Target end date:  Indefinite   Send INR reminders to:  [facility]    Indications    H/O mechanical aortic valve replacement [Z95.2]  Long term current use of anticoagulant therapy [Z79.01]             Comments:  [employer] home meter             Anticoagulation Care Providers       Provider Role Specialty Phone number    Min Fried MD Referring Internal Medicine [phone]

## 2024-05-29 ENCOUNTER — MYC MEDICAL ADVICE (OUTPATIENT)
Dept: INTERNAL MEDICINE | Facility: CLINIC | Age: 69
End: 2024-05-29
Payer: MEDICARE

## 2024-05-29 DIAGNOSIS — Z29.89 SBE (SUBACUTE BACTERIAL ENDOCARDITIS) PROPHYLAXIS CANDIDATE: ICD-10-CM

## 2024-05-30 RX ORDER — AMOXICILLIN 500 MG/1
CAPSULE ORAL
Qty: 4 CAPSULE | Refills: 5 | Status: SHIPPED | OUTPATIENT
Start: 2024-05-30 | End: 2024-05-31

## 2024-05-31 RX ORDER — AMOXICILLIN 500 MG/1
CAPSULE ORAL
Qty: 24 CAPSULE | Refills: 5 | Status: SHIPPED | OUTPATIENT
Start: 2024-05-31

## 2024-06-01 ENCOUNTER — MYC MEDICAL ADVICE (OUTPATIENT)
Dept: INTERNAL MEDICINE | Facility: CLINIC | Age: 69
End: 2024-06-01
Payer: MEDICARE

## 2024-06-01 DIAGNOSIS — N40.1 BENIGN PROSTATIC HYPERPLASIA WITH URINARY FREQUENCY: ICD-10-CM

## 2024-06-01 DIAGNOSIS — R35.0 BENIGN PROSTATIC HYPERPLASIA WITH URINARY FREQUENCY: ICD-10-CM

## 2024-06-03 RX ORDER — TAMSULOSIN HYDROCHLORIDE 0.4 MG/1
0.4 CAPSULE ORAL DAILY
Qty: 90 CAPSULE | Refills: 0 | OUTPATIENT
Start: 2024-06-03

## 2024-06-04 DIAGNOSIS — I50.22 CHRONIC SYSTOLIC HEART FAILURE (H): ICD-10-CM

## 2024-06-04 NOTE — TELEPHONE ENCOUNTER
Last Written Prescription Date:  12/13/23  Last Fill Quantity: 90,  # refills: 3   Last office visit: 4/15/2024 with    Future Office Visit:  9/5/24     Gabbie Khan on 6/4/2024 at 7:31 AM

## 2024-06-07 ENCOUNTER — ANTICOAGULATION THERAPY VISIT (OUTPATIENT)
Dept: ANTICOAGULATION | Facility: CLINIC | Age: 69
End: 2024-06-07
Payer: MEDICARE

## 2024-06-07 DIAGNOSIS — Z79.01 LONG TERM CURRENT USE OF ANTICOAGULANT THERAPY: ICD-10-CM

## 2024-06-07 DIAGNOSIS — Z95.2 H/O MECHANICAL AORTIC VALVE REPLACEMENT: Primary | ICD-10-CM

## 2024-06-07 LAB — INR HOME MONITORING: 2.3 (ref 2–3)

## 2024-06-07 NOTE — PROGRESS NOTES
ANTICOAGULATION MANAGEMENT     Min Andino 68 year old male is on warfarin with therapeutic INR result. (Goal INR 2.0-3.0)    Recent labs: (last 7 days)     06/06/24  0000   INR 2.3       ASSESSMENT     Source(s): Chart Review and Patient/Caregiver Call            PLAN     Recommended plan for no diet, medication or health factor changes affecting INR     Dosing Instructions: Continue your current warfarin dose with next INR in 2 weeks       Summary  As of 6/7/2024      Full warfarin instructions:  2 mg every Tue; 3 mg all other days   Next INR check:  6/20/2024               Detailed voice message left for Min with dosing instructions and follow up date.     Patient to recheck with home meter    Education provided:   Please call back if any changes to your diet, medications or how you've been taking warfarin  Advised in detailed VM to check INR during Anticoagulation clinic hours of 8-430 so if out of range, a nurse is available for safe dosing.    Plan made per [facility] anticoagulation protocol    Shantal Hoover RN  Anticoagulation Clinic  6/7/2024    _______________________________________________________________________     Anticoagulation Episode Summary       Current INR goal:  2.0-3.0   TTR:  91.2% (5.5 mo)   Target end date:  Indefinite   Send INR reminders to:  [facility]    Indications    H/O mechanical aortic valve replacement [Z95.2]  Long term current use of anticoagulant therapy [Z79.01]             Comments:  [employer] home meter             Anticoagulation Care Providers       Provider Role Specialty Phone number    Min Fried MD Referring Internal Medicine [phone]

## 2024-06-10 RX ORDER — TAMSULOSIN HYDROCHLORIDE 0.4 MG/1
0.4 CAPSULE ORAL DAILY
Qty: 90 CAPSULE | Refills: 0 | Status: SHIPPED | OUTPATIENT
Start: 2024-06-10

## 2024-06-10 NOTE — TELEPHONE ENCOUNTER
Patient requesting a 90 day refill.  Pharmacy currently has 30 day prescription.  To provider for advisement/refill need with increased qty..  Kylee KERR RN

## 2024-06-15 LAB — INR HOME MONITORING: 2.8 (ref 2–3)

## 2024-06-17 ENCOUNTER — ANTICOAGULATION THERAPY VISIT (OUTPATIENT)
Dept: ANTICOAGULATION | Facility: CLINIC | Age: 69
End: 2024-06-17
Payer: MEDICARE

## 2024-06-17 DIAGNOSIS — Z79.01 LONG TERM CURRENT USE OF ANTICOAGULANT THERAPY: ICD-10-CM

## 2024-06-17 DIAGNOSIS — Z95.2 H/O MECHANICAL AORTIC VALVE REPLACEMENT: Primary | ICD-10-CM

## 2024-06-17 NOTE — PROGRESS NOTES
ANTICOAGULATION MANAGEMENT     Min Andino 68 year old male is on warfarin with therapeutic INR result. (Goal INR 2.0-3.0)    Recent labs: (last 7 days)     06/15/24  0000   INR 2.8       ASSESSMENT     Source(s): Chart Review  Previous INR was Therapeutic last 2(+) visits  Medication, diet, health changes since last INR chart reviewed; none identified         PLAN     Recommended plan for no diet, medication or health factor changes affecting INR     Dosing Instructions: Continue your current warfarin dose with next INR in 2 weeks       Summary  As of 6/17/2024      Full warfarin instructions:  2 mg every Tue; 3 mg all other days   Next INR check:  7/1/2024               Detailed voice message left for Min with dosing instructions and follow up date.     Patient to recheck with home meter    Education provided:   Please call back if any changes to your diet, medications or how you've been taking warfarin  Symptom monitoring: monitoring for bleeding signs and symptoms, monitoring for clotting signs and symptoms, and monitoring for stroke signs and symptoms  Contact [phone]  with any changes, questions or concerns.     Plan made per [facility] anticoagulation protocol    Brittni Lemus RN  Anticoagulation Clinic  6/17/2024    _______________________________________________________________________     Anticoagulation Episode Summary       Current INR goal:  2.0-3.0   TTR:  91.6% (5.8 mo)   Target end date:  Indefinite   Send INR reminders to:  [facility]    Indications    H/O mechanical aortic valve replacement [Z95.2]  Long term current use of anticoagulant therapy [Z79.01]             Comments:  [employer] home meter             Anticoagulation Care Providers       Provider Role Specialty Phone number    Min Fried MD Referring Internal Medicine [phone]

## 2024-06-25 ENCOUNTER — LAB (OUTPATIENT)
Dept: LAB | Facility: OTHER | Age: 69
End: 2024-06-25
Payer: MEDICARE

## 2024-06-25 DIAGNOSIS — N18.32 STAGE 3B CHRONIC KIDNEY DISEASE (H): ICD-10-CM

## 2024-06-25 LAB
ALBUMIN MFR UR ELPH: 6.5 MG/DL
ALBUMIN SERPL BCG-MCNC: 3.8 G/DL (ref 3.5–5.2)
ANION GAP SERPL CALCULATED.3IONS-SCNC: 8 MMOL/L (ref 7–15)
BUN SERPL-MCNC: 31.7 MG/DL (ref 8–23)
CALCIUM SERPL-MCNC: 9.2 MG/DL (ref 8.8–10.2)
CHLORIDE SERPL-SCNC: 105 MMOL/L (ref 98–107)
CREAT SERPL-MCNC: 1.76 MG/DL (ref 0.67–1.17)
CREAT UR-MCNC: 69 MG/DL
DEPRECATED HCO3 PLAS-SCNC: 26 MMOL/L (ref 22–29)
EGFRCR SERPLBLD CKD-EPI 2021: 41 ML/MIN/1.73M2
GLUCOSE SERPL-MCNC: 87 MG/DL (ref 70–99)
HGB BLD-MCNC: 12.7 G/DL (ref 13.3–17.7)
PHOSPHATE SERPL-MCNC: 3.9 MG/DL (ref 2.5–4.5)
POTASSIUM SERPL-SCNC: 5.2 MMOL/L (ref 3.4–5.3)
PROT/CREAT 24H UR: 0.09 MG/MG CR (ref 0–0.2)
SODIUM SERPL-SCNC: 139 MMOL/L (ref 135–145)

## 2024-06-25 PROCEDURE — 80069 RENAL FUNCTION PANEL: CPT

## 2024-06-25 PROCEDURE — 36415 COLL VENOUS BLD VENIPUNCTURE: CPT

## 2024-06-25 PROCEDURE — 84156 ASSAY OF PROTEIN URINE: CPT

## 2024-06-25 PROCEDURE — 85018 HEMOGLOBIN: CPT

## 2024-07-02 ENCOUNTER — ANTICOAGULATION THERAPY VISIT (OUTPATIENT)
Dept: ANTICOAGULATION | Facility: CLINIC | Age: 69
End: 2024-07-02
Payer: MEDICARE

## 2024-07-02 DIAGNOSIS — Z95.2 H/O MECHANICAL AORTIC VALVE REPLACEMENT: Primary | ICD-10-CM

## 2024-07-02 DIAGNOSIS — Z79.01 LONG TERM CURRENT USE OF ANTICOAGULANT THERAPY: ICD-10-CM

## 2024-07-02 LAB — INR HOME MONITORING: 2.4 (ref 2–3)

## 2024-07-02 NOTE — PROGRESS NOTES
ANTICOAGULATION MANAGEMENT     Min Andino 68 year old male is on warfarin with therapeutic INR result. (Goal INR 2.0-3.0)    Recent labs: (last 7 days)     06/28/24  0000   INR 2.4       ASSESSMENT     Source(s): Chart Review and Patient/Caregiver Call     Warfarin doses taken: Warfarin taken as instructed  Diet: No new diet changes identified  Medication/supplement changes:  restarted Bumex , no interaction with warfarin expected  New illness, injury, or hospitalization: No  Signs or symptoms of bleeding or clotting: No  Previous result: Therapeutic last 2(+) visits  Additional findings: None       PLAN     Recommended plan for no diet, medication or health factor changes affecting INR     Dosing Instructions: Continue your current warfarin dose with next INR in 2 weeks       Summary  As of 7/2/2024      Full warfarin instructions:  2 mg every Tue; 3 mg all other days   Next INR check:  7/12/2024               Telephone call with Min who agrees to plan and repeated back plan correctly    Patient to recheck with home meter    Education provided: Please call back if any changes to your diet, medications or how you've been taking warfarin  Goal range and lab monitoring: goal range and significance of current result    Plan made per [facility] anticoagulation protocol    Morena Dempsey RN  Anticoagulation Clinic  7/2/2024    _______________________________________________________________________     Anticoagulation Episode Summary       Current INR goal:  2.0-3.0   TTR:  92.2% (6.2 mo)   Target end date:  Indefinite   Send INR reminders to:  [facility]    Indications    H/O mechanical aortic valve replacement [Z95.2]  Long term current use of anticoagulant therapy [Z79.01]             Comments:  [employer] home meter             Anticoagulation Care Providers       Provider Role Specialty Phone number    Min Fried MD Referring Internal Medicine [phone]

## 2024-07-10 ENCOUNTER — OFFICE VISIT (OUTPATIENT)
Dept: PODIATRY | Facility: OTHER | Age: 69
End: 2024-07-10
Payer: MEDICARE

## 2024-07-10 ENCOUNTER — ANCILLARY PROCEDURE (OUTPATIENT)
Dept: GENERAL RADIOLOGY | Facility: OTHER | Age: 69
End: 2024-07-10
Attending: PODIATRIST
Payer: MEDICARE

## 2024-07-10 VITALS
SYSTOLIC BLOOD PRESSURE: 92 MMHG | DIASTOLIC BLOOD PRESSURE: 50 MMHG | BODY MASS INDEX: 28.71 KG/M2 | WEIGHT: 212 LBS | HEIGHT: 72 IN

## 2024-07-10 DIAGNOSIS — M72.2 PLANTAR FASCIITIS OF RIGHT FOOT: Primary | ICD-10-CM

## 2024-07-10 DIAGNOSIS — M79.671 RIGHT FOOT PAIN: ICD-10-CM

## 2024-07-10 PROCEDURE — 99203 OFFICE O/P NEW LOW 30 MIN: CPT | Performed by: PODIATRIST

## 2024-07-10 PROCEDURE — 73630 X-RAY EXAM OF FOOT: CPT | Mod: TC | Performed by: RADIOLOGY

## 2024-07-10 ASSESSMENT — PAIN SCALES - GENERAL: PAINLEVEL: NO PAIN (0)

## 2024-07-10 NOTE — PATIENT INSTRUCTIONS
Reliable shoe stores: To maximize your experience and provide the best possible fit.  Be sure to show them your foot concerns and tell them Dr. Putnam sent you.      Stores listed in bold have only athletic shoes, and stores that are not bold are mostly casual or variety of shoes    [employer]  [address] [place] [address]  [phone]    [employer] [facility] [place]  [address] [place] [address]  [phone]     [employer]  [address] [place] [address]  [phone]    [employer]  [address] [facility]  [facility] [address]  [phone]    [employer]  [address] [place], [place] [address] [phone]    [employer]  [address] [place] [address]  [phone]                         [employer] Locations:     [address] [place] [address]   [phone]     [address] [place] [address] [address] [place] [address]   [phone]     [address] [place] [address]   [phone]     [address] [facility] [place] [address]   [phone]     [address] [place] [address]   [phone]     [address] [place] [address]   [phone]     [address] [facility] [address]   [place] [address]   [phone]     [address] [facility]   [place] [address]   [phone]             How to find reasonable shoes          The correct width    Correct Fitting    Correct Length      Foot Distortion    Posture Distortion                          Torsional Rigidity      Grasp behind the heel and underneath the foot and twist      Bad    Excessive torsion/twist in midfoot     Less torsion/twist in midfoot is better                   Heel Counter Rigidity      Grasp just above   midsole and squeeze      Bad    Soft heel counter      Good    Rigid Heel Counter      Flexion Rigidity      Grasp shoe and bend from forefoot to rearfoot             Nail Debridement    A high quality instrument  "makes trimming toenails MUCH easier.  Search [employer] for any 5\" nail nipper manufactured by reliable brands such as Miltex, Integra or Jarit as these quality instruments will help manage difficult nails more effectively and comfortably. We use Miltex -SS.  A physician is not necessary to trim nails even if you are taking blood thinners or are diabetic.  Your family or care givers may help manage your toenails.      Trim or sand the nails once weekly.  Do not wait until they are long and painful or trimming will become too difficult and painful and will increase your risk of complications or infection.  A course file or 120 grit sandpaper on a sanding block can be helpful.  For very thick nails many people prefer battery operated pérez such as an Amope', Personal Pedi and Emjoi for regular use or heavy painful callouses or thick toenails.    Trim or skive any portion of nail that is thick, loose, crumbling, or not well attached. Do not tear the nail away, but rather cut them with a nail nipperor sand or sand them down.  You may follow up with your Podiatric Physician if you have pain, bleeding, infection, questions or other concerns.      You may also contact the following Registered Nurses for further help with nail debridement and minor hygiene concnerns.  They may come to your home or meet them at their clinic to trim your toenails and soak your feet, as well as monitor for any complications that would require evaluation by a Physician.      [facility]  Corin Pena RN  Phone & text [phone]    [facility]  Madeline Hoffman RN  Office [phone]    [employer] [facility]  [phone]  [email or web address]    For up to date list and to find foot care nurses in other communities visit American Foot Care Nurses Association website:  afcna.org.     Calluses, Corns, IPKs, Porokeratosis    When there is excessive friction or pressure on the skin, the " body responds by making the skin thicker.  While this may protect the deeper structures, the thickened skin can take up more space and thus increase pressure over a bony prominence or become an open sore or skin ulcer as this skin becomes less flexible.    Flat, diffuse thickening are simple calluses and they are usually caused by friction.  Often these are the result of rubbing on a shoe or going barefoot.    Calluses with a central core between the toes are called corns.  These often result from prominent joints on adjacent toes rubbing together.  Theses are often a symptom of bone malalignment and will usually recur unless the underlying bones are addressed.    Many of these lesions can be kept comfortable with routine maintenance. This consists of filing them with a Ped Egg, callus file, or 120 grit sandpaper on a block, every day during your bath or shower.  Most people prefer battery operated pérez such as an Amope', Personal Pedi and Emjoi for regular use or heavy painful callouses.  Heavy creams or ointments can be applied 1-2 times every day to keep them soft. Toe spacers can be used for corns, gel pads can be used for other lesions on the bottom of the foot. If there is a deformity noted, such as a prominent bone, often this can be addressed to minimize recurrence. However, sometimes the pressure and lesion simply migrates to another spot after surgery, so it is not a guaranteed cure.     If you have severe callouses and cracking, you may apply heavy ointments that you scoop up such as Cetaphil cream, Eucerin, Aquaphor or Vaseline.  Be sure to obtain cream or ointment in these brands and not lotion (lotion is water based and not durable enough for feet). For more aggressive help apply heavy creams or ointment under occlusive dressings such as Saran Wrap or Jelly Feet while sleeping.   Jelly Feet can be obtained at [email or web address].     To be successful with managing hyperkeratotic skin, you must manage  hygiene daily.  Apply the cream once or twice EVERY day.  At your bath or shower time is the easiest time to work on this when skin is most soft.  There is no medical or surgical treatment that will absolutely eliminate many of these symptoms.      [employer] is a reliable source for all sorts of foot pads, cushions, or interdigital spacers and foot appliances. Go to [email or web address].[employer] or request a catalog at 8-986-eRelyx.        Please call with any additional questions.

## 2024-07-10 NOTE — PROGRESS NOTES
HPI:  Min Andino is a 68 year old male who is seen in consultation at the request of self.    Pt presents for eval of:   (Onset, Location, L/R, Character, Treatments, Injury if yes)    XR Right foot 7/10/2024    WARFARIN     Onset early June 2024, [place] with unsupportive shoes, sudden plantar mid arch of Right foot, intense pain, had to limp to the house. No injury noted. NWB for 2 days with crutches, unable to put any pressure on that foot. Presents 7/10/2024, denies pain or swelling.  2. Ongoing callus plantar Left and Right 5th met head.  3. Ongoing Left and Right toe numbness after open heart and vascular surgeries.    Retired from [employer].    ROS:  10 point ROS neg other than the symptoms noted above in the HPI.    Patient Active Problem List   Diagnosis    CARDIOVASCULAR SCREENING; LDL GOAL LESS THAN 160    Hypertension, goal below 140/90    CKD (chronic kidney disease) stage 3, GFR 30-59 ml/min (H)    Hx of aortic aneurysm    Aneurysm of iliac artery (H24)    Aortic regurgitation    Callus    Congestive heart failure (H)    Coronary atherosclerosis    Delirium, acute    Gastroesophageal reflux disease without esophagitis    Hyperlipidemia    Presence of aortocoronary bypass graft    Pseudoaneurysm of femoral artery (H24)    History of endovascular stent graft for abdominal aortic aneurysm (AAA)    Tailors bunion    Vitamin D deficiency    Urinary urgency    H/O mechanical aortic valve replacement    Long term current use of anticoagulant therapy    S/P insertion of iliac artery stent    Anemia of chronic renal failure, unspecified CKD stage    Thoracic aortic aneurysm without rupture (H24)    Class 1 obesity due to excess calories with serious comorbidity and body mass index (BMI) of 34.0 to 34.9 in adult    Secondary renal hyperparathyroidism (H24)       PAST MEDICAL HISTORY:   Past Medical History:   Diagnosis Date    Anemia of chronic renal failure, unspecified CKD stage  "8/27/2021    Aortic dissection (H)     Aortic root aneurysm (H24)     Arthritis     C. difficile colitis     April 2014 following surgery    CKD (chronic kidney disease) stage 3, GFR 30-59 ml/min (H) 9/28/2014    Class 1 obesity due to excess calories with serious comorbidity and body mass index (BMI) of 34.0 to 34.9 in adult 3/8/2022    Connective tissue disease (H24)     \"probable\" per [facility] Notes    DVT (deep venous thrombosis) (H)     April 2014 following surgery    History of blood transfusion     Horseshoe kidney     Hypertension     S/P insertion of iliac artery stent 11/11/2020    Left internal iliac artery stenting for endoleak    Thoracic aortic aneurysm without rupture (H24) 3/8/2014    Urinary urgency 10/12/2020    Valvular cardiomyopathy (H)         PAST SURGICAL HISTORY:   Past Surgical History:   Procedure Laterality Date    ABDOMEN SURGERY      AORTIC VALVE REPLACEMENT  4/7/14    ARTHROSCOPY KNEE RT/LT  2005    left, repair meniscus    COLONOSCOPY      COLONOSCOPY N/A 6/22/2022    Procedure: COLONOSCOPY, WITH POLYPECTOMY AND BIOPSY;  Surgeon: Josh Morales MD;  Location:  GI    ESOPHAGOSCOPY, GASTROSCOPY, DUODENOSCOPY (EGD), COMBINED N/A 6/22/2022    Procedure: ESOPHAGOGASTRODUODENOSCOPY, WITH BIOPSY;  Surgeon: Josh Morales MD;  Location:  GI    REPAIR AORTIC ROOT  4/7/14    surgery followed by ECMO, vasopressor therapy    SOFT TISSUE SURGERY      THORACIC SURGERY      VASCULAR SURGERY      [facility] CABG, ARTERIAL, SINGLE  4/7/14    [facility] REPAIR CRUCIATE LIGAMENT,KNEE  1999    left        MEDICATIONS:   Current Outpatient Medications:     amiodarone (PACERONE) 200 MG tablet, 200 mg oral bid for 2 weeks, then 200 mg daily, Disp: , Rfl:     aspirin (ASA) 81 MG chewable tablet, Take 81 mg by mouth daily, Disp: , Rfl:     atorvastatin (LIPITOR) 40 MG tablet, Take 1 tablet (40 mg) by mouth daily Last refill. Need follow-up with Dr. Blair., Disp: 90 tablet, Rfl: 3    bumetanide (BUMEX) " 0.5 MG tablet, Take 1 tablet (0.5 mg) by mouth daily as needed, Disp: 90 tablet, Rfl: 3    carvedilol (COREG) 6.25 MG tablet, Take 1 tablet (6.25 mg) by mouth 2 times daily (with meals), Disp: 180 tablet, Rfl: 3    citalopram (CELEXA) 10 MG tablet, Take 1 tablet (10 mg) by mouth daily, Disp: 90 tablet, Rfl: 3    docusate sodium (COLACE) 100 MG capsule, daily , Disp: , Rfl:     empagliflozin (JARDIANCE) 10 MG TABS tablet, Take 1 tablet (10 mg) by mouth daily, Disp: 90 tablet, Rfl: 2    multivitamin w/minerals (THERA-VIT-M) tablet, Take 1 tablet by mouth daily, Disp: , Rfl:     psyllium (METAMUCIL/KONSYL) 0.52 g capsule, Take 1.04 g by mouth, Disp: , Rfl:     sacubitril-valsartan (ENTRESTO) 24-26 MG per tablet, Take 1 tablet by mouth 2 times daily, Disp: 180 tablet, Rfl: 3    tamsulosin (FLOMAX) 0.4 MG capsule, Take 1 capsule (0.4 mg) by mouth daily, Disp: 90 capsule, Rfl: 0    Vitamin D, Cholecalciferol, 25 MCG (1000 UT) TABS, 2 times daily , Disp: , Rfl:     warfarin ANTICOAGULANT (COUMADIN) 1 MG tablet, Take 2 mg on Tues/Fri or as directed by INR clinic, Disp: 55 tablet, Rfl: 0    warfarin ANTICOAGULANT (COUMADIN) 3 MG tablet, Take by mouth 3 mg (3 mg x 1 tablet) every day or as directed by INR clinic, Disp: 90 tablet, Rfl: 1    warfarin ANTICOAGULANT (COUMADIN) 4 MG tablet, Take 4 mg on Monday, Wed, Thurs, Friday, Sunday or as directed by INR clinic, Disp: 75 tablet, Rfl: 1    amoxicillin (AMOXIL) 500 MG capsule, Take 4 capsules 1 hour before dental work (Patient not taking: Reported on 7/10/2024), Disp: 24 capsule, Rfl: 5    nitroGLYcerin (NITROSTAT) 0.4 MG sublingual tablet, Place 1 tablet (0.4 mg) under the tongue every 5 minutes as needed for chest pain (Patient not taking: Reported on 7/10/2024), Disp: 10 tablet, Rfl: 0     ALLERGIES:  No Known Allergies     SOCIAL HISTORY:   Social History     Socioeconomic History    Marital status:      Spouse name: Not on file    Number of children: 5    Years of  education: Not on file    Highest education level: Not on file   Occupational History     Employer: [employer]     Comment: 35 years     Employer: OTHER     Comment: landscaping business   Tobacco Use    Smoking status: Never    Smokeless tobacco: Never   Vaping Use    Vaping status: Never Used   Substance and Sexual Activity    Alcohol use: Yes     Comment: reports very little etoh use.     Drug use: No    Sexual activity: Not Currently     Partners: Female     Birth control/protection: None   Other Topics Concern    Parent/sibling w/ CABG, MI or angioplasty before 65F 55M? No   Social History Narrative    Not on file     Social Determinants of Health     Financial Resource Strain: Low Risk  (5/6/2024)    Financial Resource Strain     Within the past 12 months, have you or your family members you live with been unable to get utilities (heat, electricity) when it was really needed?: No   Food Insecurity: Low Risk  (5/6/2024)    Food Insecurity     Within the past 12 months, did you worry that your food would run out before you got money to buy more?: No     Within the past 12 months, did the food you bought just not last and you didn t have money to get more?: No   Transportation Needs: Low Risk  (5/6/2024)    Transportation Needs     Within the past 12 months, has lack of transportation kept you from medical appointments, getting your medicines, non-medical meetings or appointments, work, or from getting things that you need?: No   Physical Activity: Inactive (9/15/2022)    [facility]    Exercise Vital Sign     Days of Exercise per Week: 0 days     Minutes of Exercise per Session: 0 min   Stress: Stress Concern Present (5/6/2024)    [place] of Occupational Health - Occupational Stress Questionnaire     Feeling of Stress : To some extent   Social Connections: Unknown (5/6/2024)    Social Connection and Isolation Panel [NHANES]     Frequency of Communication  "with Friends and Family: Not on file     Frequency of Social Gatherings with Friends and Family: Twice a week     Attends Scientology Services: Not on file     Active Member of Clubs or Organizations: Not on file     Attends Club or Organization Meetings: Not on file     Marital Status: Not on file   Interpersonal Safety: Low Risk  (12/6/2023)    Interpersonal Safety     Do you feel physically and emotionally safe where you currently live?: Yes     Within the past 12 months, have you been hit, slapped, kicked or otherwise physically hurt by someone?: Not on file     Within the past 12 months, have you been humiliated or emotionally abused in other ways by your partner or ex-partner?: Not on file   Housing Stability: Low Risk  (5/6/2024)    Housing Stability     Do you have housing? : Yes     Are you worried about losing your housing?: No        FAMILY HISTORY:   Family History   Problem Relation Age of Onset    Breast Cancer Mother 85    Cancer Father     Aneurysm Other         family hx    C.A.D. No family hx of     Diabetes No family hx of     Hypertension No family hx of         EXAM:Vitals: BP 92/50 (BP Location: Left arm, Patient Position: Sitting, Cuff Size: Adult Regular)   Ht 1.816 m (5' 11.5\")   Wt 96.2 kg (212 lb)   BMI 29.16 kg/m    BMI= Body mass index is 29.16 kg/m .    General appearance: Patient is alert and fully cooperative with history & exam.  No sign of distress is noted during the visit.     Psychiatric: Affect is pleasant & appropriate.  Patient appears motivated to improve health.     Respiratory: Breathing is regular & unlabored while sitting.     HEENT: Hearing is intact to spoken word.  Speech is clear.  No gross evidence of visual impairment that would impact ambulation.     Vascular: DP & PT pulses are intact & regular bilaterally.  No significant edema or varicosities noted.  CFT and skin temperature is normal to both lower extremities.     Neurologic: Lower extremity sensation is " intact to light touch.  No evidence of weakness or contracture in the lower extremities.  No evidence of neuropathy.    Dermatologic: Skin is intact to both lower extremities with adequate texture, turgor and tone about the integument.  No paronychia or evidence of soft tissue infection is noted.     Musculoskeletal: Patient is ambulatory without assistive device or brace.  Pinpoint area of discomfort with firm palpation along the medial band of plantar fascia centrally through the arch not upon the origination of the calcaneus.  No palpable edema.    Radiographs: 3 views right foot demonstrate osteophyte about the plantar calcaneus.     ASSESSMENT:       ICD-10-CM    1. Plantar fasciitis of right foot  M72.2            PLAN:  Reviewed patient's chart in [facility].      7/10/2024   patient appears to have strained his plantar fascia and is now 80 or 90% improved after 3 weeks.  Recommended sturdy shoes avoiding barefoot walking and osteophyte at the plantar calcaneus.  Obtained and interpreted radiographs.  May consider custom molded orthotics or other treatments if this becomes chronic however in 3 weeks time 80 or 90% of his symptoms have resolved.  Recommended appropriate shoe gear.  He admits wearing slip on shoes has not been helpful and since he discontinued them most of his symptoms have resolved.  Follow-up at this remain symptomatic.      Iker Putnam DPM      Hemoglobin A1C (%)   Date Value   05/08/2024 5.0   12/29/2013 5.6     Creatinine (mg/dL)   Date Value   06/25/2024 1.76 (H)   05/08/2024 1.75 (H)   04/05/2024 2.08 (H)   03/06/2024 1.97 (H)   01/30/2024 1.84 (H)   01/15/2024 1.97 (H)   04/14/2021 1.52 (H)   01/21/2021 1.30 (H)   10/16/2020 1.46 (H)   09/15/2020 1.47 (H)   08/18/2020 1.39 (H)   07/22/2020 1.61 (H)

## 2024-07-10 NOTE — LETTER
7/10/2024      Min Andino  [address] [facility] [address]      Dear Colleague,    Thank you for referring your patient, Min Andino, to the [facility]. Please see a copy of my visit note below.    HPI:  Min Andino is a 68 year old male who is seen in consultation at the request of self.    Pt presents for eval of:   (Onset, Location, L/R, Character, Treatments, Injury if yes)    XR Right foot 7/10/2024    WARFARIN     Onset early June 2024, [place] with unsupportive shoes, sudden plantar mid arch of Right foot, intense pain, had to limp to the house. No injury noted. NWB for 2 days with crutches, unable to put any pressure on that foot. Presents 7/10/2024, denies pain or swelling.  2. Ongoing callus plantar Left and Right 5th met head.  3. Ongoing Left and Right toe numbness after open heart and vascular surgeries.    Retired from [employer].    ROS:  10 point ROS neg other than the symptoms noted above in the HPI.    Patient Active Problem List   Diagnosis     CARDIOVASCULAR SCREENING; LDL GOAL LESS THAN 160     Hypertension, goal below 140/90     CKD (chronic kidney disease) stage 3, GFR 30-59 ml/min (H)     Hx of aortic aneurysm     Aneurysm of iliac artery (H24)     Aortic regurgitation     Callus     Congestive heart failure (H)     Coronary atherosclerosis     Delirium, acute     Gastroesophageal reflux disease without esophagitis     Hyperlipidemia     Presence of aortocoronary bypass graft     Pseudoaneurysm of femoral artery (H24)     History of endovascular stent graft for abdominal aortic aneurysm (AAA)     Tailors bunion     Vitamin D deficiency     Urinary urgency     H/O mechanical aortic valve replacement     Long term current use of anticoagulant therapy     S/P insertion of iliac artery stent     Anemia of chronic renal failure, unspecified CKD stage     Thoracic aortic aneurysm without rupture (H24)     Class 1 obesity  "due to excess calories with serious comorbidity and body mass index (BMI) of 34.0 to 34.9 in adult     Secondary renal hyperparathyroidism (H24)       PAST MEDICAL HISTORY:   Past Medical History:   Diagnosis Date     Anemia of chronic renal failure, unspecified CKD stage 8/27/2021     Aortic dissection (H)      Aortic root aneurysm (H24)      Arthritis      C. difficile colitis     April 2014 following surgery     CKD (chronic kidney disease) stage 3, GFR 30-59 ml/min (H) 9/28/2014     Class 1 obesity due to excess calories with serious comorbidity and body mass index (BMI) of 34.0 to 34.9 in adult 3/8/2022     Connective tissue disease (H24)     \"probable\" per [facility] Notes     DVT (deep venous thrombosis) (H)     April 2014 following surgery     History of blood transfusion      Horseshoe kidney      Hypertension      S/P insertion of iliac artery stent 11/11/2020    Left internal iliac artery stenting for endoleak     Thoracic aortic aneurysm without rupture (H24) 3/8/2014     Urinary urgency 10/12/2020     Valvular cardiomyopathy (H)         PAST SURGICAL HISTORY:   Past Surgical History:   Procedure Laterality Date     ABDOMEN SURGERY       AORTIC VALVE REPLACEMENT  4/7/14     ARTHROSCOPY KNEE RT/LT  2005    left, repair meniscus     COLONOSCOPY       COLONOSCOPY N/A 6/22/2022    Procedure: COLONOSCOPY, WITH POLYPECTOMY AND BIOPSY;  Surgeon: Josh Morales MD;  Location: [facility]     ESOPHAGOSCOPY, GASTROSCOPY, DUODENOSCOPY (EGD), COMBINED N/A 6/22/2022    Procedure: ESOPHAGOGASTRODUODENOSCOPY, WITH BIOPSY;  Surgeon: Josh Morales MD;  Location: [facility]     REPAIR AORTIC ROOT  4/7/14    surgery followed by ECMO, vasopressor therapy     SOFT TISSUE SURGERY       THORACIC SURGERY       VASCULAR SURGERY       [facility] CABG, ARTERIAL, SINGLE  4/7/14     [facility] REPAIR CRUCIATE LIGAMENT,KNEE  1999    left        MEDICATIONS:   Current Outpatient Medications:      amiodarone (PACERONE) 200 MG tablet, 200 mg " oral bid for 2 weeks, then 200 mg daily, Disp: , Rfl:      aspirin (ASA) 81 MG chewable tablet, Take 81 mg by mouth daily, Disp: , Rfl:      atorvastatin (LIPITOR) 40 MG tablet, Take 1 tablet (40 mg) by mouth daily Last refill. Need follow-up with Dr. Blair., Disp: 90 tablet, Rfl: 3     bumetanide (BUMEX) 0.5 MG tablet, Take 1 tablet (0.5 mg) by mouth daily as needed, Disp: 90 tablet, Rfl: 3     carvedilol (COREG) 6.25 MG tablet, Take 1 tablet (6.25 mg) by mouth 2 times daily (with meals), Disp: 180 tablet, Rfl: 3     citalopram (CELEXA) 10 MG tablet, Take 1 tablet (10 mg) by mouth daily, Disp: 90 tablet, Rfl: 3     docusate sodium (COLACE) 100 MG capsule, daily , Disp: , Rfl:      empagliflozin (JARDIANCE) 10 MG TABS tablet, Take 1 tablet (10 mg) by mouth daily, Disp: 90 tablet, Rfl: 2     multivitamin w/minerals (THERA-VIT-M) tablet, Take 1 tablet by mouth daily, Disp: , Rfl:      psyllium (METAMUCIL/KONSYL) 0.52 g capsule, Take 1.04 g by mouth, Disp: , Rfl:      sacubitril-valsartan (ENTRESTO) 24-26 MG per tablet, Take 1 tablet by mouth 2 times daily, Disp: 180 tablet, Rfl: 3     tamsulosin (FLOMAX) 0.4 MG capsule, Take 1 capsule (0.4 mg) by mouth daily, Disp: 90 capsule, Rfl: 0     Vitamin D, Cholecalciferol, 25 MCG (1000 UT) TABS, 2 times daily , Disp: , Rfl:      warfarin ANTICOAGULANT (COUMADIN) 1 MG tablet, Take 2 mg on Tues/Fri or as directed by INR clinic, Disp: 55 tablet, Rfl: 0     warfarin ANTICOAGULANT (COUMADIN) 3 MG tablet, Take by mouth 3 mg (3 mg x 1 tablet) every day or as directed by INR clinic, Disp: 90 tablet, Rfl: 1     warfarin ANTICOAGULANT (COUMADIN) 4 MG tablet, Take 4 mg on Monday, Wed, Thurs, Friday, Sunday or as directed by INR clinic, Disp: 75 tablet, Rfl: 1     amoxicillin (AMOXIL) 500 MG capsule, Take 4 capsules 1 hour before dental work (Patient not taking: Reported on 7/10/2024), Disp: 24 capsule, Rfl: 5     nitroGLYcerin (NITROSTAT) 0.4 MG sublingual tablet, Place 1 tablet (0.4  mg) under the tongue every 5 minutes as needed for chest pain (Patient not taking: Reported on 7/10/2024), Disp: 10 tablet, Rfl: 0     ALLERGIES:  No Known Allergies     SOCIAL HISTORY:   Social History     Socioeconomic History     Marital status:      Spouse name: Not on file     Number of children: 5     Years of education: Not on file     Highest education level: Not on file   Occupational History     Employer: [employer]     Comment: 35 years     Employer: OTHER     Comment: landscaping business   Tobacco Use     Smoking status: Never     Smokeless tobacco: Never   Vaping Use     Vaping status: Never Used   Substance and Sexual Activity     Alcohol use: Yes     Comment: reports very little etoh use.      Drug use: No     Sexual activity: Not Currently     Partners: Female     Birth control/protection: None   Other Topics Concern     Parent/sibling w/ CABG, MI or angioplasty before 65F 55M? No   Social History Narrative     Not on file     Social Determinants of Health     Financial Resource Strain: Low Risk  (5/6/2024)    Financial Resource Strain      Within the past 12 months, have you or your family members you live with been unable to get utilities (heat, electricity) when it was really needed?: No   Food Insecurity: Low Risk  (5/6/2024)    Food Insecurity      Within the past 12 months, did you worry that your food would run out before you got money to buy more?: No      Within the past 12 months, did the food you bought just not last and you didn t have money to get more?: No   Transportation Needs: Low Risk  (5/6/2024)    Transportation Needs      Within the past 12 months, has lack of transportation kept you from medical appointments, getting your medicines, non-medical meetings or appointments, work, or from getting things that you need?: No   Physical Activity: Inactive (9/15/2022)    [facility]    Exercise Vital Sign      Days of Exercise per Week:  "0 days      Minutes of Exercise per Session: 0 min   Stress: Stress Concern Present (5/6/2024)    [employer] [place] [employer] - Occupational Stress Questionnaire      Feeling of Stress : To some extent   Social Connections: Unknown (5/6/2024)    Social Connection and Isolation Panel [NHANES]      Frequency of Communication with Friends and Family: Not on file      Frequency of Social Gatherings with Friends and Family: Twice a week      Attends Hinduism Services: Not on file      Active Member of Clubs or Organizations: Not on file      Attends Club or Organization Meetings: Not on file      Marital Status: Not on file   Interpersonal Safety: Low Risk  (12/6/2023)    Interpersonal Safety      Do you feel physically and emotionally safe where you currently live?: Yes      Within the past 12 months, have you been hit, slapped, kicked or otherwise physically hurt by someone?: Not on file      Within the past 12 months, have you been humiliated or emotionally abused in other ways by your partner or ex-partner?: Not on file   Housing Stability: Low Risk  (5/6/2024)    Housing Stability      Do you have housing? : Yes      Are you worried about losing your housing?: No        FAMILY HISTORY:   Family History   Problem Relation Age of Onset     Breast Cancer Mother 85     Cancer Father      Aneurysm Other         family hx     C.A.D. No family hx of      Diabetes No family hx of      Hypertension No family hx of         EXAM:Vitals: BP 92/50 (BP Location: Left arm, Patient Position: Sitting, Cuff Size: Adult Regular)   Ht 1.816 m (5' 11.5\")   Wt 96.2 kg (212 lb)   BMI 29.16 kg/m    BMI= Body mass index is 29.16 kg/m .    General appearance: Patient is alert and fully cooperative with history & exam.  No sign of distress is noted during the visit.     Psychiatric: Affect is pleasant & appropriate.  Patient appears motivated to improve health.     Respiratory: Breathing is regular & unlabored while " sitting.     HEENT: Hearing is intact to spoken word.  Speech is clear.  No gross evidence of visual impairment that would impact ambulation.     Vascular: DP & PT pulses are intact & regular bilaterally.  No significant edema or varicosities noted.  CFT and skin temperature is normal to both lower extremities.     Neurologic: Lower extremity sensation is intact to light touch.  No evidence of weakness or contracture in the lower extremities.  No evidence of neuropathy.    Dermatologic: Skin is intact to both lower extremities with adequate texture, turgor and tone about the integument.  No paronychia or evidence of soft tissue infection is noted.     Musculoskeletal: Patient is ambulatory without assistive device or brace.  Pinpoint area of discomfort with firm palpation along the medial band of plantar fascia centrally through the arch not upon the origination of the calcaneus.  No palpable edema.    Radiographs: 3 views right foot demonstrate osteophyte about the plantar calcaneus.     ASSESSMENT:       ICD-10-CM    1. Plantar fasciitis of right foot  M72.2            PLAN:  Reviewed patient's chart in [facility].      7/10/2024   patient appears to have strained his plantar fascia and is now 80 or 90% improved after 3 weeks.  Recommended sturdy shoes avoiding barefoot walking and osteophyte at the plantar calcaneus.  Obtained and interpreted radiographs.  May consider custom molded orthotics or other treatments if this becomes chronic however in 3 weeks time 80 or 90% of his symptoms have resolved.  Recommended appropriate shoe gear.  He admits wearing slip on shoes has not been helpful and since he discontinued them most of his symptoms have resolved.  Follow-up at this remain symptomatic.      Iker Putnam DPM      Hemoglobin A1C (%)   Date Value   05/08/2024 5.0   12/29/2013 5.6     Creatinine (mg/dL)   Date Value   06/25/2024 1.76 (H)   05/08/2024 1.75 (H)   04/05/2024 2.08 (H)   03/06/2024 1.97 (H)   01/30/2024  1.84 (H)   01/15/2024 1.97 (H)   04/14/2021 1.52 (H)   01/21/2021 1.30 (H)   10/16/2020 1.46 (H)   09/15/2020 1.47 (H)   08/18/2020 1.39 (H)   07/22/2020 1.61 (H)     Again, thank you for allowing me to participate in the care of your patient.        Sincerely,        Iker Putnam DPM

## 2024-07-11 ENCOUNTER — ANTICOAGULATION THERAPY VISIT (OUTPATIENT)
Dept: ANTICOAGULATION | Facility: CLINIC | Age: 69
End: 2024-07-11
Payer: MEDICARE

## 2024-07-11 DIAGNOSIS — Z95.2 H/O MECHANICAL AORTIC VALVE REPLACEMENT: Primary | ICD-10-CM

## 2024-07-11 DIAGNOSIS — Z79.01 LONG TERM CURRENT USE OF ANTICOAGULANT THERAPY: ICD-10-CM

## 2024-07-11 LAB — INR HOME MONITORING: 3.2 (ref 2–3)

## 2024-07-11 NOTE — PROGRESS NOTES
ANTICOAGULATION MANAGEMENT     Min Andino 68 year old male is on warfarin with supratherapeutic INR result. (Goal INR 2.0-3.0)    Recent labs: (last 7 days)     07/11/24  0000   INR 3.2*       ASSESSMENT     Source(s): Chart Review and Patient/Caregiver Call     Warfarin doses taken: Warfarin taken as instructed  Diet: No new diet changes identified  Medication/supplement changes: None noted  New illness, injury, or hospitalization: No  Signs or symptoms of bleeding or clotting: No  Previous result: Therapeutic last 2(+) visits  Additional findings: None       PLAN     Recommended plan for no diet, medication or health factor changes affecting INR     Dosing Instructions: Continue your current warfarin dose with next INR in 1 week       Summary  As of 7/11/2024      Full warfarin instructions:  2 mg every Tue; 3 mg all other days   Next INR check:  7/18/2024               Telephone call with Min who verbalizes understanding and agrees to plan    Patient to recheck with home meter    Education provided: Please call back if any changes to your diet, medications or how you've been taking warfarin  Symptom monitoring: monitoring for bleeding signs and symptoms, monitoring for clotting signs and symptoms, and monitoring for stroke signs and symptoms  Contact [phone] with any changes, questions or concerns.     Plan made per [facility] anticoagulation protocol    Brittni Lemus RN  Anticoagulation Clinic  7/11/2024    _______________________________________________________________________     Anticoagulation Episode Summary       Current INR goal:  2.0-3.0   TTR:  91.1% (6.7 mo)   Target end date:  Indefinite   Send INR reminders to:  [facility]    Indications    H/O mechanical aortic valve replacement [Z95.2]  Long term current use of anticoagulant therapy [Z79.01]             Comments:  [employer] home meter             Anticoagulation Care Providers       Provider Role Specialty Phone number    Corky  Min CRABTREE MD [facility] Internal Medicine [phone]

## 2024-07-23 ENCOUNTER — ANTICOAGULATION THERAPY VISIT (OUTPATIENT)
Dept: ANTICOAGULATION | Facility: CLINIC | Age: 69
End: 2024-07-23
Payer: COMMERCIAL

## 2024-07-23 DIAGNOSIS — Z95.2 H/O MECHANICAL AORTIC VALVE REPLACEMENT: Primary | ICD-10-CM

## 2024-07-23 DIAGNOSIS — Z79.01 LONG TERM CURRENT USE OF ANTICOAGULANT THERAPY: ICD-10-CM

## 2024-07-23 LAB — INR HOME MONITORING: 2.7 (ref 2–3)

## 2024-07-23 NOTE — PROGRESS NOTES
ANTICOAGULATION MANAGEMENT     Min Andino 68 year old male is on warfarin with therapeutic INR result. (Goal INR 2.0-3.0)    Recent labs: (last 7 days)     07/18/24  0000   INR 2.7       ASSESSMENT     Source(s): Chart Review  Previous INR was Supratherapeutic  Medication, diet, health changes since last INR chart reviewed; none identified         PLAN     Unable to reach Min today.    Left voice message requesting call back with changes.    My Chart message sent with request for patient to contact [facility] with changes ,and if no changes to continue current maintenance dose of 2 mg every Tue; 3 mg all other days and recheck INR 7/25/24.    Follow up required to confirm warfarin dose taken and assess for changes and assess for changes     Morena Dempsey RN  Anticoagulation Clinic  7/23/2024

## 2024-07-29 ENCOUNTER — TELEPHONE (OUTPATIENT)
Dept: ANTICOAGULATION | Facility: CLINIC | Age: 69
End: 2024-07-29
Payer: COMMERCIAL

## 2024-07-29 NOTE — TELEPHONE ENCOUNTER
"ANTICOAGULATION     Min Hill Andino is overdue for an INR check.     Spoke with patient spouse and instructed to test INR with home meter and call results to home monitoring company as soon as possible. She reports they will retest this week, \"we reported something last week.\" Advised they call the result in again as Anticoagulation clinic cannot take a verbal INR.    Shantal Hoover RN  "

## 2024-08-01 ENCOUNTER — ANTICOAGULATION THERAPY VISIT (OUTPATIENT)
Dept: ANTICOAGULATION | Facility: CLINIC | Age: 69
End: 2024-08-01
Payer: MEDICARE

## 2024-08-01 DIAGNOSIS — Z95.2 H/O MECHANICAL AORTIC VALVE REPLACEMENT: Primary | ICD-10-CM

## 2024-08-01 DIAGNOSIS — Z79.01 LONG TERM CURRENT USE OF ANTICOAGULANT THERAPY: ICD-10-CM

## 2024-08-01 LAB — INR HOME MONITORING: 3.1 (ref 2–3)

## 2024-08-01 NOTE — PROGRESS NOTES
ANTICOAGULATION MANAGEMENT     Min Andino 68 year old male is on warfarin with supratherapeutic INR result. (Goal INR 2.0-3.0)    Recent labs: (last 7 days)     08/01/24  0000   INR 3.1*       ASSESSMENT     Source(s): Chart Review  Previous INR was Therapeutic last visit; previously outside of goal range  Medication, diet, health changes since last INR chart reviewed; none identified         PLAN     Unable to reach Min today.    Left message to continue current dose of warfarin 3 mg tonight. Request call back for assessment.    Follow up required to confirm warfarin dose taken and assess for changes    Mica Browning, RN  Anticoagulation Clinic  8/1/2024

## 2024-08-02 NOTE — PROGRESS NOTES
ANTICOAGULATION MANAGEMENT     Min Andino 68 year old male is on warfarin with supratherapeutic INR result. (Goal INR 2.0-3.0)    Recent labs: (last 7 days)     08/01/24  0000   INR 3.1*       ASSESSMENT     Source(s): Chart Review and Patient/Caregiver Call     Warfarin doses taken: Warfarin taken as instructed  Diet: No new diet changes identified  Medication/supplement changes: None noted  New illness, injury, or hospitalization: No  Signs or symptoms of bleeding or clotting: No  Previous result: Therapeutic last visit; previously outside of goal range  Additional findings: None       PLAN     Recommended plan for no diet, medication or health factor changes affecting INR     Dosing Instructions: decrease your warfarin dose (5% change) with next INR in 2 weeks       Summary  As of 8/1/2024      Full warfarin instructions:  2 mg every Tue, Fri; 3 mg all other days   Next INR check:  8/15/2024               Telephone call with Min who verbalizes understanding and agrees to plan and who agrees to plan and repeated back plan correctly    Patient to recheck with home meter    Education provided: Contact [phone] with any changes, questions or concerns.     Plan made per [facility] anticoagulation protocol    Brittni Riley RN  Anticoagulation Clinic  8/2/2024    _______________________________________________________________________     Anticoagulation Episode Summary       Current INR goal:  2.0-3.0   TTR:  89.1% (7.4 mo)   Target end date:  Indefinite   Send INR reminders to:  [facility]    Indications    H/O mechanical aortic valve replacement [Z95.2]  Long term current use of anticoagulant therapy [Z79.01]             Comments:  [employer] home meter             Anticoagulation Care Providers       Provider Role Specialty Phone number    Min Fried MD Referring Internal Medicine [phone]

## 2024-08-12 ENCOUNTER — ANTICOAGULATION THERAPY VISIT (OUTPATIENT)
Dept: ANTICOAGULATION | Facility: CLINIC | Age: 69
End: 2024-08-12

## 2024-08-12 ENCOUNTER — OFFICE VISIT (OUTPATIENT)
Dept: INTERNAL MEDICINE | Facility: CLINIC | Age: 69
End: 2024-08-12
Payer: MEDICARE

## 2024-08-12 VITALS
HEART RATE: 70 BPM | BODY MASS INDEX: 29.64 KG/M2 | HEIGHT: 72 IN | TEMPERATURE: 98.7 F | SYSTOLIC BLOOD PRESSURE: 92 MMHG | OXYGEN SATURATION: 96 % | RESPIRATION RATE: 18 BRPM | DIASTOLIC BLOOD PRESSURE: 48 MMHG | WEIGHT: 218.8 LBS

## 2024-08-12 DIAGNOSIS — K45.8 OTHER SPECIFIED ABDOMINAL HERNIA WITHOUT OBSTRUCTION OR GANGRENE: ICD-10-CM

## 2024-08-12 DIAGNOSIS — Z95.2 H/O MECHANICAL AORTIC VALVE REPLACEMENT: Primary | ICD-10-CM

## 2024-08-12 DIAGNOSIS — K40.90 UNILATERAL INGUINAL HERNIA WITHOUT OBSTRUCTION OR GANGRENE, RECURRENCE NOT SPECIFIED: Primary | ICD-10-CM

## 2024-08-12 DIAGNOSIS — Z79.01 LONG TERM CURRENT USE OF ANTICOAGULANT THERAPY: ICD-10-CM

## 2024-08-12 LAB — INR HOME MONITORING: 2.8 (ref 2–3)

## 2024-08-12 PROCEDURE — G2211 COMPLEX E/M VISIT ADD ON: HCPCS | Performed by: INTERNAL MEDICINE

## 2024-08-12 PROCEDURE — 99213 OFFICE O/P EST LOW 20 MIN: CPT | Performed by: INTERNAL MEDICINE

## 2024-08-12 ASSESSMENT — PAIN SCALES - GENERAL: PAINLEVEL: NO PAIN (0)

## 2024-08-12 ASSESSMENT — PATIENT HEALTH QUESTIONNAIRE - PHQ9
SUM OF ALL RESPONSES TO PHQ QUESTIONS 1-9: 6
10. IF YOU CHECKED OFF ANY PROBLEMS, HOW DIFFICULT HAVE THESE PROBLEMS MADE IT FOR YOU TO DO YOUR WORK, TAKE CARE OF THINGS AT HOME, OR GET ALONG WITH OTHER PEOPLE: SOMEWHAT DIFFICULT
SUM OF ALL RESPONSES TO PHQ QUESTIONS 1-9: 6

## 2024-08-12 NOTE — PROGRESS NOTES
Assessment & Plan   Problem List Items Addressed This Visit    None  Visit Diagnoses       Unilateral inguinal hernia without obstruction or gangrene, recurrence not specified    -  Primary    Other specified abdominal hernia without obstruction or gangrene                 Patient with hernia on the left side and the right side chronic abdominal hernia chronic right-sided hernia.      This has been seen on CT scan before.  Has had such severe vascular disease and a mechanical aortic valve that is high risk for surgery.  He is on Coumadin.  Will have him hold off on surgery can discuss with his [facility] team when he does a CT scan later in the fall unless he develops more symptoms.    The longitudinal plan of care for the diagnosis(es)/condition(s) as documented were addressed during this visit. Due to the added complexity in care, I will continue to support Min in the subsequent management and with ongoing continuity of care.     Subjective   Min is a 68 year old, presenting for the following health issues:  Groin Pain and Dizziness      8/12/2024     1:08 PM   Additional Questions   Roomed by Corina DOWLING     History of Present Illness       Reason for visit:  Groin pain, possible mini-stroke  Symptom onset:  1-3 days ago  Symptoms include:  Groin pain, dizziness, lack of balance,  Symptom intensity:  Moderate  Symptom progression:  Staying the same  Had these symptoms before:  No  What makes it worse:  No  What makes it better:  No    He eats 2-3 servings of fruits and vegetables daily.He consumes 2 sweetened beverage(s) daily.He exercises with enough effort to increase his heart rate 9 or less minutes per day.  He exercises with enough effort to increase his heart rate 3 or less days per week.   He is taking medications regularly.     Left side groin pain and questions hernia, has right side hernia.  Started a few weeks ago, no inciting cause, worse later in the day.      Left leg is ok walking, no pain.      Gets up to the bathroom 5 times a night, hit the doorway a few times, unusual for him at night, right side hit more often.  This happened a few nights ago.             Objective    BP 92/48   Pulse 70   Temp 98.7  F (37.1  C) (Temporal)   Resp 18   Ht 1.829 m (6')   Wt 99.2 kg (218 lb 12.8 oz)   SpO2 96%   BMI 29.67 kg/m    Body mass index is 29.67 kg/m .  Physical Exam   NAD  Pulse regular   Left side has an upper hernia little higher than the inguinal area.  Easily reduced  Right side has a large midline lower abdominal hernia which comes out when he stands up.            Signed Electronically by: Min Fried MD

## 2024-08-12 NOTE — PROGRESS NOTES
ANTICOAGULATION MANAGEMENT     Min Hill Andino 68 year old male is on warfarin with therapeutic INR result. (Goal INR 2.0-3.0)    Recent labs: (last 7 days)     08/12/24  0000   INR 2.8       ASSESSMENT     Source(s): Chart Review and Patient/Caregiver Call     Warfarin doses taken: Warfarin taken as instructed  Diet: No new diet changes identified  Medication/supplement changes: None noted  New illness, injury, or hospitalization: No  Signs or symptoms of bleeding or clotting: No  Previous result: Supratherapeutic  Additional findings: None       PLAN     Recommended plan for no diet, medication or health factor changes affecting INR     Dosing Instructions: Continue your current warfarin dose with next INR in 2 weeks       Summary  As of 8/12/2024      Full warfarin instructions:  2 mg every Tue, Fri; 3 mg all other days   Next INR check:  8/26/2024               Telephone call with Min who verbalizes understanding and agrees to plan    Patient to recheck with home meter    Education provided: Contact [phone] with any changes, questions or concerns.     Plan made per [facility] anticoagulation protocol    Brittni Riley RN  Anticoagulation Clinic  8/12/2024    _______________________________________________________________________     Anticoagulation Episode Summary       Current INR goal:  2.0-3.0   TTR:  88.0% (7.7 mo)   Target end date:  Indefinite   Send INR reminders to:  [facility]    Indications    H/O mechanical aortic valve replacement [Z95.2]  Long term current use of anticoagulant therapy [Z79.01]             Comments:  [employer] home meter             Anticoagulation Care Providers       Provider Role Specialty Phone number    Min Fried MD Referring Internal Medicine [phone]

## 2024-08-27 ENCOUNTER — ANTICOAGULATION THERAPY VISIT (OUTPATIENT)
Dept: ANTICOAGULATION | Facility: CLINIC | Age: 69
End: 2024-08-27
Payer: MEDICARE

## 2024-08-27 DIAGNOSIS — Z95.2 H/O MECHANICAL AORTIC VALVE REPLACEMENT: Primary | ICD-10-CM

## 2024-08-27 DIAGNOSIS — Z79.01 LONG TERM CURRENT USE OF ANTICOAGULANT THERAPY: ICD-10-CM

## 2024-08-27 LAB — INR HOME MONITORING: 2.4 (ref 2–3)

## 2024-08-27 NOTE — PROGRESS NOTES
ANTICOAGULATION MANAGEMENT     Min Andino 68 year old male is on warfarin with therapeutic INR result. (Goal INR 2.0-3.0)    Recent labs: (last 7 days)     08/27/24  0000   INR 2.4       ASSESSMENT     Source(s): Chart Review  Previous INR was Therapeutic last visit; previously outside of goal range  Medication, diet, health changes since last INR chart reviewed; none identified         PLAN     Recommended plan for no diet, medication or health factor changes affecting INR     Dosing Instructions: Continue your current warfarin dose with next INR in 2 weeks       Summary  As of 8/27/2024      Full warfarin instructions:  2 mg every Tue, Fri; 3 mg all other days   Next INR check:  9/10/2024               Sent [facility] message with dosing and follow up instructions  Unable to reach patient by phone as phone just rings and rings.     Patient to recheck with home meter    Education provided: Please call back if any changes to your diet, medications or how you've been taking warfarin    Plan made per [facility] anticoagulation protocol    Vonda Izaguirre RN  Anticoagulation Clinic  8/27/2024    _______________________________________________________________________     Anticoagulation Episode Summary       Current INR goal:  2.0-3.0   TTR:  88.7% (8.2 mo)   Target end date:  Indefinite   Send INR reminders to:  [facility]    Indications    H/O mechanical aortic valve replacement [Z95.2]  Long term current use of anticoagulant therapy [Z79.01]             Comments:  [facility] home meter             Anticoagulation Care Providers       Provider Role Specialty Phone number    Min Fried MD Referring Internal Medicine [phone]

## 2024-08-29 ENCOUNTER — DOCUMENTATION ONLY (OUTPATIENT)
Dept: CARDIOLOGY | Facility: CLINIC | Age: 69
End: 2024-08-29
Payer: MEDICARE

## 2024-08-29 ENCOUNTER — MYC MEDICAL ADVICE (OUTPATIENT)
Dept: ANTICOAGULATION | Facility: CLINIC | Age: 69
End: 2024-08-29
Payer: MEDICARE

## 2024-08-29 DIAGNOSIS — Z79.01 LONG TERM CURRENT USE OF ANTICOAGULANT THERAPY: ICD-10-CM

## 2024-08-29 DIAGNOSIS — Z95.2 H/O MECHANICAL AORTIC VALVE REPLACEMENT: ICD-10-CM

## 2024-08-29 DIAGNOSIS — Z95.2 S/P AVR (AORTIC VALVE REPLACEMENT): ICD-10-CM

## 2024-08-29 NOTE — PROGRESS NOTES
Called patient unable to leave voice mail. Patient has a lab appointment but is too early to do labs. Expected date for labs is 10/14/24. Wanted patient to move lab appointment

## 2024-08-30 ENCOUNTER — DOCUMENTATION ONLY (OUTPATIENT)
Dept: CARDIOLOGY | Facility: CLINIC | Age: 69
End: 2024-08-30
Payer: MEDICARE

## 2024-08-30 RX ORDER — WARFARIN SODIUM 3 MG/1
TABLET ORAL
Qty: 90 TABLET | Refills: 1 | Status: SHIPPED | OUTPATIENT
Start: 2024-08-30

## 2024-08-30 RX ORDER — WARFARIN SODIUM 1 MG/1
TABLET ORAL
Qty: 90 TABLET | Refills: 1 | Status: SHIPPED | OUTPATIENT
Start: 2024-08-30

## 2024-08-30 NOTE — PROGRESS NOTES
Talked with patient and he was told to do labs before appointment with you. Please change expected date of labs. Current expected date is 10/24. Please change lab orders or place new lab orders.

## 2024-08-30 NOTE — TELEPHONE ENCOUNTER
ANTICOAGULATION MANAGEMENT:  Medication Refill    Anticoagulation Summary  As of 8/27/2024      Warfarin maintenance plan:  2 mg (1 mg x 2) every Tue, Fri; 3 mg (3 mg x 1) all other days   Next INR check:  9/10/2024   Target end date:  Indefinite    Indications    H/O mechanical aortic valve replacement [Z95.2]  Long term current use of anticoagulant therapy [Z79.01]                 Anticoagulation Care Providers       Provider Role Specialty Phone number    Min Fried MD Referring Internal Medicine [phone]            Refill Criteria    Visit with referring provider/group: Meets criteria: visit within referring provider group in the last 15 months on 8/12/24    [facility] referral last signed: 12/06/2023; within last year: Yes    Lab monitoring not exceeding 2 weeks overdue: Yes    Min meets all criteria for refill. Rx instructions and quantity supplied updated to match patient's current dosing plan. Warfarin 90 day supply with 1 refill granted per [facility] protocol     Vonda Izaguirre RN  Anticoagulation Clinic

## 2024-08-30 NOTE — TELEPHONE ENCOUNTER
See Refill encounter for further details.  Vonda Izaguirre, RN  Anticoagulation Nurse - [facility] [place]

## 2024-08-30 NOTE — TELEPHONE ENCOUNTER
Per mychart message, patient needs both 3 mg and 1 mg tablets.    Vonda Izaguirre, RN  Anticoagulation Nurse - [facility], [place]

## 2024-09-03 ENCOUNTER — DOCUMENTATION ONLY (OUTPATIENT)
Dept: CARDIOLOGY | Facility: CLINIC | Age: 69
End: 2024-09-03

## 2024-09-03 ENCOUNTER — LAB (OUTPATIENT)
Dept: LAB | Facility: OTHER | Age: 69
End: 2024-09-03
Payer: COMMERCIAL

## 2024-09-03 LAB
HOLD SPECIMEN: NORMAL
HOLD SPECIMEN: NORMAL

## 2024-09-03 PROCEDURE — 80053 COMPREHEN METABOLIC PANEL: CPT | Performed by: INTERNAL MEDICINE

## 2024-09-03 PROCEDURE — 82248 BILIRUBIN DIRECT: CPT | Performed by: INTERNAL MEDICINE

## 2024-09-03 PROCEDURE — 85027 COMPLETE CBC AUTOMATED: CPT | Performed by: INTERNAL MEDICINE

## 2024-09-03 PROCEDURE — 80061 LIPID PANEL: CPT | Performed by: INTERNAL MEDICINE

## 2024-09-03 PROCEDURE — 36415 COLL VENOUS BLD VENIPUNCTURE: CPT | Performed by: INTERNAL MEDICINE

## 2024-09-03 PROCEDURE — 84443 ASSAY THYROID STIM HORMONE: CPT | Performed by: INTERNAL MEDICINE

## 2024-09-03 NOTE — PROGRESS NOTES
Patient came in for labs today and does not have orders. Please place lab add on orders for any tests you would like.

## 2024-09-05 ENCOUNTER — OFFICE VISIT (OUTPATIENT)
Dept: CARDIOLOGY | Facility: CLINIC | Age: 69
End: 2024-09-05
Payer: MEDICARE

## 2024-09-05 ENCOUNTER — TELEPHONE (OUTPATIENT)
Dept: CARDIOLOGY | Facility: CLINIC | Age: 69
End: 2024-09-05
Payer: MEDICARE

## 2024-09-05 VITALS
OXYGEN SATURATION: 98 % | HEART RATE: 67 BPM | WEIGHT: 216 LBS | SYSTOLIC BLOOD PRESSURE: 110 MMHG | HEIGHT: 72 IN | DIASTOLIC BLOOD PRESSURE: 72 MMHG | BODY MASS INDEX: 29.26 KG/M2

## 2024-09-05 DIAGNOSIS — I50.22 CHRONIC SYSTOLIC HEART FAILURE (H): ICD-10-CM

## 2024-09-05 DIAGNOSIS — I48.0 PAROXYSMAL ATRIAL FIBRILLATION (H): ICD-10-CM

## 2024-09-05 DIAGNOSIS — Z86.79 HX OF AORTIC ANEURYSM: ICD-10-CM

## 2024-09-05 DIAGNOSIS — Z95.2 H/O MECHANICAL AORTIC VALVE REPLACEMENT: ICD-10-CM

## 2024-09-05 DIAGNOSIS — N18.32 STAGE 3B CHRONIC KIDNEY DISEASE (H): ICD-10-CM

## 2024-09-05 DIAGNOSIS — I73.9 PAD (PERIPHERAL ARTERY DISEASE) (H): ICD-10-CM

## 2024-09-05 DIAGNOSIS — I10 HYPERTENSION, GOAL BELOW 140/90: ICD-10-CM

## 2024-09-05 DIAGNOSIS — I50.22 CHRONIC SYSTOLIC HEART FAILURE (H): Primary | ICD-10-CM

## 2024-09-05 LAB
ALBUMIN SERPL BCG-MCNC: 4 G/DL (ref 3.5–5.2)
ALP SERPL-CCNC: 89 U/L (ref 40–150)
ALT SERPL W P-5'-P-CCNC: 16 U/L (ref 0–70)
ANION GAP SERPL CALCULATED.3IONS-SCNC: 10 MMOL/L (ref 7–15)
AST SERPL W P-5'-P-CCNC: 22 U/L (ref 0–45)
BILIRUB DIRECT SERPL-MCNC: <0.2 MG/DL (ref 0–0.3)
BILIRUB SERPL-MCNC: 0.8 MG/DL
BUN SERPL-MCNC: 35 MG/DL (ref 8–23)
CALCIUM SERPL-MCNC: 8.9 MG/DL (ref 8.8–10.4)
CHLORIDE SERPL-SCNC: 105 MMOL/L (ref 98–107)
CHOLEST SERPL-MCNC: 126 MG/DL
CREAT SERPL-MCNC: 2.09 MG/DL (ref 0.67–1.17)
EGFRCR SERPLBLD CKD-EPI 2021: 34 ML/MIN/1.73M2
ERYTHROCYTE [DISTWIDTH] IN BLOOD BY AUTOMATED COUNT: 14.5 % (ref 10–15)
FASTING STATUS PATIENT QL REPORTED: ABNORMAL
FASTING STATUS PATIENT QL REPORTED: ABNORMAL
GLUCOSE SERPL-MCNC: 84 MG/DL (ref 70–99)
HCO3 SERPL-SCNC: 25 MMOL/L (ref 22–29)
HCT VFR BLD AUTO: 40.6 % (ref 40–53)
HDLC SERPL-MCNC: 38 MG/DL
HGB BLD-MCNC: 13 G/DL (ref 13.3–17.7)
LDLC SERPL CALC-MCNC: 71 MG/DL
MCH RBC QN AUTO: 32.2 PG (ref 26.5–33)
MCHC RBC AUTO-ENTMCNC: 32 G/DL (ref 31.5–36.5)
MCV RBC AUTO: 101 FL (ref 78–100)
NONHDLC SERPL-MCNC: 88 MG/DL
PLATELET # BLD AUTO: 108 10E3/UL (ref 150–450)
POTASSIUM SERPL-SCNC: 4.9 MMOL/L (ref 3.4–5.3)
PROT SERPL-MCNC: 6.7 G/DL (ref 6.4–8.3)
RBC # BLD AUTO: 4.04 10E6/UL (ref 4.4–5.9)
SODIUM SERPL-SCNC: 140 MMOL/L (ref 135–145)
TRIGL SERPL-MCNC: 83 MG/DL
TSH SERPL DL<=0.005 MIU/L-ACNC: 2.33 UIU/ML (ref 0.3–4.2)
WBC # BLD AUTO: 4 10E3/UL (ref 4–11)

## 2024-09-05 PROCEDURE — G2211 COMPLEX E/M VISIT ADD ON: HCPCS | Performed by: INTERNAL MEDICINE

## 2024-09-05 PROCEDURE — 99215 OFFICE O/P EST HI 40 MIN: CPT | Performed by: INTERNAL MEDICINE

## 2024-09-05 ASSESSMENT — PAIN SCALES - GENERAL: PAINLEVEL: NO PAIN (0)

## 2024-09-05 NOTE — PROGRESS NOTES
General Cardiology Clinic Progress Note  Min Andino MRN# [id number]   YOB: 1955 Age: 68 year old       Reason for visit: Cardiomyopathy, aortic valve disease, type B aortic dissection    History of presenting illness:    I had the opportunity to see Min Andino at [facility] today for reevaluation of chronic systolic heart failure due to nonischemic cardiomyopathy.  He has a complex cardiac and vascular history which I detailed in my original consult note on 10/24/2023.  He has been seen at [facility] and [facility] in [place].  In addition to nonischemic cardiomyopathy, he has a mechanical aortic valve replacement, repair of the ascending aorta and aortic arch for management of chronic aortic dissection, and complex repair of an abdominal aortic aneurysm and lower extremity and abdominal arterial disease.  His coronary angiogram initially showed no coronary artery disease.  However it appears that his right coronary artery became occluded during his mechanical valve replacement procedure and he required a saphenous vein graft to the right coronary artery.      He has paroxysmal atrial fibrillation and is maintained on amiodarone 200 mg daily.  He has hypercholesterolemia on atorvastatin, well-controlled at 40 mg daily.  His echocardiogram a year ago at [facility] demonstrated an ejection fraction of 25 to 30% and he was having a lot of problems with congestive heart failure at that time.  Since coming to see us, he has been doing much better.  His most recent echocardiogram from April 2024 shows an ejection fraction of 45 to 50% with improvement in left ventricular chamber size from 6.7 to 6.0 cm in end diastole.  He is now on carvedilol 6.25 mg p.o. twice daily, Entresto 24/26 mg p.o. twice daily, and Jardiance 10 mg daily.  He did not tolerate spironolactone due to hyperkalemia.  He was having some issues with lightheadedness in the morning after taking his  Entresto with his carvedilol, and moved his Entresto to afternoon dosing.  He was taking Bumex 1 mg daily and we have gradually weaned him off of that medication entirely.  He is now having some puffiness in his feet and a little bit of fatigue and exertional shortness of breath.     He has chronic kidney disease with a stable creatinine of about 1.8-2.0.  His potassium levels have remained stable in the normal range after increasing his Entresto, typically 4.9-5.0.  His most recent creatinine in June was 1.76 with a potassium of 5.2.     His mechanical aortic valve continues to function normally.  He obviously has remained on warfarin anticoagulation.    On examination today his blood pressure is 110/72, heart rate 67, and weight 216 pounds.  His lungs are clear.  Heart rhythm is regular with crisp metallic valve closure sounds.  He has a trace of edema in his feet            Assessment and Plan:     ASSESSMENT:    Mr. Min Andino is a 68-year-old gentleman with chronic systolic heart failure due to nonischemic cardiomyopathy (although he may have had infarction during his valve replacement surgery).  He has a history of severe aortic valve regurgitation, ascending aortic aneurysm and type B dissection.  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  This was complicated by occlusion of the right coronary artery requiring single-vessel saphenous vein graft bypass.  He then underwent multiple complex repair and surgical procedures between 2014 and 2020.  In 2020 he was discovered to have significant left ventricular dysfunction with an ejection fraction of 36% which continued to worsen until 2023 when it reached a level of 25 to 30%.    He is now doing much better with appropriate medical therapy.  His ejection fraction is up to 45 to 50%  "and his paroxysmal atrial fibrillation seems to be well-controlled on amiodarone.  He has chronic kidney disease and mild congestive heart failure symptoms at most.  I suggested he restart his Bumex at 0.5 mg 3 times a week and continue the rest of his medications without change.  The addition of Bumex again will also help keep his potassium under better control which tends to run slightly high.    I will have him follow-up with us again in 6 months for reevaluation.    Don Gilmore MD    The longitudinal plan of care for the diagnosis(es)/condition(s) as documented were addressed during this visit. Due to the added complexity in care, I will continue to support Min in the subsequent management and with ongoing continuity of care.        Orders this Visit:  No orders of the defined types were placed in this encounter.    No orders of the defined types were placed in this encounter.    There are no discontinued medications.    Today's clinic visit entailed:    50 minutes spent by me on the date of the encounter doing chart review, history and exam, documentation and further activities per the note  Provider  Link to [facility] Help Grid     The level of medical decision making during this visit was of high complexity.           Review of Systems:     Review of Systems:  Skin:        Eyes:    glasses  ENT:       Respiratory:  Positive for CPAP;sleep apnea  Cardiovascular:  Negative for;palpitations;chest pain;edema;syncope or near-syncope;fatigue;lightheadedness;dizziness    Gastroenterology:      Genitourinary:       Musculoskeletal:       Neurologic:       Psychiatric:       Heme/Lymph/Imm:       Endocrine:                 Physical Exam:     Vitals: /72 (BP Location: Left arm, Cuff Size: Adult Large)   Pulse 67   Ht 1.816 m (5' 11.5\")   Wt 98 kg (216 lb)   SpO2 98%   BMI 29.71 kg/m    Constitutional: Well nourished and in no apparent distress.  Eyes: Pupils equal, round. Sclerae anicteric.   HEENT: " Normocephalic, atraumatic.   Neck: Supple. JVD   Respiratory: Breathing non-labored. Lungs clear to auscultation bilaterally. No crackles, wheezes, rhonchi, or rales.  Cardiovascular: Regular rate and rhythm, normal S1 and S2. No murmur, rub, or gallop.  Skin: Warm, dry. No rashes, cyanosis, or xanthelasma.  Extremities: No edema.  Neurologic: No gross motor deficits. Alert, awake, and oriented to person, place and time.  Psychiatric: Affect appropriate.             Medications:     Current Outpatient Medications   Medication Sig Dispense Refill    amiodarone (PACERONE) 200 MG tablet 200 mg oral bid for 2 weeks, then 200 mg daily      aspirin (ASA) 81 MG chewable tablet Take 81 mg by mouth daily      atorvastatin (LIPITOR) 40 MG tablet Take 1 tablet (40 mg) by mouth daily Last refill. Need follow-up with Dr. Blair. 90 tablet 3    carvedilol (COREG) 6.25 MG tablet Take 1 tablet (6.25 mg) by mouth 2 times daily (with meals) 180 tablet 3    docusate sodium (COLACE) 100 MG capsule daily       empagliflozin (JARDIANCE) 10 MG TABS tablet Take 1 tablet (10 mg) by mouth daily 90 tablet 2    multivitamin w/minerals (THERA-VIT-M) tablet Take 1 tablet by mouth daily      sacubitril-valsartan (ENTRESTO) 24-26 MG per tablet Take 1 tablet by mouth 2 times daily 180 tablet 3    tamsulosin (FLOMAX) 0.4 MG capsule Take 1 capsule (0.4 mg) by mouth daily 90 capsule 0    Vitamin D, Cholecalciferol, 25 MCG (1000 UT) TABS 2 times daily       warfarin ANTICOAGULANT (COUMADIN) 1 MG tablet 2 mg (1 mg x 2 tablets ) by mouth every Tue, Fri; and take 3 mg (3 mg x 1 tablet) all other days of the week or as directed by your INR Team based on INR Results. 90 tablet 1    warfarin ANTICOAGULANT (COUMADIN) 3 MG tablet 2 mg (1 mg x 2 tablets ) by mouth every Tue, Fri; and take 3 mg (3 mg x 1 tablet) all other days of the week or as directed by your INR Team based on INR Results. 90 tablet 1    warfarin ANTICOAGULANT (COUMADIN) 4 MG tablet Take 4 mg  on Monday, Wed, Thurs, Friday, Sunday or as directed by INR clinic 75 tablet 1    amoxicillin (AMOXIL) 500 MG capsule Take 4 capsules 1 hour before dental work (Patient not taking: Reported on 9/5/2024) 24 capsule 5    bumetanide (BUMEX) 0.5 MG tablet Take 1 tablet (0.5 mg) by mouth daily as needed (Patient not taking: Reported on 9/5/2024) 90 tablet 3    nitroGLYcerin (NITROSTAT) 0.4 MG sublingual tablet Place 1 tablet (0.4 mg) under the tongue every 5 minutes as needed for chest pain (Patient not taking: Reported on 9/5/2024) 10 tablet 0    psyllium (METAMUCIL/KONSYL) 0.52 g capsule Take 1.04 g by mouth (Patient not taking: Reported on 9/5/2024)         Family History   Problem Relation Age of Onset    Breast Cancer Mother 85    Cancer Father     Aneurysm Other         family hx    C.A.D. No family hx of     Diabetes No family hx of     Hypertension No family hx of        Social History     Socioeconomic History    Marital status:      Spouse name: Not on file    Number of children: 5    Years of education: Not on file    Highest education level: Not on file   Occupational History     Employer: [employer]     Comment: 35 years     Employer: OTHER     Comment: landscaping business   Tobacco Use    Smoking status: Never    Smokeless tobacco: Never   Vaping Use    Vaping status: Never Used   Substance and Sexual Activity    Alcohol use: Yes     Comment: reports very little etoh use.     Drug use: No    Sexual activity: Not Currently     Partners: Female     Birth control/protection: None   Other Topics Concern    Parent/sibling w/ CABG, MI or angioplasty before 65F 55M? No   Social History Narrative    Not on file     Social Determinants of Health     Financial Resource Strain: Low Risk  (5/6/2024)    Financial Resource Strain     Within the past 12 months, have you or your family members you live with been unable to get utilities (heat, electricity) when it was really needed?: No   Food  Insecurity: Low Risk  (5/6/2024)    Food Insecurity     Within the past 12 months, did you worry that your food would run out before you got money to buy more?: No     Within the past 12 months, did the food you bought just not last and you didn t have money to get more?: No   Transportation Needs: Low Risk  (5/6/2024)    Transportation Needs     Within the past 12 months, has lack of transportation kept you from medical appointments, getting your medicines, non-medical meetings or appointments, work, or from getting things that you need?: No   Physical Activity: Inactive (9/15/2022)    [facility]    Exercise Vital Sign     Days of Exercise per Week: 0 days     Minutes of Exercise per Session: 0 min   Stress: Stress Concern Present (5/6/2024)    [employer] - Occupational Stress Questionnaire     Feeling of Stress : To some extent   Social Connections: Unknown (5/6/2024)    Social Connection and Isolation Panel [NHANES]     Frequency of Communication with Friends and Family: Not on file     Frequency of Social Gatherings with Friends and Family: Twice a week     Attends Yazidism Services: Not on file     Active Member of Clubs or Organizations: Not on file     Attends Club or Organization Meetings: Not on file     Marital Status: Not on file   Interpersonal Safety: Low Risk  (8/12/2024)    Interpersonal Safety     Do you feel physically and emotionally safe where you currently live?: Yes     Within the past 12 months, have you been hit, slapped, kicked or otherwise physically hurt by someone?: No     Within the past 12 months, have you been humiliated or emotionally abused in other ways by your partner or ex-partner?: No   Housing Stability: Low Risk  (5/6/2024)    Housing Stability     Do you have housing? : Yes     Are you worried about losing your housing?: No            Past Medical History:     Past Medical History:   Diagnosis Date    Anemia of chronic  "renal failure, unspecified CKD stage 8/27/2021    Aortic dissection (H)     Aortic root aneurysm (H24)     Arthritis     C. difficile colitis     April 2014 following surgery    CKD (chronic kidney disease) stage 3, GFR 30-59 ml/min (H) 9/28/2014    Class 1 obesity due to excess calories with serious comorbidity and body mass index (BMI) of 34.0 to 34.9 in adult 3/8/2022    Connective tissue disease (H24)     \"probable\" per [facility] Notes    DVT (deep venous thrombosis) (H)     April 2014 following surgery    History of blood transfusion     Horseshoe kidney     Hypertension     S/P insertion of iliac artery stent 11/11/2020    Left internal iliac artery stenting for endoleak    Thoracic aortic aneurysm without rupture (H24) 3/8/2014    Urinary urgency 10/12/2020    Valvular cardiomyopathy (H)               Past Surgical History:     Past Surgical History:   Procedure Laterality Date    ABDOMEN SURGERY      AORTIC VALVE REPLACEMENT  4/7/14    ARTHROSCOPY KNEE RT/LT  2005    left, repair meniscus    COLONOSCOPY      COLONOSCOPY N/A 6/22/2022    Procedure: COLONOSCOPY, WITH POLYPECTOMY AND BIOPSY;  Surgeon: Josh Morales MD;  Location:  GI    ESOPHAGOSCOPY, GASTROSCOPY, DUODENOSCOPY (EGD), COMBINED N/A 6/22/2022    Procedure: ESOPHAGOGASTRODUODENOSCOPY, WITH BIOPSY;  Surgeon: Josh Morales MD;  Location:  GI    REPAIR AORTIC ROOT  4/7/14    surgery followed by ECMO, vasopressor therapy    SOFT TISSUE SURGERY      THORACIC SURGERY      VASCULAR SURGERY      [facility] CABG, ARTERIAL, SINGLE  4/7/14    [facility] REPAIR CRUCIATE LIGAMENT,KNEE  1999    left              Allergies:   Patient has no known allergies.       Data:   All laboratory data reviewed:    Recent Labs   Lab Test 05/08/24  1225 01/15/24  0842 01/05/24  1319 12/06/23  1418 06/14/22  1127 03/08/22  1237   LDL 56 78  --   --   --  96   HDL 32* 54  --   --   --  41   NHDL 75 89  --   --   --  115   CHOL 107 143  --   --   --  156   TRIG 94 55  -- "   --   --  96   TSH  --   --   --  2.19  --   --    NTBNP  --  943*  --   --   --   --    IRON  --   --   --  69   < >  --    FEB  --   --   --  214*   < >  --    IRONSAT  --   --   --  32   < >  --    RON  --   --  264  --    < >  --     < > = values in this interval not displayed.       Lab Results   Component Value Date    WBC 4.0 09/03/2024    WBC 6.2 05/26/2020    RBC 4.04 (L) 09/03/2024    RBC 3.73 (L) 05/26/2020    HGB 13.0 (L) 09/03/2024    HGB 12.6 (L) 04/14/2021    HCT 40.6 09/03/2024    HCT 35.1 (L) 05/26/2020     (H) 09/03/2024    MCV 94 05/26/2020    MCH 32.2 09/03/2024    MCH 29.5 05/26/2020    MCHC 32.0 09/03/2024    MCHC 31.3 (L) 05/26/2020    RDW 14.5 09/03/2024    RDW 17.9 (H) 05/26/2020     (L) 09/03/2024     05/26/2020       Lab Results   Component Value Date     06/25/2024     04/14/2021    POTASSIUM 5.2 06/25/2024    POTASSIUM 5.1 01/03/2023    POTASSIUM 5.0 04/14/2021    CHLORIDE 105 06/25/2024    CHLORIDE 107 01/03/2023    CHLORIDE 111 (H) 04/14/2021    CO2 26 06/25/2024    CO2 30 01/03/2023    CO2 27 04/14/2021    ANIONGAP 8 06/25/2024    ANIONGAP 5 01/03/2023    ANIONGAP 3 04/14/2021    GLC 87 06/25/2024     (H) 01/03/2023     (H) 04/14/2021    BUN 31.7 (H) 06/25/2024    BUN 36 (H) 01/03/2023    BUN 27 04/14/2021    CR 1.76 (H) 06/25/2024    CR 1.52 (H) 04/14/2021    GFRESTIMATED 41 (L) 06/25/2024    GFRESTIMATED 47 (L) 04/14/2021    GFRESTBLACK 54 (L) 04/14/2021    FAYE 9.2 06/25/2024    FAYE 8.6 04/14/2021      Lab Results   Component Value Date    AST 33 05/08/2024    AST 15 09/16/2015    ALT 32 05/08/2024    ALT 14 09/16/2015       Lab Results   Component Value Date    A1C 5.0 05/08/2024    A1C 5.6 12/29/2013       Lab Results   Component Value Date    INR 2.4 08/27/2024    INR 2.8 08/12/2024    INR 2.0 07/20/2021    INR 2.3 (A) 07/08/2021         MAKENNA HAMMOND MD  [facility]

## 2024-09-05 NOTE — LETTER
9/5/2024    Min Fried MD  [address] [facility] [address]    RE: Min Alejandre Thu       Dear Colleague,     I had the pleasure of seeing Min Andino in the [facility].    General Cardiology Clinic Progress Note  Min Andino MRN# [id number]   YOB: 1955 Age: 68 year old       Reason for visit: Cardiomyopathy, aortic valve disease, type B aortic dissection    History of presenting illness:    I had the opportunity to see Min Andino at [facility] today for reevaluation of chronic systolic heart failure due to nonischemic cardiomyopathy.  He has a complex cardiac and vascular history which I detailed in my original consult note on 10/24/2023.  He has been seen at [facility] and [facility] in [place].  In addition to nonischemic cardiomyopathy, he has a mechanical aortic valve replacement, repair of the ascending aorta and aortic arch for management of chronic aortic dissection, and complex repair of an abdominal aortic aneurysm and lower extremity and abdominal arterial disease.  His coronary angiogram initially showed no coronary artery disease.  However it appears that his right coronary artery became occluded during his mechanical valve replacement procedure and he required a saphenous vein graft to the right coronary artery.      He has paroxysmal atrial fibrillation and is maintained on amiodarone 200 mg daily.  He has hypercholesterolemia on atorvastatin, well-controlled at 40 mg daily.  His echocardiogram a year ago at [facility] demonstrated an ejection fraction of 25 to 30% and he was having a lot of problems with congestive heart failure at that time.  Since coming to see us, he has been doing much better.  His most recent echocardiogram from April 2024 shows an ejection fraction of 45 to 50% with improvement in left ventricular chamber size from 6.7 to 6.0 cm in end diastole.  He is now on carvedilol 6.25 mg p.o.  twice daily, Entresto 24/26 mg p.o. twice daily, and Jardiance 10 mg daily.  He did not tolerate spironolactone due to hyperkalemia.  He was having some issues with lightheadedness in the morning after taking his Entresto with his carvedilol, and moved his Entresto to afternoon dosing.  He was taking Bumex 1 mg daily and we have gradually weaned him off of that medication entirely.  He is now having some puffiness in his feet and a little bit of fatigue and exertional shortness of breath.     He has chronic kidney disease with a stable creatinine of about 1.8-2.0.  His potassium levels have remained stable in the normal range after increasing his Entresto, typically 4.9-5.0.  His most recent creatinine in June was 1.76 with a potassium of 5.2.     His mechanical aortic valve continues to function normally.  He obviously has remained on warfarin anticoagulation.    On examination today his blood pressure is 110/72, heart rate 67, and weight 216 pounds.  His lungs are clear.  Heart rhythm is regular with crisp metallic valve closure sounds.  He has a trace of edema in his feet            Assessment and Plan:     ASSESSMENT:    Mr. Min Andino is a 68-year-old gentleman with chronic systolic heart failure due to nonischemic cardiomyopathy (although he may have had infarction during his valve replacement surgery).  He has a history of severe aortic valve regurgitation, ascending aortic aneurysm and type B dissection.  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  This was complicated by occlusion of the right coronary artery requiring single-vessel saphenous vein graft bypass.  He then underwent multiple complex repair and surgical procedures between 2014 and 2020.  In 2020 he was discovered to have significant left ventricular dysfunction  with an ejection fraction of 36% which continued to worsen until 2023 when it reached a level of 25 to 30%.    He is now doing much better with appropriate medical therapy.  His ejection fraction is up to 45 to 50% and his paroxysmal atrial fibrillation seems to be well-controlled on amiodarone.  He has chronic kidney disease and mild congestive heart failure symptoms at most.  I suggested he restart his Bumex at 0.5 mg 3 times a week and continue the rest of his medications without change.  The addition of Bumex again will also help keep his potassium under better control which tends to run slightly high.    I will have him follow-up with us again in 6 months for reevaluation.    Don Gilmore MD    The longitudinal plan of care for the diagnosis(es)/condition(s) as documented were addressed during this visit. Due to the added complexity in care, I will continue to support Min in the subsequent management and with ongoing continuity of care.        Orders this Visit:  No orders of the defined types were placed in this encounter.    No orders of the defined types were placed in this encounter.    There are no discontinued medications.    Today's clinic visit entailed:    50 minutes spent by me on the date of the encounter doing chart review, history and exam, documentation and further activities per the note  Provider  Link to [facility] Help Grid     The level of medical decision making during this visit was of high complexity.           Review of Systems:     Review of Systems:  Skin:        Eyes:    glasses  ENT:       Respiratory:  Positive for CPAP;sleep apnea  Cardiovascular:  Negative for;palpitations;chest pain;edema;syncope or near-syncope;fatigue;lightheadedness;dizziness    Gastroenterology:      Genitourinary:       Musculoskeletal:       Neurologic:       Psychiatric:       Heme/Lymph/Imm:       Endocrine:                 Physical Exam:     Vitals: /72 (BP Location: Left arm, Cuff Size: Adult Large)    "Pulse 67   Ht 1.816 m (5' 11.5\")   Wt 98 kg (216 lb)   SpO2 98%   BMI 29.71 kg/m    Constitutional: Well nourished and in no apparent distress.  Eyes: Pupils equal, round. Sclerae anicteric.   HEENT: Normocephalic, atraumatic.   Neck: Supple. JVD   Respiratory: Breathing non-labored. Lungs clear to auscultation bilaterally. No crackles, wheezes, rhonchi, or rales.  Cardiovascular: Regular rate and rhythm, normal S1 and S2. No murmur, rub, or gallop.  Skin: Warm, dry. No rashes, cyanosis, or xanthelasma.  Extremities: No edema.  Neurologic: No gross motor deficits. Alert, awake, and oriented to person, place and time.  Psychiatric: Affect appropriate.             Medications:     Current Outpatient Medications   Medication Sig Dispense Refill     amiodarone (PACERONE) 200 MG tablet 200 mg oral bid for 2 weeks, then 200 mg daily       aspirin (ASA) 81 MG chewable tablet Take 81 mg by mouth daily       atorvastatin (LIPITOR) 40 MG tablet Take 1 tablet (40 mg) by mouth daily Last refill. Need follow-up with Dr. Blair. 90 tablet 3     carvedilol (COREG) 6.25 MG tablet Take 1 tablet (6.25 mg) by mouth 2 times daily (with meals) 180 tablet 3     docusate sodium (COLACE) 100 MG capsule daily        empagliflozin (JARDIANCE) 10 MG TABS tablet Take 1 tablet (10 mg) by mouth daily 90 tablet 2     multivitamin w/minerals (THERA-VIT-M) tablet Take 1 tablet by mouth daily       sacubitril-valsartan (ENTRESTO) 24-26 MG per tablet Take 1 tablet by mouth 2 times daily 180 tablet 3     tamsulosin (FLOMAX) 0.4 MG capsule Take 1 capsule (0.4 mg) by mouth daily 90 capsule 0     Vitamin D, Cholecalciferol, 25 MCG (1000 UT) TABS 2 times daily        warfarin ANTICOAGULANT (COUMADIN) 1 MG tablet 2 mg (1 mg x 2 tablets ) by mouth every Tue, Fri; and take 3 mg (3 mg x 1 tablet) all other days of the week or as directed by your INR Team based on INR Results. 90 tablet 1     warfarin ANTICOAGULANT (COUMADIN) 3 MG tablet 2 mg (1 mg x 2 " tablets ) by mouth every Tue, Fri; and take 3 mg (3 mg x 1 tablet) all other days of the week or as directed by your INR Team based on INR Results. 90 tablet 1     warfarin ANTICOAGULANT (COUMADIN) 4 MG tablet Take 4 mg on Monday, Wed, Thurs, Friday, Sunday or as directed by INR clinic 75 tablet 1     amoxicillin (AMOXIL) 500 MG capsule Take 4 capsules 1 hour before dental work (Patient not taking: Reported on 9/5/2024) 24 capsule 5     bumetanide (BUMEX) 0.5 MG tablet Take 1 tablet (0.5 mg) by mouth daily as needed (Patient not taking: Reported on 9/5/2024) 90 tablet 3     nitroGLYcerin (NITROSTAT) 0.4 MG sublingual tablet Place 1 tablet (0.4 mg) under the tongue every 5 minutes as needed for chest pain (Patient not taking: Reported on 9/5/2024) 10 tablet 0     psyllium (METAMUCIL/KONSYL) 0.52 g capsule Take 1.04 g by mouth (Patient not taking: Reported on 9/5/2024)         Family History   Problem Relation Age of Onset     Breast Cancer Mother 85     Cancer Father      Aneurysm Other         family hx     C.A.D. No family hx of      Diabetes No family hx of      Hypertension No family hx of        Social History     Socioeconomic History     Marital status:      Spouse name: Not on file     Number of children: 5     Years of education: Not on file     Highest education level: Not on file   Occupational History     Employer: [employer]     Comment: 35 years     Employer: OTHER     Comment: landscaping business   Tobacco Use     Smoking status: Never     Smokeless tobacco: Never   Vaping Use     Vaping status: Never Used   Substance and Sexual Activity     Alcohol use: Yes     Comment: reports very little etoh use.      Drug use: No     Sexual activity: Not Currently     Partners: Female     Birth control/protection: None   Other Topics Concern     Parent/sibling w/ CABG, MI or angioplasty before 65F 55M? No   Social History Narrative     Not on file     Social Determinants of Health      Financial Resource Strain: Low Risk  (5/6/2024)    Financial Resource Strain      Within the past 12 months, have you or your family members you live with been unable to get utilities (heat, electricity) when it was really needed?: No   Food Insecurity: Low Risk  (5/6/2024)    Food Insecurity      Within the past 12 months, did you worry that your food would run out before you got money to buy more?: No      Within the past 12 months, did the food you bought just not last and you didn t have money to get more?: No   Transportation Needs: Low Risk  (5/6/2024)    Transportation Needs      Within the past 12 months, has lack of transportation kept you from medical appointments, getting your medicines, non-medical meetings or appointments, work, or from getting things that you need?: No   Physical Activity: Inactive (9/15/2022)    [facility] from [facility], [facility]    Exercise Vital Sign      Days of Exercise per Week: 0 days      Minutes of Exercise per Session: 0 min   Stress: Stress Concern Present (5/6/2024)    [employer] - Occupational Stress Questionnaire      Feeling of Stress : To some extent   Social Connections: Unknown (5/6/2024)    Social Connection and Isolation Panel [NHANES]      Frequency of Communication with Friends and Family: Not on file      Frequency of Social Gatherings with Friends and Family: Twice a week      Attends Judaism Services: Not on file      Active Member of Clubs or Organizations: Not on file      Attends Club or Organization Meetings: Not on file      Marital Status: Not on file   Interpersonal Safety: Low Risk  (8/12/2024)    Interpersonal Safety      Do you feel physically and emotionally safe where you currently live?: Yes      Within the past 12 months, have you been hit, slapped, kicked or otherwise physically hurt by someone?: No      Within the past 12 months, have you been humiliated or emotionally abused in other ways by your partner  "or ex-partner?: No   Housing Stability: Low Risk  (5/6/2024)    Housing Stability      Do you have housing? : Yes      Are you worried about losing your housing?: No            Past Medical History:     Past Medical History:   Diagnosis Date     Anemia of chronic renal failure, unspecified CKD stage 8/27/2021     Aortic dissection (H)      Aortic root aneurysm (H24)      Arthritis      C. difficile colitis     April 2014 following surgery     CKD (chronic kidney disease) stage 3, GFR 30-59 ml/min (H) 9/28/2014     Class 1 obesity due to excess calories with serious comorbidity and body mass index (BMI) of 34.0 to 34.9 in adult 3/8/2022     Connective tissue disease (H24)     \"probable\" per [facility] Notes     DVT (deep venous thrombosis) (H)     April 2014 following surgery     History of blood transfusion      Horseshoe kidney      Hypertension      S/P insertion of iliac artery stent 11/11/2020    Left internal iliac artery stenting for endoleak     Thoracic aortic aneurysm without rupture (H24) 3/8/2014     Urinary urgency 10/12/2020     Valvular cardiomyopathy (H)               Past Surgical History:     Past Surgical History:   Procedure Laterality Date     ABDOMEN SURGERY       AORTIC VALVE REPLACEMENT  4/7/14     ARTHROSCOPY KNEE RT/LT  2005    left, repair meniscus     COLONOSCOPY       COLONOSCOPY N/A 6/22/2022    Procedure: COLONOSCOPY, WITH POLYPECTOMY AND BIOPSY;  Surgeon: Josh Morales MD;  Location:  GI     ESOPHAGOSCOPY, GASTROSCOPY, DUODENOSCOPY (EGD), COMBINED N/A 6/22/2022    Procedure: ESOPHAGOGASTRODUODENOSCOPY, WITH BIOPSY;  Surgeon: Josh Morales MD;  Location:  GI     REPAIR AORTIC ROOT  4/7/14    surgery followed by ECMO, vasopressor therapy     SOFT TISSUE SURGERY       THORACIC SURGERY       VASCULAR SURGERY       [facility] CABG, ARTERIAL, SINGLE  4/7/14     [facility] REPAIR CRUCIATE LIGAMENT,KNEE  1999    left              Allergies:   Patient has no known allergies.       Data: "   All laboratory data reviewed:    Recent Labs   Lab Test 05/08/24  1225 01/15/24  0842 01/05/24  1319 12/06/23  1418 06/14/22  1127 03/08/22  1237   LDL 56 78  --   --   --  96   HDL 32* 54  --   --   --  41   NHDL 75 89  --   --   --  115   CHOL 107 143  --   --   --  156   TRIG 94 55  --   --   --  96   TSH  --   --   --  2.19  --   --    NTBNP  --  943*  --   --   --   --    IRON  --   --   --  69   < >  --    FEB  --   --   --  214*   < >  --    IRONSAT  --   --   --  32   < >  --    RON  --   --  264  --    < >  --     < > = values in this interval not displayed.       Lab Results   Component Value Date    WBC 4.0 09/03/2024    WBC 6.2 05/26/2020    RBC 4.04 (L) 09/03/2024    RBC 3.73 (L) 05/26/2020    HGB 13.0 (L) 09/03/2024    HGB 12.6 (L) 04/14/2021    HCT 40.6 09/03/2024    HCT 35.1 (L) 05/26/2020     (H) 09/03/2024    MCV 94 05/26/2020    MCH 32.2 09/03/2024    MCH 29.5 05/26/2020    MCHC 32.0 09/03/2024    MCHC 31.3 (L) 05/26/2020    RDW 14.5 09/03/2024    RDW 17.9 (H) 05/26/2020     (L) 09/03/2024     05/26/2020       Lab Results   Component Value Date     06/25/2024     04/14/2021    POTASSIUM 5.2 06/25/2024    POTASSIUM 5.1 01/03/2023    POTASSIUM 5.0 04/14/2021    CHLORIDE 105 06/25/2024    CHLORIDE 107 01/03/2023    CHLORIDE 111 (H) 04/14/2021    CO2 26 06/25/2024    CO2 30 01/03/2023    CO2 27 04/14/2021    ANIONGAP 8 06/25/2024    ANIONGAP 5 01/03/2023    ANIONGAP 3 04/14/2021    GLC 87 06/25/2024     (H) 01/03/2023     (H) 04/14/2021    BUN 31.7 (H) 06/25/2024    BUN 36 (H) 01/03/2023    BUN 27 04/14/2021    CR 1.76 (H) 06/25/2024    CR 1.52 (H) 04/14/2021    GFRESTIMATED 41 (L) 06/25/2024    GFRESTIMATED 47 (L) 04/14/2021    GFRESTBLACK 54 (L) 04/14/2021    FAYE 9.2 06/25/2024    FAYE 8.6 04/14/2021      Lab Results   Component Value Date    AST 33 05/08/2024    AST 15 09/16/2015    ALT 32 05/08/2024    ALT 14 09/16/2015       Lab Results   Component  Value Date    A1C 5.0 05/08/2024    A1C 5.6 12/29/2013       Lab Results   Component Value Date    INR 2.4 08/27/2024    INR 2.8 08/12/2024    INR 2.0 07/20/2021    INR 2.3 (A) 07/08/2021         DON GILMORE MD  [facility]      Thank you for allowing me to participate in the care of your patient.      Sincerely,     DON GILMORE MD     [facility]  cc:   Don Gilmore MD  [address]  [place] [address]

## 2024-09-05 NOTE — TELEPHONE ENCOUNTER
Health Call Center    Phone Message    May a detailed message be left on voicemail: yes     Reason for Call: Other: Patient wife call regarding labs. Jalyn richmond patient did have blood drawn in [place] on 9/3. Wants to know if Dr. Gilmore team got the results. If not patient is fasting right now and would like to know if he should go in again for labs. Please reach out ASAP. Patient wants to know if he can eat or not. Thank you       Action Taken: Other: cardiology     Travel Screening: Not Applicable     Thank you!  [facility]      Date of Service:

## 2024-09-09 NOTE — TELEPHONE ENCOUNTER
"Lab results noted. Lab orders were released 9/5 so it doesn't look like  had labs at the time of the visit.      Per  from 9/5 visit, \"He has chronic kidney disease and mild congestive heart failure symptoms at most. I suggested he restart his Bumex at 0.5 mg 3 times a week and continue the rest of his medications without change. The addition of Bumex again will also help keep his potassium under better control which tends to run slightly high. \"    Will send  an update on lab results.      Mg RN September 9, 2024, 3:36 PM    Component      Latest Ref Rng 9/3/2024  11:31 AM   Sodium      135 - 145 mmol/L 140    Potassium      3.4 - 5.3 mmol/L 4.9    Chloride      98 - 107 mmol/L 105    Carbon Dioxide (CO2)      22 - 29 mmol/L 25    Anion Gap      7 - 15 mmol/L 10    Urea Nitrogen      8.0 - 23.0 mg/dL 35.0 (H)    Creatinine      0.67 - 1.17 mg/dL 2.09 (H)    GFR Estimate      >60 mL/min/1.73m2 34 (L)    Calcium      8.8 - 10.4 mg/dL 8.9    Glucose      70 - 99 mg/dL 84    Patient Fasting? Unknown    Patient Fasting? Unknown    WBC      4.0 - 11.0 10e3/uL 4.0    RBC Count      4.40 - 5.90 10e6/uL 4.04 (L)    Hemoglobin      13.3 - 17.7 g/dL 13.0 (L)    Hematocrit      40.0 - 53.0 % 40.6    MCV      78 - 100 fL 101 (H)    MCH      26.5 - 33.0 pg 32.2    MCHC      31.5 - 36.5 g/dL 32.0    RDW      10.0 - 15.0 % 14.5    Platelet Count      150 - 450 10e3/uL 108 (L)    Protein Total      6.4 - 8.3 g/dL 6.7    Albumin      3.5 - 5.2 g/dL 4.0    Bilirubin Total      <=1.2 mg/dL 0.8    Alkaline Phosphatase      40 - 150 U/L 89    AST      0 - 45 U/L 22    ALT      0 - 70 U/L 16    Bilirubin Direct      0.00 - 0.30 mg/dL <0.20    Cholesterol      <200 mg/dL 126    Triglycerides      <150 mg/dL 83    HDL Cholesterol      >=40 mg/dL 38 (L)    LDL Cholesterol Calculated      <=100 mg/dL 71    Non HDL Cholesterol      <130 mg/dL 88    TSH      0.30 - 4.20 uIU/mL 2.33       Legend:  (H) High  (L) " Low

## 2024-09-09 NOTE — TELEPHONE ENCOUNTER
I reviewed the labs that were done at the time of my office visit with him on 9/3/2024.  These were not drawn before the visit so that I would have them available at the time of the visit.  His labs essentially look stable.  His creatinine is up to 2.09, but was the same in April 2024 and dropped down to 1.75 for a couple months while he was on Bumex.  I restarted the Bumex and I expect the creatinine to improve again.  Lets recheck the basic metabolic panel in 1 month.  Otherwise labs look good including liver functions, cholesterol numbers, and CBC.  Don Gilmore MD

## 2024-09-10 NOTE — TELEPHONE ENCOUNTER
[employer] message sent to patient with lab results and Dr. Gilmore recommendations. BMP order to be drawn in one month has been placed.

## 2024-09-15 LAB — INR HOME MONITORING: 2.5 (ref 2–3)

## 2024-09-16 ENCOUNTER — ANTICOAGULATION THERAPY VISIT (OUTPATIENT)
Dept: ANTICOAGULATION | Facility: CLINIC | Age: 69
End: 2024-09-16
Payer: MEDICARE

## 2024-09-16 DIAGNOSIS — Z79.01 LONG TERM CURRENT USE OF ANTICOAGULANT THERAPY: ICD-10-CM

## 2024-09-16 DIAGNOSIS — Z95.2 H/O MECHANICAL AORTIC VALVE REPLACEMENT: Primary | ICD-10-CM

## 2024-09-16 NOTE — PROGRESS NOTES
ANTICOAGULATION MANAGEMENT     Min Andino 68 year old male is on warfarin with therapeutic INR result. (Goal INR 2.0-3.0)    Recent labs: (last 7 days)     09/11/24  0000   INR 2.5       ASSESSMENT     Source(s): Chart Review  Previous INR was Therapeutic last 2(+) visits  Medication, diet, health changes since last INR chart reviewed; none identified         PLAN     Recommended plan for no diet, medication or health factor changes affecting INR     Dosing Instructions: Continue your current warfarin dose with next INR in 2 weeks       Summary  As of 9/16/2024      Full warfarin instructions:  2 mg every Tue, Fri; 3 mg all other days   Next INR check:  9/25/2024               Detailed voice message left for Min with dosing instructions and follow up date.   Sent [employer] message with dosing and follow up instructions    Patient to recheck with home meter    Education provided: Please call back if any changes to your diet, medications or how you've been taking warfarin  Goal range and lab monitoring: goal range and significance of current result  Written instructions provided  Contact [phone] with any changes, questions or concerns.     Plan made per [facility] anticoagulation protocol    Dodie Lackey RN  9/16/2024  Anticoagulation Clinic  [facility] for routing messages: p ANTICOAG HOME MONITORING  [facility] patient phone line: [phone]        _______________________________________________________________________     Anticoagulation Episode Summary       Current INR goal:  2.0-3.0   TTR:  89.4% (8.7 mo)   Target end date:  Indefinite   Send INR reminders to:  ANTICOAG HOME MONITORING    Indications    H/O mechanical aortic valve replacement [Z95.2]  Long term current use of anticoagulant therapy [Z79.01]             Comments:  [employer] home meter             Anticoagulation Care Providers       Provider Role Specialty Phone number    Min Fried MD Referring Internal Medicine [phone]

## 2024-10-01 ENCOUNTER — ANTICOAGULATION THERAPY VISIT (OUTPATIENT)
Dept: ANTICOAGULATION | Facility: CLINIC | Age: 69
End: 2024-10-01
Payer: MEDICARE

## 2024-10-01 DIAGNOSIS — Z95.2 H/O MECHANICAL AORTIC VALVE REPLACEMENT: Primary | ICD-10-CM

## 2024-10-01 DIAGNOSIS — Z79.01 LONG TERM CURRENT USE OF ANTICOAGULANT THERAPY: ICD-10-CM

## 2024-10-01 LAB — INR HOME MONITORING: 2.4 (ref 2–3)

## 2024-10-01 NOTE — PROGRESS NOTES
ANTICOAGULATION MANAGEMENT     Min Andino 68 year old male is on warfarin with therapeutic INR result. (Goal INR 2.0-3.0)    Recent labs: (last 7 days)     09/25/24  0000   INR 2.4       ASSESSMENT     Source(s): Chart Review  Previous INR was Therapeutic last 2(+) visits  Medication, diet, health changes since last INR chart reviewed; none identified         PLAN     Recommended plan for no diet, medication or health factor changes affecting INR     Dosing Instructions: Continue your current warfarin dose with next INR in 2 weeks       Summary  As of 10/1/2024      Full warfarin instructions:  2 mg every Tue, Fri; 3 mg all other days   Next INR check:  10/9/2024               Detailed voice message left for Min with dosing instructions and follow up date.     Patient to recheck with home meter    Education provided: Please call back if any changes to your diet, medications or how you've been taking warfarin  Contact [phone] with any changes, questions or concerns.     Plan made per [facility] anticoagulation protocol    Mica Browning RN  10/1/2024  Anticoagulation Clinic  [facility] for routing messages: p ANTICOAG HOME MONITORING  [facility] patient phone line: [phone]        _______________________________________________________________________     Anticoagulation Episode Summary       Current INR goal:  2.0-3.0   TTR:  89.9% (9.2 mo)   Target end date:  Indefinite   Send INR reminders to:  ANTICOAG HOME MONITORING    Indications    H/O mechanical aortic valve replacement [Z95.2]  Long term current use of anticoagulant therapy [Z79.01]             Comments:  [employer] home meter             Anticoagulation Care Providers       Provider Role Specialty Phone number    Min Fried MD Referring Internal Medicine [phone]

## 2024-10-12 LAB — INR HOME MONITORING: 3.1 (ref 2–3)

## 2024-10-14 ENCOUNTER — ANTICOAGULATION THERAPY VISIT (OUTPATIENT)
Dept: ANTICOAGULATION | Facility: CLINIC | Age: 69
End: 2024-10-14
Payer: MEDICARE

## 2024-10-14 DIAGNOSIS — Z79.01 LONG TERM CURRENT USE OF ANTICOAGULANT THERAPY: ICD-10-CM

## 2024-10-14 DIAGNOSIS — Z95.2 H/O MECHANICAL AORTIC VALVE REPLACEMENT: Primary | ICD-10-CM

## 2024-10-14 NOTE — PROGRESS NOTES
ANTICOAGULATION MANAGEMENT     Min Andino 68 year old male is on warfarin with supratherapeutic INR result. (Goal INR 2.0-3.0)    Recent labs: (last 7 days)     10/09/24  0000   INR 3.1*       ASSESSMENT     Source(s): Chart Review  Previous INR was Therapeutic last 2(+) visits  Medication, diet, health changes since last INR chart reviewed; none identified         PLAN     Unable to reach Min today.    Left message to continue current dose of warfarin 3 mg tonight. Request call back for assessment.    Follow up required to discuss out of range result   My chart message sent.    Che Cuadra, RN  10/14/2024  Anticoagulation Clinic  [facility] for routing messages: [facility]  [facility] patient phone line: [phone]

## 2024-10-15 ENCOUNTER — ANTICOAGULATION THERAPY VISIT (OUTPATIENT)
Dept: ANTICOAGULATION | Facility: CLINIC | Age: 69
End: 2024-10-15
Payer: MEDICARE

## 2024-10-15 DIAGNOSIS — Z95.2 H/O MECHANICAL AORTIC VALVE REPLACEMENT: Primary | ICD-10-CM

## 2024-10-15 DIAGNOSIS — Z79.01 LONG TERM CURRENT USE OF ANTICOAGULANT THERAPY: ICD-10-CM

## 2024-10-15 NOTE — PROGRESS NOTES
ANTICOAGULATION MANAGEMENT     Min Andino 68 year old male is on warfarin with supratherapeutic INR result. (Goal INR 2.0-3.0)    Recent labs: (last 7 days)     10/09/24  0000   INR 3.1*   This Result was reported today but dated 10/9/24.    ASSESSMENT     Source(s): Chart Review  Previous INR was Therapeutic last 2(+) visits  Medication, diet, health changes since last INR chart reviewed; none identified         PLAN     Recommended plan for no diet, medication or health factor changes affecting INR     Dosing Instructions: Continue your current warfarin dose with next INR in 1 week       Summary  As of 10/15/2024      Full warfarin instructions:  2 mg every Tue, Fri; 3 mg all other days   Next INR check:  10/30/2024               Telephone call with Min who agrees to plan and repeated back plan correctly    Patient to recheck with home meter    Education provided: Please call back if any changes to your diet, medications or how you've been taking warfarin    Plan made per [facility] anticoagulation protocol    Vonda Izaguirre RN  10/15/2024  Anticoagulation Clinic  [facility] for routing messages: p ANTICOAG HOME MONITORING  [facility] patient phone line: [phone]        _______________________________________________________________________     Anticoagulation Episode Summary       Current INR goal:  2.0-3.0   TTR:  89.7% (9.7 mo)   Target end date:  Indefinite   Send INR reminders to:  [facility]AG HOME [facility]IT[facility]    Indications    H/O mechanical aortic valve replacement [Z95.2]  Long term current use of anticoagulant therapy [Z79.01]             Comments:  [employer] home meter             Anticoagulation Care Providers       Provider Role Specialty Phone number    Min Fried MD Referring Internal Medicine [phone]

## 2024-10-24 ENCOUNTER — ANTICOAGULATION THERAPY VISIT (OUTPATIENT)
Dept: ANTICOAGULATION | Facility: CLINIC | Age: 69
End: 2024-10-24
Payer: MEDICARE

## 2024-10-24 DIAGNOSIS — Z79.01 LONG TERM CURRENT USE OF ANTICOAGULANT THERAPY: ICD-10-CM

## 2024-10-24 DIAGNOSIS — Z95.2 H/O MECHANICAL AORTIC VALVE REPLACEMENT: Primary | ICD-10-CM

## 2024-10-24 LAB
INR HOME MONITORING: 2.2 (ref 2–3)
INR HOME MONITORING: 2.8 (ref 2–3)

## 2024-10-24 NOTE — PROGRESS NOTES
ANTICOAGULATION MANAGEMENT     Min Andino 68 year old male is on warfarin with therapeutic INR result. (Goal INR 2.0-3.0)    Recent labs: (last 7 days)     10/23/24  0000   INR 2.2   Result received on 10/24/24    ASSESSMENT     Source(s): Chart Review  Previous INR was Therapeutic last visit; previously outside of goal range  Medication, diet, health changes since last INR chart reviewed; none identified         PLAN     Recommended plan for no diet, medication or health factor changes affecting INR     Dosing Instructions: Continue your current warfarin dose with next INR in 2 weeks       Summary  As of 10/24/2024      Full warfarin instructions:  2 mg every Tue, Fri; 3 mg all other days   Next INR check:  11/7/2024               Detailed voice message left for Min with dosing instructions and follow up date.   Sent [employer]cohart message with dosing and follow up instructions    Patient to recheck with home meter    Education provided: Please call back if any changes to your diet, medications or how you've been taking warfarin  Contact [phone] with any changes, questions or concerns.     Plan made per [facility] anticoagulation protocol    Zaina Gibbs RN  10/24/2024  Anticoagulation Clinic  [facility] for routing messages: p ANTICOAG HOME MONITORING  [facility] patient phone line: [phone]        _______________________________________________________________________     Anticoagulation Episode Summary       Current INR goal:  2.0-3.0   TTR:  89.4% (10.1 mo)   Target end date:  Indefinite   Send INR reminders to:  ANTICOAG HOME [facility]IT[facility]    Indications    H/O mechanical aortic valve replacement [Z95.2]  Long term current use of anticoagulant therapy [Z79.01]             Comments:  [employer] home meter             Anticoagulation Care Providers       Provider Role Specialty Phone number    Min Fried MD Referring Internal Medicine [phone]

## 2024-10-25 DIAGNOSIS — N18.32 STAGE 3B CHRONIC KIDNEY DISEASE (H): Primary | ICD-10-CM

## 2024-11-05 ENCOUNTER — LAB (OUTPATIENT)
Dept: LAB | Facility: OTHER | Age: 69
End: 2024-11-05
Payer: MEDICARE

## 2024-11-05 ENCOUNTER — IMMUNIZATION (OUTPATIENT)
Dept: FAMILY MEDICINE | Facility: OTHER | Age: 69
End: 2024-11-05
Payer: MEDICARE

## 2024-11-05 DIAGNOSIS — Z23 HIGH PRIORITY FOR 2019-NCOV VACCINE: ICD-10-CM

## 2024-11-05 DIAGNOSIS — I50.22 CHRONIC SYSTOLIC HEART FAILURE (H): ICD-10-CM

## 2024-11-05 DIAGNOSIS — Z23 NEED FOR PROPHYLACTIC VACCINATION AND INOCULATION AGAINST INFLUENZA: Primary | ICD-10-CM

## 2024-11-05 DIAGNOSIS — N18.32 STAGE 3B CHRONIC KIDNEY DISEASE (H): ICD-10-CM

## 2024-11-05 LAB
ALBUMIN MFR UR ELPH: 7.5 MG/DL
ALBUMIN SERPL BCG-MCNC: 4.2 G/DL (ref 3.5–5.2)
ANION GAP SERPL CALCULATED.3IONS-SCNC: 11 MMOL/L (ref 7–15)
BUN SERPL-MCNC: 38.3 MG/DL (ref 8–23)
CALCIUM SERPL-MCNC: 9.3 MG/DL (ref 8.8–10.4)
CHLORIDE SERPL-SCNC: 104 MMOL/L (ref 98–107)
CREAT SERPL-MCNC: 1.92 MG/DL (ref 0.67–1.17)
CREAT UR-MCNC: 78.5 MG/DL
EGFRCR SERPLBLD CKD-EPI 2021: 37 ML/MIN/1.73M2
ERYTHROCYTE [DISTWIDTH] IN BLOOD BY AUTOMATED COUNT: 14.3 % (ref 10–15)
GLUCOSE SERPL-MCNC: 85 MG/DL (ref 70–99)
HCO3 SERPL-SCNC: 24 MMOL/L (ref 22–29)
HCT VFR BLD AUTO: 43.9 % (ref 40–53)
HGB BLD-MCNC: 14.2 G/DL (ref 13.3–17.7)
MCH RBC QN AUTO: 32.5 PG (ref 26.5–33)
MCHC RBC AUTO-ENTMCNC: 32.3 G/DL (ref 31.5–36.5)
MCV RBC AUTO: 101 FL (ref 78–100)
PHOSPHATE SERPL-MCNC: 3.6 MG/DL (ref 2.5–4.5)
PLATELET # BLD AUTO: 114 10E3/UL (ref 150–450)
POTASSIUM SERPL-SCNC: 5.4 MMOL/L (ref 3.4–5.3)
PROT/CREAT 24H UR: 0.1 MG/MG CR (ref 0–0.2)
RBC # BLD AUTO: 4.37 10E6/UL (ref 4.4–5.9)
SODIUM SERPL-SCNC: 139 MMOL/L (ref 135–145)
WBC # BLD AUTO: 4.8 10E3/UL (ref 4–11)

## 2024-11-05 PROCEDURE — 82306 VITAMIN D 25 HYDROXY: CPT

## 2024-11-05 PROCEDURE — 85027 COMPLETE CBC AUTOMATED: CPT

## 2024-11-05 PROCEDURE — 90662 IIV NO PRSV INCREASED AG IM: CPT

## 2024-11-05 PROCEDURE — 90480 ADMN SARSCOV2 VAC 1/ONLY CMP: CPT

## 2024-11-05 PROCEDURE — 36415 COLL VENOUS BLD VENIPUNCTURE: CPT

## 2024-11-05 PROCEDURE — 91320 SARSCV2 VAC 30MCG TRS-SUC IM: CPT

## 2024-11-05 PROCEDURE — G0008 ADMIN INFLUENZA VIRUS VAC: HCPCS

## 2024-11-05 PROCEDURE — 84156 ASSAY OF PROTEIN URINE: CPT

## 2024-11-05 PROCEDURE — 80069 RENAL FUNCTION PANEL: CPT

## 2024-11-07 ENCOUNTER — ANTICOAGULATION THERAPY VISIT (OUTPATIENT)
Dept: ANTICOAGULATION | Facility: CLINIC | Age: 69
End: 2024-11-07
Payer: MEDICARE

## 2024-11-07 DIAGNOSIS — Z95.2 H/O MECHANICAL AORTIC VALVE REPLACEMENT: Primary | ICD-10-CM

## 2024-11-07 DIAGNOSIS — Z79.01 LONG TERM CURRENT USE OF ANTICOAGULANT THERAPY: ICD-10-CM

## 2024-11-07 LAB — INR HOME MONITORING: 2.2 (ref 2–3)

## 2024-11-07 NOTE — PROGRESS NOTES
ANTICOAGULATION MANAGEMENT     Min Andino 68 year old male is on warfarin with therapeutic INR result. (Goal INR 2.0-3.0)    Recent labs: (last 7 days)     11/06/24  0000   INR 2.2       ASSESSMENT     Source(s): Chart Review and Patient/Caregiver Call     Warfarin doses taken: Less warfarin taken than planned which may be affecting INR  Diet: No new diet changes identified  Medication/supplement changes: None noted  New illness, injury, or hospitalization: No  Signs or symptoms of bleeding or clotting: No  Previous result: Therapeutic last 2(+) visits  Additional findings: None       PLAN     Recommended plan for no diet, medication or health factor changes affecting INR     Dosing Instructions: Continue your current warfarin dose with next INR in 2 weeks. Dosing adjusted to reflect what the patient has been taking which is a 5.3% decrease in the maintenance dosing.    Summary  As of 11/7/2024      Full warfarin instructions:  2 mg every Sun, Tue, Fri; 3 mg all other days   Next INR check:  11/20/2024               Telephone call with Min who verbalizes understanding and agrees to plan    Patient to recheck with home meter    Education provided: None required    Plan made per [facility] anticoagulation protocol    Mica Browning RN  11/7/2024  Anticoagulation Clinic  [facility] for routing messages: p ANTICOAG HOME MONITORING  [facility] patient phone line: [phone]        _______________________________________________________________________     Anticoagulation Episode Summary       Current INR goal:  2.0-3.0   TTR:  89.9% (10.6 mo)   Target end date:  Indefinite   Send INR reminders to:  ANTICOAG HOME [facility]IT[facility]    Indications    H/O mechanical aortic valve replacement [Z95.2]  Long term current use of anticoagulant therapy [Z79.01]             Comments:  [employer] home meter             Anticoagulation Care Providers       Provider Role Specialty Phone number    Min Fried MD Referring Internal Medicine  [phone]

## 2024-11-10 LAB
DEPRECATED CALCIDIOL+CALCIFEROL SERPL-MC: <39 UG/L (ref 20–75)
VITAMIN D2 SERPL-MCNC: <5 UG/L
VITAMIN D3 SERPL-MCNC: 34 UG/L

## 2024-11-12 ENCOUNTER — VIRTUAL VISIT (OUTPATIENT)
Dept: NEPHROLOGY | Facility: CLINIC | Age: 69
End: 2024-11-12
Payer: COMMERCIAL

## 2024-11-12 DIAGNOSIS — I10 HYPERTENSION, GOAL BELOW 140/90: ICD-10-CM

## 2024-11-12 DIAGNOSIS — N18.32 STAGE 3B CHRONIC KIDNEY DISEASE (H): Primary | ICD-10-CM

## 2024-11-12 DIAGNOSIS — E87.5 HYPERKALEMIA: ICD-10-CM

## 2024-11-12 DIAGNOSIS — N25.81 SECONDARY RENAL HYPERPARATHYROIDISM (H): ICD-10-CM

## 2024-11-12 PROCEDURE — 99214 OFFICE O/P EST MOD 30 MIN: CPT | Mod: 95 | Performed by: INTERNAL MEDICINE

## 2024-11-12 NOTE — PROGRESS NOTES
"Virtual Visit Details    Type of service:  Video Visit     Originating Location (pt. Location): Home    Distant Location (provider location):  Off-site  Platform used for Video Visit: Иван    11/12/24     CC: CKD    HPI: Min Andino is a 68 year old male who presents for evaluation of CKD. I last saw Mr. Andino in 2014. To review from my previous note: Mr. Andino's past medical hx was unremarkable leading up to Dec 2013 when he noted chest tightness. He was seen at [facility] at that time and noted to have aortic dissection. He was initially monitored but later underwent aortic repair/aortic valve replacement/CABG in early April 2014. His post op course was complicated by the need for ECMO as well as pressor support. His wife reports today that he was told that he has \"normal\" creatinine levels when undergoing physicals in the past. His creatinine was 2.2 post surgery in April 2014 but improved to 1.3 at the time of discharge in April. 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 At that time his creatinine was 2.2 post op but improved to 1.3. Since that time, creatinine readings have included 1.48 on 7/17/14, 1.39 no 7/27/14, and 1.35 on 8/13/14. 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                 02/24/20: today he presents to reestablish care in our clinic. His creatinine was stable at ~ 1.1 on last check in 2014 but is now at a new baseline of ~ 1.6 since august. Mr. Andino underwent thoracoabdominal aortic repair august 2019. The placement of this stent compromised blood flow to the left kidney moiety which was noted on imaging later. His creatinine post this procedure was ~ 1.6. There have been a few episodes when the creatinine has increased, most recently a few weeks ago (to 1.9). With his most recent creatinine rise, lasix was held. He has not noted much change in his swelling or breathing " with holding the lasix for the past few weeks. Today I noted SOB during our conversation. His oxygen saturation was fine but his breathing seemed labored. He and his wife report that this is his breathing baseline over the past 5 years and is not worse than typical. They are following weights at home and he has been dropping weight - no increase since stopping the lasix. He does have difficulty with empyting his bladder - last imaging in the fall showed no hydronephrosis. Flomax did not help him in the past. He has a HHN coming once a week to the house. He is not lightheaded. He has plans to see cardiology at [place] in March, is looking to establish care in urology, is also looking to establish care with internal medicine potentially in our clinic for continuity in one location.         06/08/20:  Virtual visit. Lasix was increased last month. He reports that the swelling is there but better. He denies any change in eating. No diarrhea. Weight was 240 lbs down to 215 lbs. He had been losing 1 lb a day. He was in ED last week and  Dx with hernia. He held lasix since Friday - was on lasix 40 mg daily held over the weekend - weight was 215 lbs on Thursday and 217 lbs this AM. He denies lightheadedness/dizziness. No orthostatic sxs. He quit wearing compression stockings a week ago. He feels he has slightly more endurance; can't lift a lot, needing to take breaks. He feels his urinary retention is not as problematic - has not seen urology. He has been taking iron BID.      7/13/20: video visit. He has continued to see weight loss over time - was 215 lbs in June but now 205 lbs. He has seen a change in his clothing size by 2 over the past few months. He is hungry often - eating well. No diarrhea. He has some constipation at times. He feels that maybe his breathing is improved. He is wearing compression stockings. He is using lasix 20 mg daily.      10/20/20: video visit - started on [facility] and did exam that way - then  converted to telephone given echo noted. Feeling the best he has felt since before last winter. NO new issues. He has endovascular stent repair planned in the coming week. Activity improving. No swelling. Weight most recently 203 lbs. Over the past month - 201-203 lbs. BP has been well controlled; 115/61 at a recent physician appt. He tends to have higher readings at home; 140s at home, sometimes 130s.     01/25/21:  In the setting of COVID-19 pandemic, this visit was changed to a telephone visit. Patient reports feeling good overall. No difficulty with fluid overload/swelling/shortness of breath. His weight is up slightly but he also has a bigger appetite lately and snacking more with being at home. BP was 105/58 at a recent clinic visit but they report pressures of 130-176 at home most recently - mostly above goal. They have been working with cardiology clinic at [place] in regards to medication adjustments  And plan to reach out to them with these updates. He was hospitalized 11/11/20-11/17/20 following left internal iliac artery stenting for endoleak. Returned to operating room 11/13 for [id number] of left common femoral aneurysm and previous graft left external iliac to superficial femoral and deep femoral artery interposition graft. Creatinine was stable at 1.5-1.7 while inpatient. He has been taking iron BID but with some constipation.     9/28/21: video visit. No vascular interventions or hospitalizations since last visit. Vascular stent/leak stable on recent CT. Checking BP 4x/day with average SBP in AM 160s-180s, mid day 110s-120s, PM 140s. Amlodipine (takes in AM) increased to 10 mg daily approximately one month ago by PCP. Losartan (take at night) 100 mg daily increased march 2021. Metoprolol succinate increased to 100 mg daily (not sure when this was increased, take in AM). BP sligthly improved but not much. Denies any dizziness, CP, abdominal pain, SOB (some BROWN with moderate exertion), LE swelling or  abdominal distention. Doesn't check weights regularly, last weight 220.    01/04/22: went to [place] in first part of November - things are looking goo/stable.  NO swelling related concerns at this time. Breathing is stable. IN the past few weeks - blood pressure one time 97 in the AM - evening always 30 points higher.  Has had a consistent pattern of low in the AM and higher in evening for the past few months. Currently taking amlodipine 5 mg AM, losartan 50 mg BID, and carvedilol. 12.5 mg BID.     10/04/22: video visit. Not as hydrated as he should. No diueretics. No lower ext swelling. NO NSAIDs. Blood pressure recently - takes it every 10 days - 121/76, 129/83, 102/67, 119/75, 127/76, 119/68. We spent time discussing hydration - he admits he is not a great water drinker.     05/02/23: telephone visit -  Sara messaged me on 3/1/23 given increased swelling noted in his feet and legs - was given lasix 20 mg to be taken for 7 days. Creatinine was 1.85 in Sept 2022 in our system but then 1.43 more recently in Jan. Labs were done through [facility] in Feb and Creatinine was up to 1.94 and then last week 1.9. He was seen by his cardiologist at [facility] last week - felt to have minimally decompensated left ventricular systolic function but exacerbation of right ventricular failure perhabs due to underlying lung disease exacerbated by hypoventilation and sleep apnea. Plan is cardiac MR, lasix 20 mg daily, sleep study. Potential right heart cath in the future.    At this time, swelling is about the same. Weight is down about 10-12 lbs during the month of March - stayed stable through April. Feet and legs don't look normal but not terrible. Recent BP readings - 122/71, 127/77, 124/82. Pretty much always In the 120s. A few will go to 116. His physical stamina has declined - much more fatigued through the winter. He has been less active through the winter - more active in spring/fall/summer. Wheelchair  "most recently. Home pressures don't go above 140. SOB with activity. That has been the case for a long time - worse for the past 6-8 months. MRI of hte heart is the 10th, cards the 11th, sleep study the 21st/22nd. Cardiologist consult end of June at [place].     05/30/23: video visit. They feel the swelling is progressively getting worse day by day.last week they went to 20 mg BID of lasix per cardiology but then creatinine increased so they decreased back to 20 mg of lasix.  They have been losing weight this year with healthier dietary habits but unfortunately the scale is going up for him. Swelling in legs is significantly more uncomfortable. They did their sleep study but currently do not have a follow-up with pulmonary until July to hear the results/next stpeps.   Appt at [facility] in [place] for 2nd opinion.    Not scheduled for cardioversion at this point but they are started on amiodarone since last visit with Dr. Boyd.   Lasix 20 mg daily. When taking 20 BID, he did not see any difference in swelling improvement or urine output.  He is not on norvasc. /77. 132/78. 120s-130s.     07/11/23: video visit. Since our last visit I connected with the sleep center and he was going to be started on CPAP ASAP. I also connected with Dr. Boyd at [facility]. The prognosis of his cardiac condition is poor and he is concerned about future needs for dialysis. He has since been seen by [facility] cardiology - \"progressive LV adverse remodeling with reduced EF accompanied by right heart failure and significant tricuspid regurgitation\". They discussed optimizing medical therapy: they hope to add SGLT2 inhibitor, wanted to increase bumex to 2 mg AM And 1 mg PM. Once more euvolemic, then optimize carvedilol to a target of 25 mg BID. They also discussed replacing losartan with sacubitril/valsartan. They also felt electrical cardioversion is not unreasonable.    He has dropped about 50 lbs since our last " visit. Lasix wasn't working. Bumex has helped. They have not made med changes since being seen at [place]. He has an appt this Friday with cardiology. There is a little swelling still but much improved from our last visit - 250 lbs 1.5 months ago - just over 200 lbs now. Same eating regimen. Now on CPAP - still with apnea events but improved from previous. Was just seen by sleep physician through [facility]. Mask has been tolerable. Currently taking bumex 2 mg daily. BP has been good at 120/83. 130/85, 135/82. NO lightheaedness or dizziness.     10/10/23: video visit.   1. Are you on jardiance at this time?  YES   2. What are your recent blood pressure readings?  94/44, 104/43, 121/58, 159/69, 114/47, 156/66   3. Please confirm your current bumex dose  1mg. in AM and .5 mg. mid-day   4. Please also update on how you are feeling in regards to volume (is your weight up/down?, short of breath?, swelling?)  No edema or swelling. Weight stable.  No shortness of breath.   He was in the ED in September with dizziness. Breathing is stable. Better than in the spring certainly.  Weight has been 195, 194 lbs - +/- 5 lbs. This is stable. No lightheadedness on standing except when he first gets up in the AM. Last episode was a couple days ago. Dry Mouth. Wearing CPAP. No significant thirst.   - Now on Jardiance X 2-2.5 weeks  - Carvedilol to 12.5 mg BID  - losartan is now 25 mg BID    01/09/24: video visit. Scheduled for follow-up next week. Feeling pretty good. No volume related issues. Still on CPAP at night. Bumex is 0.5 mg every other day. No signs of dehydrated. Low BPs in the AM - 79/35, 83/41 - not every day but will occur. Other days 138/54. The pressures less than 100 occur 1-2 times per week. Rarely feels dizzy. Heart rate has been 58-69. Typically upper 50s, lower 60s.     04/16/24: video visit. Feeling pretty good. No swelling. Breathing is comfortable. He feels he is hydrated. Drinks when he is thirsty. On bumex  0.5 mg every other day. Weighs himself almost every day - gained a few lbs the past few months but much fluctuating. BP has been less than 140 at home. Using CPAP at night.     11/12/24: video visit. Creatinine has been 1.75-2.09; 1.92 on this most recent check. No proteinuria. K just slightly elevated at 5.4. no signs of fluid overload. Currently off of bumex. He has been gaining some weight. No swelling. No other signs of fluid overload. Breathing is comfortable. He remains on Jardiance. Still on CPAP at night.     Current Outpatient Medications   Medication Sig Dispense Refill    amiodarone (PACERONE) 200 MG tablet 200 mg oral bid for 2 weeks, then 200 mg daily      amoxicillin (AMOXIL) 500 MG capsule Take 4 capsules 1 hour before dental work (Patient not taking: Reported on 9/5/2024) 24 capsule 5    aspirin (ASA) 81 MG chewable tablet Take 81 mg by mouth daily      atorvastatin (LIPITOR) 40 MG tablet Take 1 tablet (40 mg) by mouth daily Last refill. Need follow-up with Dr. Blair. 90 tablet 3    bumetanide (BUMEX) 0.5 MG tablet Take 1 tablet (0.5 mg) by mouth daily as needed (Patient not taking: Reported on 9/5/2024) 90 tablet 3    carvedilol (COREG) 6.25 MG tablet Take 1 tablet (6.25 mg) by mouth 2 times daily (with meals) 180 tablet 3    docusate sodium (COLACE) 100 MG capsule daily       empagliflozin (JARDIANCE) 10 MG TABS tablet Take 1 tablet (10 mg) by mouth daily 90 tablet 2    multivitamin w/minerals (THERA-VIT-M) tablet Take 1 tablet by mouth daily      nitroGLYcerin (NITROSTAT) 0.4 MG sublingual tablet Place 1 tablet (0.4 mg) under the tongue every 5 minutes as needed for chest pain (Patient not taking: Reported on 9/5/2024) 10 tablet 0    psyllium (METAMUCIL/KONSYL) 0.52 g capsule Take 1.04 g by mouth (Patient not taking: Reported on 9/5/2024)      sacubitril-valsartan (ENTRESTO) 24-26 MG per tablet Take 1 tablet by mouth 2 times daily 180 tablet 3    Vitamin D, Cholecalciferol, 25 MCG (1000 UT) TABS 2  times daily       warfarin ANTICOAGULANT (COUMADIN) 1 MG tablet 2 mg (1 mg x 2 tablets ) by mouth every Tue, Fri; and take 3 mg (3 mg x 1 tablet) all other days of the week or as directed by your INR Team based on INR Results. 90 tablet 1    warfarin ANTICOAGULANT (COUMADIN) 3 MG tablet 2 mg (1 mg x 2 tablets ) by mouth every Tue, Fri; and take 3 mg (3 mg x 1 tablet) all other days of the week or as directed by your INR Team based on INR Results. 90 tablet 1    warfarin ANTICOAGULANT (COUMADIN) 4 MG tablet Take 4 mg on Monday, Wed, Thurs, Friday, Sunday or as directed by INR clinic 75 tablet 1     No current facility-administered medications for this visit.       Exam:  There were no vitals taken for this visit.    Results:  Recent Results (from the past 240 hours)   INR (External Result)    Collection Time: 11/13/24 12:00 AM   Result Value Ref Range    INR HOME MONITORING 2.0 2.000 - 3.000      Lab were reviewed and interpreted.       Assessment/Plan:   1. CKD Stage 3b: has CKD in the setting of previous ULICES as well as vascular compromise to the left kidney moiety from his vascular procedure in August 2019. Addt ULICES recently which was likely cardiorenal syndrome. With his vascular hx, need to consider he will be more sensitive to hemodynamic changes. Heart failure and kidney disease have been stable most recently which is great to see.      2. Hypertension: BP is at goal    3. Aneurysms/AVR: following with [facility]    4. CHF/Pulmonary hypertension: Most recently followed by Dr. Gilmore.   -  Sleep eval completed and per sleep lab, findings c/w LIVIA. Stressed the importance of ongoing sleep apnea treatment. He is following with sleep specialist   - has not had volume related concerns in many months - taking bumex just as needed. Educated on importance of taking bumex if weight gain/signs of volume overload.     5. Hyperkalemia: just above goal - likely secondary to valsartan, CKD- low K diet discussed - repeat labs  in coming weeks to assure improved.     Patient Instructions   Repeat lab again in the next 1-2 weeks.   Labs in 3 months  Follow-up in 6 months     [id number] AM video visit via [facility] - offsite  Madelin Vallecillo DO

## 2024-11-12 NOTE — NURSING NOTE
Current patient location: Patient declined to provide     Is the patient currently in the state of [place]? YES    Visit mode:VIDEO    If the visit is dropped, the patient can be reconnected by:VIDEO VISIT: Text to cell phone:   Telephone Information:   Mobile [phone]       Will anyone else be joining the visit? Yes, wife will join pt  (If patient encounters technical issues they should call [phone]    Are changes needed to the allergy or medication list? No    Are refills needed on medications prescribed by this physician? NO    Rooming Documentation:  Not applicable    Reason for visit: ALBERTO DEANF

## 2024-11-12 NOTE — Clinical Note
Team: - keep his number as preference as he uses it for INRs - please add her number as a home number so there is a scondary number list - [phone] Jalyn - thank you,   Madelin Vallecillo, DO
same name as above

## 2024-11-13 ENCOUNTER — DOCUMENTATION ONLY (OUTPATIENT)
Dept: ANTICOAGULATION | Facility: CLINIC | Age: 69
End: 2024-11-13
Payer: MEDICARE

## 2024-11-13 DIAGNOSIS — Z95.2 H/O MECHANICAL AORTIC VALVE REPLACEMENT: Primary | ICD-10-CM

## 2024-11-13 NOTE — PROGRESS NOTES
ANTICOAGULATION CLINIC REFERRAL RENEWAL REQUEST       An annual renewal order is required for all patients referred to [facility].?  Please review and sign the pended referral order for Min Andino.       ANTICOAGULATION SUMMARY      Warfarin indication(s)   Mechanical AVR    Mechanical heart valve present?  Mechanical  AVR-Bileaflet       Current goal range   INR: 2.0-3.0     Goal appropriate for indication? Goal INR 2-3, standard for indication(s) above     Time in Therapeutic Range (TTR)  (Goal > 60%) 89.9%       Office visit with referring provider's group within last year yes on 8/12/24       Zaina Gibbs RN  [facility]

## 2024-11-16 LAB — INR HOME MONITORING: 2 (ref 2–3)

## 2024-11-18 ENCOUNTER — ANTICOAGULATION THERAPY VISIT (OUTPATIENT)
Dept: ANTICOAGULATION | Facility: CLINIC | Age: 69
End: 2024-11-18
Payer: MEDICARE

## 2024-11-18 NOTE — PROGRESS NOTES
ANTICOAGULATION MANAGEMENT     Mni Andino 68 year old male is on warfarin with therapeutic INR result. (Goal INR 2.0-3.0)    Recent labs: (last 7 days)     11/13/24  0000   INR 2.0       ASSESSMENT     Source(s): Chart Review and Patient/Caregiver Call     Warfarin doses taken: Warfarin taken as instructed INR resulted on 11/13/24  Diet: No new diet changes identified  Medication/supplement changes: None noted  New illness, injury, or hospitalization: No  Signs or symptoms of bleeding or clotting: No  Previous result: Therapeutic last 2(+) visits  Additional findings: Pt adjusted his own dosing last week. If INR level trends down, may need to increase dose again.       PLAN     Recommended plan for ongoing change(s) affecting INR     Dosing Instructions: Continue your current warfarin dose with next INR in 1 week from 11/13/24  (Recommend recheck on 11/20/24)    Summary  As of 11/18/2024      Full warfarin instructions:  2 mg every Sun, Tue, Fri; 3 mg all other days   Next INR check:  11/20/2024               Telephone call with Min who verbalizes understanding and agrees to plan    Patient to recheck with home meter    Education provided: Contact [phone] with any changes, questions or concerns.     Plan made per [facility] anticoagulation protocol    Lesli Huang RN  11/18/2024  Anticoagulation Clinic  [employer] [place] for routing messages: p ANTICOAG HOME MONITORING  [facility] patient phone line: [phone]        _______________________________________________________________________     Anticoagulation Episode Summary       Current INR goal:  2.0-3.0   TTR:  90.1% (10.8 mo)   Target end date:  Indefinite   Send INR reminders to:  ANTICOAG HOME MONITORING    Indications    H/O mechanical aortic valve replacement [Z95.2]  Long term current use of anticoagulant therapy [Z79.01]             Comments:  [employer] home meter             Anticoagulation Care Providers       Provider Role Specialty Phone number    Corky  Min CRABTREE MD [facility] Internal Medicine [phone]

## 2024-11-20 ENCOUNTER — ANTICOAGULATION THERAPY VISIT (OUTPATIENT)
Dept: ANTICOAGULATION | Facility: CLINIC | Age: 69
End: 2024-11-20

## 2024-11-20 ENCOUNTER — LAB (OUTPATIENT)
Dept: LAB | Facility: OTHER | Age: 69
End: 2024-11-20
Payer: COMMERCIAL

## 2024-11-20 DIAGNOSIS — N18.32 STAGE 3B CHRONIC KIDNEY DISEASE (H): ICD-10-CM

## 2024-11-20 DIAGNOSIS — Z95.2 H/O MECHANICAL AORTIC VALVE REPLACEMENT: ICD-10-CM

## 2024-11-20 DIAGNOSIS — Z95.2 H/O MECHANICAL AORTIC VALVE REPLACEMENT: Primary | ICD-10-CM

## 2024-11-20 DIAGNOSIS — Z79.01 LONG TERM CURRENT USE OF ANTICOAGULANT THERAPY: ICD-10-CM

## 2024-11-20 LAB
ANION GAP SERPL CALCULATED.3IONS-SCNC: 9 MMOL/L (ref 7–15)
BUN SERPL-MCNC: 30 MG/DL (ref 8–23)
CALCIUM SERPL-MCNC: 9.1 MG/DL (ref 8.8–10.4)
CHLORIDE SERPL-SCNC: 108 MMOL/L (ref 98–107)
CREAT SERPL-MCNC: 1.95 MG/DL (ref 0.67–1.17)
EGFRCR SERPLBLD CKD-EPI 2021: 37 ML/MIN/1.73M2
GLUCOSE SERPL-MCNC: 95 MG/DL (ref 70–99)
HCO3 SERPL-SCNC: 26 MMOL/L (ref 22–29)
INR BLD: 2.6 (ref 0.9–1.1)
INR HOME MONITORING: 2.4 (ref 2–3)
POTASSIUM SERPL-SCNC: 5.6 MMOL/L (ref 3.4–5.3)
SODIUM SERPL-SCNC: 143 MMOL/L (ref 135–145)

## 2024-11-20 PROCEDURE — 80048 BASIC METABOLIC PNL TOTAL CA: CPT

## 2024-11-20 PROCEDURE — 85610 PROTHROMBIN TIME: CPT

## 2024-11-20 PROCEDURE — 36415 COLL VENOUS BLD VENIPUNCTURE: CPT

## 2024-11-20 NOTE — PROGRESS NOTES
ANTICOAGULATION MANAGEMENT     Min Andino 68 year old male is on warfarin with therapeutic INR result. (Goal INR 2.0-3.0)    Recent labs: (last 7 days)     11/20/24  1114   INR 2.6*       ASSESSMENT     Source(s): Chart Review and Patient/Caregiver Call     Warfarin doses taken: More warfarin taken than planned which may be affecting INR, patient notes he has only been taking 2 mg twice weekly for awhile. Calendar updated to patient dosing.  Diet: No new diet changes identified  Medication/supplement changes: None noted  New illness, injury, or hospitalization: No  Signs or symptoms of bleeding or clotting: No  Previous result: Therapeutic last 2(+) visits  Additional findings: None       PLAN     Recommended plan for no diet, medication or health factor changes affecting INR     Dosing Instructions: Continue your current warfarin dose with next INR in 1 week.   patient notes he has only been taking 2 mg twice weekly for awhile. Calendar updated to patient dosing. Which is 5.6% increase to what was noted as the maintenance dose.     Summary  As of 11/20/2024      Full warfarin instructions:  2 mg every Tue, Fri; 3 mg all other days   Next INR check:  11/27/2024               Telephone call with Min who verbalizes understanding and agrees to plan    Patient to recheck with home meter    Education provided: Importance of notifying anticoagulation clinic for: upcoming surgeries and procedures 2 weeks in advance  Discussed a single tooth extraction and the dentist noting what they prefer. Informed that usually with one no change is needed.    Plan made per [facility] anticoagulation protocol    Mica Browning RN  11/20/2024  Anticoagulation Clinic  [place] for routing messages: [employer] [facility]  [facility] patient phone line: [phone]        _______________________________________________________________________     Anticoagulation Episode Summary       Current INR goal:  2.0-3.0   TTR:  90.3% (11.1  mo)   Target end date:  Indefinite   Send INR reminders to:  ANTICOAG HOME MONITORING    Indications    H/O mechanical aortic valve replacement [Z95.2]  Long term current use of anticoagulant therapy [Z79.01]             Comments:  [employer] home meter             Anticoagulation Care Providers       Provider Role Specialty Phone number    Min Fried MD Referring Internal Medicine [phone]

## 2024-11-25 ENCOUNTER — DOCUMENTATION ONLY (OUTPATIENT)
Dept: NEPHROLOGY | Facility: CLINIC | Age: 69
End: 2024-11-25

## 2024-11-25 NOTE — PROGRESS NOTES
Patient has a lab appointment for potassium check and no usable lab orders. Please place lab orders if you would like testing done.

## 2024-11-27 ENCOUNTER — LAB (OUTPATIENT)
Dept: LAB | Facility: OTHER | Age: 69
End: 2024-11-27
Payer: COMMERCIAL

## 2024-11-27 ENCOUNTER — ANTICOAGULATION THERAPY VISIT (OUTPATIENT)
Dept: ANTICOAGULATION | Facility: CLINIC | Age: 69
End: 2024-11-27

## 2024-11-27 ENCOUNTER — OFFICE VISIT (OUTPATIENT)
Dept: FAMILY MEDICINE | Facility: OTHER | Age: 69
End: 2024-11-27
Payer: MEDICARE

## 2024-11-27 VITALS
SYSTOLIC BLOOD PRESSURE: 128 MMHG | OXYGEN SATURATION: 98 % | TEMPERATURE: 97 F | HEART RATE: 61 BPM | BODY MASS INDEX: 31.36 KG/M2 | RESPIRATION RATE: 12 BRPM | DIASTOLIC BLOOD PRESSURE: 58 MMHG | WEIGHT: 224 LBS | HEIGHT: 71 IN

## 2024-11-27 DIAGNOSIS — L02.212 ABSCESS OF BACK: Primary | ICD-10-CM

## 2024-11-27 DIAGNOSIS — Z79.01 LONG TERM CURRENT USE OF ANTICOAGULANT THERAPY: ICD-10-CM

## 2024-11-27 DIAGNOSIS — Z95.2 H/O MECHANICAL AORTIC VALVE REPLACEMENT: ICD-10-CM

## 2024-11-27 DIAGNOSIS — E87.5 HYPERKALEMIA: ICD-10-CM

## 2024-11-27 DIAGNOSIS — Z95.2 H/O MECHANICAL AORTIC VALVE REPLACEMENT: Primary | ICD-10-CM

## 2024-11-27 LAB
ANION GAP SERPL CALCULATED.3IONS-SCNC: 11 MMOL/L (ref 7–15)
BUN SERPL-MCNC: 31.1 MG/DL (ref 8–23)
CALCIUM SERPL-MCNC: 9.6 MG/DL (ref 8.8–10.4)
CHLORIDE SERPL-SCNC: 105 MMOL/L (ref 98–107)
CREAT SERPL-MCNC: 1.76 MG/DL (ref 0.67–1.17)
EGFRCR SERPLBLD CKD-EPI 2021: 41 ML/MIN/1.73M2
GLUCOSE SERPL-MCNC: 101 MG/DL (ref 70–99)
HCO3 SERPL-SCNC: 25 MMOL/L (ref 22–29)
INR BLD: 2.6 (ref 0.9–1.1)
POTASSIUM SERPL-SCNC: 5.7 MMOL/L (ref 3.4–5.3)
SODIUM SERPL-SCNC: 141 MMOL/L (ref 135–145)

## 2024-11-27 PROCEDURE — 10060 I&D ABSCESS SIMPLE/SINGLE: CPT

## 2024-11-27 PROCEDURE — 87205 SMEAR GRAM STAIN: CPT

## 2024-11-27 PROCEDURE — 85610 PROTHROMBIN TIME: CPT

## 2024-11-27 PROCEDURE — 80048 BASIC METABOLIC PNL TOTAL CA: CPT

## 2024-11-27 PROCEDURE — 87070 CULTURE OTHR SPECIMN AEROBIC: CPT

## 2024-11-27 PROCEDURE — 36415 COLL VENOUS BLD VENIPUNCTURE: CPT

## 2024-11-27 ASSESSMENT — PATIENT HEALTH QUESTIONNAIRE - PHQ9
SUM OF ALL RESPONSES TO PHQ QUESTIONS 1-9: 0
SUM OF ALL RESPONSES TO PHQ QUESTIONS 1-9: 0
10. IF YOU CHECKED OFF ANY PROBLEMS, HOW DIFFICULT HAVE THESE PROBLEMS MADE IT FOR YOU TO DO YOUR WORK, TAKE CARE OF THINGS AT HOME, OR GET ALONG WITH OTHER PEOPLE: NOT DIFFICULT AT ALL

## 2024-11-27 ASSESSMENT — PAIN SCALES - GENERAL: PAINLEVEL_OUTOF10: NO PAIN (0)

## 2024-11-27 NOTE — PROGRESS NOTES
"  Assessment & Plan     1. Abscess of back (Primary)  Point-of-care ultrasound was completed to confirm fluid collection prior to procedure being completed.    After informed consent was obtained, using chlorhexidine for cleansing   and 2% Lidocaine with epinephrine for anesthetic. an incision was made into the abscess cavity which was   then drained of purulent material. Culture obtained. Procedure well   tolerated.  Dressing applied and wound care instructions provided.   Return to clinic prn for pain, increased   swelling or fever.    - Abscess Aerobic Bacterial Culture Routine With Gram Stain  - DRAIN SKIN ABSCESS SIMPLE/SINGLE    BMI  Estimated body mass index is 30.81 kg/m  as calculated from the following:    Height as of this encounter: 1.816 m (5' 11.5\").    Weight as of this encounter: 101.6 kg (224 lb).     Dylon Copeland is a 68 year old, presenting for the following health issues:  Derm Problem        11/27/2024    11:31 AM   Additional Questions   Roomed by Melinda JAIN     History of Present Illness       Reason for visit:  Boil on back  Symptom onset:  1-2 weeks ago (about 10 days)  Symptoms include:  Boil on back  Symptom intensity:  Mild  Symptom progression:  Staying the same  Had these symptoms before:  Yes  Has tried/received treatment for these symptoms:  No  What makes it worse:  No  What makes it better:  No   He is taking medications regularly.           Objective    /58 (Cuff Size: Adult Regular)   Pulse 61   Temp 97  F (36.1  C)   Resp 12   Ht 1.816 m (5' 11.5\")   Wt 101.6 kg (224 lb)   SpO2 98%   BMI 30.81 kg/m    Body mass index is 30.81 kg/m .  Physical Exam  Constitutional:       General: He is not in acute distress.     Appearance: Normal appearance. He is not ill-appearing.   Skin:     General: Skin is warm and dry.      Comments: Left upper back with 2.5 cm areas of fluctuance and mild erythema.   Neurological:      General: No focal deficit present.      Mental Status: He " is alert and oriented to person, place, and time.            Signed Electronically by: Patricio Welsh DO

## 2024-11-27 NOTE — PROGRESS NOTES
ANTICOAGULATION MANAGEMENT     Min Andino 68 year old male is on warfarin with therapeutic INR result. (Goal INR 2.0-3.0)    Recent labs: (last 7 days)     11/27/24  1237   INR 2.6*       ASSESSMENT     Source(s): Chart Review and Patient/Caregiver Call     Warfarin doses taken: Warfarin taken as instructed  Diet: No new diet changes identified  Medication/supplement changes: None noted  New illness, injury, or hospitalization: No  Signs or symptoms of bleeding or clotting: No  Previous result: Therapeutic last 2(+) visits  Additional findings: None       PLAN     Recommended plan for no diet, medication or health factor changes affecting INR     Dosing Instructions: Continue your current warfarin dose with next INR in 1 week       Summary  As of 11/27/2024      Full warfarin instructions:  2 mg every Tue, Fri; 3 mg all other days   Next INR check:  12/4/2024               Telephone call with Min who verbalizes understanding and agrees to plan    Patient to recheck with home meter    Education provided: None required    Plan made per [facility] anticoagulation protocol    Mica Browning RN  11/27/2024  Anticoagulation Clinic  [facility] for routing messages: p ANTICOAG HOME MONITORING  [facility] patient phone line: [phone]        _______________________________________________________________________     Anticoagulation Episode Summary       Current INR goal:  2.0-3.0   TTR:  90.5% (11.3 mo)   Target end date:  Indefinite   Send INR reminders to:  ANTICOAG HOME MONITORING    Indications    H/O mechanical aortic valve replacement [Z95.2]  Long term current use of anticoagulant therapy [Z79.01]             Comments:  [employer] home meter             Anticoagulation Care Providers       Provider Role Specialty Phone number    Min Fried MD Referring Internal Medicine [phone]

## 2024-11-28 ENCOUNTER — TELEPHONE (OUTPATIENT)
Dept: NEPHROLOGY | Facility: CLINIC | Age: 69
End: 2024-11-28
Payer: MEDICARE

## 2024-11-28 DIAGNOSIS — N18.32 STAGE 3B CHRONIC KIDNEY DISEASE (H): Primary | ICD-10-CM

## 2024-11-28 LAB
BACTERIA ABSC ANAEROBE+AEROBE CULT: ABNORMAL
GRAM STAIN RESULT: ABNORMAL
GRAM STAIN RESULT: ABNORMAL

## 2024-12-02 ENCOUNTER — MYC MEDICAL ADVICE (OUTPATIENT)
Dept: FAMILY MEDICINE | Facility: OTHER | Age: 69
End: 2024-12-02
Payer: MEDICARE

## 2024-12-02 DIAGNOSIS — L02.212 CUTANEOUS ABSCESS OF BACK EXCLUDING BUTTOCKS: ICD-10-CM

## 2024-12-02 DIAGNOSIS — L02.212 CUTANEOUS ABSCESS OF BACK EXCLUDING BUTTOCKS: Primary | ICD-10-CM

## 2024-12-02 RX ORDER — DOXYCYCLINE 100 MG/1
100 CAPSULE ORAL 2 TIMES DAILY
Qty: 14 CAPSULE | Refills: 0 | Status: SHIPPED | OUTPATIENT
Start: 2024-12-02 | End: 2024-12-02

## 2024-12-02 RX ORDER — DOXYCYCLINE 100 MG/1
100 CAPSULE ORAL 2 TIMES DAILY
Qty: 14 CAPSULE | Refills: 0 | Status: SHIPPED | OUTPATIENT
Start: 2024-12-02

## 2024-12-03 ENCOUNTER — LAB (OUTPATIENT)
Dept: LAB | Facility: OTHER | Age: 69
End: 2024-12-03
Payer: COMMERCIAL

## 2024-12-03 ENCOUNTER — ANTICOAGULATION THERAPY VISIT (OUTPATIENT)
Dept: ANTICOAGULATION | Facility: CLINIC | Age: 69
End: 2024-12-03

## 2024-12-03 DIAGNOSIS — Z95.2 H/O MECHANICAL AORTIC VALVE REPLACEMENT: Primary | ICD-10-CM

## 2024-12-03 DIAGNOSIS — E87.5 HYPERKALEMIA: ICD-10-CM

## 2024-12-03 DIAGNOSIS — Z95.2 H/O MECHANICAL AORTIC VALVE REPLACEMENT: ICD-10-CM

## 2024-12-03 DIAGNOSIS — Z79.01 LONG TERM CURRENT USE OF ANTICOAGULANT THERAPY: ICD-10-CM

## 2024-12-03 LAB
ANION GAP SERPL CALCULATED.3IONS-SCNC: 11 MMOL/L (ref 7–15)
BUN SERPL-MCNC: 38 MG/DL (ref 8–23)
CALCIUM SERPL-MCNC: 9.1 MG/DL (ref 8.8–10.4)
CHLORIDE SERPL-SCNC: 106 MMOL/L (ref 98–107)
CREAT SERPL-MCNC: 1.97 MG/DL (ref 0.67–1.17)
EGFRCR SERPLBLD CKD-EPI 2021: 36 ML/MIN/1.73M2
GLUCOSE SERPL-MCNC: 91 MG/DL (ref 70–99)
HCO3 SERPL-SCNC: 25 MMOL/L (ref 22–29)
INR BLD: 2.9 (ref 0.9–1.1)
POTASSIUM SERPL-SCNC: 4.8 MMOL/L (ref 3.4–5.3)
SODIUM SERPL-SCNC: 142 MMOL/L (ref 135–145)

## 2024-12-03 PROCEDURE — 85610 PROTHROMBIN TIME: CPT

## 2024-12-03 PROCEDURE — 36415 COLL VENOUS BLD VENIPUNCTURE: CPT

## 2024-12-03 PROCEDURE — 80048 BASIC METABOLIC PNL TOTAL CA: CPT

## 2024-12-03 NOTE — PROGRESS NOTES
ANTICOAGULATION MANAGEMENT     Min Hill Andino 68 year old male is on warfarin with therapeutic INR result. (Goal INR 2.0-3.0)    Recent labs: (last 7 days)     12/03/24  1431   INR 2.9*       ASSESSMENT     Source(s): Chart Review and Patient/Caregiver Call     Warfarin doses taken: Warfarin taken as instructed  Diet: No new diet changes identified  Medication/supplement changes: started lokemla 11/25/2024 yes  New illness, injury, or hospitalization: No  Signs or symptoms of bleeding or clotting: No  Previous result: Therapeutic last 2(+) visits  Additional findings: None       PLAN     Recommended plan for no diet, medication or health factor changes affecting INR     Dosing Instructions: Continue your current warfarin dose with next INR in 1 week       Summary  As of 12/3/2024      Full warfarin instructions:  2 mg every Tue, Fri; 3 mg all other days   Next INR check:  12/10/2024               Telephone call with Min who verbalizes understanding and agrees to plan    Lab visit scheduled    Education provided: Please call back if any changes to your diet, medications or how you've been taking warfarin    Plan made per [facility] anticoagulation protocol    Ramona Wooten RN  12/3/2024  Anticoagulation Clinic  [facility] for routing messages: p ANTICOAG HOME MONITORING  [facility] patient phone line: [phone]        _______________________________________________________________________     Anticoagulation Episode Summary       Current INR goal:  2.0-3.0   TTR:  90.7% (11.5 mo)   Target end date:  Indefinite   Send INR reminders to:  ANTICOAG HOME MONITORING    Indications    H/O mechanical aortic valve replacement [Z95.2]  Long term current use of anticoagulant therapy [Z79.01]             Comments:  [employer] home meter             Anticoagulation Care Providers       Provider Role Specialty Phone number    Min Fried MD Referring Internal Medicine [phone]

## 2024-12-09 ENCOUNTER — ANTICOAGULATION THERAPY VISIT (OUTPATIENT)
Dept: ANTICOAGULATION | Facility: CLINIC | Age: 69
End: 2024-12-09
Payer: MEDICARE

## 2024-12-09 DIAGNOSIS — Z79.01 LONG TERM CURRENT USE OF ANTICOAGULANT THERAPY: ICD-10-CM

## 2024-12-09 DIAGNOSIS — Z95.2 H/O MECHANICAL AORTIC VALVE REPLACEMENT: Primary | ICD-10-CM

## 2024-12-09 LAB — INR HOME MONITORING: 4.7 (ref 2–3)

## 2024-12-09 NOTE — PROGRESS NOTES
ANTICOAGULATION MANAGEMENT     Min Andino 68 year old male is on warfarin with supratherapeutic INR result. (Goal INR 2.0-3.0)    Recent labs: (last 7 days)     12/09/24  0000   INR 4.7*       ASSESSMENT     Source(s): Chart Review and Patient/Caregiver Call     Warfarin doses taken: Warfarin taken as instructed  Diet: No new diet changes identified, decreased eating for a week. Eating once daily.  Medication/supplement changes:     Prednisone 12/09-12/14  Doxycycline 100mg 12/9-12/16  Augmentin 875-125 mg 12/09-12/16  New illness, injury, or hospitalization: Yes: Pneumonia  Signs or symptoms of bleeding or clotting: No  Previous result: Therapeutic last 2(+) visits  Additional findings: None       PLAN     Recommended plan for temporary change(s) affecting INR     Dosing Instructions: hold dose then continue your current warfarin dose with next INR in 4 days       Summary  As of 12/9/2024      Full warfarin instructions:  12/9: Hold; Otherwise 2 mg every Tue, Fri; 3 mg all other days   Next INR check:  12/13/2024               Telephone call with Min who verbalizes understanding and agrees to plan    Patient to recheck with home meter    Education provided: Interaction IS anticipated between warfarin and doxy, prednisone, Augmentin    Plan made per [facility] anticoagulation protocol    Mica Browning RN  12/9/2024  Anticoagulation Clinic  [facility] for routing messages: p ANTICOAG HOME MONITORING  [facility] patient phone line: [phone]        _______________________________________________________________________     Anticoagulation Episode Summary       Current INR goal:  2.0-3.0   TTR:  89.4% (11.7 mo)   Target end date:  Indefinite   Send INR reminders to:  ANTICOAG HOME MONITORING    Indications    H/O mechanical aortic valve replacement [Z95.2]  Long term current use of anticoagulant therapy [Z79.01]             Comments:  [employer] home meter             Anticoagulation Care Providers       Provider Role  Specialty Phone number    Min Fried MD Referring Internal Medicine [phone]

## 2024-12-14 LAB — INR HOME MONITORING: 5 (ref 2–3)

## 2024-12-16 ENCOUNTER — ANTICOAGULATION THERAPY VISIT (OUTPATIENT)
Dept: ANTICOAGULATION | Facility: CLINIC | Age: 69
End: 2024-12-16
Payer: MEDICARE

## 2024-12-16 DIAGNOSIS — Z79.01 LONG TERM CURRENT USE OF ANTICOAGULANT THERAPY: ICD-10-CM

## 2024-12-16 DIAGNOSIS — Z95.2 H/O MECHANICAL AORTIC VALVE REPLACEMENT: Primary | ICD-10-CM

## 2024-12-16 LAB — INR HOME MONITORING: 2.9 (ref 2–3)

## 2024-12-16 NOTE — PROGRESS NOTES
ANTICOAGULATION MANAGEMENT     Min Andino 68 year old male is on warfarin with therapeutic INR result. (Goal INR 2.0-3.0)    Recent labs: (last 7 days)     12/16/24  0000   INR 2.9       ASSESSMENT     Source(s): Chart Review and Patient/Caregiver Call     Warfarin doses taken: Held for Supra INR  recently which may be affecting INR  Diet:  Diet is still not back to baseline as patient is still not feeling 100% but better.  Medication/supplement changes:  Today is the last dose of Doxy and he's completed course of prednisone.  New illness, injury, or hospitalization: No  Signs or symptoms of bleeding or clotting: No  Previous result: Supratherapeutic  Additional findings:  Recovering from Pneumonia.  Wife dx with RSV last Friday.        PLAN     Recommended plan for temporary change(s) and ongoing change(s) affecting INR     Dosing Instructions: decrease your warfarin dose (5% change) with next INR in 4 days       Summary  As of 12/16/2024      Full warfarin instructions:  2 mg every Mon, Wed, Fri; 3 mg all other days   Next INR check:  12/20/2024               Telephone call with Min who verbalizes understanding and agrees to plan and who agrees to plan and repeated back plan correctly    Patient to recheck with home meter    Education provided: Please call back if any changes to your diet, medications or how you've been taking warfarin    Plan made per [facility] anticoagulation protocol    Vonda Izaguirre RN  12/16/2024  Anticoagulation Clinic  [facility] for routing messages: p ANTICOAG HOME MONITORING  [facility] patient phone line: [phone]        _______________________________________________________________________     Anticoagulation Episode Summary       Current INR goal:  2.0-3.0   TTR:  87.7% (11.9 mo)   Target end date:  Indefinite   Send INR reminders to:  ANTICOAG HOME MONITORING    Indications    H/O mechanical aortic valve replacement [Z95.2]  Long term current use of anticoagulant therapy  [Z79.01]             Comments:  [employer] home meter             Anticoagulation Care Providers       Provider Role Specialty Phone number    Min Fried MD Referring Internal Medicine [phone]

## 2024-12-21 LAB — INR HOME MONITORING: 2.6 (ref 2–3)

## 2024-12-23 ENCOUNTER — ANTICOAGULATION THERAPY VISIT (OUTPATIENT)
Dept: ANTICOAGULATION | Facility: CLINIC | Age: 69
End: 2024-12-23
Payer: MEDICARE

## 2024-12-23 ENCOUNTER — MYC MEDICAL ADVICE (OUTPATIENT)
Dept: NEPHROLOGY | Facility: CLINIC | Age: 69
End: 2024-12-23
Payer: MEDICARE

## 2024-12-23 DIAGNOSIS — E87.5 HYPERKALEMIA: ICD-10-CM

## 2024-12-23 DIAGNOSIS — Z95.2 H/O MECHANICAL AORTIC VALVE REPLACEMENT: Primary | ICD-10-CM

## 2024-12-23 DIAGNOSIS — Z79.01 LONG TERM CURRENT USE OF ANTICOAGULANT THERAPY: ICD-10-CM

## 2024-12-23 NOTE — PROGRESS NOTES
ANTICOAGULATION MANAGEMENT     Min Andino 69 year old male is on warfarin with therapeutic INR result. (Goal INR 2.0-3.0)    Recent labs: (last 7 days)     12/20/24  0000   INR 2.6       ASSESSMENT     Source(s): Chart Review  Previous INR was Therapeutic last visit; previously outside of goal range  Medication, diet, health changes since last INR chart reviewed; none identified         PLAN     Recommended plan for no diet, medication or health factor changes affecting INR     Dosing Instructions: Continue your current warfarin dose with next INR in 1 week       Summary  As of 12/23/2024      Full warfarin instructions:  2 mg every Mon, Wed, Fri; 3 mg all other days   Next INR check:  12/27/2024               Detailed voice message left for Min with dosing instructions and follow up date.   Sent [employer] message with dosing and follow up instructions    Patient to recheck with home meter    Education provided: Please call back if any changes to your diet, medications or how you've been taking warfarin  Contact [phone] with any changes, questions or concerns.     Plan made per [facility] anticoagulation protocol    Zaina Gibbs RN  12/23/2024  Anticoagulation Clinic  [facility] for routing messages: p ANTICOAG HOME MONITORING  [facility] patient phone line: [phone]        _______________________________________________________________________     Anticoagulation Episode Summary       Current INR goal:  2.0-3.0   TTR:  87.8% (1 y)   Target end date:  Indefinite   Send INR reminders to:  ANTICOAG HOME MONITORING    Indications    H/O mechanical aortic valve replacement [Z95.2]  Long term current use of anticoagulant therapy [Z79.01]             Comments:  [employer] home meter             Anticoagulation Care Providers       Provider Role Specialty Phone number    Min Fried MD Referring Internal Medicine [phone]

## 2024-12-31 ENCOUNTER — LAB (OUTPATIENT)
Dept: LAB | Facility: OTHER | Age: 69
End: 2024-12-31
Payer: COMMERCIAL

## 2024-12-31 ENCOUNTER — TELEPHONE (OUTPATIENT)
Dept: NEPHROLOGY | Facility: CLINIC | Age: 69
End: 2024-12-31

## 2024-12-31 DIAGNOSIS — N18.32 STAGE 3B CHRONIC KIDNEY DISEASE (H): ICD-10-CM

## 2024-12-31 LAB
ALBUMIN MFR UR ELPH: 6.2 MG/DL
ALBUMIN SERPL BCG-MCNC: 3.9 G/DL (ref 3.5–5.2)
ANION GAP SERPL CALCULATED.3IONS-SCNC: 11 MMOL/L (ref 7–15)
BUN SERPL-MCNC: 38.5 MG/DL (ref 8–23)
CALCIUM SERPL-MCNC: 9.1 MG/DL (ref 8.8–10.4)
CHLORIDE SERPL-SCNC: 105 MMOL/L (ref 98–107)
CREAT SERPL-MCNC: 1.83 MG/DL (ref 0.67–1.17)
CREAT UR-MCNC: 72.6 MG/DL
EGFRCR SERPLBLD CKD-EPI 2021: 39 ML/MIN/1.73M2
GLUCOSE SERPL-MCNC: 91 MG/DL (ref 70–99)
HCO3 SERPL-SCNC: 24 MMOL/L (ref 22–29)
HGB BLD-MCNC: 14.1 G/DL (ref 13.3–17.7)
PHOSPHATE SERPL-MCNC: 3.6 MG/DL (ref 2.5–4.5)
POTASSIUM SERPL-SCNC: 5.3 MMOL/L (ref 3.4–5.3)
PROT/CREAT 24H UR: 0.09 MG/MG CR (ref 0–0.2)
SODIUM SERPL-SCNC: 140 MMOL/L (ref 135–145)

## 2024-12-31 PROCEDURE — 85018 HEMOGLOBIN: CPT

## 2024-12-31 PROCEDURE — 83970 ASSAY OF PARATHORMONE: CPT

## 2024-12-31 PROCEDURE — 36415 COLL VENOUS BLD VENIPUNCTURE: CPT

## 2024-12-31 PROCEDURE — 80069 RENAL FUNCTION PANEL: CPT

## 2024-12-31 PROCEDURE — 84156 ASSAY OF PROTEIN URINE: CPT

## 2024-12-31 NOTE — TELEPHONE ENCOUNTER
Writer attempted to call patient in regards to their follow up with Dr. Vallecillo.         Per provider notes above, patient was to repeat labs at the end of November, labs again in February and then a six month follow up with labs prior in May of 2025. Unsure why patient is scheduled in January for a follow up, but appointment is not needed unless patient has some concerns they would like to discuss with provider.     Unable to reach patient at this time and left a voicemail and mychart message regarding message above. Informed patient to reach out to clinic to clarify and schedule proper labs and follow up appointments.    ABDOULAYE Chirinos   Neph/Pulm Teams  [facility]

## 2025-01-01 ENCOUNTER — MYC MEDICAL ADVICE (OUTPATIENT)
Dept: CARDIOLOGY | Facility: CLINIC | Age: 70
End: 2025-01-01
Payer: MEDICARE

## 2025-01-01 DIAGNOSIS — I25.10 ATHEROSCLEROSIS OF NATIVE CORONARY ARTERY OF NATIVE HEART WITHOUT ANGINA PECTORIS: ICD-10-CM

## 2025-01-01 DIAGNOSIS — I48.0 PAROXYSMAL ATRIAL FIBRILLATION (H): Primary | ICD-10-CM

## 2025-01-01 DIAGNOSIS — Z29.89 SBE (SUBACUTE BACTERIAL ENDOCARDITIS) PROPHYLAXIS CANDIDATE: ICD-10-CM

## 2025-01-01 DIAGNOSIS — E78.00 PURE HYPERCHOLESTEROLEMIA: ICD-10-CM

## 2025-01-01 DIAGNOSIS — I25.10 ATHEROSCLEROSIS OF CORONARY ARTERY OF NATIVE HEART WITHOUT ANGINA PECTORIS, UNSPECIFIED VESSEL OR LESION TYPE: ICD-10-CM

## 2025-01-01 DIAGNOSIS — I50.22 CHRONIC SYSTOLIC HEART FAILURE (H): ICD-10-CM

## 2025-01-01 LAB — PTH-INTACT SERPL-MCNC: 46 PG/ML (ref 15–65)

## 2025-01-02 NOTE — TELEPHONE ENCOUNTER
Refer to [employer] message 12/31/24. Closing encounter.    ABDOULAYE Chirinos   Neph/Pulm [facility]

## 2025-01-02 NOTE — TELEPHONE ENCOUNTER
Patient has follow up visit with Dr. Gilmore on 2/24/25 with lab and echocardiogram prior on 2/17/25.     [employer] message sent to patient to confirm which cardiac medications he is currently taking.     Patient replied back with cardiology medication list. Meds refilled. Sent [employer] message back.

## 2025-01-06 RX ORDER — NITROGLYCERIN 0.4 MG/1
0.4 TABLET SUBLINGUAL EVERY 5 MIN PRN
Qty: 25 TABLET | Refills: 0 | Status: SHIPPED | OUTPATIENT
Start: 2025-01-06

## 2025-01-06 RX ORDER — AMOXICILLIN 500 MG/1
2000 CAPSULE ORAL PRN
Qty: 4 CAPSULE | Refills: 3 | Status: SHIPPED | OUTPATIENT
Start: 2025-01-06

## 2025-01-06 RX ORDER — ATORVASTATIN CALCIUM 40 MG/1
40 TABLET, FILM COATED ORAL DAILY
Qty: 90 TABLET | Refills: 3 | Status: SHIPPED | OUTPATIENT
Start: 2025-01-06

## 2025-01-06 RX ORDER — AMIODARONE HYDROCHLORIDE 200 MG/1
200 TABLET ORAL DAILY
Qty: 90 TABLET | Refills: 3 | Status: SHIPPED | OUTPATIENT
Start: 2025-01-06

## 2025-01-13 NOTE — TELEPHONE ENCOUNTER
Patient notified via [employer] message (see 5/20/22 Advanced Patient [facility] encounter) that the PA for Protonix 40 mg has been approved.     Sayra Guzman LPN     [email or web address] and enter H967 to learn more about \"DASH Diet: Care Instructions.\"  Current as of: September 20, 2023  Content Version: 14.3  © 2024 [employer].   Care instructions adapted under license by [employer]. If you have questions about a medical condition or this instruction, always ask your healthcare professional. [employer] disclaims any warranty or liability for your use of this information.     Patient Education        Learning About Diabetes and Exercise  When you have diabetes, it’s important to get regular exercise. You can walk, swim, play sports, ride a bike, or do other activities you enjoy.    Your body turns food into sugar (glucose), which you need for energy. When you have diabetes, sugar builds up in your blood. This raises your blood sugar.   Your body uses sugar when you exercise. Regular exercise can help you manage your blood sugar by lowering it.   Exercising safely when you have diabetes   Talk to your doctor about how and when to exercise. Also ask your doctor what blood sugar range is safe for you when you exercise.    Check your blood sugar before and after exercise. If your blood sugar is less than 90 mg/dL before exercise, you may need to eat a carbohydrate snack first.    Be careful when you exercise if your blood sugar is too low. Keep some quick-sugar food with you.    Don’t exercise if your blood sugar is very high. If your blood sugar is too high, exercising can be dangerous. Ask your doctor what level is too high for you to exercise.    Try to exercise at about the same time each day. This may help keep your blood sugar steady. If you want to exercise more, slowly increase how hard or long you exercise.    Have someone with you when you exercise. You may need help if your blood sugar drops too low.    Take care of your feet. Wear socks and shoes that fit well. Check your feet for injuries, such as blisters.   Where can you

## 2025-01-22 ENCOUNTER — ANTICOAGULATION THERAPY VISIT (OUTPATIENT)
Dept: ANTICOAGULATION | Facility: CLINIC | Age: 70
End: 2025-01-22
Payer: MEDICARE

## 2025-01-22 DIAGNOSIS — Z79.01 LONG TERM CURRENT USE OF ANTICOAGULANT THERAPY: ICD-10-CM

## 2025-01-22 DIAGNOSIS — Z95.2 H/O MECHANICAL AORTIC VALVE REPLACEMENT: Primary | ICD-10-CM

## 2025-01-22 LAB — INR HOME MONITORING: 2.8 (ref 2–3)

## 2025-01-22 NOTE — PROGRESS NOTES
ANTICOAGULATION MANAGEMENT     Min Andino 69 year old male is on warfarin with therapeutic INR result. (Goal INR 2.0-3.0)    Recent labs: (last 7 days)     01/21/25  0000   INR 2.8       ASSESSMENT     Source(s): Chart Review  Previous INR was Therapeutic last 2(+) visits  Medication, diet, health changes since last INR chart reviewed; none identified         PLAN     Recommended plan for no diet, medication or health factor changes affecting INR     Dosing Instructions: Continue your current warfarin dose with next INR in 2 weeks       Summary  As of 1/22/2025      Full warfarin instructions:  2 mg every Mon, Wed, Fri; 3 mg all other days   Next INR check:  2/4/2025               Sent [email or web address] message with dosing and follow up instructions    Patient to recheck with home meter    Education provided: Please call back if any changes to your diet, medications or how you've been taking warfarin    Plan made per [facility] anticoagulation protocol    Vonda Izaguirre RN  1/22/2025  Anticoagulation Clinic  [employer] for routing messages: p ANTICOAG HOME MONITORING  [facility] patient phone line: [phone]        _______________________________________________________________________     Anticoagulation Episode Summary       Current INR goal:  2.0-3.0   TTR:  87.9% (1 y)   Target end date:  Indefinite   Send INR reminders to:  ANTICOAG HOME MONITORING    Indications    H/O mechanical aortic valve replacement [Z95.2]  Long term current use of anticoagulant therapy [Z79.01]             Comments:  [employer] home meter             Anticoagulation Care Providers       Provider Role Specialty Phone number    Min Fried MD Referring Internal Medicine [phone]

## 2025-01-25 LAB — INR HOME MONITORING: 2.1 (ref 2–3)

## 2025-01-27 ENCOUNTER — ANTICOAGULATION THERAPY VISIT (OUTPATIENT)
Dept: ANTICOAGULATION | Facility: CLINIC | Age: 70
End: 2025-01-27
Payer: MEDICARE

## 2025-01-27 DIAGNOSIS — Z79.01 LONG TERM CURRENT USE OF ANTICOAGULANT THERAPY: ICD-10-CM

## 2025-01-27 DIAGNOSIS — Z95.2 H/O MECHANICAL AORTIC VALVE REPLACEMENT: Primary | ICD-10-CM

## 2025-01-27 NOTE — PROGRESS NOTES
ANTICOAGULATION MANAGEMENT     Min Andino 69 year old male is on warfarin with therapeutic INR result. (Goal INR 2.0-3.0)    Recent labs: (last 7 days)     01/25/25  0000   INR 2.1       ASSESSMENT     Source(s): Chart Review and Patient/Caregiver Call     Warfarin doses taken: Warfarin taken as instructed  Diet: No new diet changes identified  Medication/supplement changes: None noted  New illness, injury, or hospitalization: No  Signs or symptoms of bleeding or clotting: No  Previous result: Therapeutic last 2(+) visits  Additional findings: None       PLAN     Recommended plan for no diet, medication or health factor changes affecting INR     Dosing Instructions: Continue your current warfarin dose with next INR in 1 week       Summary  As of 1/27/2025      Full warfarin instructions:  2 mg every Mon, Wed, Fri; 3 mg all other days   Next INR check:  2/3/2025               Detailed voice message left for Min with dosing instructions and follow up date.     Patient to recheck with home meter    Education provided: Please call back if any changes to your diet, medications or how you've been taking warfarin    Plan made per [facility] anticoagulation protocol    Vonda Izaguirre RN  1/27/2025  Anticoagulation Clinic  [facility] for routing messages: p ANTICOAG HOME MONITORING  [facility] patient phone line: [phone]        _______________________________________________________________________     Anticoagulation Episode Summary       Current INR goal:  2.0-3.0   TTR:  87.9% (1 y)   Target end date:  Indefinite   Send INR reminders to:  ANTICOAG HOME MONITORING    Indications    H/O mechanical aortic valve replacement [Z95.2]  Long term current use of anticoagulant therapy [Z79.01]             Comments:  [employer] home meter             Anticoagulation Care Providers       Provider Role Specialty Phone number    Min Fried MD Referring Internal Medicine [phone]

## 2025-02-14 ENCOUNTER — TELEPHONE (OUTPATIENT)
Dept: ADMISSION | Facility: CLINIC | Age: 70
End: 2025-02-14
Payer: MEDICARE

## 2025-02-14 NOTE — TELEPHONE ENCOUNTER
Reason for Call:  Other prescription    Detailed comments:   amiodarone (PACERONE) 200 MG tablet      [facility] called with a question regarding the amiodarone. It was never picked up in January so it was re shelved.    They also wanted Dr Gilmore to let them know if it SHOULD be filled, because there is a known interaction with Warfarin.    Reference #: [id number]      Phone Number Pharmacy can be reached at: Other phone number:  [phone] option# 2    Best Time: anytime    Can we leave a detailed message on this number? YES    Call taken on 2/14/2025 at 10:45 AM by Marquita John

## 2025-02-17 ENCOUNTER — ANTICOAGULATION THERAPY VISIT (OUTPATIENT)
Dept: ANTICOAGULATION | Facility: CLINIC | Age: 70
End: 2025-02-17

## 2025-02-17 ENCOUNTER — LAB (OUTPATIENT)
Dept: LAB | Facility: CLINIC | Age: 70
End: 2025-02-17
Payer: MEDICARE

## 2025-02-17 ENCOUNTER — HOSPITAL ENCOUNTER (OUTPATIENT)
Dept: CARDIOLOGY | Facility: CLINIC | Age: 70
Discharge: HOME | End: 2025-02-17
Attending: INTERNAL MEDICINE | Admitting: INTERNAL MEDICINE
Payer: MEDICARE

## 2025-02-17 DIAGNOSIS — I50.22 CHRONIC SYSTOLIC HEART FAILURE (H): ICD-10-CM

## 2025-02-17 DIAGNOSIS — Z79.01 LONG TERM CURRENT USE OF ANTICOAGULANT THERAPY: ICD-10-CM

## 2025-02-17 DIAGNOSIS — Z95.2 H/O MECHANICAL AORTIC VALVE REPLACEMENT: ICD-10-CM

## 2025-02-17 DIAGNOSIS — Z95.2 H/O MECHANICAL AORTIC VALVE REPLACEMENT: Primary | ICD-10-CM

## 2025-02-17 DIAGNOSIS — I73.9 PAD (PERIPHERAL ARTERY DISEASE): ICD-10-CM

## 2025-02-17 LAB
ALT SERPL W P-5'-P-CCNC: 13 U/L (ref 0–70)
ANION GAP SERPL CALCULATED.3IONS-SCNC: 10 MMOL/L (ref 7–15)
BUN SERPL-MCNC: 39.4 MG/DL (ref 8–23)
CALCIUM SERPL-MCNC: 9.4 MG/DL (ref 8.8–10.4)
CHLORIDE SERPL-SCNC: 105 MMOL/L (ref 98–107)
CHOLEST SERPL-MCNC: 225 MG/DL
CREAT SERPL-MCNC: 1.97 MG/DL (ref 0.67–1.17)
EGFRCR SERPLBLD CKD-EPI 2021: 36 ML/MIN/1.73M2
ERYTHROCYTE [DISTWIDTH] IN BLOOD BY AUTOMATED COUNT: 14.2 % (ref 10–15)
FASTING STATUS PATIENT QL REPORTED: YES
FASTING STATUS PATIENT QL REPORTED: YES
GLUCOSE SERPL-MCNC: 100 MG/DL (ref 70–99)
HCO3 SERPL-SCNC: 27 MMOL/L (ref 22–29)
HCT VFR BLD AUTO: 46.3 % (ref 40–53)
HDLC SERPL-MCNC: 50 MG/DL
HGB BLD-MCNC: 15 G/DL (ref 13.3–17.7)
INR BLD: 2 (ref 0.9–1.1)
LDLC SERPL CALC-MCNC: 152 MG/DL
LVEF ECHO: NORMAL
MCH RBC QN AUTO: 31.8 PG (ref 26.5–33)
MCHC RBC AUTO-ENTMCNC: 32.4 G/DL (ref 31.5–36.5)
MCV RBC AUTO: 98 FL (ref 78–100)
NONHDLC SERPL-MCNC: 175 MG/DL
PLATELET # BLD AUTO: 118 10E3/UL (ref 150–450)
POTASSIUM SERPL-SCNC: 5 MMOL/L (ref 3.4–5.3)
RBC # BLD AUTO: 4.72 10E6/UL (ref 4.4–5.9)
SODIUM SERPL-SCNC: 142 MMOL/L (ref 135–145)
TRIGL SERPL-MCNC: 117 MG/DL
WBC # BLD AUTO: 4.2 10E3/UL (ref 4–11)

## 2025-02-17 PROCEDURE — 93306 TTE W/DOPPLER COMPLETE: CPT

## 2025-02-17 PROCEDURE — 85610 PROTHROMBIN TIME: CPT

## 2025-02-17 PROCEDURE — 80061 LIPID PANEL: CPT

## 2025-02-17 PROCEDURE — 93306 TTE W/DOPPLER COMPLETE: CPT | Mod: 26 | Performed by: INTERNAL MEDICINE

## 2025-02-17 PROCEDURE — 80048 BASIC METABOLIC PNL TOTAL CA: CPT

## 2025-02-17 PROCEDURE — 84460 ALANINE AMINO (ALT) (SGPT): CPT

## 2025-02-17 PROCEDURE — 36415 COLL VENOUS BLD VENIPUNCTURE: CPT

## 2025-02-17 PROCEDURE — 85027 COMPLETE CBC AUTOMATED: CPT

## 2025-02-17 NOTE — PROGRESS NOTES
ANTICOAGULATION MANAGEMENT     Min Andino 69 year old male is on warfarin with therapeutic INR result. (Goal INR 2.0-3.0)    Recent labs: (last 7 days)     02/17/25  0912   INR 2.0*       ASSESSMENT     Source(s): Chart Review  Previous INR was Therapeutic last 2(+) visits  Medication, diet, health changes since last INR chart reviewed; none identified         PLAN     Recommended plan for no diet, medication or health factor changes affecting INR     Dosing Instructions: Continue your current warfarin dose with next INR in 1 week       Summary  As of 2/17/2025      Full warfarin instructions:  2 mg every Mon, Wed, Fri; 3 mg all other days   Next INR check:  --               Detailed voice message left for Min with dosing instructions and follow up date.     Patient to recheck with home meter    Education provided: Please call back if any changes to your diet, medications or how you've been taking warfarin    Plan made per [facility] anticoagulation protocol    Ramona Wooten RN  2/17/2025  Anticoagulation Clinic  [facility] for routing messages: p ANTICOAG HOME MONITORING  [facility] patient phone line: [phone]        _______________________________________________________________________     Anticoagulation Episode Summary       Current INR goal:  2.0-3.0   TTR:  87.9% (1 y)   Target end date:  Indefinite   Send INR reminders to:  ANTICOAG HOME MONITORING    Indications    H/O mechanical aortic valve replacement [Z95.2]  Long term current use of anticoagulant therapy [Z79.01]             Comments:  [employer] home meter             Anticoagulation Care Providers       Provider Role Specialty Phone number    Min Fried MD Referring Internal Medicine [phone]

## 2025-02-17 NOTE — TELEPHONE ENCOUNTER
Emelyn from [facility] requesting to speak to someone on Dr Gilmore's team re: amiodarone (PACERONE) 200 MG tablet and the interaction with Warfarin. She requested a call back and to reference #[id number] when calling.

## 2025-02-17 NOTE — TELEPHONE ENCOUNTER
RN called [facility] pharmacy spoke w/ Federica, Pharmacist regarding question on amiodarone & warfarin. Verbal auth to fill amiodarone 200 mg tab take 1 tab daily per Rx on file per Dr. Gilmore. CBC and INR labs were checked today 2/17/25. Natalie Benitez RN on 2/17/2025 at 10:37 AM

## 2025-02-24 ENCOUNTER — OFFICE VISIT (OUTPATIENT)
Dept: CARDIOLOGY | Facility: CLINIC | Age: 70
End: 2025-02-24
Attending: INTERNAL MEDICINE
Payer: COMMERCIAL

## 2025-02-24 VITALS
HEART RATE: 56 BPM | HEIGHT: 72 IN | DIASTOLIC BLOOD PRESSURE: 84 MMHG | SYSTOLIC BLOOD PRESSURE: 132 MMHG | RESPIRATION RATE: 16 BRPM | BODY MASS INDEX: 31.15 KG/M2 | WEIGHT: 230 LBS | OXYGEN SATURATION: 98 %

## 2025-02-24 DIAGNOSIS — N18.32 STAGE 3B CHRONIC KIDNEY DISEASE (H): Primary | ICD-10-CM

## 2025-02-24 DIAGNOSIS — I48.0 PAROXYSMAL ATRIAL FIBRILLATION (H): ICD-10-CM

## 2025-02-24 DIAGNOSIS — I73.9 PAD (PERIPHERAL ARTERY DISEASE): ICD-10-CM

## 2025-02-24 DIAGNOSIS — E78.1 PURE HYPERTRIGLYCERIDEMIA: ICD-10-CM

## 2025-02-24 DIAGNOSIS — I10 HYPERTENSION, GOAL BELOW 140/90: ICD-10-CM

## 2025-02-24 DIAGNOSIS — Z95.2 H/O MECHANICAL AORTIC VALVE REPLACEMENT: ICD-10-CM

## 2025-02-24 DIAGNOSIS — Z86.79 HX OF AORTIC ANEURYSM: ICD-10-CM

## 2025-02-24 DIAGNOSIS — I50.22 CHRONIC SYSTOLIC HEART FAILURE (H): ICD-10-CM

## 2025-02-24 PROCEDURE — G2211 COMPLEX E/M VISIT ADD ON: HCPCS | Performed by: INTERNAL MEDICINE

## 2025-02-24 PROCEDURE — 99215 OFFICE O/P EST HI 40 MIN: CPT | Performed by: INTERNAL MEDICINE

## 2025-02-24 RX ORDER — AMIODARONE HYDROCHLORIDE 100 MG/1
100 TABLET ORAL DAILY
Qty: 90 TABLET | Refills: 3 | Status: SHIPPED | OUTPATIENT
Start: 2025-02-24

## 2025-02-24 ASSESSMENT — PAIN SCALES - GENERAL: PAINLEVEL_OUTOF10: NO PAIN (0)

## 2025-02-24 NOTE — PROGRESS NOTES
General Cardiology Clinic Progress Note  Min Andino MRN# [id number]   YOB: 1955 Age: 69 year old       Reason for visit: Cardiomyopathy, aortic valve disease, type B aortic dissection    History of presenting illness:    Min Andino is a pleasant 70 yo Male being seen at [facility] today for 6 month follow up of chronic systolic heart failure due to nonischemic cardiomyopathy. He has a complex cardiac and vascular history which is detailed in Dr. Don Gilmore consult note on 10/24/2023. He has previously been treated at [facility] in [place].     In addition to nonischemic cardiomyopathy, he has a mechanical aortic valve replacement, repair of the ascending aorta and aortic arch for management of chronic aortic dissection, and complex repair of an abdominal aortic aneurysm and lower extremity and abdominal arterial disease.  His coronary angiogram initially showed no coronary artery disease.  However it appears that his right coronary artery became occluded during his mechanical valve replacement procedure and he required a saphenous vein graft to the right coronary artery.      He has a history of paroxysmal atrial fibrillation which has been maintained on amiodarone 200 mg daily. He uses a kardiamobile device at home once a week and denies any episodes of Afib. He notes that he is interested in lowering this medication if possible. Given he has maintained at least one year without atrial fibrillation we are comfortable reducing his amiodarone to 100mg daily.     He has hypercholesterolemia on atorvastatin 40 mg daily, which has been previously well-controlled. He notes that upon desiring less daily medications he has stopped taking his atorvastatin for one month to see how it would effect his lab results. His LDL on 2/17/25 was 152, up from 71 on 9/3/24. He agreed the medication is clearly working and that he needs to continue as prescribed.     His  echocardiogram in 2023 at [facility] demonstrated an ejection fraction of 25 to 30% and he was having a lot of problems with congestive heart failure at that time. This had improved upon echocardiogram from April 2024 which showed an ejection fraction of 45 to 50% with improvement in left ventricular chamber size from 6.7 to 6.0 cm in end diastole. Most recent echocardiogram on 2/17/25 showed an EF of 55 to 60% with normal left ventricular chamber size. Moderate concentric left ventricular hypertrophy was noted but likely unchanged from prior.     He is now on carvedilol 6.25 mg p.o. twice daily, Entresto 24/26 mg p.o. twice daily, and Jardiance 10 mg daily.  He did not tolerate spironolactone due to hyperkalemia. He is taking Bumex 0.5 mg daily. He denies any swelling of lower extremities or exertional shortness of breath.     He has mildly elevated blood pressure from baseline today at 148/82. He states that this has remained in the low 120s or 110s at home.    He has chronic kidney disease with a stable creatinine of about 1.8-2.0.  His potassium levels have remained stable in the normal range on his Entresto, typically 4.9-5.1. He notes that his nephrologist has started him on Lokelma 5g daily for his hyperkalemia. His most recent creatinine in February was 1.97 with a potassium of 5.1.     His mechanical aortic valve continues to function normally.  He has remained on warfarin anticoagulation.    On examination today his blood pressure is 148/82 and weight 230 pounds.  His lungs are clear.  Heart rhythm is regular with crisp metallic valve closure sounds.             Assessment and Plan:     ASSESSMENT:    Mr. Min Andino is a 68-year-old gentleman with chronic systolic heart failure due to nonischemic cardiomyopathy (although he may have had infarction during his valve replacement surgery).  He has a history of severe aortic valve regurgitation, ascending aortic aneurysm and type B dissection.  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  This was complicated by occlusion of the right coronary artery requiring single-vessel saphenous vein graft bypass.  He then underwent multiple complex repair and surgical procedures between 2014 and 2020.  In 2020 he was discovered to have significant left ventricular dysfunction with an ejection fraction of 36% which continued to worsen until 2023 when it reached a level of 25 to 30%.    He is now doing much better with appropriate medical therapy.  His ejection fraction is up to 55 to 60% and his paroxysmal atrial fibrillation seems to be well-controlled with no episodes on amiodarone.  He has chronic kidney disease and no noted congestive heart failure symptoms at this time.  We have decreased his amiodarone from 200mg daily to 100mg daily. We have suggested that he continue to use his Kardiamobile to monitor this following our change.    I will have him follow-up with us again in 6 months for reevaluation.    Estefani Mazariegos PA-C    I have seen the patient today and reviewed the history with the patient and Estefani Mazariegos PA-C.  I agree with the above note and recommendations.  I am pleased that his left ventricular function has normalized.  I reviewed the echo images myself and I agree.  There may be very mild basal inferolateral hypokinesis remaining.  His mechanical aortic valve is functioning normally and he is having no complications related to his warfarin which he manages with home INRs.  I recommended that he could decrease his amiodarone to 100 mg daily to decrease the potential for adverse effects from amiodarone, since he seems to be maintaining sinus rhythm consistently on his Kardia mobile device.  With medical therapy for systolic heart failure is excellent and I will make  "no changes in that regard.  He is not a candidate for MRA's due to history of hyperkalemia due to chronic kidney disease.  He follows closely with nephrology.  I urged him to restart his atorvastatin 40 mg daily.  His cholesterol numbers were well-controlled when he was taking it consistently.    Don Gilmore MD      The longitudinal plan of care for the diagnosis(es)/condition(s) as documented were addressed during this visit. Due to the added complexity in care, I will continue to support Min in the subsequent management and with ongoing continuity of care.        Orders this Visit:  Orders Placed This Encounter   Procedures    Basic metabolic panel    Lipid Profile    ALT    CBC with platelets    Follow-Up with Cardiology     Orders Placed This Encounter   Medications    amiodarone (PACERONE) 100 MG TABS tablet     Sig: Take 1 tablet (100 mg) by mouth daily.     Dispense:  90 tablet     Refill:  3     Medications Discontinued During This Encounter   Medication Reason    amiodarone (PACERONE) 200 MG tablet        Today's clinic visit entailed:    60 minutes spent by me on the date of the encounter doing chart review, history and exam, documentation and further activities per the note  Provider  Link to [facility] Help Grid     The level of medical decision making during this visit was of high complexity.           Review of Systems:     Review of Systems:   Eyes: Glasses  Respiratory:  Negative shortness of breath, cough, wheezing, Positive for CPAP; sleep apnea  Cardiovascular:  Negative, palpitations, chest pain, edema, lightheadedness, dizziness, syncope or near-syncope    Musculoskeletal:  Positive for bilateral knee pain  Gastroenterology: Increased appetite   Neurologic:  Negative numbness or tingling of hands, numbness or tingling of feet          Physical Exam:     Vitals: /84 (BP Location: Right arm, Patient Position: Sitting, Cuff Size: Adult Regular)   Pulse 56   Resp 16   Ht 1.816 m (5' 11.5\")   Wt " 104.3 kg (230 lb)   SpO2 98%   BMI 31.63 kg/m    Constitutional: Well nourished and in no apparent distress.  Eyes: Pupils equal, round. Sclerae anicteric.   HEENT: Normocephalic, atraumatic.   Neck: Supple. JVD   Respiratory: Breathing non-labored. Lungs clear to auscultation bilaterally. No crackles, wheezes, rhonchi, or rales.  Cardiovascular: Regular rate and rhythm, normal S1 and S2. No murmur, rub, or gallop. Mechanical aortic valve functioning normally  Skin: Warm, dry. No rashes, cyanosis, or xanthelasma.  Extremities: No edema.  Neurologic: No gross motor deficits. Alert, awake, and oriented to person, place and time.  Psychiatric: Affect appropriate.             Medications:     Current Outpatient Medications   Medication Sig Dispense Refill    amiodarone (PACERONE) 100 MG TABS tablet Take 1 tablet (100 mg) by mouth daily. 90 tablet 3    amoxicillin (AMOXIL) 500 MG capsule Take 4 capsules (2,000 mg) by mouth as needed (see instructions). Take 4 capsules 1 hour before dental work 4 capsule 3    aspirin (ASA) 81 MG chewable tablet Take 81 mg by mouth daily      atorvastatin (LIPITOR) 40 MG tablet Take 1 tablet (40 mg) by mouth daily. Last refill. Need follow-up with Dr. Blair. 90 tablet 3    bumetanide (BUMEX) 0.5 MG tablet Take 1 tablet (0.5 mg) by mouth daily as needed. 90 tablet 3    carvedilol (COREG) 6.25 MG tablet Take 1 tablet (6.25 mg) by mouth 2 times daily (with meals). 180 tablet 3    docusate sodium (COLACE) 100 MG capsule daily.      empagliflozin (JARDIANCE) 10 MG TABS tablet Take 1 tablet (10 mg) by mouth daily. 90 tablet 3    sacubitril-valsartan (ENTRESTO) 24-26 MG per tablet Take 1 tablet by mouth 2 times daily. 180 tablet 3    sodium zirconium cyclosilicate (LOKELMA) 5 g PACK packet Take 1 packet (5 g) by mouth every other day. 45 each 3    Vitamin D, Cholecalciferol, 25 MCG (1000 UT) TABS 2 times daily       warfarin ANTICOAGULANT (COUMADIN) 1 MG tablet 2 mg (1 mg x 2 tablets ) by mouth  every Mon, Wed, Fri; and take 3 mg (3 mg x 1 tablet) all other days of the week or as directed by your INR Team based on INR Results. 90 tablet 1    warfarin ANTICOAGULANT (COUMADIN) 3 MG tablet 2 mg (1 mg x 2 tablets ) by mouth every Mon, Wed, Fri; and take 3 mg (3 mg x 1 tablet) all other days of the week or as directed by your INR Team based on INR Results. 90 tablet 1    warfarin ANTICOAGULANT (COUMADIN) 4 MG tablet Take 4 mg on Monday, Wed, Thurs, Friday, Sunday or as directed by INR clinic 75 tablet 1    doxycycline hyclate (VIBRAMYCIN) 100 MG capsule Take 1 capsule (100 mg) by mouth 2 times daily. (Patient not taking: Reported on 2/24/2025) 14 capsule 0    multivitamin w/minerals (THERA-VIT-M) tablet Take 1 tablet by mouth daily (Patient not taking: Reported on 2/24/2025)      nitroGLYcerin (NITROSTAT) 0.4 MG sublingual tablet Place 1 tablet (0.4 mg) under the tongue every 5 minutes as needed for chest pain. (Patient not taking: Reported on 2/24/2025) 25 tablet 0    psyllium (METAMUCIL/KONSYL) 0.52 g capsule Take 1.04 g by mouth (Patient not taking: Reported on 9/5/2024)         Family History   Problem Relation Age of Onset    Breast Cancer Mother 85    Cancer Father     Aneurysm Other         family hx    C.A.D. No family hx of     Diabetes No family hx of     Hypertension No family hx of        Social History     Socioeconomic History    Marital status:      Spouse name: Not on file    Number of children: 5    Years of education: Not on file    Highest education level: Not on file   Occupational History     Employer: [employer]     Comment: 35 years     Employer: OTHER     Comment: landscaping business   Tobacco Use    Smoking status: Never     Passive exposure: Never    Smokeless tobacco: Never   Vaping Use    Vaping status: Never Used   Substance and Sexual Activity    Alcohol use: Yes     Comment: reports very little etoh use.     Drug use: No    Sexual activity: Not Currently      Partners: Female     Birth control/protection: None   Other Topics Concern    Parent/sibling w/ CABG, MI or angioplasty before 65F 55M? No   Social History Narrative    Not on file     Social Drivers of Health     Financial Resource Strain: Low Risk  (5/6/2024)    Financial Resource Strain     Within the past 12 months, have you or your family members you live with been unable to get utilities (heat, electricity) when it was really needed?: No   Food Insecurity: Low Risk  (5/6/2024)    Food Insecurity     Within the past 12 months, did you worry that your food would run out before you got money to buy more?: No     Within the past 12 months, did the food you bought just not last and you didn t have money to get more?: No   Transportation Needs: Low Risk  (5/6/2024)    Transportation Needs     Within the past 12 months, has lack of transportation kept you from medical appointments, getting your medicines, non-medical meetings or appointments, work, or from getting things that you need?: No   Physical Activity: Inactive (9/15/2022)    [facility]    Exercise Vital Sign     Days of Exercise per Week: 0 days     Minutes of Exercise per Session: 0 min   Stress: Stress Concern Present (5/6/2024)    [employer] - Occupational Stress Questionnaire     Feeling of Stress : To some extent   Social Connections: Unknown (5/6/2024)    Social Connection and Isolation Panel [NHANES]     Frequency of Communication with Friends and Family: Not on file     Frequency of Social Gatherings with Friends and Family: Twice a week     Attends Yarsanism Services: Not on file     Active Member of Clubs or Organizations: Not on file     Attends Club or Organization Meetings: Not on file     Marital Status: Not on file   Interpersonal Safety: Low Risk  (8/12/2024)    Interpersonal Safety     Do you feel physically and emotionally safe where you currently live?: Yes     Within the past  "12 months, have you been hit, slapped, kicked or otherwise physically hurt by someone?: No     Within the past 12 months, have you been humiliated or emotionally abused in other ways by your partner or ex-partner?: No   Housing Stability: Low Risk  (5/6/2024)    Housing Stability     Do you have housing? : Yes     Are you worried about losing your housing?: No            Past Medical History:     Past Medical History:   Diagnosis Date    Anemia of chronic renal failure, unspecified CKD stage 8/27/2021    Aortic dissection (H)     Aortic root aneurysm     Arthritis     C. difficile colitis     April 2014 following surgery    CKD (chronic kidney disease) stage 3, GFR 30-59 ml/min (H) 9/28/2014    Class 1 obesity due to excess calories with serious comorbidity and body mass index (BMI) of 34.0 to 34.9 in adult 3/8/2022    Connective tissue disease     \"probable\" per [facility] Notes    DVT (deep venous thrombosis) (H)     April 2014 following surgery    History of blood transfusion     Horseshoe kidney     Hypertension     S/P insertion of iliac artery stent 11/11/2020    Left internal iliac artery stenting for endoleak    Thoracic aortic aneurysm without rupture 3/8/2014    Urinary urgency 10/12/2020    Valvular cardiomyopathy (H)               Past Surgical History:     Past Surgical History:   Procedure Laterality Date    ABDOMEN SURGERY      AORTIC VALVE REPLACEMENT  4/7/14    ARTHROSCOPY KNEE RT/LT  2005    left, repair meniscus    COLONOSCOPY      COLONOSCOPY N/A 6/22/2022    Procedure: COLONOSCOPY, WITH POLYPECTOMY AND BIOPSY;  Surgeon: Josh Morales MD;  Location:  GI    ESOPHAGOSCOPY, GASTROSCOPY, DUODENOSCOPY (EGD), COMBINED N/A 6/22/2022    Procedure: ESOPHAGOGASTRODUODENOSCOPY, WITH BIOPSY;  Surgeon: Josh Morales MD;  Location: [facility]    REPAIR AORTIC ROOT  4/7/14    surgery followed by ECMO, vasopressor therapy    SOFT TISSUE SURGERY      THORACIC SURGERY      VASCULAR SURGERY      [facility] " CABG, ARTERIAL, SINGLE  4/7/14    [facility] REPAIR CRUCIATE LIGAMENT,KNEE  1999    left              Allergies:   Patient has no known allergies.       Data:   All laboratory data reviewed:    Recent Labs   Lab Test 02/17/25  0912 09/03/24  1131 05/08/24  1225 01/15/24  0842 01/05/24  1319 12/06/23  1418   * 71 56 78  --   --    HDL 50 38* 32* 54  --   --    NHDL 175* 88 75 89  --   --    CHOL 225* 126 107 143  --   --    TRIG 117 83 94 55  --   --    TSH  --  2.33  --   --   --  2.19   NTBNP  --   --   --  943*  --   --    IRON  --   --   --   --   --  69   FEB  --   --   --   --   --  214*   IRONSAT  --   --   --   --   --  32   RON  --   --   --   --  264  --        Lab Results   Component Value Date    WBC 4.2 02/17/2025    WBC 6.2 05/26/2020    RBC 4.72 02/17/2025    RBC 3.73 (L) 05/26/2020    HGB 15.0 02/17/2025    HGB 12.6 (L) 04/14/2021    HCT 46.3 02/17/2025    HCT 35.1 (L) 05/26/2020    MCV 98 02/17/2025    MCV 94 05/26/2020    MCH 31.8 02/17/2025    MCH 29.5 05/26/2020    MCHC 32.4 02/17/2025    MCHC 31.3 (L) 05/26/2020    RDW 14.2 02/17/2025    RDW 17.9 (H) 05/26/2020     (L) 02/17/2025     05/26/2020       Lab Results   Component Value Date     02/17/2025     04/14/2021    POTASSIUM 5.0 02/17/2025    POTASSIUM 5.1 01/03/2023    POTASSIUM 5.0 04/14/2021    CHLORIDE 105 02/17/2025    CHLORIDE 107 01/03/2023    CHLORIDE 111 (H) 04/14/2021    CO2 27 02/17/2025    CO2 30 01/03/2023    CO2 27 04/14/2021    ANIONGAP 10 02/17/2025    ANIONGAP 5 01/03/2023    ANIONGAP 3 04/14/2021     (H) 02/17/2025     (H) 01/03/2023     (H) 04/14/2021    BUN 39.4 (H) 02/17/2025    BUN 36 (H) 01/03/2023    BUN 27 04/14/2021    CR 1.97 (H) 02/17/2025    CR 1.52 (H) 04/14/2021    GFRESTIMATED 36 (L) 02/17/2025    GFRESTIMATED 47 (L) 04/14/2021    GFRESTBLACK 54 (L) 04/14/2021    FAYE 9.4 02/17/2025    FAYE 8.6 04/14/2021      Lab Results   Component Value Date    AST 22 09/03/2024     AST 15 09/16/2015    ALT 13 02/17/2025    ALT 14 09/16/2015       Lab Results   Component Value Date    A1C 5.0 05/08/2024    A1C 5.6 12/29/2013       Lab Results   Component Value Date    INR 2.0 (H) 02/17/2025    INR 2.0 02/13/2025    INR 2.2 01/30/2025    INR 2.0 07/20/2021    INR 2.3 (A) 07/08/2021     ALEXI MARTIN PA-C  [facility]

## 2025-02-24 NOTE — LETTER
2/24/2025    Min Fried MD  [address] [facility] [address]    RE: Min Andino       Dear Colleague,     I had the pleasure of seeing Min Andino in the [facility].    General Cardiology Clinic Progress Note  Min Andino MRN# [id number]   YOB: 1955 Age: 69 year old       Reason for visit: Cardiomyopathy, aortic valve disease, type B aortic dissection    History of presenting illness:    Min Andino is a pleasant 70 yo Male being seen at [facility] today for 6 month follow up of chronic systolic heart failure due to nonischemic cardiomyopathy. He has a complex cardiac and vascular history which is detailed in Dr. Don Gilmore consult note on 10/24/2023. He has previously been treated at [facility] and [facility] in [place].     In addition to nonischemic cardiomyopathy, he has a mechanical aortic valve replacement, repair of the ascending aorta and aortic arch for management of chronic aortic dissection, and complex repair of an abdominal aortic aneurysm and lower extremity and abdominal arterial disease.  His coronary angiogram initially showed no coronary artery disease.  However it appears that his right coronary artery became occluded during his mechanical valve replacement procedure and he required a saphenous vein graft to the right coronary artery.      He has a history of paroxysmal atrial fibrillation which has been maintained on amiodarone 200 mg daily. He uses a kardiamobile device at home once a week and denies any episodes of Afib. He notes that he is interested in lowering this medication if possible. Given he has maintained at least one year without atrial fibrillation we are comfortable reducing his amiodarone to 100mg daily.     He has hypercholesterolemia on atorvastatin 40 mg daily, which has been previously well-controlled. He notes that upon desiring less daily medications he has stopped taking his  atorvastatin for one month to see how it would effect his lab results. His LDL on 2/17/25 was 152, up from 71 on 9/3/24. He agreed the medication is clearly working and that he needs to continue as prescribed.     His echocardiogram in 2023 at [facility] demonstrated an ejection fraction of 25 to 30% and he was having a lot of problems with congestive heart failure at that time. This had improved upon echocardiogram from April 2024 which showed an ejection fraction of 45 to 50% with improvement in left ventricular chamber size from 6.7 to 6.0 cm in end diastole. Most recent echocardiogram on 2/17/25 showed an EF of 55 to 60% with normal left ventricular chamber size. Moderate concentric left ventricular hypertrophy was noted but likely unchanged from prior.     He is now on carvedilol 6.25 mg p.o. twice daily, Entresto 24/26 mg p.o. twice daily, and Jardiance 10 mg daily.  He did not tolerate spironolactone due to hyperkalemia. He is taking Bumex 0.5 mg daily. He denies any swelling of lower extremities or exertional shortness of breath.     He has mildly elevated blood pressure from baseline today at 148/82. He states that this has remained in the low 120s or 110s at home.    He has chronic kidney disease with a stable creatinine of about 1.8-2.0.  His potassium levels have remained stable in the normal range on his Entresto, typically 4.9-5.1. He notes that his nephrologist has started him on Lokelma 5g daily for his hyperkalemia. His most recent creatinine in February was 1.97 with a potassium of 5.1.     His mechanical aortic valve continues to function normally.  He has remained on warfarin anticoagulation.    On examination today his blood pressure is 148/82 and weight 230 pounds.  His lungs are clear.  Heart rhythm is regular with crisp metallic valve closure sounds.             Assessment and Plan:     ASSESSMENT:    Mr. Min Andino is a 68-year-old gentleman with chronic systolic heart failure due to  nonischemic cardiomyopathy (although he may have had infarction during his valve replacement surgery).  He has a history of severe aortic valve regurgitation, ascending aortic aneurysm and type B dissection.  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  This was complicated by occlusion of the right coronary artery requiring single-vessel saphenous vein graft bypass.  He then underwent multiple complex repair and surgical procedures between 2014 and 2020.  In 2020 he was discovered to have significant left ventricular dysfunction with an ejection fraction of 36% which continued to worsen until 2023 when it reached a level of 25 to 30%.    He is now doing much better with appropriate medical therapy.  His ejection fraction is up to 55 to 60% and his paroxysmal atrial fibrillation seems to be well-controlled with no episodes on amiodarone.  He has chronic kidney disease and no noted congestive heart failure symptoms at this time.  We have decreased his amiodarone from 200mg daily to 100mg daily. We have suggested that he continue to use his Kardiamobile to monitor this following our change.    I will have him follow-up with us again in 6 months for reevaluation.    Estefani Mazariegos PA-C    I have seen the patient today and reviewed the history with the patient and Estefani Mazariegos PA-C.  I agree with the above note and recommendations.  I am pleased that his left ventricular function has normalized.  I reviewed the echo images myself and I agree.  There may be very mild basal inferolateral hypokinesis remaining.  His mechanical aortic valve is functioning normally and he is having no complications related to his warfarin which he manages with home INRs.  I recommended that he could decrease his amiodarone to 100 mg daily to decrease the  potential for adverse effects from amiodarone, since he seems to be maintaining sinus rhythm consistently on his [employer] mobile device.  With medical therapy for systolic heart failure is excellent and I will make no changes in that regard.  He is not a candidate for MRA's due to history of hyperkalemia due to chronic kidney disease.  He follows closely with nephrology.  I urged him to restart his atorvastatin 40 mg daily.  His cholesterol numbers were well-controlled when he was taking it consistently.    Don Gilmore MD      The longitudinal plan of care for the diagnosis(es)/condition(s) as documented were addressed during this visit. Due to the added complexity in care, I will continue to support Min in the subsequent management and with ongoing continuity of care.        Orders this Visit:  Orders Placed This Encounter   Procedures     Basic metabolic panel     Lipid Profile     ALT     CBC with platelets     Follow-Up with Cardiology     Orders Placed This Encounter   Medications     amiodarone (PACERONE) 100 MG TABS tablet     Sig: Take 1 tablet (100 mg) by mouth daily.     Dispense:  90 tablet     Refill:  3     Medications Discontinued During This Encounter   Medication Reason     amiodarone (PACERONE) 200 MG tablet        Today's clinic visit entailed:    60 minutes spent by me on the date of the encounter doing chart review, history and exam, documentation and further activities per the note  Provider  Link to MDM Help Grid     The level of medical decision making during this visit was of high complexity.           Review of Systems:     Review of Systems:   Eyes: Glasses  Respiratory:  Negative shortness of breath, cough, wheezing, Positive for CPAP; sleep apnea  Cardiovascular:  Negative, palpitations, chest pain, edema, lightheadedness, dizziness, syncope or near-syncope    Musculoskeletal:  Positive for bilateral knee pain  Gastroenterology: Increased appetite   Neurologic:  Negative numbness or  "tingling of hands, numbness or tingling of feet          Physical Exam:     Vitals: /84 (BP Location: Right arm, Patient Position: Sitting, Cuff Size: Adult Regular)   Pulse 56   Resp 16   Ht 1.816 m (5' 11.5\")   Wt 104.3 kg (230 lb)   SpO2 98%   BMI 31.63 kg/m    Constitutional: Well nourished and in no apparent distress.  Eyes: Pupils equal, round. Sclerae anicteric.   HEENT: Normocephalic, atraumatic.   Neck: Supple. JVD   Respiratory: Breathing non-labored. Lungs clear to auscultation bilaterally. No crackles, wheezes, rhonchi, or rales.  Cardiovascular: Regular rate and rhythm, normal S1 and S2. No murmur, rub, or gallop. Mechanical aortic valve functioning normally  Skin: Warm, dry. No rashes, cyanosis, or xanthelasma.  Extremities: No edema.  Neurologic: No gross motor deficits. Alert, awake, and oriented to person, place and time.  Psychiatric: Affect appropriate.             Medications:     Current Outpatient Medications   Medication Sig Dispense Refill     amiodarone (PACERONE) 100 MG TABS tablet Take 1 tablet (100 mg) by mouth daily. 90 tablet 3     amoxicillin (AMOXIL) 500 MG capsule Take 4 capsules (2,000 mg) by mouth as needed (see instructions). Take 4 capsules 1 hour before dental work 4 capsule 3     aspirin (ASA) 81 MG chewable tablet Take 81 mg by mouth daily       atorvastatin (LIPITOR) 40 MG tablet Take 1 tablet (40 mg) by mouth daily. Last refill. Need follow-up with Dr. Blair. 90 tablet 3     bumetanide (BUMEX) 0.5 MG tablet Take 1 tablet (0.5 mg) by mouth daily as needed. 90 tablet 3     carvedilol (COREG) 6.25 MG tablet Take 1 tablet (6.25 mg) by mouth 2 times daily (with meals). 180 tablet 3     docusate sodium (COLACE) 100 MG capsule daily.       empagliflozin (JARDIANCE) 10 MG TABS tablet Take 1 tablet (10 mg) by mouth daily. 90 tablet 3     sacubitril-valsartan (ENTRESTO) 24-26 MG per tablet Take 1 tablet by mouth 2 times daily. 180 tablet 3     sodium zirconium " cyclosilicate (LOKELMA) 5 g PACK packet Take 1 packet (5 g) by mouth every other day. 45 each 3     Vitamin D, Cholecalciferol, 25 MCG (1000 UT) TABS 2 times daily        warfarin ANTICOAGULANT (COUMADIN) 1 MG tablet 2 mg (1 mg x 2 tablets ) by mouth every Mon, Wed, Fri; and take 3 mg (3 mg x 1 tablet) all other days of the week or as directed by your INR Team based on INR Results. 90 tablet 1     warfarin ANTICOAGULANT (COUMADIN) 3 MG tablet 2 mg (1 mg x 2 tablets ) by mouth every Mon, Wed, Fri; and take 3 mg (3 mg x 1 tablet) all other days of the week or as directed by your INR Team based on INR Results. 90 tablet 1     warfarin ANTICOAGULANT (COUMADIN) 4 MG tablet Take 4 mg on Monday, Wed, Thurs, Friday, Sunday or as directed by INR clinic 75 tablet 1     doxycycline hyclate (VIBRAMYCIN) 100 MG capsule Take 1 capsule (100 mg) by mouth 2 times daily. (Patient not taking: Reported on 2/24/2025) 14 capsule 0     multivitamin w/minerals (THERA-VIT-M) tablet Take 1 tablet by mouth daily (Patient not taking: Reported on 2/24/2025)       nitroGLYcerin (NITROSTAT) 0.4 MG sublingual tablet Place 1 tablet (0.4 mg) under the tongue every 5 minutes as needed for chest pain. (Patient not taking: Reported on 2/24/2025) 25 tablet 0     psyllium (METAMUCIL/KONSYL) 0.52 g capsule Take 1.04 g by mouth (Patient not taking: Reported on 9/5/2024)         Family History   Problem Relation Age of Onset     Breast Cancer Mother 85     Cancer Father      Aneurysm Other         family hx     C.A.D. No family hx of      Diabetes No family hx of      Hypertension No family hx of        Social History     Socioeconomic History     Marital status:      Spouse name: Not on file     Number of children: 5     Years of education: Not on file     Highest education level: Not on file   Occupational History     Employer: [employer]     Comment: 35 years     Employer: OTHER     Comment: landscaping business   Tobacco Use      Smoking status: Never     Passive exposure: Never     Smokeless tobacco: Never   Vaping Use     Vaping status: Never Used   Substance and Sexual Activity     Alcohol use: Yes     Comment: reports very little etoh use.      Drug use: No     Sexual activity: Not Currently     Partners: Female     Birth control/protection: None   Other Topics Concern     Parent/sibling w/ CABG, MI or angioplasty before 65F 55M? No   Social History Narrative     Not on file     Social Drivers of Health     Financial Resource Strain: Low Risk  (5/6/2024)    Financial Resource Strain      Within the past 12 months, have you or your family members you live with been unable to get utilities (heat, electricity) when it was really needed?: No   Food Insecurity: Low Risk  (5/6/2024)    Food Insecurity      Within the past 12 months, did you worry that your food would run out before you got money to buy more?: No      Within the past 12 months, did the food you bought just not last and you didn t have money to get more?: No   Transportation Needs: Low Risk  (5/6/2024)    Transportation Needs      Within the past 12 months, has lack of transportation kept you from medical appointments, getting your medicines, non-medical meetings or appointments, work, or from getting things that you need?: No   Physical Activity: Inactive (9/15/2022)    [facility] from [facility], [facility]    Exercise Vital Sign      Days of Exercise per Week: 0 days      Minutes of Exercise per Session: 0 min   Stress: Stress Concern Present (5/6/2024)    [employer] - Occupational Stress Questionnaire      Feeling of Stress : To some extent   Social Connections: Unknown (5/6/2024)    Social Connection and Isolation Panel [NHANES]      Frequency of Communication with Friends and Family: Not on file      Frequency of Social Gatherings with Friends and Family: Twice a week      Attends Christian Services: Not on file      Active Member of Clubs or  "Organizations: Not on file      Attends Club or Organization Meetings: Not on file      Marital Status: Not on file   Interpersonal Safety: Low Risk  (8/12/2024)    Interpersonal Safety      Do you feel physically and emotionally safe where you currently live?: Yes      Within the past 12 months, have you been hit, slapped, kicked or otherwise physically hurt by someone?: No      Within the past 12 months, have you been humiliated or emotionally abused in other ways by your partner or ex-partner?: No   Housing Stability: Low Risk  (5/6/2024)    Housing Stability      Do you have housing? : Yes      Are you worried about losing your housing?: No            Past Medical History:     Past Medical History:   Diagnosis Date     Anemia of chronic renal failure, unspecified CKD stage 8/27/2021     Aortic dissection (H)      Aortic root aneurysm      Arthritis      C. difficile colitis     April 2014 following surgery     CKD (chronic kidney disease) stage 3, GFR 30-59 ml/min (H) 9/28/2014     Class 1 obesity due to excess calories with serious comorbidity and body mass index (BMI) of 34.0 to 34.9 in adult 3/8/2022     Connective tissue disease     \"probable\" per [facility] Notes     DVT (deep venous thrombosis) (H)     April 2014 following surgery     History of blood transfusion      Horseshoe kidney      Hypertension      S/P insertion of iliac artery stent 11/11/2020    Left internal iliac artery stenting for endoleak     Thoracic aortic aneurysm without rupture 3/8/2014     Urinary urgency 10/12/2020     Valvular cardiomyopathy (H)               Past Surgical History:     Past Surgical History:   Procedure Laterality Date     ABDOMEN SURGERY       AORTIC VALVE REPLACEMENT  4/7/14     ARTHROSCOPY KNEE RT/LT  2005    left, repair meniscus     COLONOSCOPY       COLONOSCOPY N/A 6/22/2022    Procedure: COLONOSCOPY, WITH POLYPECTOMY AND BIOPSY;  Surgeon: Josh Morales MD;  Location:  GI     ESOPHAGOSCOPY, " GASTROSCOPY, DUODENOSCOPY (EGD), COMBINED N/A 6/22/2022    Procedure: ESOPHAGOGASTRODUODENOSCOPY, WITH BIOPSY;  Surgeon: Josh Morales MD;  Location: [facility]     REPAIR AORTIC ROOT  4/7/14    surgery followed by ECMO, vasopressor therapy     SOFT TISSUE SURGERY       THORACIC SURGERY       VASCULAR SURGERY       [facility] CABG, ARTERIAL, SINGLE  4/7/14     [facility] REPAIR CRUCIATE LIGAMENT,KNEE  1999    left              Allergies:   Patient has no known allergies.       Data:   All laboratory data reviewed:    Recent Labs   Lab Test 02/17/25  0912 09/03/24  1131 05/08/24  1225 01/15/24  0842 01/05/24  1319 12/06/23  1418   * 71 56 78  --   --    HDL 50 38* 32* 54  --   --    NHDL 175* 88 75 89  --   --    CHOL 225* 126 107 143  --   --    TRIG 117 83 94 55  --   --    TSH  --  2.33  --   --   --  2.19   NTBNP  --   --   --  943*  --   --    IRON  --   --   --   --   --  69   FEB  --   --   --   --   --  214*   IRONSAT  --   --   --   --   --  32   RON  --   --   --   --  264  --        Lab Results   Component Value Date    WBC 4.2 02/17/2025    WBC 6.2 05/26/2020    RBC 4.72 02/17/2025    RBC 3.73 (L) 05/26/2020    HGB 15.0 02/17/2025    HGB 12.6 (L) 04/14/2021    HCT 46.3 02/17/2025    HCT 35.1 (L) 05/26/2020    MCV 98 02/17/2025    MCV 94 05/26/2020    MCH 31.8 02/17/2025    MCH 29.5 05/26/2020    MCHC 32.4 02/17/2025    MCHC 31.3 (L) 05/26/2020    RDW 14.2 02/17/2025    RDW 17.9 (H) 05/26/2020     (L) 02/17/2025     05/26/2020       Lab Results   Component Value Date     02/17/2025     04/14/2021    POTASSIUM 5.0 02/17/2025    POTASSIUM 5.1 01/03/2023    POTASSIUM 5.0 04/14/2021    CHLORIDE 105 02/17/2025    CHLORIDE 107 01/03/2023    CHLORIDE 111 (H) 04/14/2021    CO2 27 02/17/2025    CO2 30 01/03/2023    CO2 27 04/14/2021    ANIONGAP 10 02/17/2025    ANIONGAP 5 01/03/2023    ANIONGAP 3 04/14/2021     (H) 02/17/2025     (H) 01/03/2023     (H) 04/14/2021    BUN  39.4 (H) 02/17/2025    BUN 36 (H) 01/03/2023    BUN 27 04/14/2021    CR 1.97 (H) 02/17/2025    CR 1.52 (H) 04/14/2021    GFRESTIMATED 36 (L) 02/17/2025    GFRESTIMATED 47 (L) 04/14/2021    GFRESTBLACK 54 (L) 04/14/2021    FAYE 9.4 02/17/2025    FAYE 8.6 04/14/2021      Lab Results   Component Value Date    AST 22 09/03/2024    AST 15 09/16/2015    ALT 13 02/17/2025    ALT 14 09/16/2015       Lab Results   Component Value Date    A1C 5.0 05/08/2024    A1C 5.6 12/29/2013       Lab Results   Component Value Date    INR 2.0 (H) 02/17/2025    INR 2.0 02/13/2025    INR 2.2 01/30/2025    INR 2.0 07/20/2021    INR 2.3 (A) 07/08/2021     ALEXI MARTIN PA-C  [facility]      Thank you for allowing me to participate in the care of your patient.      Sincerely,     MAKENNA HAMMOND MD     [facility]  cc:   Makenna Hammond MD  [address]  [place] [address]

## 2025-03-04 ENCOUNTER — ANTICOAGULATION THERAPY VISIT (OUTPATIENT)
Dept: ANTICOAGULATION | Facility: CLINIC | Age: 70
End: 2025-03-04
Payer: MEDICARE

## 2025-03-04 ENCOUNTER — TELEPHONE (OUTPATIENT)
Dept: ANTICOAGULATION | Facility: CLINIC | Age: 70
End: 2025-03-04
Payer: MEDICARE

## 2025-03-04 DIAGNOSIS — Z79.01 LONG TERM CURRENT USE OF ANTICOAGULANT THERAPY: ICD-10-CM

## 2025-03-04 DIAGNOSIS — Z95.2 H/O MECHANICAL AORTIC VALVE REPLACEMENT: Primary | ICD-10-CM

## 2025-03-04 LAB — INR HOME MONITORING: 2.4 (ref 2–3)

## 2025-03-04 NOTE — PROGRESS NOTES
ANTICOAGULATION MANAGEMENT     Min Andino 69 year old male is on warfarin with therapeutic INR result. (Goal INR 2.0-3.0)    Recent labs: (last 7 days)     03/04/25  0000   INR 2.4       ASSESSMENT     Source(s): Chart Review and Patient/Caregiver Call     Warfarin doses taken: Warfarin taken as instructed  Diet: No new diet changes identified  Medication/supplement changes:  Amiodarone dose decreased from 200 mg daily to 100 mg daily on 2/24.   New illness, injury, or hospitalization: No  Signs or symptoms of bleeding or clotting: No  Previous result: Therapeutic last 2(+) visits  Additional findings:  Plan for closer INR monitoring due to Amiodarone daily dose decrease.       PLAN     Recommended plan for ongoing change(s) affecting INR     Dosing Instructions: Continue your current warfarin dose with next INR in 1 week       Summary  As of 3/4/2025      Full warfarin instructions:  2 mg every Mon, Wed, Fri; 3 mg all other days   Next INR check:  3/11/2025               Telephone call with Min who verbalizes understanding and agrees to plan    Patient to recheck with home meter    Education provided: Please call back if any changes to your diet, medications or how you've been taking warfarin  Interaction IS anticipated between warfarin and Amiodarone  Contact [phone] with any changes, questions or concerns.     Plan made per [facility] anticoagulation protocol    Marisel Stone RN  3/4/2025  Anticoagulation Clinic  [facility] for routing messages: p ANTICOAG HOME MONITORING  [facility] patient phone line: [phone]        _______________________________________________________________________     Anticoagulation Episode Summary       Current INR goal:  2.0-3.0   TTR:  87.9% (1 y)   Target end date:  Indefinite   Send INR reminders to:  ANTICOAG HOME MONITORING    Indications    H/O mechanical aortic valve replacement [Z95.2]  Long term current use of anticoagulant therapy [Z79.01]             Comments:   [employer] home meter             Anticoagulation Care Providers       Provider Role Specialty Phone number    Min Fried MD Referring Internal Medicine [phone]

## 2025-03-04 NOTE — TELEPHONE ENCOUNTER
ANTICOAGULATION     Min Andino is overdue for an INR check.     Spoke with Min instructed to test INR with home meter and call results to home monitoring company as soon as possible.    Cristal Ahuja, RN  3/4/2025  Anticoagulation Clinic  [facility] for routing messages: [employer] [facility]  [facility] patient phone line: [phone]

## 2025-03-15 LAB — INR HOME MONITORING: 2.1 (ref 2–3)

## 2025-03-17 ENCOUNTER — ANTICOAGULATION THERAPY VISIT (OUTPATIENT)
Dept: ANTICOAGULATION | Facility: CLINIC | Age: 70
End: 2025-03-17
Payer: MEDICARE

## 2025-03-17 DIAGNOSIS — Z79.01 LONG TERM CURRENT USE OF ANTICOAGULANT THERAPY: ICD-10-CM

## 2025-03-17 DIAGNOSIS — Z95.2 H/O MECHANICAL AORTIC VALVE REPLACEMENT: Primary | ICD-10-CM

## 2025-03-17 NOTE — PROGRESS NOTES
ANTICOAGULATION MANAGEMENT     Min Andino 69 year old male is on warfarin with therapeutic INR result. (Goal INR 2.0-3.0)    Recent labs: (last 7 days)     03/14/25  0000   INR 2.1       ASSESSMENT     Source(s): Chart Review and Patient/Caregiver Call     Warfarin doses taken: Warfarin taken as instructed  Diet: No new diet changes identified  Medication/supplement changes: Amiodarone daily dose decreased from 200 mg to 100 mg daily on 2/24.  New illness, injury, or hospitalization: No  Signs or symptoms of bleeding or clotting: No  Previous result: Therapeutic last 2(+) visits  Additional findings: None       PLAN     Recommended plan for no diet, medication or health factor changes affecting INR     Dosing Instructions: Continue your current warfarin dose with next INR in 2 weeks       Summary  As of 3/17/2025      Full warfarin instructions:  2 mg every Mon, Wed, Fri; 3 mg all other days   Next INR check:  3/28/2025               Telephone call with Min who verbalizes understanding and agrees to plan    Patient to recheck with home meter    Education provided: Contact [phone] with any changes, questions or concerns.     Plan made per [facility] anticoagulation protocol    Marisel Stone RN  3/17/2025  Anticoagulation Clinic  [employer] for routing messages: p ANTICOAG HOME MONITORING  [facility] patient phone line: [phone]        _______________________________________________________________________     Anticoagulation Episode Summary       Current INR goal:  2.0-3.0   TTR:  87.8% (1 y)   Target end date:  Indefinite   Send INR reminders to:  ANTICOAG HOME MONITORING    Indications    H/O mechanical aortic valve replacement [Z95.2]  Long term current use of anticoagulant therapy [Z79.01]             Comments:  [employer] home meter             Anticoagulation Care Providers       Provider Role Specialty Phone number    Min Fried MD Referring Internal Medicine [phone]

## 2025-04-14 ENCOUNTER — ANTICOAGULATION THERAPY VISIT (OUTPATIENT)
Dept: ANTICOAGULATION | Facility: CLINIC | Age: 70
End: 2025-04-14
Payer: MEDICARE

## 2025-04-14 DIAGNOSIS — Z95.2 H/O MECHANICAL AORTIC VALVE REPLACEMENT: Primary | ICD-10-CM

## 2025-04-14 DIAGNOSIS — Z79.01 LONG TERM CURRENT USE OF ANTICOAGULANT THERAPY: ICD-10-CM

## 2025-04-14 LAB — INR HOME MONITORING: 2.1 (ref 2–3)

## 2025-04-14 NOTE — PROGRESS NOTES
"ANTICOAGULATION MANAGEMENT     Min Andino 69 year old male is on warfarin with therapeutic INR result. (Goal INR 2.0-3.0)    Recent labs: (last 7 days)     04/14/25  0000   INR 2.1       ASSESSMENT     Source(s): Chart Review and Patient/Caregiver Call     Warfarin doses taken: Warfarin taken differently, but did not change total weekly dose  Diet: No new diet changes identified  Medication/supplement changes: None noted  New illness, injury, or hospitalization: No  Signs or symptoms of bleeding or clotting: No  Previous result: Subtherapeutic  Additional findings:  Pt states, \"taking 2mg on Monday/Friday is easier to remember.\"        PLAN     Recommended plan for no diet, medication or health factor changes affecting INR     Dosing Instructions: Continue your current warfarin dose with next INR in 2 weeks       Summary  As of 4/14/2025      Full warfarin instructions:  2 mg every Mon, Fri; 3 mg all other days   Next INR check:  4/28/2025               Telephone call with Min who verbalizes understanding and agrees to plan and who agrees to plan and repeated back plan correctly    Patient to recheck with home meter    Education provided: Please call back if any changes to your diet, medications or how you've been taking warfarin  Contact [phone] with any changes, questions or concerns.     Plan made per [facility] anticoagulation protocol    Stalin Dennis RN  4/14/2025  Anticoagulation Clinic  [employer] for routing messages: p ANTICOAG HOME MONITORING  [facility] patient phone line: [phone]        _______________________________________________________________________     Anticoagulation Episode Summary       Current INR goal:  2.0-3.0   TTR:  83.7% (1 y)   Target end date:  Indefinite   Send INR reminders to:  ANTICOAG HOME MONITORING    Indications    H/O mechanical aortic valve replacement [Z95.2]  Long term current use of anticoagulant therapy [Z79.01]             Comments:  [employer] home meter         " ----- Message from Erlinda NELSON RN sent at 8/14/2024  1:12 PM CDT -----    ----- Message -----  From: Garrett Keyes MD  Sent: 8/14/2024  12:48 PM CDT  To: [facility] Nurse Msg Pool    The pathology results demonstrated Tubular adenoma.       Anticoagulation Care Providers       Provider Role Specialty Phone number    Min Fried MD Referring Internal Medicine [phone]

## 2025-05-04 ENCOUNTER — HEALTH MAINTENANCE LETTER (OUTPATIENT)
Age: 70
End: 2025-05-04

## 2025-05-05 ENCOUNTER — MYC MEDICAL ADVICE (OUTPATIENT)
Dept: ANTICOAGULATION | Facility: CLINIC | Age: 70
End: 2025-05-05
Payer: MEDICARE

## 2025-05-06 ENCOUNTER — ANTICOAGULATION THERAPY VISIT (OUTPATIENT)
Dept: ANTICOAGULATION | Facility: CLINIC | Age: 70
End: 2025-05-06

## 2025-05-06 ENCOUNTER — LAB (OUTPATIENT)
Dept: LAB | Facility: OTHER | Age: 70
End: 2025-05-06
Payer: COMMERCIAL

## 2025-05-06 DIAGNOSIS — N18.32 STAGE 3B CHRONIC KIDNEY DISEASE (H): ICD-10-CM

## 2025-05-06 DIAGNOSIS — Z95.2 H/O MECHANICAL AORTIC VALVE REPLACEMENT: Primary | ICD-10-CM

## 2025-05-06 DIAGNOSIS — Z79.01 LONG TERM CURRENT USE OF ANTICOAGULANT THERAPY: ICD-10-CM

## 2025-05-06 LAB
ALBUMIN MFR UR ELPH: 7.9 MG/DL
ALBUMIN SERPL BCG-MCNC: 4.3 G/DL (ref 3.5–5.2)
ANION GAP SERPL CALCULATED.3IONS-SCNC: 11 MMOL/L (ref 7–15)
BUN SERPL-MCNC: 39.8 MG/DL (ref 8–23)
CALCIUM SERPL-MCNC: 9.4 MG/DL (ref 8.8–10.4)
CHLORIDE SERPL-SCNC: 106 MMOL/L (ref 98–107)
CREAT SERPL-MCNC: 1.8 MG/DL (ref 0.67–1.17)
CREAT UR-MCNC: 66.4 MG/DL
EGFRCR SERPLBLD CKD-EPI 2021: 40 ML/MIN/1.73M2
GLUCOSE SERPL-MCNC: 83 MG/DL (ref 70–99)
HCO3 SERPL-SCNC: 23 MMOL/L (ref 22–29)
HGB BLD-MCNC: 14.8 G/DL (ref 13.3–17.7)
INR HOME MONITORING: 2.3 (ref 2–3)
PHOSPHATE SERPL-MCNC: 3.6 MG/DL (ref 2.5–4.5)
POTASSIUM SERPL-SCNC: 5 MMOL/L (ref 3.4–5.3)
PROT/CREAT 24H UR: 0.12 MG/MG CR (ref 0–0.2)
PTH-INTACT SERPL-MCNC: 55 PG/ML (ref 15–65)
SODIUM SERPL-SCNC: 140 MMOL/L (ref 135–145)

## 2025-05-06 PROCEDURE — 36415 COLL VENOUS BLD VENIPUNCTURE: CPT

## 2025-05-06 PROCEDURE — 84156 ASSAY OF PROTEIN URINE: CPT

## 2025-05-06 PROCEDURE — 83970 ASSAY OF PARATHORMONE: CPT

## 2025-05-06 PROCEDURE — 85018 HEMOGLOBIN: CPT

## 2025-05-06 PROCEDURE — 80069 RENAL FUNCTION PANEL: CPT

## 2025-05-06 NOTE — PROGRESS NOTES
"ANTICOAGULATION MANAGEMENT     Min Andino 69 year old male is on warfarin with therapeutic INR result. (Goal INR 2.0-3.0)    No results for input(s): \"INR\" in the last 168 hours.    2.3 INR result reported from 4/29.    ASSESSMENT     Source(s): Chart Review  Previous INR was Therapeutic last visit; previously outside of goal range  Medication, diet, health changes since last INR chart reviewed; none identified         PLAN     Recommended plan for no diet, medication or health factor changes affecting INR     Dosing Instructions: Continue your current warfarin dose with next INR in 1 week       Summary  As of 5/6/2025      Full warfarin instructions:  2 mg every Mon, Fri; 3 mg all other days   Next INR check:  5/13/2025               Detailed voice message left for Min with dosing instructions and follow up date.     Patient to recheck with home meter    Education provided: Please call back if any changes to your diet, medications or how you've been taking warfarin  Contact [phone] with any changes, questions or concerns.     Plan made per [facility] anticoagulation protocol    Stalin Dennis RN  5/6/2025  Anticoagulation Clinic  [facility] for routing messages: p ANTICOAG HOME MONITORING  [facility] patient phone line: [phone]        _______________________________________________________________________     Anticoagulation Episode Summary       Current INR goal:  2.0-3.0   TTR:  83.4% (11.9 mo)   Target end date:  Indefinite   Send INR reminders to:  ANTICOAG HOME MONITORING    Indications    H/O mechanical aortic valve replacement [Z95.2]  Long term current use of anticoagulant therapy [Z79.01]             Comments:  [employer] home meter             Anticoagulation Care Providers       Provider Role Specialty Phone number    Min Fried MD Referring Internal Medicine [phone]            "

## 2025-05-13 ENCOUNTER — RESULTS FOLLOW-UP (OUTPATIENT)
Dept: NEPHROLOGY | Facility: CLINIC | Age: 70
End: 2025-05-13

## 2025-05-13 ENCOUNTER — VIRTUAL VISIT (OUTPATIENT)
Dept: NEPHROLOGY | Facility: CLINIC | Age: 70
End: 2025-05-13
Payer: MEDICARE

## 2025-05-13 DIAGNOSIS — N18.32 STAGE 3B CHRONIC KIDNEY DISEASE (H): Primary | ICD-10-CM

## 2025-05-13 NOTE — NURSING NOTE
Current patient location: [address] [facility] [address]    Is the patient currently in the state of [place]? YES    Visit mode: VIDEO    If the visit is dropped, the patient can be reconnected by:VIDEO VISIT: Send to e-mail at: [email or web address]    Will anyone else be joining the visit? YES: How would they like to receive their invitation? Send to e-mail: PT will be with wife  (If patient encounters technical issues they should call [phone]    Are changes needed to the allergy or medication list? No    Are refills needed on medications prescribed by this physician? NO    Rooming Documentation:  Questionnaire(s) completed    Reason for visit: RECHECK    Larry PRICE

## 2025-05-13 NOTE — PROGRESS NOTES
"Virtual Visit Details    Type of service:  Video Visit     Originating Location (pt. Location): Home    Distant Location (provider location):  Off-site  Platform used for Video Visit: Иван    05/13/25   CC: CKD    HPI: Min Andino is a 69 year old  male who presents for evaluation of CKD. I last saw Mr. Andino in 2014. To review from my previous note: Mr. Andino's past medical hx was unremarkable leading up to Dec 2013 when he noted chest tightness. He was seen at [facility] at that time and noted to have aortic dissection. He was initially monitored but later underwent aortic repair/aortic valve replacement/CABG in early April 2014. His post op course was complicated by the need for ECMO as well as pressor support. His wife reports today that he was told that he has \"normal\" creatinine levels when undergoing physicals in the past. His creatinine was 2.2 post surgery in April 2014 but improved to 1.3 at the time of discharge in April. 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 At that time his creatinine was 2.2 post op but improved to 1.3. Since that time, creatinine readings have included 1.48 on 7/17/14, 1.39 no 7/27/14, and 1.35 on 8/13/14. 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                 02/24/20: today he presents to reestablish care in our clinic. His creatinine was stable at ~ 1.1 on last check in 2014 but is now at a new baseline of ~ 1.6 since august. Mr. Andino underwent thoracoabdominal aortic repair august 2019. The placement of this stent compromised blood flow to the left kidney moiety which was noted on imaging later. His creatinine post this procedure was ~ 1.6. There have been a few episodes when the creatinine has increased, most recently a few weeks ago (to 1.9). With his most recent creatinine rise, lasix was held. He has not noted much change in his swelling or breathing " with holding the lasix for the past few weeks. Today I noted SOB during our conversation. His oxygen saturation was fine but his breathing seemed labored. He and his wife report that this is his breathing baseline over the past 5 years and is not worse than typical. They are following weights at home and he has been dropping weight - no increase since stopping the lasix. He does have difficulty with empyting his bladder - last imaging in the fall showed no hydronephrosis. Flomax did not help him in the past. He has a HHN coming once a week to the house. He is not lightheaded. He has plans to see cardiology at [place] in March, is looking to establish care in urology, is also looking to establish care with internal medicine potentially in our clinic for continuity in one location.         06/08/20:  Virtual visit. Lasix was increased last month. He reports that the swelling is there but better. He denies any change in eating. No diarrhea. Weight was 240 lbs down to 215 lbs. He had been losing 1 lb a day. He was in ED last week and  Dx with hernia. He held lasix since Friday - was on lasix 40 mg daily held over the weekend - weight was 215 lbs on Thursday and 217 lbs this AM. He denies lightheadedness/dizziness. No orthostatic sxs. He quit wearing compression stockings a week ago. He feels he has slightly more endurance; can't lift a lot, needing to take breaks. He feels his urinary retention is not as problematic - has not seen urology. He has been taking iron BID.      7/13/20: video visit. He has continued to see weight loss over time - was 215 lbs in June but now 205 lbs. He has seen a change in his clothing size by 2 over the past few months. He is hungry often - eating well. No diarrhea. He has some constipation at times. He feels that maybe his breathing is improved. He is wearing compression stockings. He is using lasix 20 mg daily.      10/20/20: video visit - started on [facility]WELL and did exam that way - then  converted to telephone given echo noted. Feeling the best he has felt since before last winter. NO new issues. He has endovascular stent repair planned in the coming week. Activity improving. No swelling. Weight most recently 203 lbs. Over the past month - 201-203 lbs. BP has been well controlled; 115/61 at a recent physician appt. He tends to have higher readings at home; 140s at home, sometimes 130s.     01/25/21:  In the setting of COVID-19 pandemic, this visit was changed to a telephone visit. Patient reports feeling good overall. No difficulty with fluid overload/swelling/shortness of breath. His weight is up slightly but he also has a bigger appetite lately and snacking more with being at home. BP was 105/58 at a recent clinic visit but they report pressures of 130-176 at home most recently - mostly above goal. They have been working with cardiology clinic at [place] in regards to medication adjustments  And plan to reach out to them with these updates. He was hospitalized 11/11/20-11/17/20 following left internal iliac artery stenting for endoleak. Returned to operating room 11/13 for [id number] of left common femoral aneurysm and previous graft left external iliac to superficial femoral and deep femoral artery interposition graft. Creatinine was stable at 1.5-1.7 while inpatient. He has been taking iron BID but with some constipation.     9/28/21: video visit. No vascular interventions or hospitalizations since last visit. Vascular stent/leak stable on recent CT. Checking BP 4x/day with average SBP in AM 160s-180s, mid day 110s-120s, PM 140s. Amlodipine (takes in AM) increased to 10 mg daily approximately one month ago by PCP. Losartan (take at night) 100 mg daily increased march 2021. Metoprolol succinate increased to 100 mg daily (not sure when this was increased, take in AM). BP sligthly improved but not much. Denies any dizziness, CP, abdominal pain, SOB (some BROWN with moderate exertion), LE swelling or  abdominal distention. Doesn't check weights regularly, last weight 220.    01/04/22: went to [place] in first part of November - things are looking goo/stable.  NO swelling related concerns at this time. Breathing is stable. IN the past few weeks - blood pressure one time 97 in the AM - evening always 30 points higher.  Has had a consistent pattern of low in the AM and higher in evening for the past few months. Currently taking amlodipine 5 mg AM, losartan 50 mg BID, and carvedilol. 12.5 mg BID.     10/04/22: video visit. Not as hydrated as he should. No diueretics. No lower ext swelling. NO NSAIDs. Blood pressure recently - takes it every 10 days - 121/76, 129/83, 102/67, 119/75, 127/76, 119/68. We spent time discussing hydration - he admits he is not a great water drinker.     05/02/23: telephone visit -  Sara messaged me on 3/1/23 given increased swelling noted in his feet and legs - was given lasix 20 mg to be taken for 7 days. Creatinine was 1.85 in Sept 2022 in our system but then 1.43 more recently in Jan. Labs were done through [facility] in Feb and Creatinine was up to 1.94 and then last week 1.9. He was seen by his cardiologist at [facility] last week - felt to have minimally decompensated left ventricular systolic function but exacerbation of right ventricular failure perhabs due to underlying lung disease exacerbated by hypoventilation and sleep apnea. Plan is cardiac MR, lasix 20 mg daily, sleep study. Potential right heart cath in the future.    At this time, swelling is about the same. Weight is down about 10-12 lbs during the month of March - stayed stable through April. Feet and legs don't look normal but not terrible. Recent BP readings - 122/71, 127/77, 124/82. Pretty much always In the 120s. A few will go to 116. His physical stamina has declined - much more fatigued through the winter. He has been less active through the winter - more active in spring/fall/summer. Wheelchair  "most recently. Home pressures don't go above 140. SOB with activity. That has been the case for a long time - worse for the past 6-8 months. MRI of hte heart is the 10th, cards the 11th, sleep study the 21st/22nd. Cardiologist consult end of June at [place].     05/30/23: video visit. They feel the swelling is progressively getting worse day by day.last week they went to 20 mg BID of lasix per cardiology but then creatinine increased so they decreased back to 20 mg of lasix.  They have been losing weight this year with healthier dietary habits but unfortunately the scale is going up for him. Swelling in legs is significantly more uncomfortable. They did their sleep study but currently do not have a follow-up with pulmonary until July to hear the results/next stpeps.   Appt at [facility] in [place] for 2nd opinion.    Not scheduled for cardioversion at this point but they are started on amiodarone since last visit with Dr. Boyd.   Lasix 20 mg daily. When taking 20 BID, he did not see any difference in swelling improvement or urine output.  He is not on norvasc. /77. 132/78. 120s-130s.     07/11/23: video visit. Since our last visit I connected with the sleep center and he was going to be started on CPAP ASAP. I also connected with Dr. Boyd at [facility]. The prognosis of his cardiac condition is poor and he is concerned about future needs for dialysis. He has since been seen by [facility] cardiology - \"progressive LV adverse remodeling with reduced EF accompanied by right heart failure and significant tricuspid regurgitation\". They discussed optimizing medical therapy: they hope to add SGLT2 inhibitor, wanted to increase bumex to 2 mg AM And 1 mg PM. Once more euvolemic, then optimize carvedilol to a target of 25 mg BID. They also discussed replacing losartan with sacubitril/valsartan. They also felt electrical cardioversion is not unreasonable.    He has dropped about 50 lbs since our last " visit. Lasix wasn't working. Bumex has helped. They have not made med changes since being seen at [place]. He has an appt this Friday with cardiology. There is a little swelling still but much improved from our last visit - 250 lbs 1.5 months ago - just over 200 lbs now. Same eating regimen. Now on CPAP - still with apnea events but improved from previous. Was just seen by sleep physician through [facility]. Mask has been tolerable. Currently taking bumex 2 mg daily. BP has been good at 120/83. 130/85, 135/82. NO lightheaedness or dizziness.     10/10/23: video visit.   1. Are you on jardiance at this time?  YES   2. What are your recent blood pressure readings?  94/44, 104/43, 121/58, 159/69, 114/47, 156/66   3. Please confirm your current bumex dose  1mg. in AM and .5 mg. mid-day   4. Please also update on how you are feeling in regards to volume (is your weight up/down?, short of breath?, swelling?)  No edema or swelling. Weight stable.  No shortness of breath.   He was in the ED in September with dizziness. Breathing is stable. Better than in the spring certainly.  Weight has been 195, 194 lbs - +/- 5 lbs. This is stable. No lightheadedness on standing except when he first gets up in the AM. Last episode was a couple days ago. Dry Mouth. Wearing CPAP. No significant thirst.   - Now on Jardiance X 2-2.5 weeks  - Carvedilol to 12.5 mg BID  - losartan is now 25 mg BID    01/09/24: video visit. Scheduled for follow-up next week. Feeling pretty good. No volume related issues. Still on CPAP at night. Bumex is 0.5 mg every other day. No signs of dehydrated. Low BPs in the AM - 79/35, 83/41 - not every day but will occur. Other days 138/54. The pressures less than 100 occur 1-2 times per week. Rarely feels dizzy. Heart rate has been 58-69. Typically upper 50s, lower 60s.     04/16/24: video visit. Feeling pretty good. No swelling. Breathing is comfortable. He feels he is hydrated. Drinks when he is thirsty. On bumex  0.5 mg every other day. Weighs himself almost every day - gained a few lbs the past few months but much fluctuating. BP has been less than 140 at home. Using CPAP at night.     11/12/24: video visit. Creatinine has been 1.75-2.09; 1.92 on this most recent check. No proteinuria. K just slightly elevated at 5.4. no signs of fluid overload. Currently off of bumex. He has been gaining some weight. No swelling. No other signs of fluid overload. Breathing is comfortable. He remains on Jardiance. Still on CPAP at night.     05/13/25: video visit. Feeling pretty good. Still using CPAP. No swelling. Breathing stable. On entresto and Jardiance. Hasn't needed to use the bumex recently. Gaining weight slowly but isn't sure if related to activity or eating. No swelling. Slow gradual. BP at home - no recent readings as had been stable. No hospitalization or big changes with his health. Had PNA end of NOv but no hospitalization.     Current Outpatient Medications   Medication Sig Dispense Refill    amiodarone (PACERONE) 100 MG TABS tablet Take 1 tablet (100 mg) by mouth daily. 90 tablet 3    amoxicillin (AMOXIL) 500 MG capsule Take 4 capsules (2,000 mg) by mouth as needed (see instructions). Take 4 capsules 1 hour before dental work 4 capsule 3    aspirin (ASA) 81 MG chewable tablet Take 81 mg by mouth daily      atorvastatin (LIPITOR) 40 MG tablet Take 1 tablet (40 mg) by mouth daily. Last refill. Need follow-up with Dr. Blair. 90 tablet 3    bumetanide (BUMEX) 0.5 MG tablet Take 1 tablet (0.5 mg) by mouth daily as needed. 90 tablet 3    carvedilol (COREG) 6.25 MG tablet Take 1 tablet (6.25 mg) by mouth 2 times daily (with meals). 180 tablet 3    docusate sodium (COLACE) 100 MG capsule daily.      empagliflozin (JARDIANCE) 10 MG TABS tablet Take 1 tablet (10 mg) by mouth daily. 90 tablet 3    nitroGLYcerin (NITROSTAT) 0.4 MG sublingual tablet Place 1 tablet (0.4 mg) under the tongue every 5 minutes as needed for chest pain.  (Patient not taking: Reported on 2/24/2025) 25 tablet 0    psyllium (METAMUCIL/KONSYL) 0.52 g capsule Take 1.04 g by mouth (Patient not taking: Reported on 9/5/2024)      sacubitril-valsartan (ENTRESTO) 24-26 MG per tablet Take 1 tablet by mouth 2 times daily. 180 tablet 3    sodium zirconium cyclosilicate (LOKELMA) 5 g PACK packet Take 1 packet (5 g) by mouth every other day. 45 each 3    Vitamin D, Cholecalciferol, 25 MCG (1000 UT) TABS 2 times daily       warfarin ANTICOAGULANT (COUMADIN) 1 MG tablet 2 mg (1 mg x 2 tablets ) by mouth every Mon, Wed, Fri; and take 3 mg (3 mg x 1 tablet) all other days of the week or as directed by your INR Team based on INR Results. 90 tablet 1    warfarin ANTICOAGULANT (COUMADIN) 3 MG tablet 2 mg (1 mg x 2 tablets ) by mouth every Mon, Wed, Fri; and take 3 mg (3 mg x 1 tablet) all other days of the week or as directed by your INR Team based on INR Results. 90 tablet 1    warfarin ANTICOAGULANT (COUMADIN) 4 MG tablet Take 4 mg on Monday, Wed, Thurs, Friday, Sunday or as directed by INR clinic 75 tablet 1     No current facility-administered medications for this visit.       Exam:  There were no vitals taken for this visit.    Results:  Recent Results (from the past 240 hours)   Hemoglobin    Collection Time: 05/06/25 11:52 AM   Result Value Ref Range    Hemoglobin 14.8 13.3 - 17.7 g/dL   Parathyroid Hormone Intact    Collection Time: 05/06/25 11:52 AM   Result Value Ref Range    Parathyroid Hormone Intact 55 15 - 65 pg/mL   Renal panel    Collection Time: 05/06/25 11:52 AM   Result Value Ref Range    Sodium 140 135 - 145 mmol/L    Potassium 5.0 3.4 - 5.3 mmol/L    Chloride 106 98 - 107 mmol/L    Carbon Dioxide (CO2) 23 22 - 29 mmol/L    Anion Gap 11 7 - 15 mmol/L    Glucose 83 70 - 99 mg/dL    Urea Nitrogen 39.8 (H) 8.0 - 23.0 mg/dL    Creatinine 1.80 (H) 0.67 - 1.17 mg/dL    GFR Estimate 40 (L) >60 mL/min/1.73m2    Calcium 9.4 8.8 - 10.4 mg/dL    Albumin 4.3 3.5 - 5.2 g/dL     Phosphorus 3.6 2.5 - 4.5 mg/dL   Protein  random urine    Collection Time: 05/06/25 11:54 AM   Result Value Ref Range    Total Protein Urine mg/dL 7.9   mg/dL    Total Protein Urine mg/mg Creat 0.12 0.00 - 0.20 mg/mg Cr    Creatinine Urine mg/dL 66.4 mg/dL      Lab were reviewed and interpreted.       Assessment/Plan:   1. CKD Stage 3b: has CKD in the setting of previous ULICES as well as vascular compromise to the left kidney moiety from his vascular procedure in August 2019. Addt ULICES 2023 which was likely cardiorenal syndrome. With his vascular hx, need to consider he will be more sensitive to hemodynamic changes. Heart failure and kidney disease have been stable most recently which is great to see. On jardiance and entresto.      2. Hypertension: BP is at goal - no changes today - goal is <130/80    3. Aneurysms/AVR: following with [facility] yearly.     4. CHF/Pulmonary hypertension: Most recently followed by Dr. Gilmore.   -  Sleep eval completed and per sleep lab, findings c/w LIVIA. Stressed the importance of ongoing sleep apnea treatment. He is following with sleep specialist   - has not had volume related concerns in many months - taking bumex just as needed. Educated on importance of taking bumex if weight gain/signs of volume overload.     5. Hyperkalemia: stable on lokelma - continue    Patient Instructions   Lab and follow-up in 6 months     [id number] AM video visit via [employer] - offsite.   Madelin Vallecillo DO

## 2025-05-19 ENCOUNTER — ANTICOAGULATION THERAPY VISIT (OUTPATIENT)
Dept: ANTICOAGULATION | Facility: CLINIC | Age: 70
End: 2025-05-19
Payer: MEDICARE

## 2025-05-19 DIAGNOSIS — Z95.2 H/O MECHANICAL AORTIC VALVE REPLACEMENT: Primary | ICD-10-CM

## 2025-05-19 DIAGNOSIS — Z79.01 LONG TERM CURRENT USE OF ANTICOAGULANT THERAPY: ICD-10-CM

## 2025-05-19 LAB — INR HOME MONITORING: 2.1 (ref 2–3)

## 2025-05-19 NOTE — PROGRESS NOTES
ANTICOAGULATION MANAGEMENT     Min Andino 69 year old male is on warfarin with therapeutic INR result. (Goal INR 2.0-3.0)    Recent labs: (last 7 days)     05/15/25  0000   INR 2.1       ASSESSMENT     Source(s): Chart Review and Patient/Caregiver Call     Warfarin doses taken: Warfarin taken as instructed  Diet: No new diet changes identified  Medication/supplement changes: None noted  New illness, injury, or hospitalization: No  Signs or symptoms of bleeding or clotting: No  Previous result: Therapeutic last 2(+) visits  Additional findings: Discussed Managed by Exception Program.       PLAN     Recommended plan for no diet, medication or health factor changes affecting INR     Dosing Instructions: Continue your current warfarin dose with next INR in 2 weeks       Summary  As of 5/19/2025      Full warfarin instructions:  2 mg every Mon, Fri; 3 mg all other days   Next INR check:  5/29/2025               Telephone call with Min who verbalizes understanding and agrees to plan and who agrees to plan and repeated back plan correctly    Patient to recheck with home meter    Education provided: Please call back if any changes to your diet, medications or how you've been taking warfarin  Resume manage by exception with home monitor. Continue to submit INR results to home monitor company.You will only be called when your result is out of range and at 90 day check in. Please call and notify [facility] if new medication started, dose missed, signs or symptoms of bleeding or clotting, or a surgery/procedure is scheduled. Due for next call no later than: 8/17/25.    Plan made per [facility] anticoagulation protocol    Vonda Izaguirre, RN  5/19/2025  Anticoagulation Clinic  [facility] for routing messages: [employer]  [facility] patient phone line: [phone]        _______________________________________________________________________     Anticoagulation Episode Summary       Current INR goal:  2.0-3.0   TTR:  86.9%  (1 y)   Target end date:  Indefinite   Send INR reminders to:  [facility]    Indications    H/O mechanical aortic valve replacement [Z95.2]  Long term current use of anticoagulant therapy [Z79.01]             Comments:  [employer] home meter / Managed by Exception             Anticoagulation Care Providers       Provider Role Specialty Phone number    Min Fried MD Referring Internal Medicine [phone]

## 2025-06-03 ENCOUNTER — DOCUMENTATION ONLY (OUTPATIENT)
Dept: ANTICOAGULATION | Facility: CLINIC | Age: 70
End: 2025-06-03
Payer: MEDICARE

## 2025-06-03 ENCOUNTER — RESULTS FOLLOW-UP (OUTPATIENT)
Dept: ANTICOAGULATION | Facility: CLINIC | Age: 70
End: 2025-06-03

## 2025-06-03 DIAGNOSIS — Z95.2 H/O MECHANICAL AORTIC VALVE REPLACEMENT: Primary | ICD-10-CM

## 2025-06-03 DIAGNOSIS — Z79.01 LONG TERM CURRENT USE OF ANTICOAGULANT THERAPY: ICD-10-CM

## 2025-06-03 LAB — INR HOME MONITORING: 2 (ref 2–3)

## 2025-06-03 NOTE — PROGRESS NOTES
ANTICOAGULATION  MANAGEMENT-Home Monitor Managed by Exception    Min Alejandre Thu 69 year old male is on warfarin with therapeutic INR result. (Goal INR 2.0-3.0)    Recent labs: (last 7 days)     06/02/25  0000   INR 2.0       Previous INR was Therapeutic  Medication, diet, health changes since last INR:chart reviewed; none identified  Contacted within the last 12 weeks by phone on 5/19/25  Due for next call no later than: 8/17/25   Last [facility] referral date: 11/13/2024      FALLON Copeland was NOT contacted regarding therapeutic result today per home monitoring policy manage by exception agreement.   Current warfarin dose is to be continued:     Summary  As of 6/3/2025      Full warfarin instructions:  2 mg every Mon, Fri; 3 mg all other days   Next INR check:  6/16/2025             ?   Brittni Lemus RN  Anticoagulation Clinic  6/3/2025    _______________________________________________________________________     Anticoagulation Episode Summary       Current INR goal:  2.0-3.0   TTR:  87.6% (1 y)   Target end date:  Indefinite   Send INR reminders to:  [facility]    Indications    H/O mechanical aortic valve replacement [Z95.2]  Long term current use of anticoagulant therapy [Z79.01]             Comments:  [employer] home meter / Managed by Exception             Anticoagulation Care Providers       Provider Role Specialty Phone number    Min Fried MD Referring Internal Medicine [phone]

## 2025-06-15 ENCOUNTER — HEALTH MAINTENANCE LETTER (OUTPATIENT)
Age: 70
End: 2025-06-15

## 2025-06-16 ENCOUNTER — RESULTS FOLLOW-UP (OUTPATIENT)
Dept: ANTICOAGULATION | Facility: CLINIC | Age: 70
End: 2025-06-16

## 2025-06-16 ENCOUNTER — DOCUMENTATION ONLY (OUTPATIENT)
Dept: ANTICOAGULATION | Facility: CLINIC | Age: 70
End: 2025-06-16
Payer: MEDICARE

## 2025-06-16 DIAGNOSIS — Z79.01 LONG TERM CURRENT USE OF ANTICOAGULANT THERAPY: ICD-10-CM

## 2025-06-16 DIAGNOSIS — Z95.2 H/O MECHANICAL AORTIC VALVE REPLACEMENT: Primary | ICD-10-CM

## 2025-06-16 LAB — INR HOME MONITORING: 2.1 (ref 2–3)

## 2025-06-16 NOTE — PROGRESS NOTES
ANTICOAGULATION  MANAGEMENT-Home Monitor Managed by Exception    Min Alejandre Thu 69 year old male is on warfarin with therapeutic INR result. (Goal INR 2.0-3.0)    Recent labs: (last 7 days)     06/16/25  0000   INR 2.1       Previous INR was Therapeutic  Medication, diet, health changes since last INR:chart reviewed; none identified  Contacted within the last 12 weeks by phone on 05/19/25  Due for next call no later than: 8/17/25   Last [facility] referral date: 11/13/2024      FALLON Copeland was NOT contacted regarding therapeutic result today per home monitoring policy manage by exception agreement.   Current warfarin dose is to be continued:     Summary  As of 6/16/2025      Full warfarin instructions:  2 mg every Mon, Fri; 3 mg all other days   Next INR check:  6/30/2025             ?   Mica Browning RN  Anticoagulation Clinic  6/16/2025    _______________________________________________________________________     Anticoagulation Episode Summary       Current INR goal:  2.0-3.0   TTR:  87.7% (1 y)   Target end date:  Indefinite   Send INR reminders to:  [facility]    Indications    H/O mechanical aortic valve replacement [Z95.2]  Long term current use of anticoagulant therapy [Z79.01]             Comments:  [employer] home meter / Managed by Exception             Anticoagulation Care Providers       Provider Role Specialty Phone number    Min Fried MD Referring Internal Medicine [phone]

## 2025-06-26 ENCOUNTER — RESULTS FOLLOW-UP (OUTPATIENT)
Dept: ANTICOAGULATION | Facility: CLINIC | Age: 70
End: 2025-06-26

## 2025-06-26 ENCOUNTER — DOCUMENTATION ONLY (OUTPATIENT)
Dept: ANTICOAGULATION | Facility: CLINIC | Age: 70
End: 2025-06-26
Payer: MEDICARE

## 2025-06-26 DIAGNOSIS — Z95.2 H/O MECHANICAL AORTIC VALVE REPLACEMENT: Primary | ICD-10-CM

## 2025-06-26 DIAGNOSIS — Z79.01 LONG TERM CURRENT USE OF ANTICOAGULANT THERAPY: ICD-10-CM

## 2025-06-26 LAB — INR HOME MONITORING: 2.2 (ref 2–3)

## 2025-06-26 NOTE — PROGRESS NOTES
ANTICOAGULATION  MANAGEMENT-Home Monitor Managed by Exception    Min Alejandre Thu 69 year old male is on warfarin with therapeutic INR result. (Goal INR 2.0-3.0)    Recent labs: (last 7 days)     06/26/25  0000   INR 2.2       Previous INR was Therapeutic  Medication, diet, health changes since last INR:chart reviewed; none identified  Contacted within the last 12 weeks by phone on 5/19/25  Due for next call no later than: 8/17/25.   Last [facility] referral date: 11/13/2024      FALLON Copeland was NOT contacted regarding therapeutic result today per home monitoring policy manage by exception agreement.   Current warfarin dose is to be continued:     Summary  As of 6/26/2025      Full warfarin instructions:  2 mg every Mon, Fri; 3 mg all other days   Next INR check:  7/10/2025             ?   Zaina Gibbs RN  Anticoagulation Clinic  6/26/2025    _______________________________________________________________________     Anticoagulation Episode Summary       Current INR goal:  2.0-3.0   TTR:  87.7% (1 y)   Target end date:  Indefinite   Send INR reminders to:  [facility]    Indications    H/O mechanical aortic valve replacement [Z95.2]  Long term current use of anticoagulant therapy [Z79.01]             Comments:  [employer] home meter / Managed by Exception             Anticoagulation Care Providers       Provider Role Specialty Phone number    Min Fried MD Referring Internal Medicine [phone]

## 2025-07-10 ENCOUNTER — ANTICOAGULATION THERAPY VISIT (OUTPATIENT)
Dept: ANTICOAGULATION | Facility: CLINIC | Age: 70
End: 2025-07-10
Payer: MEDICARE

## 2025-07-10 DIAGNOSIS — Z95.2 H/O MECHANICAL AORTIC VALVE REPLACEMENT: Primary | ICD-10-CM

## 2025-07-10 DIAGNOSIS — Z79.01 LONG TERM CURRENT USE OF ANTICOAGULANT THERAPY: ICD-10-CM

## 2025-07-10 LAB — INR HOME MONITORING: 1.9 (ref 2–3)

## 2025-07-10 NOTE — PROGRESS NOTES
ANTICOAGULATION MANAGEMENT     Min Andino 69 year old male is on warfarin with subtherapeutic INR result. (Goal INR 2.0-3.0)    Recent labs: (last 7 days)     07/09/25  0000   INR 1.9*       ASSESSMENT     Source(s): Chart Review and Patient/Caregiver Call     Warfarin doses taken: Warfarin taken as instructed  Diet: No new diet changes identified  Medication/supplement changes: None noted  New illness, injury, or hospitalization: No  Signs or symptoms of bleeding or clotting: No  Previous result: Therapeutic last 2(+) visits  Additional findings: None       PLAN     Recommended plan for no diet, medication or health factor changes affecting INR     Dosing Instructions: Continue your current warfarin dose with next INR in 1 week       Summary  As of 7/10/2025      Full warfarin instructions:  2 mg every Mon, Fri; 3 mg all other days   Next INR check:  7/17/2025               Telephone call with Min who verbalizes understanding and agrees to plan    Patient to recheck with home meter    Education provided: Please call back if any changes to your diet, medications or how you've been taking warfarin    Plan made per [facility] anticoagulation protocol    Brittni Lemus RN  7/10/2025  Anticoagulation Clinic  [employer] for routing messages: [employer] [facility]  [facility] patient phone line: [phone]        _______________________________________________________________________     Anticoagulation Episode Summary       Current INR goal:  2.0-3.0   TTR:  87.1% (1 y)   Target end date:  Indefinite   Send INR reminders to:  [facility]    Indications    H/O mechanical aortic valve replacement [Z95.2]  Long term current use of anticoagulant therapy [Z79.01]             Comments:  [employer] home meter / Managed by Exception             Anticoagulation Care Providers       Provider Role Specialty Phone number    Min Fried MD Referring Internal Medicine [phone]

## 2025-07-11 SDOH — HEALTH STABILITY: PHYSICAL HEALTH: ON AVERAGE, HOW MANY DAYS PER WEEK DO YOU ENGAGE IN MODERATE TO STRENUOUS EXERCISE (LIKE A BRISK WALK)?: 4 DAYS

## 2025-07-11 SDOH — HEALTH STABILITY: PHYSICAL HEALTH: ON AVERAGE, HOW MANY MINUTES DO YOU ENGAGE IN EXERCISE AT THIS LEVEL?: 10 MIN

## 2025-07-11 ASSESSMENT — SOCIAL DETERMINANTS OF HEALTH (SDOH): HOW OFTEN DO YOU GET TOGETHER WITH FRIENDS OR RELATIVES?: THREE TIMES A WEEK

## 2025-07-16 ENCOUNTER — OFFICE VISIT (OUTPATIENT)
Dept: INTERNAL MEDICINE | Facility: CLINIC | Age: 70
End: 2025-07-16
Payer: MEDICARE

## 2025-07-16 VITALS
DIASTOLIC BLOOD PRESSURE: 78 MMHG | BODY MASS INDEX: 31.49 KG/M2 | WEIGHT: 232.5 LBS | SYSTOLIC BLOOD PRESSURE: 135 MMHG | RESPIRATION RATE: 15 BRPM | HEIGHT: 72 IN | HEART RATE: 56 BPM | OXYGEN SATURATION: 98 % | TEMPERATURE: 97.4 F

## 2025-07-16 DIAGNOSIS — I50.43 ACUTE ON CHRONIC COMBINED SYSTOLIC (CONGESTIVE) AND DIASTOLIC (CONGESTIVE) HEART FAILURE (H): ICD-10-CM

## 2025-07-16 DIAGNOSIS — Z95.2 H/O MECHANICAL AORTIC VALVE REPLACEMENT: ICD-10-CM

## 2025-07-16 DIAGNOSIS — Z13.6 CARDIOVASCULAR SCREENING; LDL GOAL LESS THAN 160: ICD-10-CM

## 2025-07-16 DIAGNOSIS — Z12.5 SCREENING FOR PROSTATE CANCER: ICD-10-CM

## 2025-07-16 DIAGNOSIS — R63.5 WEIGHT GAIN: ICD-10-CM

## 2025-07-16 DIAGNOSIS — G89.29 CHRONIC PAIN OF BOTH KNEES: ICD-10-CM

## 2025-07-16 DIAGNOSIS — Z00.00 ENCOUNTER FOR MEDICARE ANNUAL WELLNESS EXAM: Primary | ICD-10-CM

## 2025-07-16 DIAGNOSIS — R35.0 BENIGN PROSTATIC HYPERPLASIA WITH URINARY FREQUENCY: ICD-10-CM

## 2025-07-16 DIAGNOSIS — Z79.01 LONG TERM CURRENT USE OF ANTICOAGULANT THERAPY: ICD-10-CM

## 2025-07-16 DIAGNOSIS — N40.1 BENIGN PROSTATIC HYPERPLASIA WITH URINARY FREQUENCY: ICD-10-CM

## 2025-07-16 DIAGNOSIS — M25.562 CHRONIC PAIN OF BOTH KNEES: ICD-10-CM

## 2025-07-16 DIAGNOSIS — K40.90 UNILATERAL INGUINAL HERNIA WITHOUT OBSTRUCTION OR GANGRENE, RECURRENCE NOT SPECIFIED: ICD-10-CM

## 2025-07-16 DIAGNOSIS — M25.561 CHRONIC PAIN OF BOTH KNEES: ICD-10-CM

## 2025-07-16 DIAGNOSIS — D63.1 ANEMIA OF CHRONIC RENAL FAILURE, UNSPECIFIED CKD STAGE: ICD-10-CM

## 2025-07-16 DIAGNOSIS — N18.9 ANEMIA OF CHRONIC RENAL FAILURE, UNSPECIFIED CKD STAGE: ICD-10-CM

## 2025-07-16 DIAGNOSIS — I50.22 CHRONIC SYSTOLIC HEART FAILURE (H): ICD-10-CM

## 2025-07-16 LAB
ALBUMIN SERPL BCG-MCNC: 4 G/DL (ref 3.5–5.2)
ALP SERPL-CCNC: 99 U/L (ref 40–150)
ALT SERPL W P-5'-P-CCNC: 11 U/L (ref 0–70)
ANION GAP SERPL CALCULATED.3IONS-SCNC: 9 MMOL/L (ref 7–15)
AST SERPL W P-5'-P-CCNC: 16 U/L (ref 0–45)
BILIRUB SERPL-MCNC: 0.7 MG/DL
BUN SERPL-MCNC: 30.4 MG/DL (ref 8–23)
CALCIUM SERPL-MCNC: 9.1 MG/DL (ref 8.8–10.4)
CHLORIDE SERPL-SCNC: 105 MMOL/L (ref 98–107)
CHOLEST SERPL-MCNC: 122 MG/DL
CREAT SERPL-MCNC: 1.76 MG/DL (ref 0.67–1.17)
CREAT UR-MCNC: 64.5 MG/DL
EGFRCR SERPLBLD CKD-EPI 2021: 41 ML/MIN/1.73M2
FASTING STATUS PATIENT QL REPORTED: YES
FASTING STATUS PATIENT QL REPORTED: YES
GLUCOSE SERPL-MCNC: 100 MG/DL (ref 70–99)
HCO3 SERPL-SCNC: 26 MMOL/L (ref 22–29)
HDLC SERPL-MCNC: 41 MG/DL
LDLC SERPL CALC-MCNC: 67 MG/DL
MICROALBUMIN UR-MCNC: <12 MG/L
MICROALBUMIN/CREAT UR: NORMAL MG/G{CREAT}
NONHDLC SERPL-MCNC: 81 MG/DL
POTASSIUM SERPL-SCNC: 5 MMOL/L (ref 3.4–5.3)
PROT SERPL-MCNC: 6.8 G/DL (ref 6.4–8.3)
PSA SERPL DL<=0.01 NG/ML-MCNC: 0.8 NG/ML (ref 0–6.5)
SODIUM SERPL-SCNC: 140 MMOL/L (ref 135–145)
TRIGL SERPL-MCNC: 69 MG/DL

## 2025-07-16 PROCEDURE — 80061 LIPID PANEL: CPT | Performed by: INTERNAL MEDICINE

## 2025-07-16 PROCEDURE — 82043 UR ALBUMIN QUANTITATIVE: CPT | Performed by: INTERNAL MEDICINE

## 2025-07-16 PROCEDURE — 82570 ASSAY OF URINE CREATININE: CPT | Performed by: INTERNAL MEDICINE

## 2025-07-16 PROCEDURE — 36415 COLL VENOUS BLD VENIPUNCTURE: CPT | Performed by: INTERNAL MEDICINE

## 2025-07-16 PROCEDURE — 80053 COMPREHEN METABOLIC PANEL: CPT | Performed by: INTERNAL MEDICINE

## 2025-07-16 PROCEDURE — G0103 PSA SCREENING: HCPCS | Performed by: INTERNAL MEDICINE

## 2025-07-16 RX ORDER — TAMSULOSIN HYDROCHLORIDE 0.4 MG/1
0.4 CAPSULE ORAL DAILY
Qty: 30 CAPSULE | Refills: 3 | Status: SHIPPED | OUTPATIENT
Start: 2025-07-16

## 2025-07-16 SDOH — HEALTH STABILITY: PHYSICAL HEALTH: ON AVERAGE, HOW MANY DAYS PER WEEK DO YOU ENGAGE IN MODERATE TO STRENUOUS EXERCISE (LIKE A BRISK WALK)?: 4 DAYS

## 2025-07-16 SDOH — HEALTH STABILITY: PHYSICAL HEALTH: ON AVERAGE, HOW MANY MINUTES DO YOU ENGAGE IN EXERCISE AT THIS LEVEL?: 10 MIN

## 2025-07-16 ASSESSMENT — SOCIAL DETERMINANTS OF HEALTH (SDOH)
HOW OFTEN DO YOU GET TOGETHER WITH FRIENDS OR RELATIVES?: THREE TIMES A WEEK
HOW OFTEN DO YOU GET TOGETHER WITH FRIENDS OR RELATIVES?: THREE TIMES A WEEK

## 2025-07-16 ASSESSMENT — PATIENT HEALTH QUESTIONNAIRE - PHQ9
SUM OF ALL RESPONSES TO PHQ QUESTIONS 1-9: 1
10. IF YOU CHECKED OFF ANY PROBLEMS, HOW DIFFICULT HAVE THESE PROBLEMS MADE IT FOR YOU TO DO YOUR WORK, TAKE CARE OF THINGS AT HOME, OR GET ALONG WITH OTHER PEOPLE: NOT DIFFICULT AT ALL
SUM OF ALL RESPONSES TO PHQ QUESTIONS 1-9: 1

## 2025-07-16 ASSESSMENT — PAIN SCALES - GENERAL: PAINLEVEL_OUTOF10: NO PAIN (0)

## 2025-07-16 NOTE — PROGRESS NOTES
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 Bili, TP)      Benign prostatic hyperplasia with urinary frequency        Relevant Medications    tamsulosin (FLOMAX) 0.4 MG capsule      Screening for prostate cancer        Relevant Orders    PSA, screen      Chronic pain of both knees          Unilateral inguinal hernia without obstruction or gangrene, recurrence not specified               Medicare well ness exam  Immunizations are good  Memory is good   Colonoscopy is up to date, will check psa today.    Weight gain is up 20 pounds since last year. Needs exercise but knee pains, will try intermittent fasting which worked for his wife, I do not think this is water weight, no swelling.     Knee pain chronic not a good surgical candidate, has had injections and synvisc before .     CHF and is stable on medications, jardiance, coreg, entresto. Has bumex but not needing usually.     BPH symptoms and slow flow, will add flomax back again, tried last year and hopefully will work.     Valve replaced and on coumadin, no blood loss.     The longitudinal plan of care for the diagnosis(es)/condition(s) as documented were addressed during  "this visit. Due to the added complexity in care, I will continue to support Min in the subsequent management and with ongoing continuity of care.                Patient has been advised of split billing requirements and indicates understanding: Yes    BMI  Estimated body mass index is 31.98 kg/m  as calculated from the following:    Height as of this encounter: 1.816 m (5' 11.5\").    Weight as of this encounter: 105.5 kg (232 lb 8 oz).   Weight management plan: Discussed healthy diet and exercise guidelines    Counseling  Appropriate preventive services were addressed with this patient via screening, questionnaire, or discussion as appropriate for fall prevention, nutrition, physical activity, Tobacco-use cessation, social engagement, weight loss and cognition.  Checklist reviewing preventive services available has been given to the patient.  Reviewed patient's diet, addressing concerns and/or questions.   The patient was provided with written information regarding signs of hearing loss.   Information on urinary incontinence and treatment options given to patient.   Reviewed preventive health counseling, as reflected in patient instructions       Regular exercise       Healthy diet/nutrition    Follow-up   No follow-ups on file.     Follow-up Visit   Expected date:  Jul 23, 2026 (Approximate)      Follow Up Appointment Details:     Follow-up with whom?: PCP    Follow-Up for what?: Medicare Wellness    Welcome or Annual?: Annual Wellness    How?: In Person                 Subjective   Min is a 69 year old, presenting for the following:  Wellness Visit        7/16/2025     9:51 AM   Additional Questions   Roomed by Blaire        HPI       He is doing ok, been to cardiology and nephrology.     Not needing bumex.     Weight is going up, up almost 20 pounds. Little less active just being older.     Urinates every 2 hours. Has decent volume, does have urgency. Tamsulosin didn't make a big difference.     Lipids were up " in Feb but back on atorvastatin.     Coumadin has been ok, no bleeding problems.     Advance Care Planning  Discussed advance care planning with patient; informed AVS has link to [employer].        7/16/2025   General Health   How would you rate your overall physical health? (!) FAIR   Feel stress (tense, anxious, or unable to sleep) Only a little   (!) STRESS CONCERN      7/16/2025   Nutrition   Diet: Regular (no restrictions)         7/16/2025   Exercise   Days per week of moderate/strenous exercise 4 days   Average minutes spent exercising at this level 10 min         7/16/2025   Social Factors   Frequency of gathering with friends or relatives Three times a week   Worry food won't last until get money to buy more No   Food not last or not have enough money for food? No   Do you have housing? (Housing is defined as stable permanent housing and does not include staying outside in a car, in a tent, in an abandoned building, in an overnight shelter, or couch-surfing.) No   Are you worried about losing your housing? No   Lack of transportation? No   Unable to get utilities (heat,electricity)? No   Want help with housing or utility concern? No   (!) HOUSING CONCERN PRESENT      7/16/2025   Fall Risk   Fallen 2 or more times in the past year? No     No   Trouble with walking or balance? Yes     Yes   Reason Gait Speed Test Not Completed Patient declines       Proxy-reported    Multiple values from one day are sorted in reverse-chronological order          7/16/2025   Activities of Daily Living- Home Safety   Needs help with the following daily activites None of the above   Safety concerns in the home None of the above         7/16/2025   Dental   Dentist two times every year? Yes         7/16/2025   Hearing Screening   Hearing concerns? (!) IT'S HARDER TO UNDERSTAND WOMEN'S VOICES THAN MEN'S VOICES.   Would you like a referral for hearing testing? No         7/16/2025   Driving Risk Screening   Patient/family  members have concerns about driving No         7/16/2025   General Alertness/Fatigue Screening   Have you been more tired than usual lately? No         7/16/2025   Urinary Incontinence Screening   Bothered by leaking urine in past 6 months Yes       Today's PHQ-9 Score:       7/16/2025     9:54 AM   PHQ-9 SCORE   PHQ-9 Total Score MyChart 1 (Minimal depression)   PHQ-9 Total Score 1        Patient-reported         7/16/2025   Substance Use   Alcohol more than 3/day or more than 7/wk No   Do you have a current opioid prescription? No   How severe/bad is pain from 1 to 10? 4/10   Do you use any other substances recreationally? No     Social History     Tobacco Use    Smoking status: Never     Passive exposure: Never    Smokeless tobacco: Never   Vaping Use    Vaping status: Never Used   Substance Use Topics    Alcohol use: Yes     Comment: reports very little etoh use.     Drug use: No           7/16/2025   AAA Screening   Family history of Abdominal Aortic Aneurysm (AAA)? (!) YES    ASCVD Risk   The 10-year ASCVD risk score (Imelda CORONA, et al., 2019) is: 24%    Values used to calculate the score:      Age: 69 years      Sex: Male      Is Non- : No      Diabetic: No      Tobacco smoker: No      Systolic Blood Pressure: 135 mmHg      Is BP treated: Yes      HDL Cholesterol: 50 mg/dL      Total Cholesterol: 225 mg/dL  Reviewed and updated as needed this visit by Provider                    Lab work is in process  Labs reviewed in EPIC  Current providers sharing in care for this patient include:  Patient Care Team:  Min Fried MD as PCP - General (Internal Medicine)  Madelin Vallecillo DO as Assigned Nephrology Provider  Celio May DO as MD (Gastroenterology)  Monserrat Brennan MD as MD (Otolaryngology)  Don Gilmore MD as Assigned Heart and Vascular Provider  Min Fried MD as Assigned PCP  Iker Putnam DPM as Assigned Surgical Provider  Madelin Vallecillo DO  as Physician (Nephrology)    The following health maintenance items are reviewed in Epic and correct as of today:  Health Maintenance   Topic Date Due    HF ACTION PLAN  Never done    DEPRESSION ACTION PLAN  Never done    RSV VACCINE (1 - Risk 60-74 years 1-dose series) Never done    ANNUAL REVIEW OF HM ORDERS  12/06/2024    MICROALBUMIN  01/05/2025    COVID-19 VACCINE (7 - 2024-25 season) 05/05/2025    MEDICARE ANNUAL WELLNESS VISIT  05/08/2025    INFLUENZA VACCINE (1) 09/01/2025    BMP  11/06/2025    PHQ-9  01/16/2026    ALT  02/17/2026    LIPID  02/17/2026    CBC  02/17/2026    HEMOGLOBIN  05/06/2026    FALL RISK ASSESSMENT  07/16/2026    DIABETES SCREENING  05/06/2028    DTAP/TDAP/TD VACCINE (4 - Td or Tdap) 03/04/2029    ADVANCE CARE PLANNING  05/08/2029    COLORECTAL CANCER SCREENING  06/22/2029    TSH W/FREE T4 REFLEX  Completed    HEPATITIS C SCREENING  Completed    PNEUMOCOCCAL VACCINE 50+ YEARS  Completed    URINALYSIS  Completed    ZOSTER VACCINE  Completed    HPV VACCINE  Aged Out    MENINGITIS VACCINE  Aged Out       Review of Systems  CONSTITUTIONAL:POSITIVE  for weight gain  INTEGUMENTARY/SKIN: NEGATIVE for worrisome rashes, moles or lesions  EYES: NEGATIVE for vision changes or irritation  ENT/MOUTH: NEGATIVE for ear, mouth and throat problems  RESP: NEGATIVE for significant cough or SOB  BREAST: NEGATIVE for masses, tenderness or discharge  CV: NEGATIVE for chest pain, palpitations or peripheral edema  GI: NEGATIVE for nausea, abdominal pain, heartburn, or change in bowel habits  : NEGATIVE for frequency, dysuria, or hematuria  MUSCULOSKELETAL: NEGATIVE for significant arthralgias or myalgia  NEURO: NEGATIVE for weakness, dizziness or paresthesias  ENDOCRINE: NEGATIVE for temperature intolerance, skin/hair changes  HEME: NEGATIVE for bleeding problems  PSYCHIATRIC: NEGATIVE for changes in mood or affect     Objective    Exam  /78 (BP Location: Left arm, Patient Position: Sitting, Cuff Size:  "Adult Large)   Pulse 56   Temp 97.4  F (36.3  C) (Temporal)   Resp 15   Ht 1.816 m (5' 11.5\")   Wt 105.5 kg (232 lb 8 oz)   SpO2 98%   BMI 31.98 kg/m     Estimated body mass index is 31.98 kg/m  as calculated from the following:    Height as of this encounter: 1.816 m (5' 11.5\").    Weight as of this encounter: 105.5 kg (232 lb 8 oz).    Physical Exam  GENERAL: alert and no distress  EYES: Eyes grossly normal to inspection, PERRL and conjunctivae and sclerae normal  HENT: ear canals and TM's normal, nose and mouth without ulcers or lesions  NECK: no adenopathy, no asymmetry, masses, or scars  RESP: lungs clear to auscultation - no rales, rhonchi or wheezes  CV: regular rates and rhythm, normal S1 S2, no S3 or S4, peripheral pulses strong, no peripheral edema, and valvular click   ABDOMEN: soft, nontender, no hepatosplenomegaly, no masses and bowel sounds normal  MS: no gross musculoskeletal defects noted, no edema  SKIN: no suspicious lesions or rashes  NEURO: Normal strength and tone, mentation intact and speech normal  PSYCH: mentation appears normal, affect normal/bright  Gait and balance assessed per Gait Speed Test.  Result as above.        7/16/2025   Mini Cog   Clock Draw Score 2 Normal   3 Item Recall 3 objects recalled   Mini Cog Total Score 5            Signed Electronically by: iMn Fried MD    Answers submitted by the patient for this visit:  Patient Health Questionnaire (Submitted on 7/16/2025)  If you checked off any problems, how difficult have these problems made it for you to do your work, take care of things at home, or get along with other people?: Not difficult at all  PHQ9 TOTAL SCORE: 1    "

## 2025-07-16 NOTE — PATIENT INSTRUCTIONS
Patient Education   Preventive Care Advice   This is general advice given by our system to help you stay healthy. However, your care team may have specific advice just for you. Please talk to your care team about your preventive care needs.  Nutrition  Eat 5 or more servings of fruits and vegetables each day.  Try wheat bread, brown rice and whole grain pasta (instead of white bread, rice, and pasta).  Get enough calcium and vitamin D. Check the label on foods and aim for 100% of the RDA (recommended daily allowance).  Lifestyle  Exercise at least 150 minutes each week  (30 minutes a day, 5 days a week).  Do muscle strengthening activities 2 days a week. These help control your weight and prevent disease.  No smoking.  Wear sunscreen to prevent skin cancer.  Have a dental exam and cleaning every 6 months.  Yearly exams  See your health care team every year to talk about:  Any changes in your health.  Any medicines your care team has prescribed.  Preventive care, family planning, and ways to prevent chronic diseases.  Shots (vaccines)   HPV shots (up to age 26), if you've never had them before.  Hepatitis B shots (up to age 59), if you've never had them before.  COVID-19 shot: Get this shot when it's due.  Flu shot: Get a flu shot every year.  Tetanus shot: Get a tetanus shot every 10 years.  Pneumococcal, hepatitis A, and RSV shots: Ask your care team if you need these based on your risk.  Shingles shot (for age 50 and up)  General health tests  Diabetes screening:  Starting at age 35, Get screened for diabetes at least every 3 years.  If you are younger than age 35, ask your care team if you should be screened for diabetes.  Cholesterol test: At age 39, start having a cholesterol test every 5 years, or more often if advised.  Bone density scan (DEXA): At age 50, ask your care team if you should have this scan for osteoporosis (brittle bones).  Hepatitis C: Get tested at least once in your life.  STIs (sexually  transmitted infections)  Before age 24: Ask your care team if you should be screened for STIs.  After age 24: Get screened for STIs if you're at risk. You are at risk for STIs (including HIV) if:  You are sexually active with more than one person.  You don't use condoms every time.  You or a partner was diagnosed with a sexually transmitted infection.  If you are at risk for HIV, ask about PrEP medicine to prevent HIV.  Get tested for HIV at least once in your life, whether you are at risk for HIV or not.  Cancer screening tests  Cervical cancer screening: If you have a cervix, begin getting regular cervical cancer screening tests starting at age 21.  Breast cancer scan (mammogram): If you've ever had breasts, begin having regular mammograms starting at age 40. This is a scan to check for breast cancer.  Colon cancer screening: It is important to start screening for colon cancer at age 45.  Have a colonoscopy test every 10 years (or more often if you're at risk) Or, ask your provider about stool tests like a FIT test every year or Cologuard test every 3 years.  To learn more about your testing options, visit:   .  For help making a decision, visit:   https://bit.ly/wd60056.  Prostate cancer screening test: If you have a prostate, ask your care team if a prostate cancer screening test (PSA) at age 55 is right for you.  Lung cancer screening: If you are a current or former smoker ages 50 to 80, ask your care team if ongoing lung cancer screenings are right for you.  For informational purposes only. Not to replace the advice of your health care provider. Copyright   2023 [facility]. All rights reserved. Clinically reviewed by the [facility]. [employer] 360840 - REV 01/24.  Preventing Falls: Care Instructions  Injuries and health problems such as trouble walking or poor eyesight can increase your risk of falling. So can some medicines. But there are things you can do to help  "prevent falls. You can exercise to get stronger. You can also arrange your home to make it safer.    Talk to your doctor about the medicines you take. Ask if any of them increase the risk of falls and whether they can be changed or stopped.   Try to exercise regularly. It can help improve your strength and balance. This can help lower your risk of falling.         Practice fall safety and prevention.   Wear low-heeled shoes that fit well and give your feet good support. Talk to your doctor if you have foot problems that make this hard.  Carry a cellphone or wear a medical alert device that you can use to call for help.  Use stepladders instead of chairs to reach high objects. Don't climb if you're at risk for falls. Ask for help, if needed.  Wear the correct eyeglasses, if you need them.        Make your home safer.   Remove rugs, cords, clutter, and furniture from walkways.  Keep your house well lit. Use night-lights in hallways and bathrooms.  Install and use sturdy handrails on stairways.  Wear nonskid footwear, even inside. Don't walk barefoot or in socks without shoes.        Be safe outside.   Use handrails, curb cuts, and ramps whenever possible.  Keep your hands free by using a shoulder bag or backpack.  Try to walk in well-lit areas. Watch out for uneven ground, changes in pavement, and debris.  Be careful in the winter. Walk on the grass or gravel when sidewalks are slippery. Use de-icer on steps and walkways. Add non-slip devices to shoes.    Put grab bars and nonskid mats in your shower or tub and near the toilet. Try to use a shower chair or bath bench when bathing.   Get into a tub or shower by putting in your weaker leg first. Get out with your strong side first. Have a phone or medical alert device in the bathroom with you.   Where can you learn more?  Go to https://www.[employer].net/patiented  Enter G117 in the search box to learn more about \"Preventing Falls: Care Instructions.\"  Current as of: " July 31, 2024  Content Version: 14.5    7135-4566 [employer].   Care instructions adapted under license by your healthcare professional. If you have questions about a medical condition or this instruction, always ask your healthcare professional. [employer] disclaims any warranty or liability for your use of this information.    Hearing Loss: Care Instructions  Overview     Hearing loss is a sudden or slow decrease in how well you hear. It can range from slight to profound. Permanent hearing loss can occur with aging. It also can happen when you are exposed long-term to loud noise. Examples include listening to loud music, riding motorcycles, or being around other loud machines.  Hearing loss can affect your work and home life. It can make you feel lonely or depressed. You may feel that you have lost your independence. But hearing aids and other devices can help you hear better and feel connected to others.  Follow-up care is a key part of your treatment and safety. Be sure to make and go to all appointments, and call your doctor if you are having problems. It's also a good idea to know your test results and keep a list of the medicines you take.  How can you care for yourself at home?  Avoid loud noises whenever possible. This helps keep your hearing from getting worse.  Always wear hearing protection around loud noises.  Wear a hearing aid as directed.  A professional can help you pick a hearing aid that will work best for you.  You can also get hearing aids over the counter for mild to moderate hearing loss.  Have hearing tests as your doctor suggests. They can show whether your hearing has changed. Your hearing aid may need to be adjusted.  Use other devices as needed. These may include:  Telephone amplifiers and hearing aids that can connect to a television, stereo, radio, or microphone.  Devices that use lights or vibrations. These alert you to the doorbell, a ringing telephone, or a  "baby monitor.  Television closed-captioning. This shows the words at the bottom of the screen. Most new TVs can do this.  TTY (text telephone). This lets you type messages back and forth on the telephone instead of talking or listening. These devices are also called TDD. When messages are typed on the keyboard, they are sent over the phone line to a receiving TTY. The message is shown on a monitor.  Use text messaging, social media, and email if it is hard for you to communicate by telephone.  Try to learn a listening technique called speechreading. It is not lipreading. You pay attention to people's gestures, expressions, posture, and tone of voice. These clues can help you understand what a person is saying. Face the person you are talking to, and have them face you. Make sure the lighting is good. You need to see the other person's face clearly.  Think about counseling if you need help to adjust to your hearing loss.  When should you call for help?  Watch closely for changes in your health, and be sure to contact your doctor if:    You think your hearing is getting worse.     You have new symptoms, such as dizziness or nausea.   Where can you learn more?  Go to https://www.[employer].net/patiented  Enter R798 in the search box to learn more about \"Hearing Loss: Care Instructions.\"  Current as of: October 27, 2024  Content Version: 14.5 2024-2025 [employer].   Care instructions adapted under license by your healthcare professional. If you have questions about a medical condition or this instruction, always ask your healthcare professional. [employer] disclaims any warranty or liability for your use of this information.    Bladder Training: Care Instructions  Your Care Instructions     Bladder training is used to treat urge incontinence and stress incontinence. Urge incontinence means that the need to urinate comes on so fast that you can't get to a toilet in time. Stress incontinence " means that you leak urine because of pressure on your bladder. For example, it may happen when you laugh, cough, or lift something heavy.  Bladder training can increase how long you can wait before you have to urinate. It can also help your bladder hold more urine. And it can give you better control over the urge to urinate.  It is important to remember that bladder training takes a few weeks to a few months to make a difference. You may not see results right away, but don't give up.  Follow-up care is a key part of your treatment and safety. Be sure to make and go to all appointments, and call your doctor if you are having problems. It's also a good idea to know your test results and keep a list of the medicines you take.  How can you care for yourself at home?  Work with your doctor to come up with a bladder training program that is right for you. You may use one or more of the following methods.  Delayed urination  In the beginning, try to keep from urinating for 5 minutes after you first feel the need to go.  While you wait, take deep, slow breaths to relax. Kegel exercises can also help you delay the need to go to the bathroom.  After some practice, when you can easily wait 5 minutes to urinate, try to wait 10 minutes before you urinate.  Slowly increase the waiting period until you are able to control when you have to urinate.  Scheduled urination  Empty your bladder when you first wake up in the morning.  Schedule times throughout the day when you will urinate.  Start by going to the bathroom every hour, even if you don't need to go.  Slowly increase the time between trips to the bathroom.  When you have found a schedule that works well for you, keep doing it.  If you wake up during the night and have to urinate, do it. Apply your schedule to waking hours only.  Kegel exercises  These tighten and strengthen pelvic muscles, which can help you control the flow of urine. (If doing these exercises causes pain,  "stop doing them and talk with your doctor.) To do Kegel exercises:  Squeeze your muscles as if you were trying not to pass gas. Or squeeze your muscles as if you were stopping the flow of urine. Your belly, legs, and buttocks shouldn't move.  Hold the squeeze for 3 seconds, then relax for 5 to 10 seconds.  Start with 3 seconds, then add 1 second each week until you are able to squeeze for 10 seconds.  Repeat the exercise 10 times a session. Do 3 to 8 sessions a day.  When should you call for help?  Watch closely for changes in your health, and be sure to contact your doctor if:    Your incontinence is getting worse.     You do not get better as expected.   Where can you learn more?  Go to https://www.[employer].net/patiented  Enter V684 in the search box to learn more about \"Bladder Training: Care Instructions.\"  Current as of: April 30, 2024  Content Version: 14.5    4751-4630 [employer].   Care instructions adapted under license by your healthcare professional. If you have questions about a medical condition or this instruction, always ask your healthcare professional. [employer] disclaims any warranty or liability for your use of this information.       "

## 2025-07-21 ENCOUNTER — RESULTS FOLLOW-UP (OUTPATIENT)
Dept: ANTICOAGULATION | Facility: CLINIC | Age: 70
End: 2025-07-21
Payer: MEDICARE

## 2025-07-21 ENCOUNTER — ANTICOAGULATION THERAPY VISIT (OUTPATIENT)
Dept: ANTICOAGULATION | Facility: CLINIC | Age: 70
End: 2025-07-21
Payer: MEDICARE

## 2025-07-21 DIAGNOSIS — Z79.01 LONG TERM CURRENT USE OF ANTICOAGULANT THERAPY: ICD-10-CM

## 2025-07-21 DIAGNOSIS — Z95.2 H/O MECHANICAL AORTIC VALVE REPLACEMENT: Primary | ICD-10-CM

## 2025-07-21 LAB — INR HOME MONITORING: 2.2 (ref 2–3)

## 2025-07-21 NOTE — PROGRESS NOTES
ANTICOAGULATION MANAGEMENT     Min Andino 69 year old male is on warfarin with therapeutic INR result. (Goal INR 2.0-3.0)    Recent labs: (last 7 days)     07/18/25  0000   INR 2.2       ASSESSMENT     Source(s): Chart Review and Patient/Caregiver Call     Warfarin doses taken: Warfarin taken as instructed  Diet: No new diet changes identified  Medication/supplement changes: None noted  New illness, injury, or hospitalization: No  Signs or symptoms of bleeding or clotting: No  Previous result: Subtherapeutic  Additional findings: None       PLAN     Recommended plan for no diet, medication or health factor changes affecting INR     Dosing Instructions: Continue your current warfarin dose with next INR in 2 weeks       Summary  As of 7/21/2025      Full warfarin instructions:  2 mg every Mon, Fri; 3 mg all other days   Next INR check:  8/1/2025               Telephone call with Min who agrees to plan and repeated back plan correctly    Patient to recheck with home meter    Education provided: Resume manage by exception with home monitor. Continue to submit INR results to home monitor company.You will only be called when your result is out of range and at 90 day check in. Please call and notify [facility] if new medication started, dose missed, signs or symptoms of bleeding or clotting, or a surgery/procedure is scheduled. Due for next call no later than: 10/19/25.    Plan made per [facility] anticoagulation protocol    Tyesha Leonard RN  7/21/2025  Anticoagulation Clinic  [facility] for routing messages: p ANTICOAG HOME MONITORING  [facility] patient phone line: [phone]        _______________________________________________________________________     Anticoagulation Episode Summary       Current INR goal:  2.0-3.0   TTR:  87.2% (1 y)   Target end date:  Indefinite   Send INR reminders to:  ANTICOAG HOME MONITORING    Indications    H/O mechanical aortic valve replacement [Z95.2]  Long term current use of anticoagulant  therapy [Z79.01]             Comments:  [employer] home meter / Managed by Exception             Anticoagulation Care Providers       Provider Role Specialty Phone number    Min Fried MD Referring Internal Medicine [phone]

## 2025-08-06 ENCOUNTER — E-VISIT (OUTPATIENT)
Dept: INTERNAL MEDICINE | Facility: CLINIC | Age: 70
End: 2025-08-06
Payer: COMMERCIAL

## 2025-08-06 DIAGNOSIS — N40.1 BENIGN PROSTATIC HYPERPLASIA WITH URINARY FREQUENCY: ICD-10-CM

## 2025-08-06 DIAGNOSIS — R35.0 BENIGN PROSTATIC HYPERPLASIA WITH URINARY FREQUENCY: ICD-10-CM

## 2025-08-06 PROCEDURE — 99207 PR NON-BILLABLE SERV PER CHARTING: CPT | Performed by: INTERNAL MEDICINE

## 2025-08-11 RX ORDER — TAMSULOSIN HYDROCHLORIDE 0.4 MG/1
0.4 CAPSULE ORAL 2 TIMES DAILY
Qty: 180 CAPSULE | Refills: 3 | Status: SHIPPED | OUTPATIENT
Start: 2025-08-11

## 2025-08-14 ENCOUNTER — DOCUMENTATION ONLY (OUTPATIENT)
Dept: ANTICOAGULATION | Facility: CLINIC | Age: 70
End: 2025-08-14
Payer: MEDICARE

## 2025-08-14 DIAGNOSIS — Z95.2 H/O MECHANICAL AORTIC VALVE REPLACEMENT: Primary | ICD-10-CM

## 2025-08-14 DIAGNOSIS — Z79.01 LONG TERM CURRENT USE OF ANTICOAGULANT THERAPY: ICD-10-CM

## 2025-08-14 LAB — INR HOME MONITORING: 2 (ref 2–3)

## 2025-08-30 LAB — INR HOME MONITORING: 1.9 (ref 2–3)

## 2025-09-02 ENCOUNTER — ANTICOAGULATION THERAPY VISIT (OUTPATIENT)
Dept: ANTICOAGULATION | Facility: CLINIC | Age: 70
End: 2025-09-02
Payer: MEDICARE

## 2025-09-02 ENCOUNTER — TELEPHONE (OUTPATIENT)
Dept: INTERNAL MEDICINE | Facility: CLINIC | Age: 70
End: 2025-09-02
Payer: MEDICARE

## 2025-09-02 DIAGNOSIS — Z95.2 H/O MECHANICAL AORTIC VALVE REPLACEMENT: Primary | ICD-10-CM

## 2025-09-02 DIAGNOSIS — Z79.01 LONG TERM CURRENT USE OF ANTICOAGULANT THERAPY: ICD-10-CM

## (undated) RX ORDER — FENTANYL CITRATE 50 UG/ML
INJECTION, SOLUTION INTRAMUSCULAR; INTRAVENOUS
Status: DISPENSED
Start: 2022-06-22